# Patient Record
Sex: MALE | Race: WHITE | NOT HISPANIC OR LATINO | Employment: OTHER | URBAN - METROPOLITAN AREA
[De-identification: names, ages, dates, MRNs, and addresses within clinical notes are randomized per-mention and may not be internally consistent; named-entity substitution may affect disease eponyms.]

---

## 2017-01-10 DIAGNOSIS — L30.9 ECZEMA: Primary | ICD-10-CM

## 2017-01-10 NOTE — TELEPHONE ENCOUNTER
fluocinonide      Last Written Prescription Date: 11/10/2016  Last Fill Quantity: 60,  # refills: 0   Last Office Visit with [facility], [facility] or [facility] prescribing provider: 12/27/2016

## 2017-01-12 RX ORDER — FLUOCINONIDE 0.5 MG/G
CREAM TOPICAL
Qty: 60 G | Refills: 0 | Status: SHIPPED | OUTPATIENT
Start: 2017-01-12 | End: 2017-03-05

## 2017-01-12 NOTE — TELEPHONE ENCOUNTER
Prescription approved per [facility] Refill Protocol.  Zahida Carbajal RN

## 2017-03-05 DIAGNOSIS — L30.9 ECZEMA: ICD-10-CM

## 2017-03-06 NOTE — TELEPHONE ENCOUNTER
Fluocinonide   Last Written Prescription Date: 01/12/2017  Last Fill Quantity: 60,  # refills: 0   Last Office Visit with [facility], [facility] or [facility] prescribing provider: 12/27/2016

## 2017-03-07 RX ORDER — FLUOCINONIDE 0.5 MG/G
CREAM TOPICAL
Qty: 60 G | Refills: 5 | Status: SHIPPED | OUTPATIENT
Start: 2017-03-07 | End: 2017-11-29

## 2017-03-07 NOTE — TELEPHONE ENCOUNTER
Prescription approved per [facility] Refill Protocol.  Zoila Matthews RN

## 2017-03-17 DIAGNOSIS — J30.2 SEASONAL ALLERGIC RHINITIS: ICD-10-CM

## 2017-03-17 NOTE — TELEPHONE ENCOUNTER
Ventolin 108 mcg       Last Written Prescription Date: 03/25/2016  Last Fill Quantity: 3, # refills: 3    Last Office Visit with G, [facility] or [facility] prescribing provider:  03/25/2016   Future Office Visit:       Date of Last Asthma Action Plan Letter:   Asthma Action Plan Q1 Year    Topic Date Due     Asthma Action Plan - yearly  12/27/2017      Asthma Control Test:   ACT Total Scores 12/27/2016   ACT TOTAL SCORE -   ASTHMA ER VISITS -   ASTHMA HOSPITALIZATIONS -   ACT TOTAL SCORE (Goal Greater than or Equal to 20) 22   In the past 12 months, how many times did you visit the emergency room for your asthma without being admitted to the hospital? 0   In the past 12 months, how many times were you hospitalized overnight because of your asthma? 0       Date of Last Spirometry Test:   No results found for this or any previous visit.

## 2017-03-20 RX ORDER — ALBUTEROL SULFATE 90 UG/1
AEROSOL, METERED RESPIRATORY (INHALATION)
Qty: 54 G | Refills: 3 | Status: SHIPPED | OUTPATIENT
Start: 2017-03-20 | End: 2018-02-27

## 2017-03-20 NOTE — TELEPHONE ENCOUNTER
Prescription approved per [facility] Refill Protocol.  Boogie Carlin RN

## 2017-06-27 DIAGNOSIS — J45.31 MILD PERSISTENT ASTHMA WITH ACUTE EXACERBATION: ICD-10-CM

## 2017-06-27 NOTE — TELEPHONE ENCOUNTER
Symbicort 160-4.5 mcg       Last Written Prescription Date: 9/19/2016  Last Fill Quantity: 3, # refills: 2    Last Office Visit with G, P or [facility] prescribing provider:  12/27/2016   Future Office Visit:       Date of Last Asthma Action Plan Letter:   Asthma Action Plan Q1 Year    Topic Date Due     Asthma Action Plan - yearly  12/27/2017      Asthma Control Test:   ACT Total Scores 12/27/2016   ACT TOTAL SCORE -   ASTHMA ER VISITS -   ASTHMA HOSPITALIZATIONS -   ACT TOTAL SCORE (Goal Greater than or Equal to 20) 22   In the past 12 months, how many times did you visit the emergency room for your asthma without being admitted to the hospital? 0   In the past 12 months, how many times were you hospitalized overnight because of your asthma? 0       Date of Last Spirometry Test:   No results found for this or any previous visit.

## 2017-06-28 RX ORDER — BUDESONIDE AND FORMOTEROL FUMARATE DIHYDRATE 160; 4.5 UG/1; UG/1
AEROSOL RESPIRATORY (INHALATION)
Qty: 3 INHALER | Refills: 1 | Status: SHIPPED | OUTPATIENT
Start: 2017-06-28 | End: 2017-11-29

## 2017-06-28 NOTE — TELEPHONE ENCOUNTER
Prescription approved per [facility] [place] Refill Protocol.  Per LOV okay to follow up in a year.   Zahida Carbajal RN, BSN

## 2017-11-29 DIAGNOSIS — L30.9 ECZEMA: ICD-10-CM

## 2017-11-29 DIAGNOSIS — J45.31 MILD PERSISTENT ASTHMA WITH ACUTE EXACERBATION: ICD-10-CM

## 2017-11-30 RX ORDER — FLUOCINONIDE 0.5 MG/G
CREAM TOPICAL
Qty: 60 G | Refills: 0 | Status: SHIPPED | OUTPATIENT
Start: 2017-11-30 | End: 2017-12-29

## 2017-11-30 RX ORDER — BUDESONIDE AND FORMOTEROL FUMARATE DIHYDRATE 160; 4.5 UG/1; UG/1
AEROSOL RESPIRATORY (INHALATION)
Qty: 30.6 G | Refills: 0 | Status: SHIPPED | OUTPATIENT
Start: 2017-11-30 | End: 2017-12-29

## 2017-11-30 NOTE — TELEPHONE ENCOUNTER
Symbicort & Lidex:  Prescription approved per [facility] Refill Protocol.    Rolanda Merrill, RN, BSN

## 2017-12-22 NOTE — PROGRESS NOTES
SUBJECTIVE:   CC: Mainor Grande is an 62 year old male who presents for preventative health visit.     Physical   Annual:     Getting at least 3 servings of Calcium per day::  NO    Bi-annual eye exam::  Yes    Dental care twice a year::  Yes    Sleep apnea or symptoms of sleep apnea::  Daytime drowsiness    Diet::  Regular (no restrictions)    Frequency of exercise::  2-3 days/week    Duration of exercise::  15-30 minutes    Taking medications regularly::  Yes    Medication side effects::  Not applicable    Additional concerns today::  YES          Answers for HPI/ROS submitted by the patient on 12/28/2017   PHQ-2 Score: 2      Today's PHQ-2 Score:   PHQ-2 ( 1999 [employer]) 12/28/2017   Q1: Little interest or pleasure in doing things 1   Q2: Feeling down, depressed or hopeless 1   PHQ-2 Score 2   Q1: Little interest or pleasure in doing things Several days   Q2: Feeling down, depressed or hopeless Several days   PHQ-2 Score 2       Abuse: Current or Past(Physical, Sexual or Emotional)- No  Do you feel safe in your environment - Yes    Social History   Substance Use Topics     Smoking status: Never Smoker     Smokeless tobacco: Never Used     Alcohol use 6.0 oz/week      Comment: caffeine 6 per day     Alcohol Use 12/28/2017   If you drink alcohol, do you typically have greater than 3 drinks per day OR greater than 7 drinks per week?   No   No flowsheet data found.      Last PSA:   PSA   Date Value Ref Range Status   09/05/2014 2.34 0 - 4 ug/L Final       Reviewed orders with patient. Reviewed health maintenance and updated orders accordingly - Yes  Labs reviewed in EPIC  BP Readings from Last 3 Encounters:   12/29/17 118/78   12/27/16 124/74   07/28/16 136/77    Wt Readings from Last 3 Encounters:   12/29/17 214 lb (97.1 kg)   12/27/16 210 lb (95.3 kg)   07/28/16 207 lb 4.8 oz (94 kg)                  Patient Active Problem List   Diagnosis     Reflux esophagitis     Abnormal levels of other serum enzymes      Erectile dysfunction     Mild persistent asthma without complication     Fatty liver     HYPERLIPIDEMIA LDL GOAL <160     Seasonal allergic rhinitis     Deviated nasal septum     Chronic nasal congestion     Past Surgical History:   Procedure Laterality Date     COLONOSCOPY  9/4/2013    Procedure: COLONOSCOPY;  colonoscopy;  Surgeon: Beto Keen MD;  Location: [facility]     HC COLONOSCOPY THRU STOMA, DIAGNOSTIC  2005    normal     HC VASECTOMY UNILAT/BILAT W POSTOP SEMEN  1992    Vasectomy       Social History   Substance Use Topics     Smoking status: Never Smoker     Smokeless tobacco: Never Used     Alcohol use 6.0 oz/week      Comment: caffeine 6 per day     Family History   Problem Relation Age of Onset     CANCER Mother      bladder cancer     Neurologic Disorder Father      alzheimers dementia     GASTROINTESTINAL DISEASE Brother      divertculitis         Current Outpatient Prescriptions   Medication Sig Dispense Refill     budesonide-formoterol (SYMBICORT) 160-4.5 MCG/ACT Inhaler Inhale 2 puffs into the lungs 2 times daily 30.6 g 11     fluocinonide (LIDEX) 0.05 % cream Apply topically 2 times daily APPLY SPARINGLY TO AFFECTED AREA TWICE DAILY AS NEEDED. DO NOT APPLY TO FACE. 60 g 0     VENTOLIN  (90 BASE) MCG/ACT Inhaler INHALE 1-2 PUFFS EVERY 4-6 HOURS AS NEEDED 54 g 3     Ibuprofen (ADVIL PO)        ASPIRIN PO Take 162 mg by mouth at onset of headache for moderate pain       esomeprazole (NEXIUM) 20 MG capsule Take 20 mg by mouth every other day Take 30-60 minutes before eating.       Multiple Vitamins-Minerals (MULTIVITAMIN ADULT PO)        melatonin 1 MG TABS Take 1 mg by mouth nightly as needed for sleep       fluticasone (FLONASE) 50 MCG/ACT nasal spray Spray 1-2 sprays into both nostrils daily 16 g 11     azelastine (ASTELIN) 0.1 % spray SPRAY 2 SPRAYS IN EACH NOSTRIL TWO TIMES A DAY 90 mL 3     Allergies   Allergen Reactions     No Known Drug Allergies      Seasonal Allergies      Recent  Labs   Lab Test  01/02/18   0720  06/02/16   0837  12/22/15   0846   09/05/14   1508   LDL  91   --   112*   --   119   HDL  65   --   82   --   73   TRIG  67   --   67   --   113   ALT  64  61  125*   --    --    CR  1.04  1.03  1.01   < >   --    GFRESTIMATED  72  73  75   < >   --    GFRESTBLACK  87  88  >90   GFR Calc     < >   --    POTASSIUM  4.1  4.1  4.3   < >   --    TSH   --   1.49   --    --    --     < > = values in this interval not displayed.              Reviewed and updated as needed this visit by clinical staffTobacco  Allergies  Meds  Problems  Med Hx  Surg Hx  Fam Hx  Soc Hx          Reviewed and updated as needed this visit by Provider  Allergies  Meds  Problems          Past Medical History:   Diagnosis Date     Allergic rhinitis, cause unspecified      Fatty liver 2004    elevated LFT, saw GI for a consultation     Mild persistent asthma 9/12/2008     Reflux esophagitis      Unspecified disease of respiratory system     RAD     Viremia, unspecified     acute      Past Surgical History:   Procedure Laterality Date     COLONOSCOPY  9/4/2013    Procedure: COLONOSCOPY;  colonoscopy;  Surgeon: Beto Keen MD;  Location:  GI     HC COLONOSCOPY THRU STOMA, DIAGNOSTIC  2005    normal     HC VASECTOMY UNILAT/BILAT W POSTOP SEMEN  1992    Vasectomy         Review of Systems   Constitutional: Negative for chills and fever.   HENT: Negative for congestion, ear pain, hearing loss and sore throat.    Eyes: Positive for visual disturbance. Negative for pain.   Respiratory: Negative for cough.    Cardiovascular: Negative for chest pain, palpitations and peripheral edema.   Gastrointestinal: Negative for abdominal pain, constipation, diarrhea, heartburn, hematochezia and nausea.   Genitourinary: Negative for discharge, dysuria, frequency, genital sores, hematuria, impotence and urgency.   Musculoskeletal: Negative for arthralgias, joint swelling and myalgias.   Skin: Positive  "for rash.   Neurological: Positive for paresthesias. Negative for dizziness, weakness and headaches.   Psychiatric/Behavioral: Positive for mood changes. The patient is not nervous/anxious.        OBJECTIVE:   /78 (BP Location: Right arm, Patient Position: Chair, Cuff Size: Adult Regular)  Pulse 66  Temp 97.8  F (36.6  C) (Oral)  Resp 16  Ht 5' 10.28\" (1.785 m)  Wt 214 lb (97.1 kg)  BMI 30.46 kg/m2    Physical Exam   Constitutional: He is oriented to person, place, and time. He appears well-developed and well-nourished. No distress.   HENT:   Right Ear: Tympanic membrane and external ear normal.   Left Ear: Tympanic membrane and external ear normal.   Nose: Nose normal.   Mouth/Throat: Oropharynx is clear and moist. No oral lesions. No oropharyngeal exudate.   Eyes: Conjunctivae are normal. Pupils are equal, round, and reactive to light. Right eye exhibits no discharge. Left eye exhibits no discharge.   Neck: Neck supple. No tracheal deviation present. No thyromegaly present.   Cardiovascular: Normal rate, regular rhythm, S1 normal, S2 normal, normal heart sounds and normal pulses.  Exam reveals no S3 and no S4.    No murmur heard.  Pulmonary/Chest: Effort normal and breath sounds normal. No respiratory distress. He has no wheezes. He has no rales.   Abdominal: Soft. Bowel sounds are normal. He exhibits no mass. There is no hepatosplenomegaly. There is no tenderness.   Musculoskeletal: Normal range of motion. He exhibits no edema or deformity.   Lymphadenopathy:     He has no cervical adenopathy.   Neurological: He is alert and oriented to person, place, and time. He has normal strength and normal reflexes. He exhibits normal muscle tone.   Skin: Skin is warm and dry. No lesion and no rash noted.   Psychiatric: He has a normal mood and affect. His speech is normal. Judgment and thought content normal. Cognition and memory are normal.         ASSESSMENT/PLAN:     Problem List Items Addressed This Visit  " "   None      Visit Diagnoses     Screening for lipoid disorders    -  Primary    Relevant Orders    Lipid Profile (Chol, Trig, HDL, LDL calc) (Completed)    Mild persistent asthma with acute exacerbation        Relevant Medications    budesonide-formoterol (SYMBICORT) 160-4.5 MCG/ACT Inhaler    Eczema, unspecified type        Relevant Medications    fluocinonide (LIDEX) 0.05 % cream    Transaminitis        Relevant Orders    Comprehensive metabolic panel (BMP + Alb, Alk Phos, ALT, AST, Total. Bili, TP) (Completed)    Encounter for routine adult health examination without abnormal findings                COUNSELING:   Reviewed preventive health counseling, as reflected in patient instructions       Regular exercise       Healthy diet/nutrition       Vision screening       Hearing screening           reports that he has never smoked. He has never used smokeless tobacco.      Estimated body mass index is 30.46 kg/(m^2) as calculated from the following:    Height as of this encounter: 5' 10.28\" (1.785 m).    Weight as of this encounter: 214 lb (97.1 kg).         Counseling Resources:  ATP IV Guidelines  Pooled Cohorts Equation Calculator  FRAX Risk Assessment  ICSI Preventive Guidelines  Dietary Guidelines for Americans, 2010  USDA's MyPlate  ASA Prophylaxis  Lung CA Screening    Karen Rodriguez MD  [facility]  "

## 2017-12-28 ASSESSMENT — ENCOUNTER SYMPTOMS
HEMATOCHEZIA: 0
PARESTHESIAS: 1
CHILLS: 0
ABDOMINAL PAIN: 0
COUGH: 0
FREQUENCY: 0
HEARTBURN: 0
CONSTIPATION: 0
DIZZINESS: 0
JOINT SWELLING: 0
DIARRHEA: 0
HEMATURIA: 0
ARTHRALGIAS: 0
MYALGIAS: 0
NAUSEA: 0
DYSURIA: 0
HEADACHES: 0
WEAKNESS: 0
NERVOUS/ANXIOUS: 0
EYE PAIN: 0
PALPITATIONS: 0
FEVER: 0
SORE THROAT: 0

## 2017-12-29 ENCOUNTER — OFFICE VISIT (OUTPATIENT)
Dept: FAMILY MEDICINE | Facility: OTHER | Age: 62
End: 2017-12-29
Payer: COMMERCIAL

## 2017-12-29 VITALS
SYSTOLIC BLOOD PRESSURE: 118 MMHG | HEART RATE: 66 BPM | BODY MASS INDEX: 30.64 KG/M2 | DIASTOLIC BLOOD PRESSURE: 78 MMHG | RESPIRATION RATE: 16 BRPM | WEIGHT: 214 LBS | HEIGHT: 70 IN | TEMPERATURE: 97.8 F

## 2017-12-29 DIAGNOSIS — R74.01 TRANSAMINITIS: ICD-10-CM

## 2017-12-29 DIAGNOSIS — Z00.00 ENCOUNTER FOR ROUTINE ADULT HEALTH EXAMINATION WITHOUT ABNORMAL FINDINGS: Primary | ICD-10-CM

## 2017-12-29 DIAGNOSIS — Z13.220 SCREENING FOR LIPOID DISORDERS: ICD-10-CM

## 2017-12-29 DIAGNOSIS — L30.9 ECZEMA, UNSPECIFIED TYPE: ICD-10-CM

## 2017-12-29 DIAGNOSIS — J45.31 MILD PERSISTENT ASTHMA WITH ACUTE EXACERBATION: ICD-10-CM

## 2017-12-29 PROCEDURE — 99396 PREV VISIT EST AGE 40-64: CPT | Performed by: FAMILY MEDICINE

## 2017-12-29 RX ORDER — FLUOCINONIDE 0.5 MG/G
CREAM TOPICAL 2 TIMES DAILY
Qty: 60 G | Refills: 0 | Status: SHIPPED | OUTPATIENT
Start: 2017-12-29 | End: 2018-01-24

## 2017-12-29 RX ORDER — BUDESONIDE AND FORMOTEROL FUMARATE DIHYDRATE 160; 4.5 UG/1; UG/1
2 AEROSOL RESPIRATORY (INHALATION) 2 TIMES DAILY
Qty: 30.6 G | Refills: 11 | Status: SHIPPED | OUTPATIENT
Start: 2017-12-29 | End: 2019-01-17

## 2017-12-29 ASSESSMENT — ENCOUNTER SYMPTOMS
ARTHRALGIAS: 0
COUGH: 0
FEVER: 0
SORE THROAT: 0
HEADACHES: 0
FREQUENCY: 0
HEMATURIA: 0
HEMATOCHEZIA: 0
DYSURIA: 0
MYALGIAS: 0
DIARRHEA: 0
JOINT SWELLING: 0
CONSTIPATION: 0
DIZZINESS: 0
NAUSEA: 0
PALPITATIONS: 0
EYE PAIN: 0
NERVOUS/ANXIOUS: 0
WEAKNESS: 0
CHILLS: 0
HEARTBURN: 0
ABDOMINAL PAIN: 0
PARESTHESIAS: 1

## 2017-12-29 ASSESSMENT — PAIN SCALES - GENERAL: PAINLEVEL: NO PAIN (0)

## 2017-12-29 NOTE — NURSING NOTE
"Chief Complaint   Patient presents with     Physical     Panel Management     Flu, ACT, AAP, AD       Initial /78 (BP Location: Right arm, Patient Position: Chair, Cuff Size: Adult Regular)  Pulse 66  Temp 97.8  F (36.6  C) (Oral)  Resp 16  Ht 5' 10.28\" (1.785 m)  Wt 214 lb (97.1 kg)  BMI 30.46 kg/m2 Estimated body mass index is 30.46 kg/(m^2) as calculated from the following:    Height as of this encounter: 5' 10.28\" (1.785 m).    Weight as of this encounter: 214 lb (97.1 kg).  Medication Reconciliation: complete   Erin Cortes CMA (AAMA)      "

## 2017-12-29 NOTE — MR AVS SNAPSHOT
After Visit Summary   12/29/2017    Mainor Grande    MRN: [id number]           Patient Information     Date Of Birth          1955        Visit Information        Provider Department      12/29/2017 2:20 PM Karen Rodriguez MD [facility]        Today's Diagnoses     Screening for lipoid disorders    -  1    Mild persistent asthma with acute exacerbation        Eczema, unspecified type        Transaminitis          Care Instructions      Preventive Health Recommendations  Male Ages 50 - 64    Yearly exam:             See your health care provider every year in order to  o   Review health changes.   o   Discuss preventive care.    o   Review your medicines if your doctor has prescribed any.     Have a cholesterol test every 5 years, or more frequently if you are at risk for high cholesterol/heart disease.     Have a diabetes test (fasting glucose) every three years. If you are at risk for diabetes, you should have this test more often.     Have a colonoscopy at age 50, or have a yearly FIT test (stool test). These exams will check for colon cancer.      Talk with your health care provider about whether or not a prostate cancer screening test (PSA) is right for you.    You should be tested each year for STDs (sexually transmitted diseases), if you re at risk.     Shots: Get a flu shot each year. Get a tetanus shot every 10 years.     Nutrition:    Eat at least 5 servings of fruits and vegetables daily.     Eat whole-grain bread, whole-wheat pasta and brown rice instead of white grains and rice.     Talk to your provider about Calcium and Vitamin D.     Lifestyle    Exercise for at least 150 minutes a week (30 minutes a day, 5 days a week). This will help you control your weight and prevent disease.     Limit alcohol to one drink per day.     No smoking.     Wear sunscreen to prevent skin cancer.     See your dentist every six months for an exam and cleaning.     See your eye doctor  "every 1 to 2 years.            Follow-ups after your visit        Future tests that were ordered for you today     Open Future Orders        Priority Expected Expires Ordered    Comprehensive metabolic panel (BMP + Alb, Alk Phos, ALT, AST, Total. Bili, TP) Routine  12/29/2018 12/29/2017    Lipid Profile (Chol, Trig, HDL, LDL calc) Routine  12/29/2018 12/29/2017            Who to contact     If you have questions or need follow up information about today's clinic visit or your schedule please contact [facility] [place] directly at [phone].  Normal or non-critical lab and imaging results will be communicated to you by [employer], letter or phone within 4 business days after the clinic has received the results. If you do not hear from us within 7 days, please contact the clinic through [employer] or phone. If you have a critical or abnormal lab result, we will notify you by phone as soon as possible.  Submit refill requests through [employer] or call your pharmacy and they will forward the refill request to us. Please allow 3 business days for your refill to be completed.          Additional Information About Your Visit        MyChart Information     [employer] gives you secure access to your electronic health record. If you see a primary care provider, you can also send messages to your care team and make appointments. If you have questions, please call your primary care clinic.  If you do not have a primary care provider, please call [phone] and they will assist you.        Care EveryWhere ID     This is your Care EveryWhere ID. This could be used by other organizations to access your [place] medical records  [id number]        Your Vitals Were     Pulse Temperature Respirations Height BMI (Body Mass Index)       66 97.8  F (36.6  C) (Oral) 16 5' 10.28\" (1.785 m) 30.46 kg/m2        Blood Pressure from Last 3 Encounters:   12/29/17 118/78   12/27/16 124/74   07/28/16 136/77    Weight from Last 3 Encounters: "   12/29/17 214 lb (97.1 kg)   12/27/16 210 lb (95.3 kg)   07/28/16 207 lb 4.8 oz (94 kg)                 Today's Medication Changes          These changes are accurate as of: 12/29/17  3:13 PM.  If you have any questions, ask your nurse or doctor.               These medicines have changed or have updated prescriptions.        Dose/Directions    budesonide-formoterol 160-4.5 MCG/ACT Inhaler   Commonly known as:  SYMBICORT   This may have changed:  See the new instructions.   Used for:  Mild persistent asthma with acute exacerbation   Changed by:  Karen Rodriguez MD        Dose:  2 puff   Inhale 2 puffs into the lungs 2 times daily   Quantity:  30.6 g   Refills:  11       fluocinonide 0.05 % cream   Commonly known as:  LIDEX   This may have changed:  See the new instructions.   Used for:  Eczema, unspecified type   Changed by:  Karen Rodriguez MD        Apply topically 2 times daily APPLY SPARINGLY TO AFFECTED AREA TWICE DAILY AS NEEDED. DO NOT APPLY TO FACE.   Quantity:  60 g   Refills:  0            Where to get your medicines      These medications were sent to [id number] - [place], [place] - [address] [facility]  [id number] [place] [address]     Phone:  [phone]     budesonide-formoterol 160-4.5 MCG/ACT Inhaler    fluocinonide 0.05 % cream                Primary Care Provider Office Phone # Fax #    Karen Rodriguez [phone]       [address] [place] [address]        Equal Access to Services     MINA ALEJANDRA : Claudia moy Sojanett, waaxda luqadaha, qaybta kaalmada adeegyadustin, tarik rodriguez. So [facility] [phone].    ATENCIÓN: Si habla español, tiene a agustin disposición servicios gratuitos de asistencia lingüística. Llame al [phone].    We comply with applicable federal civil rights laws and [place] laws. We do not discriminate on the basis of race, color, national origin, age, disability, sex, sexual orientation, or  gender identity.            Thank you!     Thank you for choosing [facility]  for your care. Our goal is always to provide you with excellent care. Hearing back from our patients is one way we can continue to improve our services. Please take a few minutes to complete the written survey that you may receive in the mail after your visit with us. Thank you!             Your Updated Medication List - Protect others around you: Learn how to safely use, store and throw away your medicines at www.disposemymeds.org.          This list is accurate as of: 12/29/17  3:13 PM.  Always use your most recent med list.                   Brand Name Dispense Instructions for use Diagnosis    ADVIL PO           ASPIRIN PO      Take 162 mg by mouth at onset of headache for moderate pain        azelastine 0.1 % spray    ASTELIN    3 Bottle    USE 2 SPRAYS INTO BOTH NOSTRILS TWICE A DAY    Chronic rhinitis       budesonide-formoterol 160-4.5 MCG/ACT Inhaler    SYMBICORT    30.6 g    Inhale 2 puffs into the lungs 2 times daily    Mild persistent asthma with acute exacerbation       fluocinonide 0.05 % cream    LIDEX    60 g    Apply topically 2 times daily APPLY SPARINGLY TO AFFECTED AREA TWICE DAILY AS NEEDED. DO NOT APPLY TO FACE.    Eczema, unspecified type       fluticasone 50 MCG/ACT spray    FLONASE    16 g    Spray 1-2 sprays into both nostrils daily    Chronic nasal congestion, Deviated nasal septum, Seasonal allergic rhinitis       melatonin 1 MG Tabs tablet      Take 1 mg by mouth nightly as needed for sleep        MULTIVITAMIN ADULT PO           nexIUM 20 MG CR capsule   Generic drug:  esomeprazole      Take 20 mg by mouth every other day Take 30-60 minutes before eating.        VENTOLIN  (90 BASE) MCG/ACT Inhaler   Generic drug:  albuterol     54 g    INHALE 1-2 PUFFS EVERY 4-6 HOURS AS NEEDED    Seasonal allergic rhinitis

## 2017-12-30 ASSESSMENT — ASTHMA QUESTIONNAIRES: ACT_TOTALSCORE: 24

## 2018-01-02 DIAGNOSIS — Z13.220 SCREENING FOR LIPOID DISORDERS: ICD-10-CM

## 2018-01-02 DIAGNOSIS — R74.01 TRANSAMINITIS: ICD-10-CM

## 2018-01-02 LAB
ALBUMIN SERPL-MCNC: 3.6 G/DL (ref 3.4–5)
ALP SERPL-CCNC: 202 U/L (ref 40–150)
ALT SERPL W P-5'-P-CCNC: 64 U/L (ref 0–70)
ANION GAP SERPL CALCULATED.3IONS-SCNC: 7 MMOL/L (ref 3–14)
AST SERPL W P-5'-P-CCNC: 42 U/L (ref 0–45)
BILIRUB SERPL-MCNC: 0.6 MG/DL (ref 0.2–1.3)
BUN SERPL-MCNC: 10 MG/DL (ref 7–30)
CALCIUM SERPL-MCNC: 8.7 MG/DL (ref 8.5–10.1)
CHLORIDE SERPL-SCNC: 101 MMOL/L (ref 94–109)
CHOLEST SERPL-MCNC: 169 MG/DL
CO2 SERPL-SCNC: 29 MMOL/L (ref 20–32)
CREAT SERPL-MCNC: 1.04 MG/DL (ref 0.66–1.25)
GFR SERPL CREATININE-BSD FRML MDRD: 72 ML/MIN/1.7M2
GLUCOSE SERPL-MCNC: 94 MG/DL (ref 70–99)
HDLC SERPL-MCNC: 65 MG/DL
LDLC SERPL CALC-MCNC: 91 MG/DL
NONHDLC SERPL-MCNC: 104 MG/DL
POTASSIUM SERPL-SCNC: 4.1 MMOL/L (ref 3.4–5.3)
PROT SERPL-MCNC: 7.4 G/DL (ref 6.8–8.8)
SODIUM SERPL-SCNC: 137 MMOL/L (ref 133–144)
TRIGL SERPL-MCNC: 67 MG/DL

## 2018-01-02 PROCEDURE — 36415 COLL VENOUS BLD VENIPUNCTURE: CPT | Performed by: FAMILY MEDICINE

## 2018-01-02 PROCEDURE — 80061 LIPID PANEL: CPT | Performed by: FAMILY MEDICINE

## 2018-01-02 PROCEDURE — 80053 COMPREHEN METABOLIC PANEL: CPT | Performed by: FAMILY MEDICINE

## 2018-01-04 ENCOUNTER — MYC MEDICAL ADVICE (OUTPATIENT)
Dept: FAMILY MEDICINE | Facility: OTHER | Age: 63
End: 2018-01-04

## 2018-01-04 NOTE — TELEPHONE ENCOUNTER
"[employer] message sent with Dr. Rodriguez's result note. \"Improved cholesterol levels. Blood chemistries including kidney and liver functions are essentially normal\"  Maya Dale, CMA    "

## 2018-01-24 DIAGNOSIS — L30.9 ECZEMA, UNSPECIFIED TYPE: ICD-10-CM

## 2018-01-29 RX ORDER — FLUOCINONIDE 0.5 MG/G
CREAM TOPICAL
Qty: 60 G | Refills: 3 | Status: SHIPPED | OUTPATIENT
Start: 2018-01-29 | End: 2018-05-01

## 2018-01-29 NOTE — TELEPHONE ENCOUNTER
Lidex:  Prescription approved per [facility] Refill Protocol.    Rolanda Merrill, RN, BSN

## 2018-01-30 ENCOUNTER — NURSE TRIAGE (OUTPATIENT)
Dept: NURSING | Facility: CLINIC | Age: 63
End: 2018-01-30

## 2018-01-30 NOTE — TELEPHONE ENCOUNTER
Patient calling for refill of med pasted below  fluocinonide (LIDEX) 0.05 % cream 60 g 3 1/29/2018  No      Sig: APPLY SPARINGLY TO AFFECTED AREA TWICE DAILY AS NEEDED DO NOT APPLY TO FACE     Advised Patient that the refill was sent in yesterday and should be available to him at his pharmacy.  Caller has no further questions or concerns.     Additional Information    Pharmacy calling with prescription question and triager answers question    Protocols used: MEDICATION QUESTION CALL-ADULT-

## 2018-02-27 DIAGNOSIS — J45.30 MILD PERSISTENT ASTHMA WITHOUT COMPLICATION: Primary | ICD-10-CM

## 2018-02-27 DIAGNOSIS — J30.2 SEASONAL ALLERGIC RHINITIS: ICD-10-CM

## 2018-02-28 RX ORDER — ALBUTEROL SULFATE 90 UG/1
AEROSOL, METERED RESPIRATORY (INHALATION)
Qty: 54 G | Refills: 3 | Status: SHIPPED | OUTPATIENT
Start: 2018-02-28 | End: 2019-01-17

## 2018-02-28 NOTE — TELEPHONE ENCOUNTER
VENTOLIN  (90 BASE) MCG/ACT Inhaler  ACT Total Scores 12/29/2017   ACT TOTAL SCORE -   ASTHMA ER VISITS -   ASTHMA HOSPITALIZATIONS -   ACT TOTAL SCORE (Goal Greater than or Equal to 20) 24   In the past 12 months, how many times did you visit the emergency room for your asthma without being admitted to the hospital? 0   In the past 12 months, how many times were you hospitalized overnight because of your asthma? 0   Prescription approved per [facility] Refill Protocol.    Zahida Carbajal, RN, BSN

## 2018-04-26 NOTE — PROGRESS NOTES
SUBJECTIVE:   Mainor Grande is a 62 year old male who presents to clinic today for the following health issues:      HPI  Acute Illness   Acute illness concerns: Cough  Onset: x 3 weeks    Fever: no    Chills/Sweats: no    Headache (location?): no    Sinus Pressure:YES- occasionally    Conjunctivitis:  no    Ear Pain: no    Rhinorrhea: no    Congestion: YES- chest    Sore Throat: YES- from coughing     Cough: YES-non-productive, worsening over time    Wheeze: no    Decreased Appetite: YES    Nausea: no    Vomiting: no    Diarrhea:  YES    Dysuria/Freq.: no    Fatigue/Achiness: YES- Fatigue    Sick/Strep Exposure: no     Therapies Tried and outcome: Robitussin, Inhaler    Problem list and histories reviewed & adjusted, as indicated.  Additional history: as documented        Patient Active Problem List   Diagnosis     Reflux esophagitis     Abnormal levels of other serum enzymes     Erectile dysfunction     Mild persistent asthma without complication     Fatty liver     HYPERLIPIDEMIA LDL GOAL <160     Seasonal allergic rhinitis     Deviated nasal septum     Chronic nasal congestion     Past Surgical History:   Procedure Laterality Date     COLONOSCOPY  9/4/2013    Procedure: COLONOSCOPY;  colonoscopy;  Surgeon: Beto Keen MD;  Location: [facility]     HC COLONOSCOPY THRU STOMA, DIAGNOSTIC  2005    normal     HC VASECTOMY UNILAT/BILAT W POSTOP SEMEN  1992    Vasectomy       Social History   Substance Use Topics     Smoking status: Never Smoker     Smokeless tobacco: Never Used     Alcohol use 6.0 oz/week      Comment: caffeine 6 per day     Family History   Problem Relation Age of Onset     CANCER Mother      bladder cancer     Neurologic Disorder Father      alzheimers dementia     GASTROINTESTINAL DISEASE Brother      divertculitis         Current Outpatient Prescriptions   Medication Sig Dispense Refill     ASPIRIN PO Take 162 mg by mouth at onset of headache for moderate pain       azelastine (ASTELIN) 0.1  "% spray SPRAY 2 SPRAYS IN EACH NOSTRIL TWO TIMES A DAY 90 mL 3     azithromycin (ZITHROMAX) 250 MG tablet Two tablets first day, then one tablet daily for four days. 6 tablet 0     budesonide-formoterol (SYMBICORT) 160-4.5 MCG/ACT Inhaler Inhale 2 puffs into the lungs 2 times daily 30.6 g 11     esomeprazole (NEXIUM) 20 MG capsule Take 20 mg by mouth every other day Take 30-60 minutes before eating.       fluocinonide (LIDEX) 0.05 % cream APPLY SPARINGLY TO AFFECTED AREA TWICE DAILY AS NEEDED DO NOT APPLY TO FACE 60 g 3     fluticasone (FLONASE) 50 MCG/ACT nasal spray Spray 1-2 sprays into both nostrils daily 16 g 11     Ibuprofen (ADVIL PO)        melatonin 1 MG TABS Take 1 mg by mouth nightly as needed for sleep       Multiple Vitamins-Minerals (MULTIVITAMIN ADULT PO)        predniSONE (DELTASONE) 20 MG tablet Take 2 tablets (40 mg) by mouth daily for 5 days 10 tablet 0     VENTOLIN  (90 BASE) MCG/ACT Inhaler INHALE 1-2 PUFFS EVERY 4-6 HOURS AS NEEDED 54 g 3     Allergies   Allergen Reactions     No Known Drug Allergies      Seasonal Allergies      BP Readings from Last 3 Encounters:   04/27/18 124/70   12/29/17 118/78   12/27/16 124/74    Wt Readings from Last 3 Encounters:   04/27/18 214 lb (97.1 kg)   12/29/17 214 lb (97.1 kg)   12/27/16 210 lb (95.3 kg)                  Labs reviewed in EPIC    ROS:  Constitutional, HEENT, cardiovascular, pulmonary, GI, , musculoskeletal, neuro, skin, endocrine and psych systems are negative, except as otherwise noted.    OBJECTIVE:     /70 (BP Location: Right arm, Patient Position: Chair, Cuff Size: Adult Regular)  Pulse 94  Temp 98.4  F (36.9  C) (Temporal)  Resp 16  Ht 5' 10.2\" (1.783 m)  Wt 214 lb (97.1 kg)  SpO2 98%  BMI 30.53 kg/m2  Body mass index is 30.53 kg/(m^2).   Physical Exam   Constitutional: He is oriented to person, place, and time. He appears well-developed and well-nourished.   HENT:   Head: Normocephalic and atraumatic. "   Cardiovascular: Normal rate, regular rhythm and normal heart sounds.    Pulmonary/Chest: Effort normal. No respiratory distress. He has wheezes. He has no rales. He exhibits no tenderness.   Neurological: He is alert and oriented to person, place, and time.   Psychiatric: He has a normal mood and affect.         Diagnostic Test Results:  none     ASSESSMENT/PLAN:     Problem List Items Addressed This Visit     None      Visit Diagnoses     Moderate persistent asthma with exacerbation    -  Primary    Relevant Medications    azithromycin (ZITHROMAX) 250 MG tablet    predniSONE (DELTASONE) 20 MG tablet    Other Relevant Orders    HONORING CHOICES REFERRAL         Likely asthma exacerbation.  Given prolonged cough I also recommended doing antibiotics to prevent secondary bacterial infections.  Prednisone as prescribed.  Advised scheduled albuterol every 6 hours for the next 2 weeks.  Continue Symbicort at the current dose.  Discussed home care  Reportable signs and symptoms discussed  RTC if symptoms persist or fail to improve    Karen Rodriguez MD  [facility]

## 2018-04-27 ENCOUNTER — OFFICE VISIT (OUTPATIENT)
Dept: FAMILY MEDICINE | Facility: OTHER | Age: 63
End: 2018-04-27
Payer: COMMERCIAL

## 2018-04-27 VITALS
OXYGEN SATURATION: 98 % | SYSTOLIC BLOOD PRESSURE: 124 MMHG | DIASTOLIC BLOOD PRESSURE: 70 MMHG | RESPIRATION RATE: 16 BRPM | BODY MASS INDEX: 30.64 KG/M2 | HEIGHT: 70 IN | TEMPERATURE: 98.4 F | HEART RATE: 94 BPM | WEIGHT: 214 LBS

## 2018-04-27 DIAGNOSIS — J45.41 MODERATE PERSISTENT ASTHMA WITH EXACERBATION: Primary | ICD-10-CM

## 2018-04-27 PROCEDURE — 99213 OFFICE O/P EST LOW 20 MIN: CPT | Performed by: FAMILY MEDICINE

## 2018-04-27 RX ORDER — PREDNISONE 20 MG/1
40 TABLET ORAL DAILY
Qty: 10 TABLET | Refills: 0 | Status: SHIPPED | OUTPATIENT
Start: 2018-04-27 | End: 2018-05-02

## 2018-04-27 RX ORDER — AZITHROMYCIN 250 MG/1
TABLET, FILM COATED ORAL
Qty: 6 TABLET | Refills: 0 | Status: SHIPPED | OUTPATIENT
Start: 2018-04-27 | End: 2018-11-30

## 2018-04-27 ASSESSMENT — PAIN SCALES - GENERAL: PAINLEVEL: NO PAIN (0)

## 2018-04-27 NOTE — MR AVS SNAPSHOT
After Visit Summary   4/27/2018    Mainor Grande    MRN: [id number]           Patient Information     Date Of Birth          1955        Visit Information        Provider Department      4/27/2018 12:40 PM Karen Rodriguez MD [facility]        Today's Diagnoses     Moderate persistent asthma with exacerbation    -  1       Follow-ups after your visit        Additional Services     HONORING MORENITA REFERRAL       Your provider has referred you to Outpatient Honoring Choices Advance Care Planning Facilitator or Serious illness clinic support staff. The facilitator or support staff will contact you to schedule the appointment or for the follow up call    Reason for Referral: Basic Advance Care Planning - 1:1 need                  Who to contact     If you have questions or need follow up information about today's clinic visit or your schedule please contact [facility] directly at [phone].  Normal or non-critical lab and imaging results will be communicated to you by MyChart, letter or phone within 4 business days after the clinic has received the results. If you do not hear from us within 7 days, please contact the clinic through [employer] or phone. If you have a critical or abnormal lab result, we will notify you by phone as soon as possible.  Submit refill requests through [employer] or call your pharmacy and they will forward the refill request to us. Please allow 3 business days for your refill to be completed.          Additional Information About Your Visit        MyChart Information     [employer] gives you secure access to your electronic health record. If you see a primary care provider, you can also send messages to your care team and make appointments. If you have questions, please call your primary care clinic.  If you do not have a primary care provider, please call [phone] and they will assist you.        Care EveryWhere ID     This is your Care  "EveryWhere ID. This could be used by other organizations to access your [facility] medical records  [id number]        Your Vitals Were     Pulse Temperature Respirations Height Pulse Oximetry BMI (Body Mass Index)    94 98.4  F (36.9  C) (Temporal) 16 5' 10.2\" (1.783 m) 98% 30.53 kg/m2       Blood Pressure from Last 3 Encounters:   04/27/18 124/70   12/29/17 118/78   12/27/16 124/74    Weight from Last 3 Encounters:   04/27/18 214 lb (97.1 kg)   12/29/17 214 lb (97.1 kg)   12/27/16 210 lb (95.3 kg)              We Performed the Following     HONORING CHOICES REFERRAL          Today's Medication Changes          These changes are accurate as of 4/27/18  1:16 PM.  If you have any questions, ask your nurse or doctor.               Start taking these medicines.        Dose/Directions    azithromycin 250 MG tablet   Commonly known as:  ZITHROMAX   Used for:  Moderate persistent asthma with exacerbation   Started by:  Karen Rodriguez MD        Two tablets first day, then one tablet daily for four days.   Quantity:  6 tablet   Refills:  0       predniSONE 20 MG tablet   Commonly known as:  DELTASONE   Used for:  Moderate persistent asthma with exacerbation   Started by:  Karen Rodriguez MD        Dose:  40 mg   Take 2 tablets (40 mg) by mouth daily for 5 days   Quantity:  10 tablet   Refills:  0            Where to get your medicines      These medications were sent to [id number] - [place], [place] - [address] [address]  [address] [facility] [address]     Phone:  [phone]     azithromycin 250 MG tablet    predniSONE 20 MG tablet                Primary Care Provider Office Phone # Fax #    Karen Rodriguez [phone]       [address]  [facility] [address]        Equal Access to Services     PATTIE ALEJANDRA AH: Claudia Irizarry, wilfredo johnston, qapolota kaalchan miles, tarik rodriguez. So [facility] [phone].    ATENCIÓN: Si wendy frank, " tiene a agustin disposición servicios gratuitos de asistencia lingüística. Feroz jennings [phone].    We comply with applicable federal civil rights laws and [place] laws. We do not discriminate on the basis of race, color, national origin, age, disability, sex, sexual orientation, or gender identity.            Thank you!     Thank you for choosing [facility]  for your care. Our goal is always to provide you with excellent care. Hearing back from our patients is one way we can continue to improve our services. Please take a few minutes to complete the written survey that you may receive in the mail after your visit with us. Thank you!             Your Updated Medication List - Protect others around you: Learn how to safely use, store and throw away your medicines at www.disposemymeds.org.          This list is accurate as of 4/27/18  1:16 PM.  Always use your most recent med list.                   Brand Name Dispense Instructions for use Diagnosis    ADVIL PO           ASPIRIN PO      Take 162 mg by mouth at onset of headache for moderate pain        azelastine 0.1 % spray    ASTELIN    90 mL    SPRAY 2 SPRAYS IN EACH NOSTRIL TWO TIMES A DAY    Chronic rhinitis, unspecified type       azithromycin 250 MG tablet    ZITHROMAX    6 tablet    Two tablets first day, then one tablet daily for four days.    Moderate persistent asthma with exacerbation       budesonide-formoterol 160-4.5 MCG/ACT Inhaler    SYMBICORT    30.6 g    Inhale 2 puffs into the lungs 2 times daily    Mild persistent asthma with acute exacerbation       fluocinonide 0.05 % cream    LIDEX    60 g    APPLY SPARINGLY TO AFFECTED AREA TWICE DAILY AS NEEDED DO NOT APPLY TO FACE    Eczema, unspecified type       fluticasone 50 MCG/ACT spray    FLONASE    16 g    Spray 1-2 sprays into both nostrils daily    Chronic nasal congestion, Deviated nasal septum, Seasonal allergic rhinitis       melatonin 1 MG Tabs tablet      Take 1 mg by mouth  nightly as needed for sleep        MULTIVITAMIN ADULT PO           nexIUM 20 MG CR capsule   Generic drug:  esomeprazole      Take 20 mg by mouth every other day Take 30-60 minutes before eating.        predniSONE 20 MG tablet    DELTASONE    10 tablet    Take 2 tablets (40 mg) by mouth daily for 5 days    Moderate persistent asthma with exacerbation       VENTOLIN  (90 Base) MCG/ACT Inhaler   Generic drug:  albuterol     54 g    INHALE 1-2 PUFFS EVERY 4-6 HOURS AS NEEDED    Seasonal allergic rhinitis, Mild persistent asthma without complication

## 2018-04-27 NOTE — LETTER
My Asthma Action Plan  Name: Mainor Grande   YOB: 1955  Date: 4/27/2018   My doctor: Karen Rodriguez MD   My clinic: [facility]        My Control Medicine: Budesonide + formoterol (Symbicort) -  160/4.5 mcg .  My Rescue Medicine: Albuterol (Proair/Ventolin/Proventil) inhaler .   My Asthma Severity: moderate persistent  Avoid your asthma triggers: dust mites               GREEN ZONE   Good Control    I feel good    No cough or wheeze    Can work, sleep and play without asthma symptoms       Take your asthma control medicine every day.     1. If exercise triggers your asthma, take your rescue medication    15 minutes before exercise or sports, and    During exercise if you have asthma symptoms  2. Spacer to use with inhaler: If you have a spacer, make sure to use it with your inhaler             YELLOW ZONE Getting Worse  I have ANY of these:    I do not feel good    Cough or wheeze    Chest feels tight    Wake up at night   1. Keep taking your Green Zone medications  2. Start taking your rescue medicine:    every 20 minutes for up to 1 hour. Then every 4 hours for 24-48 hours.  3. If you stay in the Yellow Zone for more than 12-24 hours, contact your doctor.  4. If you do not return to the Green Zone in 12-24 hours or you get worse, start taking your oral steroid medicine if prescribed by your provider.           RED ZONE Medical Alert - Get Help  I have ANY of these:    I feel awful    Medicine is not helping    Breathing getting harder    Trouble walking or talking    Nose opens wide to breathe       1. Take your rescue medicine NOW  2. If your provider has prescribed an oral steroid medicine, start taking it NOW  3. Call your doctor NOW  4. If you are still in the Red Zone after 20 minutes and you have not reached your doctor:    Take your rescue medicine again and    Call 911 or go to the emergency room right away    See your regular doctor within 2 weeks of an Emergency Room or  Urgent Care visit for follow-up treatment.          Annual Reminders:  Meet with Asthma Educator,  Flu Shot in the Fall, consider Pneumonia Vaccination for patients with asthma (aged 19 and older).    [id number] [place] [place] - [address] [address] [facility]                      Asthma Triggers  How To Control Things That Make Your Asthma Worse    Triggers are things that make your asthma worse.  Look at the list below to help you find your triggers and what you can do about them.  You can help prevent asthma flare-ups by staying away from your triggers.      Trigger                                                          What you can do   Cigarette Smoke  Tobacco smoke can make asthma worse. Do not allow smoking in your home, car or around you.  Be sure no one smokes at a child s day care or school.  If you smoke, ask your health care provider for ways to help you quit.  Ask family members to quit too.  Ask your health care provider for a referral to Quit Plan to help you quit smoking, or call 0-944-308-PLAN.     Colds, Flu, Bronchitis  These are common triggers of asthma. Wash your hands often.  Don t touch your eyes, nose or mouth.  Get a flu shot every year.     Dust Mites  These are tiny bugs that live in cloth or carpet. They are too small to see. Wash sheets and blankets in hot water every week.   Encase pillows and mattress in dust mite proof covers.  Avoid having carpet if you can. If you have carpet, vacuum weekly.   Use a dust mask and HEPA vacuum.   Pollen and Outdoor Mold  Some people are allergic to trees, grass, or weed pollen, or molds. Try to keep your windows closed.  Limit time out doors when pollen count is high.   Ask you health care provider about taking medicine during allergy season.     Animal Dander  Some people are allergic to skin flakes, urine or saliva from pets with fur or feathers. Keep pets with fur or feathers out of your home.    If you can t keep the pet outdoors, then keep  the pet out of your bedroom.  Keep the bedroom door closed.  Keep pets off cloth furniture and away from stuffed toys.     Mice, Rats, and Cockroaches  Some people are allergic to the waste from these pests.   Cover food and garbage.  Clean up spills and food crumbs.  Store grease in the refrigerator.   Keep food out of the bedroom.   Indoor Mold  This can be a trigger if your home has high moisture. Fix leaking faucets, pipes, or other sources of water.   Clean moldy surfaces.  Dehumidify basement if it is damp and smelly.   Smoke, Strong Odors, and Sprays  These can reduce air quality. Stay away from strong odors and sprays, such as perfume, powder, hair spray, paints, smoke incense, paint, cleaning products, candles and new carpet.   Exercise or Sports  Some people with asthma have this trigger. Be active!  Ask your doctor about taking medicine before sports or exercise to prevent symptoms.    Warm up for 5-10 minutes before and after sports or exercise.     Other Triggers of Asthma  Cold air:  Cover your nose and mouth with a scarf.  Sometimes laughing or crying can be a trigger.  Some medicines and food can trigger asthma.

## 2018-04-27 NOTE — NURSING NOTE
"Chief Complaint   Patient presents with     Cough     x 3 weeks     Panel Management     Height, HIV, honoring choices, aap, act       Initial /70 (BP Location: Right arm, Patient Position: Chair, Cuff Size: Adult Regular)  Pulse 94  Temp 98.4  F (36.9  C) (Temporal)  Resp 16  Ht 5' 10.2\" (1.783 m)  Wt 214 lb (97.1 kg)  SpO2 98%  BMI 30.53 kg/m2 Estimated body mass index is 30.53 kg/(m^2) as calculated from the following:    Height as of this encounter: 5' 10.2\" (1.783 m).    Weight as of this encounter: 214 lb (97.1 kg).  Medication Reconciliation: complete   Erin Cortes CMA (AAMA)      "

## 2018-05-01 DIAGNOSIS — L30.9 ECZEMA, UNSPECIFIED TYPE: ICD-10-CM

## 2018-05-02 NOTE — TELEPHONE ENCOUNTER
"Requested Prescriptions   Pending Prescriptions Disp Refills     fluocinonide (LIDEX) 0.05 % cream [Pharmacy Med Name: FLUOCINONIDE 0.05% CREA] 60 g 3     Sig: APPLY SPARINGLY TO AFFECTED AREA TWICE DAILY AS NEEDED DO NOT APPLY TO FACE    Topical Steroid Protocol Failed    5/1/2018  9:33 AM       Failed - High potency steroid not ordered       Passed - Patient is age 6 or older       Passed - Authorizing prescriber's most recent note related to this medication read.       Passed - Recent (12 mo) or future (30 days) visit within the authorizing provider's specialty    Patient had office visit in the last 12 months or has a visit in the next 30 days with authorizing provider or within the authorizing provider's specialty.  See \"Patient Info\" tab in inbasket, or \"Choose Columns\" in Meds & Orders section of the refill encounter.            Routing refill request to provider for review/approval because:  Failed - High potency steroid not ordered    Breonna Zamora RN          "

## 2018-05-03 RX ORDER — FLUOCINONIDE 0.5 MG/G
CREAM TOPICAL
Qty: 60 G | Refills: 3 | Status: SHIPPED | OUTPATIENT
Start: 2018-05-03 | End: 2018-09-21

## 2018-09-21 DIAGNOSIS — L30.9 ECZEMA, UNSPECIFIED TYPE: ICD-10-CM

## 2018-09-21 RX ORDER — FLUOCINONIDE 0.5 MG/G
CREAM TOPICAL
Qty: 60 G | Refills: 3 | Status: SHIPPED | OUTPATIENT
Start: 2018-09-21 | End: 2019-02-12

## 2018-09-21 NOTE — TELEPHONE ENCOUNTER
"Requested Prescriptions   Pending Prescriptions Disp Refills     fluocinonide (LIDEX) 0.05 % cream [Pharmacy Med Name: FLUOCINONIDE 0.05% CREA] 60 g 3     Sig: APPLY SPARINGLY TO AFFECTED AREA TWICE DAILY AS NEEDED DO NOT APPLY TO FACE    Topical Steroids and Nonsteroidals Protocol Failed    9/21/2018  9:20 AM       Failed - High potency steroid not ordered       Passed - Patient is age 6 or older       Passed - Authorizing prescriber's most recent note related to this medication read.    If refill request is for ophthalmic use, please forward request to provider for approval.         Passed - Recent (12 mo) or future (30 days) visit within the authorizing provider's specialty    Patient had office visit in the last 12 months or has a visit in the next 30 days with authorizing provider or within the authorizing provider's specialty.  See \"Patient Info\" tab in inbasket, or \"Choose Columns\" in Meds & Orders section of the refill encounter.            Last OV: 04/27/2018    Routing refill request to provider for review/approval because:  High potency steroid not ordered and given refill on 05/01/2018    Parvez Roque, RN, BSN        "

## 2018-11-30 ENCOUNTER — OFFICE VISIT (OUTPATIENT)
Dept: FAMILY MEDICINE | Facility: OTHER | Age: 63
End: 2018-11-30
Payer: COMMERCIAL

## 2018-11-30 VITALS
SYSTOLIC BLOOD PRESSURE: 118 MMHG | TEMPERATURE: 98.6 F | BODY MASS INDEX: 29.28 KG/M2 | WEIGHT: 205.2 LBS | HEART RATE: 80 BPM | OXYGEN SATURATION: 98 % | RESPIRATION RATE: 16 BRPM | DIASTOLIC BLOOD PRESSURE: 72 MMHG

## 2018-11-30 DIAGNOSIS — R19.7 DIARRHEA, UNSPECIFIED TYPE: Primary | ICD-10-CM

## 2018-11-30 DIAGNOSIS — J45.30 MILD PERSISTENT ASTHMA WITHOUT COMPLICATION: ICD-10-CM

## 2018-11-30 DIAGNOSIS — E78.5 HYPERLIPIDEMIA LDL GOAL <100: ICD-10-CM

## 2018-11-30 DIAGNOSIS — J45.41 MODERATE PERSISTENT ASTHMA WITH EXACERBATION: ICD-10-CM

## 2018-11-30 PROCEDURE — 99214 OFFICE O/P EST MOD 30 MIN: CPT | Performed by: FAMILY MEDICINE

## 2018-11-30 ASSESSMENT — PAIN SCALES - GENERAL: PAINLEVEL: NO PAIN (0)

## 2018-11-30 NOTE — ASSESSMENT & PLAN NOTE
Infectious versusinflammatory colitis  Labs as ordered to rule out above  differential.  Will treat based on the results

## 2018-11-30 NOTE — MR AVS SNAPSHOT
After Visit Summary   11/30/2018    Mainor Grande    MRN: [id number]           Patient Information     Date Of Birth          1955        Visit Information        Provider Department      11/30/2018 1:00 PM Karen Rodriguez MD [facility]        Today's Diagnoses     Diarrhea, unspecified type    -  1    Hyperlipidemia LDL goal <100        Moderate persistent asthma with exacerbation        Mild persistent asthma without complication           Follow-ups after your visit        Future tests that were ordered for you today     Open Future Orders        Priority Expected Expires Ordered    CBC with platelets and differential Routine  12/30/2018 11/30/2018    Comprehensive metabolic panel (BMP + Alb, Alk Phos, ALT, AST, Total. Bili, TP) Routine  12/30/2018 11/30/2018    TSH with free T4 reflex Routine  12/30/2018 11/30/2018    Lipid panel reflex to direct LDL Fasting Routine  11/30/2019 11/30/2018    Ova and Parasite Exam Routine Routine  11/30/2019 11/30/2018    Clostridium difficile Toxin B PCR Routine  12/30/2018 11/30/2018    Cryptosporidium in stool, stain Routine  11/30/2019 11/30/2018    Enteric Bacteria and Virus Panel by TOBY Stool Routine  11/30/2019 11/30/2018            Who to contact     If you have questions or need follow up information about today's clinic visit or your schedule please contact [facility] directly at [phone].  Normal or non-critical lab and imaging results will be communicated to you by MyChart, letter or phone within 4 business days after the clinic has received the results. If you do not hear from us within 7 days, please contact the clinic through MyChart or phone. If you have a critical or abnormal lab result, we will notify you by phone as soon as possible.  Submit refill requests through "[employer]" or call your pharmacy and they will forward the refill request to us. Please allow 3 business days for your refill to be completed.           Additional Information About Your Visit        MyChart Information     [employer] gives you secure access to your electronic health record. If you see a primary care provider, you can also send messages to your care team and make appointments. If you have questions, please call your primary care clinic.  If you do not have a primary care provider, please call [phone] and they will assist you.        Care EveryWhere ID     This is your Care EveryWhere ID. This could be used by other organizations to access your [place] medical records  [id number]        Your Vitals Were     Pulse Temperature Respirations Pulse Oximetry BMI (Body Mass Index)       80 98.6  F (37  C) (Temporal) 16 98% 29.28 kg/m2        Blood Pressure from Last 3 Encounters:   11/30/18 118/72   04/27/18 124/70   12/29/17 118/78    Weight from Last 3 Encounters:   11/30/18 205 lb 3.2 oz (93.1 kg)   04/27/18 214 lb (97.1 kg)   12/29/17 214 lb (97.1 kg)               Primary Care Provider Office Phone # Fax #    Karen Rodriguez [phone]       [address]  [facility] [address]        Equal Access to Services     PATTIE ALEJANDRA AH: Hadii deidra lundbergo Sojanett, waaxda preston, qaybta kaalmada adeumerda, tarik rodriguez. So [facility] [phone].    ATENCIÓN: Si habla español, tiene a agustin disposición servicios gratuitos de asistencia lingüística. Feroz al [phone].    We comply with applicable federal civil rights laws and [place] laws. We do not discriminate on the basis of race, color, national origin, age, disability, sex, sexual orientation, or gender identity.            Thank you!     Thank you for choosing [facility]  for your care. Our goal is always to provide you with excellent care. Hearing back from our patients is one way we can continue to improve our services. Please take a few minutes to complete the written survey that you may receive in the mail after your  visit with us. Thank you!             Your Updated Medication List - Protect others around you: Learn how to safely use, store and throw away your medicines at www.disposemymeds.org.          This list is accurate as of 11/30/18  3:45 PM.  Always use your most recent med list.                   Brand Name Dispense Instructions for use Diagnosis    ADVIL PO           ASPIRIN PO      Take 162 mg by mouth at onset of headache for moderate pain        azelastine 0.1 % nasal spray    ASTELIN    90 mL    SPRAY 2 SPRAYS IN EACH NOSTRIL TWO TIMES A DAY    Chronic rhinitis, unspecified type       budesonide-formoterol 160-4.5 MCG/ACT Inhaler    SYMBICORT    30.6 g    Inhale 2 puffs into the lungs 2 times daily    Mild persistent asthma with acute exacerbation       fluocinonide 0.05 % external cream    LIDEX    60 g    APPLY SPARINGLY TO AFFECTED AREA TWICE DAILY AS NEEDED DO NOT APPLY TO FACE    Eczema, unspecified type       fluticasone 50 MCG/ACT nasal spray    FLONASE    16 g    Spray 1-2 sprays into both nostrils daily    Chronic nasal congestion, Deviated nasal septum, Seasonal allergic rhinitis       melatonin 1 MG Tabs tablet      Take 1 mg by mouth nightly as needed for sleep        MULTIVITAMIN ADULT PO           nexIUM 20 MG DR capsule   Generic drug:  esomeprazole      Take 20 mg by mouth every other day Take 30-60 minutes before eating.        VENTOLIN  (90 Base) MCG/ACT inhaler   Generic drug:  albuterol     54 g    INHALE 1-2 PUFFS EVERY 4-6 HOURS AS NEEDED    Seasonal allergic rhinitis, Mild persistent asthma without complication

## 2018-11-30 NOTE — ASSESSMENT & PLAN NOTE
Symptoms of cough likely secondary to asthma exacerbation from recent viral URI.  Discussed consistent use of Symbicort and scheduled albuterol every 4-6 hours for the next week to 10 days.  Will consider steroid burst once infectious colitis is ruled out.  Patient is agreeable to the above plan.

## 2018-11-30 NOTE — PROGRESS NOTES
SUBJECTIVE:   Mainor Grande is a 63 year old male who presents to clinic today for the following health issues:    HPI  Acute Illness   Acute illness concerns: cough, cold, flu like symptoms, diarrhea  Onset: a few weeks ago    Fever: no     Chills/Sweats: YES- occasionally.     Headache (location?): YES- occasionally.    Sinus Pressure:YES-all the time, but has allergies.    Conjunctivitis:  no    Ear Pain: YES: bilateral, sensitive.     Rhinorrhea: no     Congestion: no     Sore Throat: YES- but from coughing.      Cough: YES-worsening over time    Wheeze: YES- occasionally.     Decreased Appetite: YES    Nausea: no     Vomiting: no     Diarrhea:  YES-mostly liquid, going a lot more.     Dysuria/Freq.: No    Fatigue/Achiness: YES- achy    Sick/Strep Exposure: YES- daughter-in-law a week ago was getting over the flu.      Therapies Tried and outcome: cough medicine, ibuprofen    Problem list and histories reviewed & adjusted, as indicated.  Additional history: as documented        Patient Active Problem List   Diagnosis     Reflux esophagitis     Abnormal levels of other serum enzymes     Erectile dysfunction     Mild persistent asthma without complication     Fatty liver     HYPERLIPIDEMIA LDL GOAL <160     Seasonal allergic rhinitis     Deviated nasal septum     Chronic nasal congestion     Diarrhea, unspecified type     Past Surgical History:   Procedure Laterality Date     COLONOSCOPY  9/4/2013    Procedure: COLONOSCOPY;  colonoscopy;  Surgeon: Beto Keen MD;  Location: [facility]     HC COLONOSCOPY THRU STOMA, DIAGNOSTIC  2005    normal     HC VASECTOMY UNILAT/BILAT W POSTOP SEMEN  1992    Vasectomy       Social History   Substance Use Topics     Smoking status: Never Smoker     Smokeless tobacco: Never Used     Alcohol use 6.0 oz/week      Comment: caffeine 6 per day     Family History   Problem Relation Age of Onset     Cancer Mother      bladder cancer     Neurologic Disorder Father      alzheimers  dementia     GASTROINTESTINAL DISEASE Brother      divertculitis         Current Outpatient Prescriptions   Medication Sig Dispense Refill     ASPIRIN PO Take 162 mg by mouth at onset of headache for moderate pain       azelastine (ASTELIN) 0.1 % spray SPRAY 2 SPRAYS IN EACH NOSTRIL TWO TIMES A DAY 90 mL 3     budesonide-formoterol (SYMBICORT) 160-4.5 MCG/ACT Inhaler Inhale 2 puffs into the lungs 2 times daily 30.6 g 11     esomeprazole (NEXIUM) 20 MG capsule Take 20 mg by mouth every other day Take 30-60 minutes before eating.       fluocinonide (LIDEX) 0.05 % cream APPLY SPARINGLY TO AFFECTED AREA TWICE DAILY AS NEEDED DO NOT APPLY TO FACE 60 g 3     fluticasone (FLONASE) 50 MCG/ACT nasal spray Spray 1-2 sprays into both nostrils daily 16 g 11     Ibuprofen (ADVIL PO)        melatonin 1 MG TABS Take 1 mg by mouth nightly as needed for sleep       Multiple Vitamins-Minerals (MULTIVITAMIN ADULT PO)        VENTOLIN  (90 BASE) MCG/ACT Inhaler INHALE 1-2 PUFFS EVERY 4-6 HOURS AS NEEDED 54 g 3     Allergies   Allergen Reactions     No Known Drug Allergies      Seasonal Allergies      BP Readings from Last 3 Encounters:   11/30/18 118/72   04/27/18 124/70   12/29/17 118/78    Wt Readings from Last 3 Encounters:   11/30/18 205 lb 3.2 oz (93.1 kg)   04/27/18 214 lb (97.1 kg)   12/29/17 214 lb (97.1 kg)                  Labs reviewed in EPIC    ROS:  Constitutional, HEENT, cardiovascular, pulmonary, gi and gu systems are negative, except as otherwise noted.    OBJECTIVE:     /72  Pulse 80  Temp 98.6  F (37  C) (Temporal)  Resp 16  Wt 205 lb 3.2 oz (93.1 kg)  SpO2 98%  BMI 29.28 kg/m2  Body mass index is 29.28 kg/(m^2).   Physical Exam   Constitutional: He is oriented to person, place, and time. He appears well-developed and well-nourished.   HENT:   Head: Normocephalic and atraumatic.   Right Ear: External ear normal.   Left Ear: External ear normal.   Nose: Nose normal.   Mouth/Throat: No oropharyngeal  exudate.   Cardiovascular: Normal rate, regular rhythm and normal heart sounds.    Pulmonary/Chest: Effort normal and breath sounds normal. No respiratory distress. He has no wheezes. He has no rales. He exhibits no tenderness.   Abdominal: Soft. Bowel sounds are normal. He exhibits no distension and no mass. There is no tenderness. There is no rebound and no guarding.   Neurological: He is alert and oriented to person, place, and time.   Psychiatric: He has a normal mood and affect.         Diagnostic Test Results:  none     ASSESSMENT/PLAN:     Problem List Items Addressed This Visit     Mild persistent asthma without complication     Symptoms of cough likely secondary to asthma exacerbation from recent viral URI.  Discussed consistent use of Symbicort and scheduled albuterol every 4-6 hours for the next week to 10 days.  Will consider steroid burst once infectious colitis is ruled out.  Patient is agreeable to the above plan.         Diarrhea, unspecified type - Primary     Infectious versusinflammatory colitis  Labs as ordered to rule out above  differential.  Will treat based on the results         Relevant Orders    Ova and Parasite Exam Routine    Clostridium difficile Toxin B PCR    Cryptosporidium in stool, stain    Enteric Bacteria and Virus Panel by TOBY Stool      Other Visit Diagnoses     Hyperlipidemia LDL goal <100        Relevant Orders    Lipid panel reflex to direct LDL Fasting    Moderate persistent asthma with exacerbation               Karen Rodriguez MD  [facility]

## 2018-12-01 ASSESSMENT — ASTHMA QUESTIONNAIRES: ACT_TOTALSCORE: 17

## 2018-12-17 ENCOUNTER — MYC MEDICAL ADVICE (OUTPATIENT)
Dept: FAMILY MEDICINE | Facility: OTHER | Age: 63
End: 2018-12-17

## 2018-12-17 DIAGNOSIS — R05.9 COUGH: Primary | ICD-10-CM

## 2018-12-17 DIAGNOSIS — T78.40XD ALLERGIC STATE, SUBSEQUENT ENCOUNTER: ICD-10-CM

## 2018-12-17 NOTE — TELEPHONE ENCOUNTER
Sent patient my chart message informing him of referral placed to an allergist and the number to schedule here in [place].

## 2019-01-10 ENCOUNTER — TELEPHONE (OUTPATIENT)
Dept: FAMILY MEDICINE | Facility: OTHER | Age: 64
End: 2019-01-10

## 2019-01-10 NOTE — LETTER
[facility]  [address]   [place] [address]  Phone: [phone]  January 11, 2019      Mainor Grande  [address]  [place] [address]      Dear Mainor,    We care about your health and have reviewed your health plan including your medical conditions, medications, and lab results.  Based on this review, it is recommended that you follow up regarding the following health topic(s):  -Cholesterol  -Labs    We recommend you take the following action(s):  -schedule a LAB ONLY APPOINTMENT to recheck your: Lipids (fasting cholesterol - nothing to eat except water and/or meds for 8-10 hours), CMP(complete metabolic panel), TSH (thyroid test), CBC (complete blood cell counts) and Stool Studies (c. diff, ova & parasite, enteric bacteria) within the next 1-4 weeks.  If' you have had labs completed eslewhere, please let us know so we can update your records.       Please call us at the [facility] - [phone] (or use Virtual Web) to address the above recommendations.     Thank you for trusting [facility] and we appreciate the opportunity to serve you.  We look forward to supporting your healthcare needs in the future.    Healthy Regards,    Your Health Care Team  [facility]

## 2019-01-10 NOTE — TELEPHONE ENCOUNTER
Panel Management Review    Summary:    Patient is due/failing the following:   TSH, CMP, CBC, LDL, C.diff, Ova & Parasite, Enteric Bacteria    Action needed:   Patient needs fasting lab only appointment and stool studies.    Type of outreach:    Phone, left message for patient to call back.     Questions for provider review:    None                                                                                                                                    Erin Cortes CMA ([facility]).       Chart routed to Care Team .      Patient has the following on his problem list:     Asthma review     ACT Total Scores 11/30/2018   ACT TOTAL SCORE -   ASTHMA ER VISITS -   ASTHMA HOSPITALIZATIONS -   ACT TOTAL SCORE (Goal Greater than or Equal to 20) 17   In the past 12 months, how many times did you visit the emergency room for your asthma without being admitted to the hospital? 0   In the past 12 months, how many times were you hospitalized overnight because of your asthma? 0      1. Is Asthma diagnosis on the Problem List? Yes    2. Is Asthma listed on Health Maintenance? No   3. Patient is due for:  none      Composite cancer screening  Chart review shows that this patient is due/due soon for the following None

## 2019-01-11 NOTE — TELEPHONE ENCOUNTER
Left message for patient to call back. Please see message below. Letter sent as well.  Maya Dale, TIESHA

## 2019-01-17 DIAGNOSIS — J45.30 MILD PERSISTENT ASTHMA WITHOUT COMPLICATION: ICD-10-CM

## 2019-01-17 DIAGNOSIS — J30.2 SEASONAL ALLERGIC RHINITIS, UNSPECIFIED TRIGGER: ICD-10-CM

## 2019-01-17 DIAGNOSIS — J45.31 MILD PERSISTENT ASTHMA WITH ACUTE EXACERBATION: ICD-10-CM

## 2019-01-17 RX ORDER — ALBUTEROL SULFATE 90 UG/1
AEROSOL, METERED RESPIRATORY (INHALATION)
Qty: 54 G | Refills: 3 | Status: SHIPPED | OUTPATIENT
Start: 2019-01-17 | End: 2019-12-05

## 2019-01-17 RX ORDER — BUDESONIDE AND FORMOTEROL FUMARATE DIHYDRATE 160; 4.5 UG/1; UG/1
AEROSOL RESPIRATORY (INHALATION)
Qty: 30.6 G | Refills: 11 | Status: SHIPPED | OUTPATIENT
Start: 2019-01-17 | End: 2020-03-20

## 2019-01-17 NOTE — TELEPHONE ENCOUNTER
Ventolin and Symbicort    ACT Total Scores 11/30/2018   ACT TOTAL SCORE -   ASTHMA ER VISITS -   ASTHMA HOSPITALIZATIONS -   ACT TOTAL SCORE (Goal Greater than or Equal to 20) 17   In the past 12 months, how many times did you visit the emergency room for your asthma without being admitted to the hospital? 0   In the past 12 months, how many times were you hospitalized overnight because of your asthma? 0     Routing refill request to provider for review/approval because:  Labs out of range:  ACT  LOV 11/30/2018    Christina Orosco RN, BSN

## 2019-01-24 NOTE — TELEPHONE ENCOUNTER
Reason for Call:  Other returning call    Detailed comments: Patient called clinic and scheduled fasting labs for 1/30.    Phone Number Patient can be reached at: Home number on file [phone] (home)    Best Time: ---    Can we leave a detailed message on this number? Not Applicable    Call taken on 1/24/2019 at 10:00 AM by Danis Quiros

## 2019-01-30 DIAGNOSIS — E78.5 HYPERLIPIDEMIA LDL GOAL <100: ICD-10-CM

## 2019-01-30 DIAGNOSIS — R19.7 DIARRHEA, UNSPECIFIED TYPE: ICD-10-CM

## 2019-01-30 LAB
ALBUMIN SERPL-MCNC: 3.2 G/DL (ref 3.4–5)
ALP SERPL-CCNC: 310 U/L (ref 40–150)
ALT SERPL W P-5'-P-CCNC: 42 U/L (ref 0–70)
ANION GAP SERPL CALCULATED.3IONS-SCNC: 6 MMOL/L (ref 3–14)
AST SERPL W P-5'-P-CCNC: 34 U/L (ref 0–45)
BASOPHILS # BLD AUTO: 0.1 10E9/L (ref 0–0.2)
BASOPHILS NFR BLD AUTO: 0.9 %
BILIRUB SERPL-MCNC: 0.9 MG/DL (ref 0.2–1.3)
BUN SERPL-MCNC: 12 MG/DL (ref 7–30)
CALCIUM SERPL-MCNC: 8.7 MG/DL (ref 8.5–10.1)
CHLORIDE SERPL-SCNC: 103 MMOL/L (ref 94–109)
CHOLEST SERPL-MCNC: 171 MG/DL
CO2 SERPL-SCNC: 28 MMOL/L (ref 20–32)
CREAT SERPL-MCNC: 1.02 MG/DL (ref 0.66–1.25)
DIFFERENTIAL METHOD BLD: NORMAL
EOSINOPHIL # BLD AUTO: 0.4 10E9/L (ref 0–0.7)
EOSINOPHIL NFR BLD AUTO: 5.4 %
ERYTHROCYTE [DISTWIDTH] IN BLOOD BY AUTOMATED COUNT: 13.1 % (ref 10–15)
GFR SERPL CREATININE-BSD FRML MDRD: 77 ML/MIN/{1.73_M2}
GLUCOSE SERPL-MCNC: 99 MG/DL (ref 70–99)
HCT VFR BLD AUTO: 41.9 % (ref 40–53)
HDLC SERPL-MCNC: 62 MG/DL
HGB BLD-MCNC: 13.4 G/DL (ref 13.3–17.7)
LDLC SERPL CALC-MCNC: 61 MG/DL
LYMPHOCYTES # BLD AUTO: 2.1 10E9/L (ref 0.8–5.3)
LYMPHOCYTES NFR BLD AUTO: 28.4 %
MCH RBC QN AUTO: 30.2 PG (ref 26.5–33)
MCHC RBC AUTO-ENTMCNC: 32 G/DL (ref 31.5–36.5)
MCV RBC AUTO: 95 FL (ref 78–100)
MONOCYTES # BLD AUTO: 0.8 10E9/L (ref 0–1.3)
MONOCYTES NFR BLD AUTO: 10.2 %
NEUTROPHILS # BLD AUTO: 4.2 10E9/L (ref 1.6–8.3)
NEUTROPHILS NFR BLD AUTO: 55.1 %
NONHDLC SERPL-MCNC: 109 MG/DL
PLATELET # BLD AUTO: 369 10E9/L (ref 150–450)
POTASSIUM SERPL-SCNC: 4.3 MMOL/L (ref 3.4–5.3)
PROT SERPL-MCNC: 8.2 G/DL (ref 6.8–8.8)
RBC # BLD AUTO: 4.43 10E12/L (ref 4.4–5.9)
SODIUM SERPL-SCNC: 137 MMOL/L (ref 133–144)
TRIGL SERPL-MCNC: 239 MG/DL
TSH SERPL DL<=0.005 MIU/L-ACNC: 1.38 MU/L (ref 0.4–4)
WBC # BLD AUTO: 7.5 10E9/L (ref 4–11)

## 2019-01-30 PROCEDURE — 80053 COMPREHEN METABOLIC PANEL: CPT | Performed by: FAMILY MEDICINE

## 2019-01-30 PROCEDURE — 85025 COMPLETE CBC W/AUTO DIFF WBC: CPT | Performed by: FAMILY MEDICINE

## 2019-01-30 PROCEDURE — 80061 LIPID PANEL: CPT | Performed by: FAMILY MEDICINE

## 2019-01-30 PROCEDURE — 36415 COLL VENOUS BLD VENIPUNCTURE: CPT | Performed by: FAMILY MEDICINE

## 2019-01-30 PROCEDURE — 84443 ASSAY THYROID STIM HORMONE: CPT | Performed by: FAMILY MEDICINE

## 2019-01-31 ENCOUNTER — TELEPHONE (OUTPATIENT)
Dept: FAMILY MEDICINE | Facility: OTHER | Age: 64
End: 2019-01-31

## 2019-01-31 NOTE — TELEPHONE ENCOUNTER
----- Message from Karen Rodriguez MD sent at 1/31/2019  2:59 PM CST -----  Normal thyroid test.  Blood counts including white blood cell and hemoglobin levels are normal.  Cholesterol levels are stable compared to last year.  Blood chemistries show essentially normal kidney function.  The liver functions are essentially stable compared to last year.  No intervention necessary at this point

## 2019-02-12 ENCOUNTER — OFFICE VISIT (OUTPATIENT)
Dept: ALLERGY | Facility: OTHER | Age: 64
End: 2019-02-12
Payer: COMMERCIAL

## 2019-02-12 VITALS
SYSTOLIC BLOOD PRESSURE: 112 MMHG | BODY MASS INDEX: 29.06 KG/M2 | RESPIRATION RATE: 18 BRPM | TEMPERATURE: 98.4 F | WEIGHT: 203 LBS | HEIGHT: 70 IN | HEART RATE: 76 BPM | OXYGEN SATURATION: 95 % | DIASTOLIC BLOOD PRESSURE: 68 MMHG

## 2019-02-12 DIAGNOSIS — J01.90 ACUTE SINUSITIS WITH SYMPTOMS > 10 DAYS: ICD-10-CM

## 2019-02-12 DIAGNOSIS — L30.0 NUMMULAR ECZEMA: ICD-10-CM

## 2019-02-12 DIAGNOSIS — J45.41 MODERATE PERSISTENT ASTHMA WITH ACUTE EXACERBATION: Primary | ICD-10-CM

## 2019-02-12 DIAGNOSIS — R09.81 NASAL CONGESTION: ICD-10-CM

## 2019-02-12 LAB
FEF 25/75: NORMAL
FEV-1: NORMAL
FEV1/FVC: NORMAL
FVC: NORMAL

## 2019-02-12 PROCEDURE — 99244 OFF/OP CNSLTJ NEW/EST MOD 40: CPT | Mod: 25 | Performed by: ALLERGY & IMMUNOLOGY

## 2019-02-12 PROCEDURE — 94060 EVALUATION OF WHEEZING: CPT | Performed by: ALLERGY & IMMUNOLOGY

## 2019-02-12 PROCEDURE — 95004 PERQ TESTS W/ALRGNC XTRCS: CPT | Performed by: ALLERGY & IMMUNOLOGY

## 2019-02-12 RX ORDER — TRIAMCINOLONE ACETONIDE 1 MG/G
CREAM TOPICAL 2 TIMES DAILY
Qty: 80 G | Refills: 3 | Status: SHIPPED | OUTPATIENT
Start: 2019-02-12 | End: 2019-10-16

## 2019-02-12 RX ORDER — ALBUTEROL SULFATE 0.83 MG/ML
2.5 SOLUTION RESPIRATORY (INHALATION) ONCE
Status: COMPLETED | OUTPATIENT
Start: 2019-02-12 | End: 2019-02-12

## 2019-02-12 RX ORDER — PREDNISONE 10 MG/1
30 TABLET ORAL 2 TIMES DAILY
Qty: 30 TABLET | Refills: 0 | Status: SHIPPED | OUTPATIENT
Start: 2019-02-12 | End: 2019-02-17

## 2019-02-12 RX ADMIN — ALBUTEROL SULFATE 2.5 MG: 0.83 SOLUTION RESPIRATORY (INHALATION) at 11:56

## 2019-02-12 ASSESSMENT — MIFFLIN-ST. JEOR: SCORE: 1712.05

## 2019-02-12 NOTE — PATIENT INSTRUCTIONS
Allergy Staff Appt Hours Shot Hours Locations    Physician     Miguel Escobar DO       Support Staff     DONNY [place] CMA  Monday:                      [place] 8-7     Tuesday:         [place] 8-5     Wednesday:        [place]: 7-5     Friday:        [place] 7-5   [place]        Monday: 9-5:50        Wednesday: 2-5:50        Friday: 7-12:50     [place]        Tuesday: 7-10:50        Thursday: 1:30-6:30     [place] Monday: 7:10-4:50        Tuesday: 12:30-6:30        Thursday: 7-11:50 [facility]  [address] [place] [address]  Appt Line: [phone]  Allergy RN (Monday):  [phone]    [facility]  [address] [place] [address]  Appt Line: [phone]  Allergy RN (Tues & Wed):  [phone]    [facility]  [address] [place] [address]  Appt Line: [phone]  Allergy RN (Friday):  [phone]       Important Scheduling Information  Aspirin Desensitization: Appt will last 2 clinic days. Please call the Allergy RN line for your clinic to schedule. Discontinue antihistamines 7 days prior to the appointment.     Food Challenges: Appt will last 3-4 hours. Please call the Allergy RN line for your clinic to schedule. Discontinue antihistamines 7 days prior to the appointment.     Penicillin Testing: Appt will last 2-3 hours. Please call the Allergy RN line for your clinic to schedule. Discontinue antihistamines 7 days prior to the appointment.     Skin Testing: Appt will about 40 minutes. Call the appointment line for your clinic to schedule. Discontinue antihistamines 7 days prior to the appointment.     Venom Testing: Appt will last 2-3 hours. Please call the Allergy RN line for your clinic to schedule. Discontinue antihistamines 7 days prior to the appointment.     Thank you for trusting us with your Allergy, Asthma, and Immunology care. Please feel free to contact us with any questions or concerns you may have.      - Prednisone 30mg by  mouth twice daily for 5 days.   - Symbicort 160/4.5mcg 2 puffs inhaled twice daily with a spacer.   - Augmentin twice daily for 10 days.   - Albuterol 2-4 puffs inhaled (use a spacer unless using a Proair Respiclick device) every 4 hours as needed for chest tightness, wheezing, shortness of breath and/or coughing.   - Continue Flonase and azelastine.     Eczema Treatment Instructions     Moisturize the skin: This is the first and most important step.  Thick, greasy ointments work best: Vaseline jelly, Eucerin, Aquaphor, etc.    Apply to entire body at least twice a day, up to several times per day when the air is dry (as in late fall, winter, early spring)    If using steroid ointments, apply these first; allow to dry before applying moisturizer over the steroid ointment.    Bathing:  Bathe DAILY.    Use as little soap as possible, and very mild soaps.  Wash dirty areas with soap first, rinse off the soap, then fill the tub with clean water.    Soak in lukewarm water for 20-30 minutes    Pat dry gently, and immediately apply moisturizer while the skin is still damp    Steroid ointments:    Stronger steroid ointment for the rest of the body:Triamcinolone 0.1% cream. Apply twice daily, only to areas of rash (do not use the steroid ointment as a moisturizer).         Avoidance measures: Avoid exposure to things that make eczema worse   Rough or scratchy clothing    Harsh soaps or detergents

## 2019-02-12 NOTE — ASSESSMENT & PLAN NOTE
Asthma diagnosed 23 years of age.  On Symbicort 160/4.5 mcg 2 puffs inhaled twice daily.  Albuterol use 2-3 days/week.  Recent flaring of chest symptoms as he has been having nasal symptoms since November.  Allergy testing done today negative.  Spirometry showed significant improvement in FEF 25-75 without improvement in FEV1.  He subjectively had improvement in his chest tightness after receiving albuterol.  ACT 20.    - An asthma action plan was provided and discussed with patient and family.   - Albuterol 2-4 puffs inhaled (use a spacer unless using a Proair Respiclick device) every 4 hours as needed for chest tightness, wheezing, shortness of breath and/or coughing.   - Albuterol 2-4 puffs inhaled (use spacer if not using Proair Respiclick device) 15-20 minutes prior to physical activity.   - Please ensure warm up period prior to exercise.   - Avoid asthma triggers.  - Symbicort 160/4.5mcg 2 puffs inhaled twice daily with a spacer.   - Prednisone 30mg PO bid for 5 days.   - Treating sinusitis as noted.

## 2019-02-12 NOTE — ASSESSMENT & PLAN NOTE
History of allergic rhinitis.  Status post allergy shots multiple years ago.  He was on shots for 15 years.  He did great for 15-20 additional years.  Over the last 2-3 years in the winter he has had nasal symptoms mucus, congestion, postnasal drainage.  Repeated allergy testing today which was negative.  Yellow mucus noted on examination.    -Augmentin twice daily for 10 days.  -Continue Flonase.  -Continue Astelin 2 sprays per nostril twice daily.

## 2019-02-12 NOTE — ASSESSMENT & PLAN NOTE
[place] shaped erythematous, dry, rough patches noted on bilateral lower extremities.  Improved somewhat with Lidex.  Using Eucerin once per day.    - Eczema treatment instructions were discussed with the patient and literature provided.    - Daily bathing.   - Use of thick emollient such as Eucerin, Aquaphor, Vanicream or Vaseline 2-3 times daily.   - Soak and seal technique was discussed.    - Avoid harsh soaps and detergents. Use fragrance free.    - Triamcinolone 0.1% cream bid to active eczema on body.

## 2019-02-12 NOTE — LETTER
My Asthma Action Plan  Name: Mainor Grande   YOB: 1955  Date: 2/12/2019   My doctor: Miguel Escobar, DO   My clinic: [facility]        My Control Medicine: Budesonide + formoterol (Symbicort) -  160/4.5 mcg 2 puffs twice daily.   My Rescue Medicine:  Albuterol 2-4 puffs inhaled (use a spacer unless using a Proair Respiclick device) every 4 hours as needed for chest tightness, wheezing, shortness of breath and/or coughing.      My Asthma Severity: moderate persistent  Avoid your asthma triggers: upper respiratory infections               GREEN ZONE   Good Control    I feel good    No cough or wheeze    Can work, sleep and play without asthma symptoms       Take your asthma control medicine every day.     1. If exercise triggers your asthma, take your rescue medication    15 minutes before exercise or sports, and    During exercise if you have asthma symptoms  2. Spacer to use with inhaler: If you have a spacer, make sure to use it with your inhaler             YELLOW ZONE Getting Worse  I have ANY of these:    I do not feel good    Cough or wheeze    Chest feels tight    Wake up at night   1. Keep taking your Green Zone medications  2. Start taking your rescue medicine:    every 20 minutes for up to 1 hour. Then every 4 hours for 24-48 hours.  3. If you stay in the Yellow Zone for more than 12-24 hours, contact your doctor.  4. If you do not return to the Green Zone in 12-24 hours or you get worse, start taking your oral steroid medicine if prescribed by your provider.           RED ZONE Medical Alert - Get Help  I have ANY of these:    I feel awful    Medicine is not helping    Breathing getting harder    Trouble walking or talking    Nose opens wide to breathe       1. Take your rescue medicine NOW  2. If your provider has prescribed an oral steroid medicine, start taking it NOW  3. Call your doctor NOW  4. If you are still in the Red Zone after 20 minutes and you have not  reached your doctor:    Take your rescue medicine again and    Call 911 or go to the emergency room right away    See your regular doctor within 2 weeks of an Emergency Room or Urgent Care visit for follow-up treatment.          Annual Reminders:  Meet with Asthma Educator,  Flu Shot in the Fall, consider Pneumonia Vaccination for patients with asthma (aged 19 and older).    Pharmacy: [id number] - [place], [place] - [address] [facility]                      Asthma Triggers  How To Control Things That Make Your Asthma Worse    Triggers are things that make your asthma worse.  Look at the list below to help you find your triggers and what you can do about them.  You can help prevent asthma flare-ups by staying away from your triggers.      Trigger                                                          What you can do   Cigarette Smoke  Tobacco smoke can make asthma worse. Do not allow smoking in your home, car or around you.  Be sure no one smokes at a child s day care or school.  If you smoke, ask your health care provider for ways to help you quit.  Ask family members to quit too.  Ask your health care provider for a referral to Quit Plan to help you quit smoking, or call 1-306-441-PLAN.     Colds, Flu, Bronchitis  These are common triggers of asthma. Wash your hands often.  Don t touch your eyes, nose or mouth.  Get a flu shot every year.     Dust Mites  These are tiny bugs that live in cloth or carpet. They are too small to see. Wash sheets and blankets in hot water every week.   Encase pillows and mattress in dust mite proof covers.  Avoid having carpet if you can. If you have carpet, vacuum weekly.   Use a dust mask and HEPA vacuum.   Pollen and Outdoor Mold  Some people are allergic to trees, grass, or weed pollen, or molds. Try to keep your windows closed.  Limit time out doors when pollen count is high.   Ask you health care provider about taking medicine during allergy season.     Animal Dander  Some  people are allergic to skin flakes, urine or saliva from pets with fur or feathers. Keep pets with fur or feathers out of your home.    If you can t keep the pet outdoors, then keep the pet out of your bedroom.  Keep the bedroom door closed.  Keep pets off cloth furniture and away from stuffed toys.     Mice, Rats, and Cockroaches  Some people are allergic to the waste from these pests.   Cover food and garbage.  Clean up spills and food crumbs.  Store grease in the refrigerator.   Keep food out of the bedroom.   Indoor Mold  This can be a trigger if your home has high moisture. Fix leaking faucets, pipes, or other sources of water.   Clean moldy surfaces.  Dehumidify basement if it is damp and smelly.   Smoke, Strong Odors, and Sprays  These can reduce air quality. Stay away from strong odors and sprays, such as perfume, powder, hair spray, paints, smoke incense, paint, cleaning products, candles and new carpet.   Exercise or Sports  Some people with asthma have this trigger. Be active!  Ask your doctor about taking medicine before sports or exercise to prevent symptoms.    Warm up for 5-10 minutes before and after sports or exercise.     Other Triggers of Asthma  Cold air:  Cover your nose and mouth with a scarf.  Sometimes laughing or crying can be a trigger.  Some medicines and food can trigger asthma.

## 2019-02-12 NOTE — NURSING NOTE
The following nebulizer treatment was given:     MEDICATION: Albuterol Sulfate 2.5 mg  : Ritedose  LOT #: [id number]  EXPIRATION DATE:  04/30/2020  NDC # 92788-114-89

## 2019-02-12 NOTE — LETTER
2/12/2019         RE: Mainor Grande  [id number] [facility] [address]        Dear Colleague,    Thank you for referring your patient, Mainor Grande, to the [facility]. Please see a copy of my visit note below.    Mainor Grande is a 63 year old White male with previous medical history significant for asthma and allergic rhinitis. Mainor Grande is being seen today for evaluation of asthma and seasonal allergies. The patient is being seen in consultation at the request of Dr. Michael MD.     Patient reports that since 23 years of age she has had perennial with winter worsening congestion, sneezing.  He underwent allergy shots for 15 years for trees, weeds, dust mite and molds.  Allergy shots were helpful.  He did rate for the last 15-20 years but then began having nasal symptoms over the last 2-3 years during the winter only.  Symptoms began in late November and persist through March.  Symptoms include congestion, sneezing, colored mucus, mucus in chest, coughing and occasional wheezing.  Treatment has included Astelin 2 sprays per nostril twice daily and Flonase 2 sprays per nostril daily.  He has been on oral antihistamine including Zyrtec, Allegra and Claritin and has found did not be beneficial.  No use of antibiotics.  He snacks has been helpful.  Denies sinus pressure.  Sense of smell and taste is intact.  He additionally has a history of asthma.  He is on Symbicort 160/4.5 mcg.  He has been on Symbicort for the last 6-7 years.  He reports chest symptoms flare with cold air, upper respiratory tract infections and mowing grass.  No use of montelukast.  He is currently using albuterol 2-3 days/week.  Symptoms have flared with his chest symptoms.  No use of prednisone.  No ER visits or hospitalizations.  He additionally reports that in the winter he gets dry, erythematous, pruritic patches on bilateral lower extremities that are corn shaped.  Treated with Lidex and  this is beneficial.  Using Eucerin once daily.      ENVIRONMENTAL HISTORY: [place] The home is heated with a forced air. They does have central air conditioning. The patient's bedroom is furnished with carpeting in bedroom.  Pets inside the house include 0 animals. There is no history of cockroach or mice infestation. There is/are 0 smokers in the house.  The house does not have a damp basement.           ACT Total Scores 2/12/2019   ACT TOTAL SCORE -   ASTHMA ER VISITS -   ASTHMA HOSPITALIZATIONS -   ACT TOTAL SCORE (Goal Greater than or Equal to 20) 20   In the past 12 months, how many times did you visit the emergency room for your asthma without being admitted to the hospital? 0   In the past 12 months, how many times were you hospitalized overnight because of your asthma? 0             Past Medical History:   Diagnosis Date     Allergic rhinitis, cause unspecified      Fatty liver 2004    elevated LFT, saw GI for a consultation     Mild persistent asthma 9/12/2008     Reflux esophagitis      Unspecified disease of respiratory system     RAD     Viremia, unspecified     acute     Family History   Problem Relation Age of Onset     Cancer Mother         bladder cancer     Neurologic Disorder Father         alzheimers dementia     Gastrointestinal Disease Brother         divertculitis     Past Surgical History:   Procedure Laterality Date     COLONOSCOPY  9/4/2013    Procedure: COLONOSCOPY;  colonoscopy;  Surgeon: Beto Keen MD;  Location: [facility]     HC COLONOSCOPY THRU STOMA, DIAGNOSTIC  2005    normal     HC VASECTOMY UNILAT/BILAT W POSTOP SEMEN  1992    Vasectomy       REVIEW OF SYSTEMS:  General: negative for weight gain. negative for weight loss. negative for changes in sleep.   Ears: negative for fullness. negative for hearing loss. negative for dizziness.   Nose: negative for snoring.negative for changes in smell. negative for drainage.   Eyes: negative  for eye watering. negative for eye itching. negative for vision changes. negative for eye redness.  Throat: negative for hoarseness. negative for sore throat. negative for trouble swallowing.   Lungs: positive  for shortness of breath.positive  for wheezing. positive  for sputum production.   Cardiovascular: negative for chest pain. negative for swelling of ankles. negative for fast or irregular heartbeat.   Gastrointestinal: negative for nausea. positive  for heartburn. negative for acid reflux.   Musculoskeletal: negative for joint pain. negative for joint stiffness. negative for joint swelling.   Neurologic: negative for seizures. negative for fainting. negative for weakness.   Psychiatric: negative for changes in mood. negative for anxiety.   Endocrine: negative for cold intolerance. negative for heat intolerance. negative for tremors.   Lymphatic: negative for lower extremity swelling. negative for lymph node swelling.   Hematologic: negative for easy bruising. negative for easy bleeding.  Integumentary: positive  for rash. negative for scaling. negative for nail changes.       Current Outpatient Medications:      amoxicillin-clavulanate (AUGMENTIN) 875-125 MG tablet, Take 1 tablet by mouth 2 times daily for 10 days, Disp: 20 tablet, Rfl: 0     ASPIRIN PO, Take 162 mg by mouth at onset of headache for moderate pain, Disp: , Rfl:      azelastine (ASTELIN) 0.1 % spray, SPRAY 2 SPRAYS IN EACH NOSTRIL TWO TIMES A DAY, Disp: 90 mL, Rfl: 3     esomeprazole (NEXIUM) 20 MG capsule, Take 20 mg by mouth every other day Take 30-60 minutes before eating., Disp: , Rfl:      fluticasone (FLONASE) 50 MCG/ACT nasal spray, Spray 1-2 sprays into both nostrils daily, Disp: 16 g, Rfl: 11     Ibuprofen (ADVIL PO), , Disp: , Rfl:      melatonin 1 MG TABS, Take 1 mg by mouth nightly as needed for sleep, Disp: , Rfl:      Multiple Vitamins-Minerals (MULTIVITAMIN ADULT PO), , Disp: , Rfl:      predniSONE (DELTASONE) 10 MG tablet, Take  30 mg by mouth 2 times daily for 5 days., Disp: 30 tablet, Rfl: 0     SYMBICORT 160-4.5 MCG/ACT Inhaler, INHALE TWO PUFFS BY MOUTH TWICE A DAY, Disp: 30.6 g, Rfl: 11     triamcinolone (KENALOG) 0.1 % external cream, Apply topically 2 times daily, Disp: 80 g, Rfl: 3     VENTOLIN  (90 Base) MCG/ACT inhaler, INHALE 1-2 PUFFS EVERY 4-6 HOURS AS NEEDED, Disp: 54 g, Rfl: 3  No current facility-administered medications for this visit.   Immunization History   Administered Date(s) Administered     HepB 06/30/2013, 08/01/2013, 01/07/2014     Influenza (IIV3) PF 11/14/2005     Influenza Vaccine IM 3yrs+ 4 Valent IIV4 10/01/2015     Mantoux Tuberculin Skin Test 06/30/2013     TD (ADULT, 7+) 06/30/2013     Tetanus 03/20/2003     Allergies   Allergen Reactions     No Known Drug Allergies      Seasonal Allergies          EXAM:   Constitutional:  Appears well-developed and well-nourished. No distress.   HEENT:   Head: Normocephalic.   Mouth/Throat: No oropharyngeal exudate present.   No cobblestoning of posterior oropharynx.   Nasal tissue pink and normal appearing.  Yellow rhinorrhea noted.    Eyes: Conjunctivae are non-erythematous   No maxillary or frontal sinus tenderness to palpation.   Cardiovascular: Normal rate, regular rhythm and normal heart sounds. Exam reveals no gallop and no friction rub.   No murmur heard.  Respiratory: Effort normal and breath sounds normal. No respiratory distress. No wheezes. No rales.   Musculoskeletal: Normal range of motion.   Neuro: Oriented to person, place, and time.  Skin: Erythematous and dry coin shaped patches on lower extremities.   Psychiatric: Normal mood and affect.     Nursing note and vitals reviewed.    WORKUP:  ENVIRONMENTAL PERCUTANEOUS SKIN TESTING: ADULT  [place] Environmental 2/12/2019   Cat Hair*ALK (10,000 BAU/ml) 0   AP Dog Hair/Dander (1:100 w/v) 0   Dust Mite p. 30,000 AU/ml 0   Dust Mite f. (30,000 AU/ml) 0   [place] (W/F in millimeters) 0   La[place] Grass  (100,000 BAU/mL) 0   Red [place] (W/F in millimeters) 0   Maple/Niles (W/F in millimeters) 0   Hackberry (W/F in millimeters) 0   Bay (W/F in millimeters) 0   [place] *ALK (W/F in millimeters) 0   American Elm (W/F in millimeters) 0   [place] (W/F in millimeters) 0   [place] (W/F in millimeters) 0   Birch Mix (W/F in millimeters) 0   [place] (W/F in millimeters) 0   Oak (W/F in millimeters) 0   Cocklebur (W/F in millimeters) 0   [place] (W/F in millimeters) 0   [place] (W/F in millimeters) 0   [place] (W/F in millimeters) 0   Nettle (W/F in millimeters) 0   English Plantain (W/F in millimeters) 0   Kochia (W/F in millimeters) 0   Lamb's Quarter (W/F in millimeters) 0   Mars[place]er (W/F in millimeters) 0   Ragweed Mix* ALK (W/F in millimeters) 0   Russian Thistle (W/F in millimeters) 0   Sagebrush/Mugwort (W/F in millimeters) 0   Sheep Sorrel (W/F in millimeters) 0   Feather Mix* ALK (W/F in millimeters) 0   Penicillium Mix (1:10 w/v) 0   Curvularia spicifera (1:10 w/v) 0   Epicoccum (1:10 w/v) 0   Aspergillus fumigatus (1:10 w/v): 0   Alternaria tenius (1:10 w/v) 0   H. Cladosporium (1:10 w/v) 0   Phoma herbarum (1:10 w/v) 0      Spirometry-pre  FVC % pred:81  FEV1 % pred:77  FEV1/FVC % act:72  FEF 25-75% pred:65    Spirometry-post  FVC % pred:76  FEV1 % pred:79  FEV1/FVC % act:78  FEF 25-75% pred:91    Nonsignificant improvement in FEV1.  40% improvement in FEF 25-75.        ASSESSMENT/PLAN:  Problem List Items Addressed This Visit        Respiratory    Moderate persistent asthma with acute exacerbation - Primary     Asthma diagnosed 23 years of age.  On Symbicort 160/4.5 mcg 2 puffs inhaled twice daily.  Albuterol use 2-3 days/week.  Recent flaring of chest symptoms as he has been having nasal symptoms since November.  Allergy testing done today negative.  Spirometry showed significant improvement in FEF 25-75 without improvement in FEV1.  He subjectively had improvement in his chest tightness  after receiving albuterol.  ACT 20.    - An asthma action plan was provided and discussed with patient and family.   - Albuterol 2-4 puffs inhaled (use a spacer unless using a Proair Respiclick device) every 4 hours as needed for chest tightness, wheezing, shortness of breath and/or coughing.   - Albuterol 2-4 puffs inhaled (use spacer if not using Proair Respiclick device) 15-20 minutes prior to physical activity.   - Please ensure warm up period prior to exercise.   - Avoid asthma triggers.  - Symbicort 160/4.5mcg 2 puffs inhaled twice daily with a spacer.   - Prednisone 30mg PO bid for 5 days.   - Treating sinusitis as noted.          Relevant Medications    albuterol (PROVENTIL) neb solution 2.5 mg (Completed)    predniSONE (DELTASONE) 10 MG tablet    amoxicillin-clavulanate (AUGMENTIN) 875-125 MG tablet    Other Relevant Orders    BREATHING CAPACITY TEST [87008] (Completed)    BRONCHODILATION RESPONSE, PRE/POST ADMIN    ALLERGY SKIN TESTS,ALLERGENS [37420] (Completed)    Acute sinusitis with symptoms > 10 days     History of allergic rhinitis.  Status post allergy shots multiple years ago.  He was on shots for 15 years.  He did great for 15-20 additional years.  Over the last 2-3 years in the winter he has had nasal symptoms mucus, congestion, postnasal drainage.  Repeated allergy testing today which was negative.  Yellow mucus noted on examination.    -Augmentin twice daily for 10 days.  -Continue Flonase.  -Continue Astelin 2 sprays per nostril twice daily.         Relevant Medications    albuterol (PROVENTIL) neb solution 2.5 mg (Completed)    predniSONE (DELTASONE) 10 MG tablet    amoxicillin-clavulanate (AUGMENTIN) 875-125 MG tablet       Musculoskeletal and Integumentary    Nummular eczema     [place] shaped erythematous, dry, rough patches noted on bilateral lower extremities.  Improved somewhat with Lidex.  Using Eucerin once per day.    - Eczema treatment instructions were discussed with the patient and  literature provided.    - Daily bathing.   - Use of thick emollient such as Eucerin, Aquaphor, Vanicream or Vaseline 2-3 times daily.   - Soak and seal technique was discussed.    - Avoid harsh soaps and detergents. Use fragrance free.    - Triamcinolone 0.1% cream bid to active eczema on body.              Relevant Medications    predniSONE (DELTASONE) 10 MG tablet    triamcinolone (KENALOG) 0.1 % external cream    Other Relevant Orders    ALLERGY SKIN TESTS,ALLERGENS [27228] (Completed)      Other Visit Diagnoses     Nasal congestion        Relevant Orders    ALLERGY SKIN TESTS,ALLERGENS [92574] (Completed)        6 month follow up.     Chart documentation with Dragon Voice recognition Software. Although reviewed after completion, some words and grammatical errors may remain.    Miguel Escobar,    Allergy/Immunology  [facility], [place] and [place], [place]      Again, thank you for allowing me to participate in the care of your patient.        Sincerely,        Miguel Escobar, DO

## 2019-02-12 NOTE — PROGRESS NOTES
Mainor Grande is a 63 year old White male with previous medical history significant for asthma and allergic rhinitis. Mainor Grande is being seen today for evaluation of asthma and seasonal allergies. The patient is being seen in consultation at the request of Dr. Michael MD.     Patient reports that since 23 years of age she has had perennial with winter worsening congestion, sneezing.  He underwent allergy shots for 15 years for trees, weeds, dust mite and molds.  Allergy shots were helpful.  He did rate for the last 15-20 years but then began having nasal symptoms over the last 2-3 years during the winter only.  Symptoms began in late November and persist through March.  Symptoms include congestion, sneezing, colored mucus, mucus in chest, coughing and occasional wheezing.  Treatment has included Astelin 2 sprays per nostril twice daily and Flonase 2 sprays per nostril daily.  He has been on oral antihistamine including Zyrtec, Allegra and Claritin and has found did not be beneficial.  No use of antibiotics.  He snacks has been helpful.  Denies sinus pressure.  Sense of smell and taste is intact.  He additionally has a history of asthma.  He is on Symbicort 160/4.5 mcg.  He has been on Symbicort for the last 6-7 years.  He reports chest symptoms flare with cold air, upper respiratory tract infections and mowing grass.  No use of montelukast.  He is currently using albuterol 2-3 days/week.  Symptoms have flared with his chest symptoms.  No use of prednisone.  No ER visits or hospitalizations.  He additionally reports that in the winter he gets dry, erythematous, pruritic patches on bilateral lower extremities that are corn shaped.  Treated with Lidex and this is beneficial.  Using Eucerin once daily.      ENVIRONMENTAL HISTORY: [place] The home is heated with a forced air. They does have central air conditioning. The patient's bedroom is furnished with carpeting  in bedroom.  Pets inside the house include 0 animals. There is no history of cockroach or mice infestation. There is/are 0 smokers in the house.  The house does not have a damp basement.           ACT Total Scores 2/12/2019   ACT TOTAL SCORE -   ASTHMA ER VISITS -   ASTHMA HOSPITALIZATIONS -   ACT TOTAL SCORE (Goal Greater than or Equal to 20) 20   In the past 12 months, how many times did you visit the emergency room for your asthma without being admitted to the hospital? 0   In the past 12 months, how many times were you hospitalized overnight because of your asthma? 0             Past Medical History:   Diagnosis Date     Allergic rhinitis, cause unspecified      Fatty liver 2004    elevated LFT, saw GI for a consultation     Mild persistent asthma 9/12/2008     Reflux esophagitis      Unspecified disease of respiratory system     RAD     Viremia, unspecified     acute     Family History   Problem Relation Age of Onset     Cancer Mother         bladder cancer     Neurologic Disorder Father         alzheimers dementia     Gastrointestinal Disease Brother         divertculitis     Past Surgical History:   Procedure Laterality Date     COLONOSCOPY  9/4/2013    Procedure: COLONOSCOPY;  colonoscopy;  Surgeon: Beto Keen MD;  Location:  GI     HC COLONOSCOPY THRU STOMA, DIAGNOSTIC  2005    normal     HC VASECTOMY UNILAT/BILAT W POSTOP SEMEN  1992    Vasectomy       REVIEW OF SYSTEMS:  General: negative for weight gain. negative for weight loss. negative for changes in sleep.   Ears: negative for fullness. negative for hearing loss. negative for dizziness.   Nose: negative for snoring.negative for changes in smell. negative for drainage.   Eyes: negative for eye watering. negative for eye itching. negative for vision changes. negative for eye redness.  Throat: negative for hoarseness. negative for sore throat. negative for trouble swallowing.   Lungs: positive  for shortness of breath.positive  for wheezing.  positive  for sputum production.   Cardiovascular: negative for chest pain. negative for swelling of ankles. negative for fast or irregular heartbeat.   Gastrointestinal: negative for nausea. positive  for heartburn. negative for acid reflux.   Musculoskeletal: negative for joint pain. negative for joint stiffness. negative for joint swelling.   Neurologic: negative for seizures. negative for fainting. negative for weakness.   Psychiatric: negative for changes in mood. negative for anxiety.   Endocrine: negative for cold intolerance. negative for heat intolerance. negative for tremors.   Lymphatic: negative for lower extremity swelling. negative for lymph node swelling.   Hematologic: negative for easy bruising. negative for easy bleeding.  Integumentary: positive  for rash. negative for scaling. negative for nail changes.       Current Outpatient Medications:      amoxicillin-clavulanate (AUGMENTIN) 875-125 MG tablet, Take 1 tablet by mouth 2 times daily for 10 days, Disp: 20 tablet, Rfl: 0     ASPIRIN PO, Take 162 mg by mouth at onset of headache for moderate pain, Disp: , Rfl:      azelastine (ASTELIN) 0.1 % spray, SPRAY 2 SPRAYS IN EACH NOSTRIL TWO TIMES A DAY, Disp: 90 mL, Rfl: 3     esomeprazole (NEXIUM) 20 MG capsule, Take 20 mg by mouth every other day Take 30-60 minutes before eating., Disp: , Rfl:      fluticasone (FLONASE) 50 MCG/ACT nasal spray, Spray 1-2 sprays into both nostrils daily, Disp: 16 g, Rfl: 11     Ibuprofen (ADVIL PO), , Disp: , Rfl:      melatonin 1 MG TABS, Take 1 mg by mouth nightly as needed for sleep, Disp: , Rfl:      Multiple Vitamins-Minerals (MULTIVITAMIN ADULT PO), , Disp: , Rfl:      predniSONE (DELTASONE) 10 MG tablet, Take 30 mg by mouth 2 times daily for 5 days., Disp: 30 tablet, Rfl: 0     SYMBICORT 160-4.5 MCG/ACT Inhaler, INHALE TWO PUFFS BY MOUTH TWICE A DAY, Disp: 30.6 g, Rfl: 11     triamcinolone (KENALOG) 0.1 % external cream, Apply topically 2 times daily, Disp: 80 g,  Rfl: 3     VENTOLIN  (90 Base) MCG/ACT inhaler, INHALE 1-2 PUFFS EVERY 4-6 HOURS AS NEEDED, Disp: 54 g, Rfl: 3  No current facility-administered medications for this visit.   Immunization History   Administered Date(s) Administered     HepB 06/30/2013, 08/01/2013, 01/07/2014     Influenza (IIV3) PF 11/14/2005     Influenza Vaccine IM 3yrs+ 4 Valent IIV4 10/01/2015     Mantoux Tuberculin Skin Test 06/30/2013     TD (ADULT, 7+) 06/30/2013     Tetanus 03/20/2003     Allergies   Allergen Reactions     No Known Drug Allergies      Seasonal Allergies          EXAM:   Constitutional:  Appears well-developed and well-nourished. No distress.   HEENT:   Head: Normocephalic.   Mouth/Throat: No oropharyngeal exudate present.   No cobblestoning of posterior oropharynx.   Nasal tissue pink and normal appearing.  Yellow rhinorrhea noted.    Eyes: Conjunctivae are non-erythematous   No maxillary or frontal sinus tenderness to palpation.   Cardiovascular: Normal rate, regular rhythm and normal heart sounds. Exam reveals no gallop and no friction rub.   No murmur heard.  Respiratory: Effort normal and breath sounds normal. No respiratory distress. No wheezes. No rales.   Musculoskeletal: Normal range of motion.   Neuro: Oriented to person, place, and time.  Skin: Erythematous and dry coin shaped patches on lower extremities.   Psychiatric: Normal mood and affect.     Nursing note and vitals reviewed.    WORKUP:  ENVIRONMENTAL PERCUTANEOUS SKIN TESTING: ADULT  [place] Environmental 2/12/2019   Cat Hair*ALK (10,000 BAU/ml) 0   AP Dog Hair/Dander (1:100 w/v) 0   Dust Mite p. 30,000 AU/ml 0   Dust Mite f. (30,000 AU/ml) 0   [place] (W/F in millimeters) 0   [place] Grass (100,000 BAU/mL) 0   Red Cedar (W/F in millimeters) 0   Maple/[place] (W/F in millimeters) 0   [place]berry (W/F in millimeters) 0   Power (W/F in millimeters) 0   [place] *ALK (W/F in millimeters) 0   American Elm (W/F in millimeters) 0   [place] (W/F in  millimeters) 0   [place] (W/F in millimeters) 0   Birch Mix (W/F in millimeters) 0   [place] (W/F in millimeters) 0   Oak (W/F in millimeters) 0   Cockle[place] (W/F in millimeters) 0   [place] (W/F in millimeters) 0   [place] (W/F in millimeters) 0   [place] (W/F in millimeters) 0   Nettle (W/F in millimeters) 0   English Plantain (W/F in millimeters) 0   Kochia (W/F in millimeters) 0   Lamb's Quarter (W/F in millimeters) 0   [place]ars[place]er (W/F in millimeters) 0   Ragweed Mix* ALK (W/F in millimeters) 0   Russian Thistle (W/F in millimeters) 0   Sagebrush/Mugwort (W/F in millimeters) 0   Sheep Sorrel (W/F in millimeters) 0   Feather Mix* ALK (W/F in millimeters) 0   Penicillium Mix (1:10 w/v) 0   Curvularia spicifera (1:10 w/v) 0   Epicoccum (1:10 w/v) 0   Aspergillus fumigatus (1:10 w/v): 0   Alternaria tenius (1:10 w/v) 0   H. Cladosporium (1:10 w/v) 0   Phoma herbarum (1:10 w/v) 0      Spirometry-pre  FVC % pred:81  FEV1 % pred:77  FEV1/FVC % act:72  FEF 25-75% pred:65    Spirometry-post  FVC % pred:76  FEV1 % pred:79  FEV1/FVC % act:78  FEF 25-75% pred:91    Nonsignificant improvement in FEV1.  40% improvement in FEF 25-75.        ASSESSMENT/PLAN:  Problem List Items Addressed This Visit        Respiratory    Moderate persistent asthma with acute exacerbation - Primary     Asthma diagnosed 23 years of age.  On Symbicort 160/4.5 mcg 2 puffs inhaled twice daily.  Albuterol use 2-3 days/week.  Recent flaring of chest symptoms as he has been having nasal symptoms since November.  Allergy testing done today negative.  Spirometry showed significant improvement in FEF 25-75 without improvement in FEV1.  He subjectively had improvement in his chest tightness after receiving albuterol.  ACT 20.    - An asthma action plan was provided and discussed with patient and family.   - Albuterol 2-4 puffs inhaled (use a spacer unless using a Proair Respiclick device) every 4 hours as needed for chest tightness, wheezing,  shortness of breath and/or coughing.   - Albuterol 2-4 puffs inhaled (use spacer if not using Proair Respiclick device) 15-20 minutes prior to physical activity.   - Please ensure warm up period prior to exercise.   - Avoid asthma triggers.  - Symbicort 160/4.5mcg 2 puffs inhaled twice daily with a spacer.   - Prednisone 30mg PO bid for 5 days.   - Treating sinusitis as noted.          Relevant Medications    albuterol (PROVENTIL) neb solution 2.5 mg (Completed)    predniSONE (DELTASONE) 10 MG tablet    amoxicillin-clavulanate (AUGMENTIN) 875-125 MG tablet    Other Relevant Orders    BREATHING CAPACITY TEST [50534] (Completed)    BRONCHODILATION RESPONSE, PRE/POST ADMIN    ALLERGY SKIN TESTS,ALLERGENS [18117] (Completed)    Acute sinusitis with symptoms > 10 days     History of allergic rhinitis.  Status post allergy shots multiple years ago.  He was on shots for 15 years.  He did great for 15-20 additional years.  Over the last 2-3 years in the winter he has had nasal symptoms mucus, congestion, postnasal drainage.  Repeated allergy testing today which was negative.  Yellow mucus noted on examination.    -Augmentin twice daily for 10 days.  -Continue Flonase.  -Continue Astelin 2 sprays per nostril twice daily.         Relevant Medications    albuterol (PROVENTIL) neb solution 2.5 mg (Completed)    predniSONE (DELTASONE) 10 MG tablet    amoxicillin-clavulanate (AUGMENTIN) 875-125 MG tablet       Musculoskeletal and Integumentary    Nummular eczema     North shaped erythematous, dry, rough patches noted on bilateral lower extremities.  Improved somewhat with Lidex.  Using Eucerin once per day.    - Eczema treatment instructions were discussed with the patient and literature provided.    - Daily bathing.   - Use of thick emollient such as Eucerin, Aquaphor, Vanicream or Vaseline 2-3 times daily.   - Soak and seal technique was discussed.    - Avoid harsh soaps and detergents. Use fragrance free.    - Triamcinolone  0.1% cream bid to active eczema on body.              Relevant Medications    predniSONE (DELTASONE) 10 MG tablet    triamcinolone (KENALOG) 0.1 % external cream    Other Relevant Orders    ALLERGY SKIN TESTS,ALLERGENS [84403] (Completed)      Other Visit Diagnoses     Nasal congestion        Relevant Orders    ALLERGY SKIN TESTS,ALLERGENS [02691] (Completed)        6 month follow up.     Chart documentation with Dragon Voice recognition Software. Although reviewed after completion, some words and grammatical errors may remain.    Miguel Escobar DO   Allergy/Immunology  [facility] and [place]

## 2019-02-13 ASSESSMENT — ASTHMA QUESTIONNAIRES: ACT_TOTALSCORE: 20

## 2019-03-10 DIAGNOSIS — J31.0 CHRONIC RHINITIS: ICD-10-CM

## 2019-03-11 NOTE — TELEPHONE ENCOUNTER
Azelastine    Routing refill request to provider for review/approval because:  pts age >64    Christina Orosco, RN, BSN

## 2019-03-12 RX ORDER — AZELASTINE 1 MG/ML
SPRAY, METERED NASAL
Qty: 90 ML | Refills: 3 | Status: SHIPPED | OUTPATIENT
Start: 2019-03-12 | End: 2020-03-10

## 2019-05-14 DIAGNOSIS — L30.9 ECZEMA, UNSPECIFIED TYPE: ICD-10-CM

## 2019-05-14 DIAGNOSIS — J45.31 MILD PERSISTENT ASTHMA WITH ACUTE EXACERBATION: ICD-10-CM

## 2019-05-15 RX ORDER — FLUOCINONIDE 0.5 MG/G
CREAM TOPICAL
Qty: 60 G | Refills: 3 | Status: SHIPPED | OUTPATIENT
Start: 2019-05-15 | End: 2019-11-21

## 2019-05-15 RX ORDER — BUDESONIDE AND FORMOTEROL FUMARATE DIHYDRATE 160; 4.5 UG/1; UG/1
AEROSOL RESPIRATORY (INHALATION)
Qty: 30.6 G | Refills: 11 | OUTPATIENT
Start: 2019-05-15

## 2019-05-15 NOTE — TELEPHONE ENCOUNTER
Symbicort    Sent 1/17/2019 with 1 year supply. Refill not appropriate at this time.       Fluocinonide cream    Routing refill request to provider for review/approval because:  Drug not active on patient's medication list    Christina Orosco RN, BSN

## 2019-09-28 ENCOUNTER — HEALTH MAINTENANCE LETTER (OUTPATIENT)
Age: 64
End: 2019-09-28

## 2019-10-15 DIAGNOSIS — L30.0 NUMMULAR ECZEMA: ICD-10-CM

## 2019-10-16 RX ORDER — TRIAMCINOLONE ACETONIDE 1 MG/G
CREAM TOPICAL 2 TIMES DAILY
Qty: 80 G | Refills: 3 | Status: SHIPPED | OUTPATIENT
Start: 2019-10-16 | End: 2021-07-02

## 2019-11-21 DIAGNOSIS — L30.9 ECZEMA, UNSPECIFIED TYPE: ICD-10-CM

## 2019-11-22 RX ORDER — FLUOCINONIDE 0.5 MG/G
CREAM TOPICAL
Qty: 60 G | Refills: 3 | Status: SHIPPED | OUTPATIENT
Start: 2019-11-22 | End: 2021-07-02

## 2019-11-22 NOTE — TELEPHONE ENCOUNTER
fluocinonide (LIDEX) 0.05 % external crea*60 g   3            Sig: APPLY SPARINGLY TO AFFECTED AREA TWICE DAILY AS           NEEDED DO NOT APPLY TO FACE    Routing refill request to provider for review/approval because:  Fails protocol: High potency steroid not ordered     Yumi Dee, RN, BSN

## 2019-12-04 ENCOUNTER — MYC MEDICAL ADVICE (OUTPATIENT)
Dept: FAMILY MEDICINE | Facility: OTHER | Age: 64
End: 2019-12-04

## 2019-12-04 NOTE — TELEPHONE ENCOUNTER
Scheduled. [employer] message sent. I did keep him on the schedule for 12/6 in case this doesn't work.

## 2019-12-04 NOTE — PROGRESS NOTES
"SUBJECTIVE:   Mainor Grande is a 64 year old male who presents for Preventive Visit.    Are you in the first 12 months of your Medicare coverage?  No    Healthy Habits:     In general, how would you rate your overall health?  Good    Frequency of exercise:  2-3 days/week    Duration of exercise:  15-30 minutes    Do you usually eat at least 4 servings of fruit and vegetables a day, include whole grains    & fiber and avoid regularly eating high fat or \"junk\" foods?  No    Taking medications regularly:  Yes    Medication side effects:  None    Ability to successfully perform activities of daily living:  No assistance needed    Home Safety:  No safety concerns identified    Hearing Impairment:  Need to ask people to speak up or repeat themselves and no hearing concerns    In the past 6 months, have you been bothered by leaking of urine? Yes    In general, how would you rate your overall mental or emotional health?  Good      PHQ-2 Total Score: 1    Additional concerns today:  No    Do you feel safe in your environment? Yes    Have you ever done Advance Care Planning? (For example, a Health Directive, POLST, or a discussion with a medical provider or your loved ones about your wishes): No, advance care planning information given to patient to review.  Patient plans to discuss their wishes with loved ones or provider.        Fall risk  Fallen 2 or more times in the past year?: No  Any fall with injury in the past year?: No    Cognitive Screening   1) Repeat 3 items (Leader, Season, Table)    2) Clock draw: NORMAL  3) 3 item recall: Recalls 2 objects   Results: NORMAL clock, 1-2 items recalled: COGNITIVE IMPAIRMENT LESS LIKELY    Mini-CogTM Copyright RYLIE Yates. Licensed by the author for use in [facility]; reprinted with permission (ramonita@.[facility]). All rights reserved.        Do you have sleep apnea, excessive snoring or daytime drowsiness?: no    Reviewed and updated as needed this visit by clinical " staff  Tobacco  Allergies  Meds  Problems  Med Hx  Surg Hx  Fam Hx  Soc Hx          Reviewed and updated as needed this visit by Provider  Tobacco  Allergies  Meds  Problems  Med Hx  Surg Hx  Fam Hx        Social History     Tobacco Use     Smoking status: Never Smoker     Smokeless tobacco: Never Used   Substance Use Topics     Alcohol use: Yes     Alcohol/week: 10.0 standard drinks     Comment: caffeine 6 per day     If you drink alcohol do you typically have >3 drinks per day or >7 drinks per week? No    Alcohol Use 12/5/2019   Prescreen: >3 drinks/day or >7 drinks/week? No   Prescreen: >3 drinks/day or >7 drinks/week? -     Current providers sharing in care for this patient include:   Patient Care Team:  Karen Rodriguez MD as PCP - General (Family Practice)  Karen Rodriguez MD as Assigned PCP    The following health maintenance items are reviewed in Epic and correct as of today:  Health Maintenance   Topic Date Due     AORTIC ANEURYSM SCREENING (SYSTEM ASSIGNED)  09/20/2018     MEDICARE ANNUAL WELLNESS VISIT  12/29/2018     ASTHMA CONTROL TEST  08/12/2019     FALL RISK ASSESSMENT  11/30/2019     ZOSTER IMMUNIZATION (3 of 3) 12/26/2019     ASTHMA ACTION PLAN  02/12/2020     PNEUMOCOCCAL IMMUNIZATION 65+ LOW/MEDIUM RISK (2 of 2 - PPSV23) 09/28/2022     DTAP/TDAP/TD IMMUNIZATION (2 - Td) 06/30/2023     COLONOSCOPY  09/04/2023     LIPID  01/30/2024     ADVANCE CARE PLANNING  12/05/2024     HEPATITIS C SCREENING  Completed     PHQ-2  Completed     INFLUENZA VACCINE  Completed     IPV IMMUNIZATION  Aged Out     MENINGITIS IMMUNIZATION  Aged Out     Lab work is in process  Labs reviewed in EPIC  BP Readings from Last 3 Encounters:   12/06/19 120/82   12/05/19 122/72   02/12/19 112/68    Wt Readings from Last 3 Encounters:   12/06/19 91.4 kg (201 lb 8 oz)   12/05/19 90.3 kg (199 lb)   02/12/19 92.1 kg (203 lb)                  Patient Active Problem List   Diagnosis     Reflux esophagitis     Abnormal  levels of other serum enzymes     Erectile dysfunction     Moderate persistent asthma with acute exacerbation     Fatty liver     HYPERLIPIDEMIA LDL GOAL <160     Seasonal allergic rhinitis     Deviated nasal septum     Chronic nasal congestion     Diarrhea, unspecified type     Acute sinusitis with symptoms > 10 days     Nummular eczema     Memory changes     Past Surgical History:   Procedure Laterality Date     COLONOSCOPY  9/4/2013    Procedure: COLONOSCOPY;  colonoscopy;  Surgeon: Beto Keen MD;  Location: [facility]     HC COLONOSCOPY THRU STOMA, DIAGNOSTIC  2005    normal     HC VASECTOMY UNILAT/BILAT W POSTOP SEMEN  1992    Vasectomy       Social History     Tobacco Use     Smoking status: Never Smoker     Smokeless tobacco: Never Used   Substance Use Topics     Alcohol use: Yes     Alcohol/week: 10.0 standard drinks     Comment: caffeine 6 per day     Family History   Problem Relation Age of Onset     Cancer Mother         bladder cancer     Neurologic Disorder Father         alzheimers dementia     Gastrointestinal Disease Brother         divertculitis         Current Outpatient Medications   Medication Sig Dispense Refill     albuterol (VENTOLIN HFA) 108 (90 Base) MCG/ACT inhaler Inhale 2 puffs into the lungs every 4 hours as needed for shortness of breath / dyspnea or wheezing 54 g 3     ASPIRIN PO Take 162 mg by mouth at onset of headache for moderate pain       azelastine (ASTELIN) 0.1 % nasal spray SPRAY 2 SPRAYS IN EACH NOSTRIL TWO TIMES A DAY 90 mL 3     esomeprazole (NEXIUM) 20 MG capsule Take 20 mg by mouth every other day Take 30-60 minutes before eating.       fluocinonide (LIDEX) 0.05 % external cream APPLY SPARINGLY TO AFFECTED AREA TWICE DAILY AS NEEDED DO NOT APPLY TO FACE 60 g 3     fluticasone (FLONASE) 50 MCG/ACT nasal spray Spray 1-2 sprays into both nostrils daily 16 g 11     Ibuprofen (ADVIL PO)        melatonin 1 MG TABS Take 1 mg by mouth nightly as needed for sleep       Multiple  "Vitamins-Minerals (MULTIVITAMIN ADULT PO)        SYMBICORT 160-4.5 MCG/ACT Inhaler INHALE TWO PUFFS BY MOUTH TWICE A DAY 30.6 g 11     triamcinolone (KENALOG) 0.1 % external cream Apply topically 2 times daily 80 g 3     Allergies   Allergen Reactions     No Known Drug Allergies      Seasonal Allergies      Recent Labs   Lab Test 01/30/19  0740 01/02/18  0720 06/02/16  0837 12/22/15  0846   LDL 61 91  --  112*   HDL 62 65  --  82   TRIG 239* 67  --  67   ALT 42 64 61 125*   CR 1.02 1.04 1.03 1.01   GFRESTIMATED 77 72 73 75   GFRESTBLACK 89 87 88 >90   GFR Calc     POTASSIUM 4.3 4.1 4.1 4.3   TSH 1.38  --  1.49  --           Review of Systems   Constitutional: Negative for chills and fever.   HENT: Positive for congestion. Negative for ear pain, hearing loss and sore throat.    Eyes: Negative for pain and visual disturbance.   Respiratory: Positive for cough. Negative for shortness of breath.    Cardiovascular: Negative for chest pain, palpitations and peripheral edema.   Gastrointestinal: Negative for abdominal pain, constipation, diarrhea, heartburn, hematochezia and nausea.   Genitourinary: Positive for impotence and urgency. Negative for discharge, dysuria, frequency, genital sores and hematuria.   Musculoskeletal: Negative for arthralgias, joint swelling and myalgias.   Skin: Negative for rash.   Neurological: Negative for dizziness, weakness, headaches and paresthesias.   Psychiatric/Behavioral: Positive for mood changes. The patient is not nervous/anxious.      Constitutional, HEENT, cardiovascular, pulmonary, GI, , musculoskeletal, neuro, skin, endocrine and psych systems are negative, except as otherwise noted.    OBJECTIVE:   /72   Pulse 56   Temp 97.6  F (36.4  C) (Temporal)   Resp 14   Ht 1.784 m (5' 10.24\")   Wt 90.3 kg (199 lb)   BMI 28.36 kg/m   Estimated body mass index is 28.36 kg/m  as calculated from the following:    Height as of this encounter: 1.784 m (5' 10.24\").   "  Weight as of this encounter: 90.3 kg (199 lb).  Physical Exam  Constitutional:       Appearance: Normal appearance. He is well-developed and normal weight.   HENT:      Head: Normocephalic and atraumatic.      Right Ear: Tympanic membrane, ear canal and external ear normal. There is no impacted cerumen.      Left Ear: Tympanic membrane, ear canal and external ear normal. There is no impacted cerumen.      Nose: Nose normal. No rhinorrhea.      Mouth/Throat:      Mouth: Mucous membranes are moist.      Pharynx: No oropharyngeal exudate or posterior oropharyngeal erythema.   Eyes:      Extraocular Movements: Extraocular movements intact.      Conjunctiva/sclera: Conjunctivae normal.      Pupils: Pupils are equal, round, and reactive to light.   Neck:      Musculoskeletal: Normal range of motion and neck supple.   Cardiovascular:      Rate and Rhythm: Normal rate and regular rhythm.      Pulses: Normal pulses.      Heart sounds: Normal heart sounds. No murmur. No friction rub. No gallop.    Pulmonary:      Effort: Pulmonary effort is normal. No respiratory distress.      Breath sounds: Normal breath sounds. No stridor. No wheezing, rhonchi or rales.   Chest:      Chest wall: No tenderness.   Abdominal:      General: Bowel sounds are normal. There is no distension.      Palpations: Abdomen is soft. There is no mass.      Tenderness: There is no abdominal tenderness. There is no right CVA tenderness, left CVA tenderness, guarding or rebound.      Hernia: No hernia is present.   Musculoskeletal: Normal range of motion.   Neurological:      General: No focal deficit present.      Mental Status: He is alert and oriented to person, place, and time.      Cranial Nerves: No cranial nerve deficit.      Sensory: No sensory deficit.      Motor: No weakness.      Coordination: Coordination normal.      Gait: Gait normal.      Deep Tendon Reflexes: Reflexes normal.   Psychiatric:         Mood and Affect: Mood normal.          "Behavior: Behavior normal.         Thought Content: Thought content normal.         Judgment: Judgment normal.           Diagnostic Test Results:  Labs reviewed in Epic    ASSESSMENT / PLAN:     Problem List Items Addressed This Visit     Seasonal allergic rhinitis    Relevant Medications    albuterol (VENTOLIN HFA) 108 (90 Base) MCG/ACT inhaler    Memory changes     Wife repors there are episodes where he has forgotten things or events that they spoke about few hours earlier. Gross memory test are normal.  Will get Neuropsych testing and MRI brain for further eval         Relevant Orders    MR Brain w/o & w Contrast    NEUROPSYCHOLOGY REFERRAL    CBC with platelets and differential      Other Visit Diagnoses     Encounter for routine adult medical exam with abnormal findings    -  Primary    Sebaceous cyst        Relevant Orders    GENERAL SURG ADULT REFERRAL    Elevated alkaline phosphatase level        Relevant Orders    Comprehensive metabolic panel    GGT    Transaminitis        Relevant Orders    Lipid panel reflex to direct LDL Fasting    Screening for prostate cancer        Relevant Orders    PSA, screen    Mild persistent asthma without complication        Relevant Medications    albuterol (VENTOLIN HFA) 108 (90 Base) MCG/ACT inhaler    Encounter for Medicare annual wellness exam              COUNSELING:  Reviewed preventive health counseling, as reflected in patient instructions       Regular exercise       Healthy diet/nutrition    Estimated body mass index is 28.36 kg/m  as calculated from the following:    Height as of this encounter: 1.784 m (5' 10.24\").    Weight as of this encounter: 90.3 kg (199 lb).         reports that he has never smoked. He has never used smokeless tobacco.      Appropriate preventive services were discussed with this patient, including applicable screening as appropriate for cardiovascular disease, diabetes, osteopenia/osteoporosis, and glaucoma.  As appropriate for age/gender, " discussed screening for colorectal cancer, prostate cancer, breast cancer, and cervical cancer. Checklist reviewing preventive services available has been given to the patient.    Reviewed patients plan of care and provided an AVS. The Basic Care Plan (routine screening as documented in Health Maintenance) for Mainor meets the Care Plan requirement. This Care Plan has been established and reviewed with the Patient.    Counseling Resources:  ATP IV Guidelines  Pooled Cohorts Equation Calculator  Breast Cancer Risk Calculator  FRAX Risk Assessment  ICSI Preventive Guidelines  Dietary Guidelines for Americans, 2010  [employer] MyPlate  ASA Prophylaxis  Lung CA Screening    Karen Rodriguez MD  [facility]    Identified Health Risks:    The patient was counseled and encouraged to consider modifying their diet and eating habits. He was provided with information on recommended healthy diet options.  The patient was provided with written information regarding signs of hearing loss.  Information on urinary incontinence and treatment options given to patient.

## 2019-12-05 ENCOUNTER — OFFICE VISIT (OUTPATIENT)
Dept: FAMILY MEDICINE | Facility: OTHER | Age: 64
End: 2019-12-05
Payer: COMMERCIAL

## 2019-12-05 VITALS
HEART RATE: 56 BPM | SYSTOLIC BLOOD PRESSURE: 122 MMHG | DIASTOLIC BLOOD PRESSURE: 72 MMHG | TEMPERATURE: 97.6 F | RESPIRATION RATE: 14 BRPM | HEIGHT: 70 IN | BODY MASS INDEX: 28.49 KG/M2 | WEIGHT: 199 LBS

## 2019-12-05 DIAGNOSIS — Z00.00 ENCOUNTER FOR MEDICARE ANNUAL WELLNESS EXAM: ICD-10-CM

## 2019-12-05 DIAGNOSIS — L72.3 SEBACEOUS CYST: ICD-10-CM

## 2019-12-05 DIAGNOSIS — Z12.5 SCREENING FOR PROSTATE CANCER: ICD-10-CM

## 2019-12-05 DIAGNOSIS — R74.8 ELEVATED ALKALINE PHOSPHATASE LEVEL: ICD-10-CM

## 2019-12-05 DIAGNOSIS — J30.2 SEASONAL ALLERGIC RHINITIS, UNSPECIFIED TRIGGER: ICD-10-CM

## 2019-12-05 DIAGNOSIS — R41.3 MEMORY CHANGES: ICD-10-CM

## 2019-12-05 DIAGNOSIS — R74.01 TRANSAMINITIS: ICD-10-CM

## 2019-12-05 DIAGNOSIS — Z00.01 ENCOUNTER FOR ROUTINE ADULT MEDICAL EXAM WITH ABNORMAL FINDINGS: Primary | ICD-10-CM

## 2019-12-05 DIAGNOSIS — J45.30 MILD PERSISTENT ASTHMA WITHOUT COMPLICATION: ICD-10-CM

## 2019-12-05 PROCEDURE — 99213 OFFICE O/P EST LOW 20 MIN: CPT | Mod: 25 | Performed by: FAMILY MEDICINE

## 2019-12-05 PROCEDURE — 99397 PER PM REEVAL EST PAT 65+ YR: CPT | Performed by: FAMILY MEDICINE

## 2019-12-05 RX ORDER — ALBUTEROL SULFATE 90 UG/1
2 AEROSOL, METERED RESPIRATORY (INHALATION) EVERY 4 HOURS PRN
Qty: 54 G | Refills: 3 | Status: SHIPPED | OUTPATIENT
Start: 2019-12-05 | End: 2020-12-22

## 2019-12-05 ASSESSMENT — ENCOUNTER SYMPTOMS
JOINT SWELLING: 0
PARESTHESIAS: 0
HEMATOCHEZIA: 0
FREQUENCY: 0
ABDOMINAL PAIN: 0
ARTHRALGIAS: 0
PALPITATIONS: 0
CONSTIPATION: 0
SHORTNESS OF BREATH: 0
DIARRHEA: 0
HEADACHES: 0
EYE PAIN: 0
MYALGIAS: 0
COUGH: 1
SORE THROAT: 0
DYSURIA: 0
HEARTBURN: 0
HEMATURIA: 0
FEVER: 0
NAUSEA: 0
CHILLS: 0
DIZZINESS: 0
NERVOUS/ANXIOUS: 0
WEAKNESS: 0

## 2019-12-05 ASSESSMENT — MIFFLIN-ST. JEOR: SCORE: 1692.66

## 2019-12-05 ASSESSMENT — ACTIVITIES OF DAILY LIVING (ADL): CURRENT_FUNCTION: NO ASSISTANCE NEEDED

## 2019-12-06 ENCOUNTER — OFFICE VISIT (OUTPATIENT)
Dept: SURGERY | Facility: OTHER | Age: 64
End: 2019-12-06
Payer: COMMERCIAL

## 2019-12-06 ENCOUNTER — TRANSFERRED RECORDS (OUTPATIENT)
Dept: HEALTH INFORMATION MANAGEMENT | Facility: CLINIC | Age: 64
End: 2019-12-06

## 2019-12-06 VITALS
BODY MASS INDEX: 28.85 KG/M2 | WEIGHT: 201.5 LBS | SYSTOLIC BLOOD PRESSURE: 120 MMHG | DIASTOLIC BLOOD PRESSURE: 82 MMHG | TEMPERATURE: 97.8 F | HEIGHT: 70 IN

## 2019-12-06 DIAGNOSIS — L98.9 SKIN LESION OF FACE: Primary | ICD-10-CM

## 2019-12-06 PROBLEM — R41.3 MEMORY CHANGES: Status: ACTIVE | Noted: 2019-12-06

## 2019-12-06 PROCEDURE — 99243 OFF/OP CNSLTJ NEW/EST LOW 30: CPT | Performed by: SURGERY

## 2019-12-06 ASSESSMENT — MIFFLIN-ST. JEOR: SCORE: 1700.25

## 2019-12-06 NOTE — LETTER
12/6/2019         RE: Mainor Grande  [id number] [facility] [address]        Dear Colleague,    Thank you for referring your patient, Mainor Grande, to the [facility]. Please see a copy of my visit note below.    General Surgery Consultation    Mainor Grande MRN# [id number]   Age: 64 year old YOB: 1955     Reason for consult: Left cheek skin lesion                         Assessment and Plan:   I was asked to see this patient at the request of Dr. Rodriguez  for evaluation of left cheek skin lesion.  Mainor Grande is a 64 year old male who presented with history, exam, laboratory and imaging most consistent with:        ICD-10-CM    1. Skin lesion of face L98.9     left cheek     Leno has an irritated epidermoid versus sebaceous cyst.  He does not need antibiotic at this point.  I recommend that he does warm compresses to help decrease the irritation around the cyst.  This should decrease in size with time.  Is a decrease in size and not relieved of irritation I recommend that we excise this area in the office.  Leno is to see me in 2 to 3 weeks.  If this continues to increase in size, redness or having pain he is to come see me immediately.  All his questions were answered to his satisfaction.    I thank Dr. Rodriguez for the opportunity to participate in the patient's care.           Chief Complaint:   Left cheek skin lesion     History is obtained from the patient         History of Present Illness:   This patient is a 64 year old  male without a significant past medical history who presents with left cheek skin lesion.    Left cheek; started 1 week ago.  Started as a pimple-like lesion and increasing in size.  ~2cm; this is the most red and bigger size this this has been.  Never had anything like this in the past no pain.  No discharge; he's attempted to squeezed it and got some white curd material.  No fevers; no chills; no abx.  He is  never had this lesion elsewhere.  Denies any skin cancer or any sarcoma in self or family.  Is not on any blood thinners.          Past Medical History:    has a past medical history of Allergic rhinitis, cause unspecified, Fatty liver (2004), Mild persistent asthma (9/12/2008), Reflux esophagitis, Unspecified disease of respiratory system, and Viremia, unspecified.          Past Surgical History:     Past Surgical History:   Procedure Laterality Date     COLONOSCOPY  9/4/2013    Procedure: COLONOSCOPY;  colonoscopy;  Surgeon: Beto Keen MD;  Location: [facility]     HC COLONOSCOPY THRU STOMA, DIAGNOSTIC  2005    normal     HC VASECTOMY UNILAT/BILAT W POSTOP SEMEN  1992    Vasectomy           Medications:   albuterol (VENTOLIN HFA) 108 (90 Base) MCG/ACT inhaler, Inhale 2 puffs into the lungs every 4 hours as needed for shortness of breath / dyspnea or wheezing  ASPIRIN PO, Take 162 mg by mouth at onset of headache for moderate pain  azelastine (ASTELIN) 0.1 % nasal spray, SPRAY 2 SPRAYS IN EACH NOSTRIL TWO TIMES A DAY  esomeprazole (NEXIUM) 20 MG capsule, Take 20 mg by mouth every other day Take 30-60 minutes before eating.  fluocinonide (LIDEX) 0.05 % external cream, APPLY SPARINGLY TO AFFECTED AREA TWICE DAILY AS NEEDED DO NOT APPLY TO FACE  fluticasone (FLONASE) 50 MCG/ACT nasal spray, Spray 1-2 sprays into both nostrils daily  Ibuprofen (ADVIL PO),   melatonin 1 MG TABS, Take 1 mg by mouth nightly as needed for sleep  Multiple Vitamins-Minerals (MULTIVITAMIN ADULT PO),   SYMBICORT 160-4.5 MCG/ACT Inhaler, INHALE TWO PUFFS BY MOUTH TWICE A DAY  triamcinolone (KENALOG) 0.1 % external cream, Apply topically 2 times daily    No current facility-administered medications on file prior to visit.         Allergies:      Allergies   Allergen Reactions     No Known Drug Allergies      Seasonal Allergies             Social History:   Mainor Grande  reports that he has never smoked. He has never used smokeless tobacco.  "He reports current alcohol use of about 10.0 standard drinks of alcohol per week. He reports that he does not use drugs.          Family History:   The patient has no family history of any bleeding, clotting or anesthesia problems.          Review of Systems:     Constitutional: Denies fever or chills   Eyes: Denies change in visual acuity   HENT: Denies nasal congestion or sore throat   Respiratory: Denies cough or shortness of breath   Cardiovascular: Denies chest pain or edema   GI: Denies abdominal pain, nausea, vomiting, bloody stools or diarrhea   : Denies dysuria   Musculoskeletal: Denies back pain or joint pain   Integument: Denies rash   Neurologic: Denies headache, focal weakness or sensory changes   Endocrine: Denies polyuria or polydipsia   Lymphatic: Denies swollen glands   Psychiatric: Denies depression or anxiety          Physical Exam:     Vitals: /82   Temp 97.8  F (36.6  C) (Temporal)   Ht 1.778 m (5' 10\")   Wt 91.4 kg (201 lb 8 oz)   BMI 28.91 kg/m     BMI= Body mass index is 28.91 kg/m .  Constitutional: Awake, alert, no acute distress.  Eyes:  No scleral icterus.  Conjunctiva are without injection.  ENMT: Mucous membranes moist, dentition and gums are intact.   Neck: Soft, supple, trachea midline.    Endocrine: n/a  Lymphatic: There is no cervical, submandibular, or supraclavicular adenopathy.  Respiratory: No audible wheezes, no acute distress  Cardiovascular: Regular; S1, S2    Abdomen: Non-distended, non-tender, normoactive bowel sounds present, No masses,   Musculoskeletal: No spinal or CVA tenderness. Full range of motion in the upper and lower extremities.    Skin: 2 cm left cheek likely epidermoid cyst or sebaceous cyst.  No signs of abscess.  Mild erythema around this palpable lesion.  Some area of thickened skin around this.  No signs of drainage.  No signs of active infection.    Neurologic: Cranial nerves II through XII are grossly intact and symmetric.  Psychiatric: The " "patient is alert and oriented times 3.  The patient's affect is not blunted and mood is appropriate.          Data:   Vital Signs:  /82   Temp 97.8  F (36.6  C) (Temporal)   Ht 1.778 m (5' 10\")   Wt 91.4 kg (201 lb 8 oz)   BMI 28.91 kg/m        WBC -   WBC   Date Value Ref Range Status   01/30/2019 7.5 4.0 - 11.0 10e9/L Final   ], HgB -   Hemoglobin   Date Value Ref Range Status   01/30/2019 13.4 13.3 - 17.7 g/dL Final   ]   Liver Function Studies -   Recent Labs   Lab Test 01/30/19  0740   PROTTOTAL 8.2   ALBUMIN 3.2*   BILITOTAL 0.9   ALKPHOS 310*   AST 34   ALT 42     No results found for this or any previous visit (from the past 744 hour(s)).     Christina Vegas DO 12/6/2019 8:41 AM           Again, thank you for allowing me to participate in the care of your patient.        Sincerely,        Christina Vegas MD    "

## 2019-12-06 NOTE — PROGRESS NOTES
General Surgery Consultation    Mainor Grande MRN# [id number]   Age: 64 year old YOB: 1955     Reason for consult: Left cheek skin lesion                         Assessment and Plan:   I was asked to see this patient at the request of Dr. Rodriguez  for evaluation of left cheek skin lesion.  Mainor Grande is a 64 year old male who presented with history, exam, laboratory and imaging most consistent with:        ICD-10-CM    1. Skin lesion of face L98.9     left cheek     Leno has an irritated epidermoid versus sebaceous cyst.  He does not need antibiotic at this point.  I recommend that he does warm compresses to help decrease the irritation around the cyst.  This should decrease in size with time.  Is a decrease in size and not relieved of irritation I recommend that we excise this area in the office.  Leno is to see me in 2 to 3 weeks.  If this continues to increase in size, redness or having pain he is to come see me immediately.  All his questions were answered to his satisfaction.    I thank Dr. Rodriguez for the opportunity to participate in the patient's care.           Chief Complaint:   Left cheek skin lesion     History is obtained from the patient         History of Present Illness:   This patient is a 64 year old  male without a significant past medical history who presents with left cheek skin lesion.    Left cheek; started 1 week ago.  Started as a pimple-like lesion and increasing in size.  ~2cm; this is the most red and bigger size this this has been.  Never had anything like this in the past no pain.  No discharge; he's attempted to squeezed it and got some white curd material.  No fevers; no chills; no abx.  He is never had this lesion elsewhere.  Denies any skin cancer or any sarcoma in self or family.  Is not on any blood thinners.          Past Medical History:    has a past medical history of Allergic rhinitis, cause unspecified, Fatty liver (2004), Mild persistent  asthma (9/12/2008), Reflux esophagitis, Unspecified disease of respiratory system, and Viremia, unspecified.          Past Surgical History:     Past Surgical History:   Procedure Laterality Date     COLONOSCOPY  9/4/2013    Procedure: COLONOSCOPY;  colonoscopy;  Surgeon: Beto Keen MD;  Location: [facility]     HC COLONOSCOPY THRU STOMA, DIAGNOSTIC  2005    normal     HC VASECTOMY UNILAT/BILAT W POSTOP SEMEN  1992    Vasectomy           Medications:   albuterol (VENTOLIN HFA) 108 (90 Base) MCG/ACT inhaler, Inhale 2 puffs into the lungs every 4 hours as needed for shortness of breath / dyspnea or wheezing  ASPIRIN PO, Take 162 mg by mouth at onset of headache for moderate pain  azelastine (ASTELIN) 0.1 % nasal spray, SPRAY 2 SPRAYS IN EACH NOSTRIL TWO TIMES A DAY  esomeprazole (NEXIUM) 20 MG capsule, Take 20 mg by mouth every other day Take 30-60 minutes before eating.  fluocinonide (LIDEX) 0.05 % external cream, APPLY SPARINGLY TO AFFECTED AREA TWICE DAILY AS NEEDED DO NOT APPLY TO FACE  fluticasone (FLONASE) 50 MCG/ACT nasal spray, Spray 1-2 sprays into both nostrils daily  Ibuprofen (ADVIL PO),   melatonin 1 MG TABS, Take 1 mg by mouth nightly as needed for sleep  Multiple Vitamins-Minerals (MULTIVITAMIN ADULT PO),   SYMBICORT 160-4.5 MCG/ACT Inhaler, INHALE TWO PUFFS BY MOUTH TWICE A DAY  triamcinolone (KENALOG) 0.1 % external cream, Apply topically 2 times daily    No current facility-administered medications on file prior to visit.         Allergies:      Allergies   Allergen Reactions     No Known Drug Allergies      Seasonal Allergies             Social History:   Mainor Grande  reports that he has never smoked. He has never used smokeless tobacco. He reports current alcohol use of about 10.0 standard drinks of alcohol per week. He reports that he does not use drugs.          Family History:   The patient has no family history of any bleeding, clotting or anesthesia problems.          Review of Systems:  "    Constitutional: Denies fever or chills   Eyes: Denies change in visual acuity   HENT: Denies nasal congestion or sore throat   Respiratory: Denies cough or shortness of breath   Cardiovascular: Denies chest pain or edema   GI: Denies abdominal pain, nausea, vomiting, bloody stools or diarrhea   : Denies dysuria   Musculoskeletal: Denies back pain or joint pain   Integument: Denies rash   Neurologic: Denies headache, focal weakness or sensory changes   Endocrine: Denies polyuria or polydipsia   Lymphatic: Denies swollen glands   Psychiatric: Denies depression or anxiety          Physical Exam:     Vitals: /82   Temp 97.8  F (36.6  C) (Temporal)   Ht 1.778 m (5' 10\")   Wt 91.4 kg (201 lb 8 oz)   BMI 28.91 kg/m    BMI= Body mass index is 28.91 kg/m .  Constitutional: Awake, alert, no acute distress.  Eyes:  No scleral icterus.  Conjunctiva are without injection.  ENMT: Mucous membranes moist, dentition and gums are intact.   Neck: Soft, supple, trachea midline.    Endocrine: n/a  Lymphatic: There is no cervical, submandibular, or supraclavicular adenopathy.  Respiratory: No audible wheezes, no acute distress  Cardiovascular: Regular; S1, S2    Abdomen: Non-distended, non-tender, normoactive bowel sounds present, No masses,   Musculoskeletal: No spinal or CVA tenderness. Full range of motion in the upper and lower extremities.    Skin: 2 cm left cheek likely epidermoid cyst or sebaceous cyst.  No signs of abscess.  Mild erythema around this palpable lesion.  Some area of thickened skin around this.  No signs of drainage.  No signs of active infection.    Neurologic: Cranial nerves II through XII are grossly intact and symmetric.  Psychiatric: The patient is alert and oriented times 3.  The patient's affect is not blunted and mood is appropriate.          Data:   Vital Signs:  /82   Temp 97.8  F (36.6  C) (Temporal)   Ht 1.778 m (5' 10\")   Wt 91.4 kg (201 lb 8 oz)   BMI 28.91 kg/m       WBC - "   WBC   Date Value Ref Range Status   01/30/2019 7.5 4.0 - 11.0 10e9/L Final   ], HgB -   Hemoglobin   Date Value Ref Range Status   01/30/2019 13.4 13.3 - 17.7 g/dL Final   ]   Liver Function Studies -   Recent Labs   Lab Test 01/30/19  0740   PROTTOTAL 8.2   ALBUMIN 3.2*   BILITOTAL 0.9   ALKPHOS 310*   AST 34   ALT 42     No results found for this or any previous visit (from the past 744 hour(s)).     Hayes-Johana DO [place] 12/6/2019 8:41 AM

## 2019-12-06 NOTE — PATIENT INSTRUCTIONS
Patient Education   Personalized Prevention Plan  You are due for the preventive services outlined below.  Your care team is available to assist you in scheduling these services.  If you have already completed any of these items, please share that information with your care team to update in your medical record.  Health Maintenance Due   Topic Date Due     AORTIC ANEURYSM SCREENING (SYSTEM ASSIGNED)  09/20/2018     Annual Wellness Visit  12/29/2018     Asthma Control Test  08/12/2019     FALL RISK ASSESSMENT  11/30/2019       Understanding USDA MyPlate  The USDA (U.S. Department of Agriculture) has guidelines to help you make healthy food choices. These are called MyPlate. MyPlate shows the food groups that make up healthy meals using the image of a place setting. Before you eat, think about the healthiest choices for what to put onto your plate or into your cup or bowl. To learn more about building a healthy plate, visit www.[employer].gov.    The food groups    Fruits. Any fruit or 100% fruit juice counts as part of the Fruit Group. Fruits may be fresh, canned, frozen, or dried, and may be whole, cut-up, or pureed. Make half your plate fruits and vegetables.    Vegetables. Any vegetable or 100% vegetable juice counts as a member of the Vegetable Group. Vegetables may be fresh, frozen, canned, or dried. They can be served raw or cooked and may be whole, cut-up, or mashed. Make half your plate fruits and vegetables.    Grains. All foods made from grains are part of the Grains Group. These include wheat, rice, oats, cornmeal, and barley such as bread, pasta, oatmeal, cereal, tortillas, and grits. Grains should be no more than a quarter of your plate. At least half of your grains should be whole grains.    Protein. This group includes meat, poultry, seafood, beans and peas, eggs, processed soy products (like tofu), nuts (including nut butters), and seeds. Make protein choices no more than a quarter of your plate.  Meat and poultry choices should be lean or low fat.    Dairy. All fluid milk products and foods made from milk that contain calcium, like yogurt and cheese, are part of the Dairy Group. (Foods that have little calcium, such as cream, butter, and cream cheese, are not part of the group.) Most dairy choices should be low-fat or fat-free.    Oils. These are fats that are liquid at room temperature. They include canola, corn, olive, soybean, and sunflower oil. Foods that are mainly oil include mayonnaise, certain salad dressings, and soft margarines. You should have only 5 to 7 teaspoons of oils a day. You probably already get this much from the food you eat.  Date Last Reviewed: 8/1/2017 2000-2018 [employer]. [address] [place] [address], [place] [address]. All rights reserved. This information is not intended as a substitute for professional medical care. Always follow your healthcare professional's instructions.          Signs of Hearing Loss     Hearing much better with one ear can be a sign of hearing loss.     Hearing loss is a problem shared by many people. In fact, it is one of the most common health conditions, particularly as people age. Most people over age 65 have some hearing loss, and by age 80, almost everyone does. Because hearing loss usually occurs slowly over the years, you may not realize your hearing ability has gotten worse.  Have your hearing checked  Contact your healthcare provider if you:    Have to strain to hear normal conversation    Have to watch other people s faces very carefully to follow what they re saying    Need to ask people to repeat what they ve said    Often misunderstand what people are saying    Turn the volume of the television or radio up so high that others complain    Feel that people are mumbling when they re talking to you    Find that the effort to hear leaves you feeling tired and irritated    Notice, when using the phone, that you hear better with one ear  than the other  Date Last Reviewed: 12/1/2016 2000-2018 [employer]. [address] [place] [address], [place] [address]. All rights reserved. This information is not intended as a substitute for professional medical care. Always follow your healthcare professional's instructions.          Urinary Incontinence (Male)    Urinary incontinence means not being able to control the release of urine from the bladder.  Causes  Common causes of urinary incontinence in men include:    Infection    Certain medicines    Aging    Poor pelvic muscle tone    Bladder spasms    Obesity    Urinary retention  Nervous system diseases, diabetes, sleep apnea, urinary tract infections, prostate surgery, and pelvic trauma can also cause incontinence. Constipation and smoking have also been identified as risk factors.  Symptoms    Urge incontinence (also called  overactive bladder ) is a sudden urge to urinate even though there may not be much urine in the bladder. The need to urinate often during the night is common. It is due to bladder spasms.    Stress incontinence is involuntary urine leakage that can occur with sneezing, coughing, and other actions that put stress on the bladder.  Treatment  Treatment of urinary incontinence depends on the cause. Infections of the bladder are treated with antibiotics. Urinary retention is treated with a bladder catheter.  Home care  Follow these guidelines when caring for yourself at home:    Don't consume foods and drinks that may irritate the bladder. These include drinks containing alcohol, caffeinate, or carbonation; chocolate; and acidic fruits and juices.    Limit fluid intake to 6 to 8 cups a day.    Lose weight if you are overweight. This will reduce your symptoms.    If needed, wear absorbent pads to catch urine. Change pads frequently to maintain hygiene and prevent skin and bladder infections.    Bathe daily to maintain good hygiene.    If an antibiotic was prescribed to treat a  bladder infection, be sure to take it until finished, even if you are feeling better before then. This is to make sure your infection has cleared.    If a catheter was left in place, it is important to keep bacteria from getting into the collection bag. Don't disconnect the catheter from the collection bag.    Use a leg band to secure the catheter drainage tube, so it does not pull on the catheter. Drain the collection bag when it becomes full using the drain spout at the bottom of the bag. Don't disconnect the bag from the catheter.    Don't pull on or try to remove a catheter. The catheter must be removed by a healthcare provider.  Follow-up care  Follow up with your healthcare provider, or as advised.  When to seek medical advice  Call your healthcare provider right away if any of these occur:    Fever over 100.4 F (38 C), or as directed by your healthcare provider    Bladder pain or fullness    Abdominal swelling, nausea, or vomiting    Back pain    Weakness, dizziness, or fainting    If a catheter was left in place, return if:  ? Catheter falls out  ? Catheter stops draining for 6 hours  Date Last Reviewed: 10/1/2017    8857-2399 [employer]. [address] [place] [address]. All rights reserved. This information is not intended as a substitute for professional medical care. Always follow your healthcare professional's instructions.

## 2019-12-06 NOTE — ASSESSMENT & PLAN NOTE
Wife repors there are episodes where he has forgotten things or events that they spoke about few hours earlier. Gross memory test are normal.  Will get Neuropsych testing and MRI brain for further eval

## 2019-12-09 ENCOUNTER — MYC MEDICAL ADVICE (OUTPATIENT)
Dept: FAMILY MEDICINE | Facility: OTHER | Age: 64
End: 2019-12-09

## 2019-12-09 ENCOUNTER — HOSPITAL ENCOUNTER (OUTPATIENT)
Dept: MRI IMAGING | Facility: CLINIC | Age: 64
Discharge: HOME | End: 2019-12-09
Attending: FAMILY MEDICINE | Admitting: FAMILY MEDICINE
Payer: COMMERCIAL

## 2019-12-09 DIAGNOSIS — R41.3 MEMORY CHANGES: ICD-10-CM

## 2019-12-09 PROCEDURE — 70553 MRI BRAIN STEM W/O & W/DYE: CPT

## 2019-12-09 PROCEDURE — 25500064 ZZH RX 255 OP 636: Performed by: FAMILY MEDICINE

## 2019-12-09 PROCEDURE — A9585 GADOBUTROL INJECTION: HCPCS | Performed by: FAMILY MEDICINE

## 2019-12-09 RX ORDER — GADOBUTROL 604.72 MG/ML
10 INJECTION INTRAVENOUS ONCE
Status: COMPLETED | OUTPATIENT
Start: 2019-12-09 | End: 2019-12-09

## 2019-12-09 RX ADMIN — GADOBUTROL 9 ML: 604.72 INJECTION INTRAVENOUS at 10:24

## 2019-12-20 ENCOUNTER — OFFICE VISIT (OUTPATIENT)
Dept: SURGERY | Facility: OTHER | Age: 64
End: 2019-12-20
Payer: COMMERCIAL

## 2019-12-20 VITALS
DIASTOLIC BLOOD PRESSURE: 77 MMHG | TEMPERATURE: 98 F | BODY MASS INDEX: 29.28 KG/M2 | WEIGHT: 204.5 LBS | HEIGHT: 70 IN | SYSTOLIC BLOOD PRESSURE: 127 MMHG | HEART RATE: 86 BPM

## 2019-12-20 DIAGNOSIS — L98.9 SKIN LESION OF CHEEK: Primary | ICD-10-CM

## 2019-12-20 PROCEDURE — 12051 INTMD RPR FACE/MM 2.5 CM/<: CPT | Performed by: SURGERY

## 2019-12-20 PROCEDURE — 88305 TISSUE EXAM BY PATHOLOGIST: CPT | Mod: TC | Performed by: SURGERY

## 2019-12-20 PROCEDURE — 11442 EXC FACE-MM B9+MARG 1.1-2 CM: CPT | Mod: 51 | Performed by: SURGERY

## 2019-12-20 ASSESSMENT — MIFFLIN-ST. JEOR: SCORE: 1713.86

## 2019-12-20 NOTE — LETTER
12/20/2019         RE: Mainor Grande  [id number] [facility] [address]        Dear Colleague,    Thank you for referring your patient, Mainor Grande, to the [facility]. Please see a copy of my visit note below.    The History and Physical has been reviewed, the patient has been examined and no changes have occurred in the patient's condition since the H & P was completed.     Left cheek lesion improved and decreased in size significantly.  Pt stated it drained spontaneously after I saw him in the office.        [facility] [place], DO      Again, thank you for allowing me to participate in the care of your patient.        Sincerely,        HayesNatalie Vegas MD

## 2019-12-20 NOTE — PATIENT INSTRUCTIONS
[facility]    Home Care Following AMBULATORY SURGERY    Dr. Vegas     Care of the Incision:    Dermabond dressing is in place.  No other dressing is needed.    On post-op day 1 you may shower, but don t soak in a tub or swim for at least 1 week.  Gently pat your incision dry with a freshly laundered towel.    Do not pick at your incision.    Leave your incision open to air.  Cover it only if clothing rubs or irritates it.    ______________________________________________________    Gauze dressing is in place.  Remove the dressing in 24 hours (replace it earlier if it is heavily soiled).  At 24 hours you may wash it in the shower with soap and water, but do not soak in a tub or swim for at least 1 week.  Gently pat your incision dry with a freshly laundered towel.  Either replace the dressing or leave it open to air according to your doctor's instructions.    ______________________________________________________  Activity:    Gradually increase your activity.  Walk short distances several times each day and increase the distance as your strength allows.    To promote circulation, do not cross your legs while sitting.    No strenuous lifting (20 pounds) or straining for 6 weeks (2 weeks for laparoscopic surgery).      Return to work will be determined by the type of work you do and should be discussed with your physician.    By 2 weeks after surgery, you may do any non-strenuous activity you feel like doing as long as you STOP IF IT HURTS.    Do not drive or operate equipment while taking prescription pain medicines.  You may drive 1 week after surgery if you have stopped taking prescription pain medicines and are pain-free enough to make an emergency stop if necessary.    Diet:    Return to the diet you were on before surgery.    Drink plenty of  water and fruit juices.    Avoid foods that cause constipation.      REMEMBER--most prescription pain pills cause constipation.  Walking, extra fluids, and  increased fiber (fresh fruits and vegetables, etc.) are natural remedies for constipation.  Ask your doctor about a stool softener such as Colace or Metamucil.    Call Your Physician if You Have:    Redness, increased swelling or cloudy drainage from your incision.    A temperature of more than 101 degrees F.    Worsening pain in your incision not relieved by your prescription pain pills and/or a short rest.  Persistent nausea/vomiting. Jaundice. Worsening abdominal pain. Shortness of breath or difficulty swallowing.    Any questions or concerns about your recovery, please call [phone].    Follow-up Care:    Make an appointment 1 week after your surgery.  Call [phone].

## 2019-12-20 NOTE — PROGRESS NOTES
The History and Physical has been reviewed, the patient has been examined and no changes have occurred in the patient's condition since the H & P was completed.     Left cheek lesion improved and decreased in size significantly.  Pt stated it drained spontaneously after I saw him in the office.        [facility]

## 2019-12-20 NOTE — PROCEDURES
EXCISION PROCEDURE NOTE    Name: Mainor [facility]: [unfilled]   : 1955, 64 year old Room/Bed: Room/bed info not found   CSN: [id number] Admission: No admission date for patient encounter.   MRN: [id number] Today: 2019,     PCP: Karen Rodriguez Attending: No att. providers found       PROCEDURE:   1. Excision of left maxilla skin lesion 2x0.5x1  2. Intermediate skin closure    INDICATION: history of infected skin lesion    PRE-PROCEDURE     : Christina Vegas MD  CONSENT: This procedure has been fully reviewed with the patient and written informed consent has been obtained.    PROCEDURE     The left maxilla area was prepped and appropriately anesthetized with 1% lidocaine with epinephrine. I marked the area where the lesion is is palpable.  Using the usual technique, full thickness elliptical excision in total and with layered closure was performed.  Utilizing a #15 blade scalpel, an incision was made over the area of the palpable mass.  Adsons forceps were used to elevate the skin edge and blunt dissection utilizing a curved hemostat was done to separate the area of concern from the deep dermis. Hemostasis was achieved by holding pressure and injecting more local anesthetic with epinephrine, copious irrigation was then used to clean the wound. Hemostasis was achieved.  The total area excised, including lesion and margins was 2x0.5cm cm.  Intermediate closure was accomplished with interrupted 3-0 monocryl for the deep subcutaneous layer and running subcuticular 4-0 monocryl for the skin.  Incision was covered with dermabond.  The procedure was well tolerated and without complications. Specimen was sent to Pathology      POST-PROCEDURE     The patient tolerated the procedure well    COMPLICATIONS: none    Plan:  1. Instructed to keep the wound dry and covered for 24-48h and clean thereafter.  2. Warning signs of infection were reviewed.    3. Recommended that the patient use OTC  acetaminophen, OTC ibuprofen as needed for pain.         Electronically signed by: Christina Vegas MD; 12/20/2019

## 2019-12-30 ENCOUNTER — OFFICE VISIT (OUTPATIENT)
Dept: SURGERY | Facility: OTHER | Age: 64
End: 2019-12-30
Payer: COMMERCIAL

## 2019-12-30 VITALS
OXYGEN SATURATION: 99 % | TEMPERATURE: 98.4 F | HEIGHT: 70 IN | HEART RATE: 65 BPM | DIASTOLIC BLOOD PRESSURE: 82 MMHG | BODY MASS INDEX: 28.77 KG/M2 | SYSTOLIC BLOOD PRESSURE: 124 MMHG | RESPIRATION RATE: 16 BRPM | WEIGHT: 201 LBS

## 2019-12-30 DIAGNOSIS — Z09 S/P EXCISION OF SKIN LESION, FOLLOW-UP EXAM: Primary | ICD-10-CM

## 2019-12-30 LAB — COPATH REPORT: NORMAL

## 2019-12-30 PROCEDURE — 99024 POSTOP FOLLOW-UP VISIT: CPT | Performed by: SURGERY

## 2019-12-30 ASSESSMENT — MIFFLIN-ST. JEOR: SCORE: 1697.98

## 2019-12-30 NOTE — LETTER
"    12/30/2019         RE: Mainor Grande  [id number] [facility] [address]        Dear Colleague,    Thank you for referring your patient, Mainor Grande, to the [facility]. Please see a copy of my visit note below.    Patient in clinic today for post-op from procedure on 12/20/2019.    [facility] FOLLOW-UP NOTE  GENERAL SURGERY    PCP: Karen Rodriguez         Assessment and Plan:   Assessment:   Mainor Grande is a 64 year old male who presented post operatively from s/p excision of left maxilla skin lesion on 12/20/2019 and is doing very well.       ICD-10-CM    1. S/P excision of skin lesion, follow-up exam Z09        I reviewed the pathology report today with the patient and answered all questions.  SPECIMEN(S):   Skin, left maxilla     FINAL DIAGNOSIS:   Skin, left maxilla:   - Consistent with ruptured folliculitis and scar - (see description)       Plan:  Incision clean dry and intact.  There is a small firmness at the superior aspect of the incision.  This is the likely from the inflammation versus another area of folliculitis.  I told patient to continue to warm compresses the area or put ice on this area.  He is to call me if this area increase in size.  Otherwise the incision is healing well.           Subjective:     Mainor Grande is a 64 year old male who presents post operatively from s/p excision of left maxilla skin lesion on 12/20/2019. Pain has been controled. Currently is not using pain medications. Eating a Regular and having regular bladder/bowel function. Overall doing very well.           Objective:       /82   Pulse 65   Temp 98.4  F (36.9  C) (Temporal)   Resp 16   Ht 1.778 m (5' 10\")   Wt 91.2 kg (201 lb)   SpO2 99%   BMI 28.84 kg/m      Constitutional: Awake, alert, in no acute distress.  Respiratory: Non-labored.   Cardiovascular: Regular rate and rhythm.   Skin : left maxilla - Incision is Healing well.    [facility] [place], " DO  [facility]                Again, thank you for allowing me to participate in the care of your patient.        Sincerely,        Christina Vegas MD

## 2019-12-30 NOTE — PROGRESS NOTES
"[facility] FOLLOW-UP NOTE  GENERAL SURGERY    PCP: Karen Rodriguez         Assessment and Plan:   Assessment:   Mainor Grande is a 64 year old male who presented post operatively from s/p excision of left maxilla skin lesion on 12/20/2019 and is doing very well.       ICD-10-CM    1. S/P excision of skin lesion, follow-up exam Z09        I reviewed the pathology report today with the patient and answered all questions.  SPECIMEN(S):   Skin, left maxilla     FINAL DIAGNOSIS:   Skin, left maxilla:   - Consistent with ruptured folliculitis and scar - (see description)       Plan:  Incision clean dry and intact.  There is a small firmness at the superior aspect of the incision.  This is the likely from the inflammation versus another area of folliculitis.  I told patient to continue to warm compresses the area or put ice on this area.  He is to call me if this area increase in size.  Otherwise the incision is healing well.           Subjective:     Mainor Grande is a 64 year old male who presents post operatively from s/p excision of left maxilla skin lesion on 12/20/2019. Pain has been controled. Currently is not using pain medications. Eating a Regular and having regular bladder/bowel function. Overall doing very well.           Objective:       /82   Pulse 65   Temp 98.4  F (36.9  C) (Temporal)   Resp 16   Ht 1.778 m (5' 10\")   Wt 91.2 kg (201 lb)   SpO2 99%   BMI 28.84 kg/m     Constitutional: Awake, alert, in no acute distress.  Respiratory: Non-labored.   Cardiovascular: Regular rate and rhythm.   Skin : left maxilla - Incision is Healing well.    [facility], DO  [place] [facility]              "

## 2020-03-07 DIAGNOSIS — J31.0 CHRONIC RHINITIS: ICD-10-CM

## 2020-03-09 NOTE — TELEPHONE ENCOUNTER
Pending Prescriptions:                       Disp   Refills    azelastine (ASTELIN) 0.1 % nasal spray [Ph*90 mL  3        Sig: SPRAY 2 SPRAYS IN EACH NOSTRIL TWO TIMES A DAY      Routing refill request to provider for review/approval because:  pts age    Christina Orosco, MSN, RN

## 2020-03-10 RX ORDER — AZELASTINE 1 MG/ML
SPRAY, METERED NASAL
Qty: 90 ML | Refills: 3 | Status: SHIPPED | OUTPATIENT
Start: 2020-03-10 | End: 2021-03-09

## 2020-03-20 DIAGNOSIS — J45.31 MILD PERSISTENT ASTHMA WITH ACUTE EXACERBATION: ICD-10-CM

## 2020-03-20 RX ORDER — BUDESONIDE AND FORMOTEROL FUMARATE DIHYDRATE 160; 4.5 UG/1; UG/1
AEROSOL RESPIRATORY (INHALATION)
Qty: 30.6 G | Refills: 11 | Status: SHIPPED | OUTPATIENT
Start: 2020-03-20 | End: 2021-06-10

## 2020-03-20 NOTE — TELEPHONE ENCOUNTER
Pending Prescriptions:                       Disp   Refills    SYMBICORT 160-4.5 MCG/ACT Inhaler [Pharmac*30.6 g 11       Sig: INHALE TWO PUFFS BY MOUTH TWICE A DAY    ACT Total Scores 2/12/2019   ACT TOTAL SCORE -   ASTHMA ER VISITS -   ASTHMA HOSPITALIZATIONS -   ACT TOTAL SCORE (Goal Greater than or Equal to 20) 20   In the past 12 months, how many times did you visit the emergency room for your asthma without being admitted to the hospital? 0   In the past 12 months, how many times were you hospitalized overnight because of your asthma? 0       Routing refill request to provider for review/approval because:  Labs not current:  ACT    Christina Orosco, MSN, RN

## 2020-05-10 ENCOUNTER — NURSE TRIAGE (OUTPATIENT)
Dept: NURSING | Facility: CLINIC | Age: 65
End: 2020-05-10

## 2020-05-10 ENCOUNTER — TELEPHONE (OUTPATIENT)
Dept: FAMILY MEDICINE | Facility: OTHER | Age: 65
End: 2020-05-10

## 2020-05-10 NOTE — TELEPHONE ENCOUNTER
Reason for Call:  Other appointment    Detailed comments: patient transfer from [facility] for an in clinic appointment for weight loss, fatigue, losing weight. Patient would like a call back Monday if possible.     Phone Number Patient can be reached at: Other phone number:  [phone]    Best Time: anytime     Can we leave a detailed message on this number? YES    Call taken on 5/10/2020 at 10:53 AM by Yenifer Gonzalez

## 2020-05-10 NOTE — TELEPHONE ENCOUNTER
Spouse calling reporting she is concerned with patient's recent weight loss from 190's to 183 lbs. Reporting increased fatigue.  Stating she is calling today as patient came down after shower sweating. Temp during triage 93 Oral (patient has been sipping cold water in past 10 minutes). Reporting chills that have resolved.  Reporting sweating has resolved. Denied chest pain or pressure. Patient is denying difficulty breathing. Spouse feels patient has been more tired and weak over the past couple of months. Becoming more fatigued when going on walks.  Patient is denying any shortness of breath on exertion today. Blood pressure 120/66 and 108/67.    Patient agrees to recheck temp in 30 minutes after avoiding cold water. If temp remains below 95 degrees Emergency Room advised.   Caller will schedule appointment for 5/11/20 while on call and verbalized understanding to go into the ER if temp is below 95.     Connected to Central Scheduling.     Maddy Ferro RN  [facility]       Reason for Disposition    [1] SEVERE sweating (e.g., drenched with sweat) AND [2] cause unknown    Additional Information    Negative: Shock suspected (e.g., cold/pale/clammy skin, too weak to stand, low BP, rapid pulse)    Negative: Sounds like a life-threatening emergency to the triager    Negative: Difficulty breathing    Negative: Patient sounds very sick or weak to the triager    Protocols used: [id number]

## 2020-05-11 ENCOUNTER — MYC MEDICAL ADVICE (OUTPATIENT)
Dept: FAMILY MEDICINE | Facility: CLINIC | Age: 65
End: 2020-05-11

## 2020-05-11 ENCOUNTER — VIRTUAL VISIT (OUTPATIENT)
Dept: FAMILY MEDICINE | Facility: OTHER | Age: 65
End: 2020-05-11
Payer: COMMERCIAL

## 2020-05-11 DIAGNOSIS — K52.9 CHRONIC DIARRHEA: ICD-10-CM

## 2020-05-11 DIAGNOSIS — R63.4 WEIGHT LOSS: Primary | ICD-10-CM

## 2020-05-11 DIAGNOSIS — Z12.11 SPECIAL SCREENING FOR MALIGNANT NEOPLASMS, COLON: ICD-10-CM

## 2020-05-11 PROCEDURE — 99214 OFFICE O/P EST MOD 30 MIN: CPT | Mod: 95 | Performed by: FAMILY MEDICINE

## 2020-05-11 ASSESSMENT — PAIN SCALES - GENERAL: PAINLEVEL: NO PAIN (0)

## 2020-05-11 NOTE — TELEPHONE ENCOUNTER
Please call patient and schedule for virtual visit with any provider today.     Routed to erc registration.  Yumi Dee, RINKUN, RN, PHN

## 2020-05-11 NOTE — PROGRESS NOTES
"Mainor Grande is a 64 year old male who is being evaluated via a billable video visit.      The patient has been notified of following:     \"This video visit will be conducted via a call between you and your physician/provider. We have found that certain health care needs can be provided without the need for an in-person physical exam.  This service lets us provide the care you need with a video conversation.  If a prescription is necessary we can send it directly to your pharmacy.  If lab work is needed we can place an order for that and you can then stop by our lab to have the test done at a later time.    Video visits are billed at different rates depending on your insurance coverage.  Please reach out to your insurance provider with any questions.    If during the course of the call the physician/provider feels a video visit is not appropriate, you will not be charged for this service.\"    Patient has given verbal consent for Video visit? Yes    How would you like to obtain your AVS? MyChart    Patient would like the video invitation sent by: Other e-mail: [email or web address]    Will anyone else be joining your video visit? No      Subjective     Mainor Grande is a 64 year old male who presents today via video visit for the following health issues:    HPI        Video Start Time: 12:29PM    Fatigue and weight loss      Duration: last few months    Description (location/character/radiation): wondering if this is stress related     Intensity:  mild    Accompanying signs and symptoms: not eating well, very stressed, not been able to go to gym, has lost about 10-20lbs    History (similar episodes/previous evaluation): None    Precipitating or alleviating factors: stress/anxiety     Therapies tried and outcome: None       Patient Active Problem List   Diagnosis     Reflux esophagitis     Abnormal levels of other serum enzymes     Erectile dysfunction     Moderate persistent asthma with acute exacerbation     " Fatty liver     HYPERLIPIDEMIA LDL GOAL <160     Seasonal allergic rhinitis     Deviated nasal septum     Chronic nasal congestion     Diarrhea, unspecified type     Acute sinusitis with symptoms > 10 days     Nummular eczema     Memory changes     Past Surgical History:   Procedure Laterality Date     COLONOSCOPY  9/4/2013    Procedure: COLONOSCOPY;  colonoscopy;  Surgeon: Beto Keen MD;  Location: [facility]     HC COLONOSCOPY THRU STOMA, DIAGNOSTIC  2005    normal     HC VASECTOMY UNILAT/BILAT W POSTOP SEMEN  1992    Vasectomy       Social History     Tobacco Use     Smoking status: Never Smoker     Smokeless tobacco: Never Used   Substance Use Topics     Alcohol use: Yes     Alcohol/week: 10.0 standard drinks     Comment: caffeine 6 per day     Family History   Problem Relation Age of Onset     Cancer Mother         bladder cancer     Neurologic Disorder Father         alzheimers dementia     Gastrointestinal Disease Brother         divertculitis         Current Outpatient Medications   Medication Sig Dispense Refill     albuterol (VENTOLIN HFA) 108 (90 Base) MCG/ACT inhaler Inhale 2 puffs into the lungs every 4 hours as needed for shortness of breath / dyspnea or wheezing 54 g 3     ASPIRIN PO Take 162 mg by mouth at onset of headache for moderate pain       azelastine (ASTELIN) 0.1 % nasal spray SPRAY 2 SPRAYS IN EACH NOSTRIL TWO TIMES A DAY 90 mL 3     esomeprazole (NEXIUM) 20 MG capsule Take 20 mg by mouth every other day Take 30-60 minutes before eating.       fluocinonide (LIDEX) 0.05 % external cream APPLY SPARINGLY TO AFFECTED AREA TWICE DAILY AS NEEDED DO NOT APPLY TO FACE 60 g 3     fluticasone (FLONASE) 50 MCG/ACT nasal spray Spray 1-2 sprays into both nostrils daily 16 g 11     Ibuprofen (ADVIL PO)        melatonin 1 MG TABS Take 1 mg by mouth nightly as needed for sleep       Multiple Vitamins-Minerals (MULTIVITAMIN ADULT PO)        SYMBICORT 160-4.5 MCG/ACT Inhaler INHALE TWO PUFFS BY MOUTH  "TWICE A DAY 30.6 g 11     triamcinolone (KENALOG) 0.1 % external cream Apply topically 2 times daily 80 g 3     Allergies   Allergen Reactions     No Known Drug Allergies      Seasonal Allergies      BP Readings from Last 3 Encounters:   12/30/19 124/82   12/20/19 127/77   12/06/19 120/82    Wt Readings from Last 3 Encounters:   12/30/19 91.2 kg (201 lb)   12/20/19 92.8 kg (204 lb 8 oz)   12/06/19 91.4 kg (201 lb 8 oz)                    Reviewed and updated as needed this visit by Provider         Review of Systems   Constitutional, HEENT, cardiovascular, pulmonary, GI, , musculoskeletal, neuro, skin, endocrine and psych systems are negative, except as otherwise noted.      Objective    There were no vitals taken for this visit.  Estimated body mass index is 28.84 kg/m  as calculated from the following:    Height as of 12/30/19: 1.778 m (5' 10\").    Weight as of 12/30/19: 91.2 kg (201 lb).  Physical Exam     GENERAL: Healthy, alert and no distress  EYES: Eyes grossly normal to inspection.  No discharge or erythema, or obvious scleral/conjunctival abnormalities.  RESP: No audible wheeze, cough, or visible cyanosis.  No visible retractions or increased work of breathing.    SKIN: Visible skin clear. No significant rash, abnormal pigmentation or lesions.  NEURO: Cranial nerves grossly intact.  Mentation and speech appropriate for age.  PSYCH: Mentation appears normal, affect normal/bright, judgement and insight intact, normal speech and appearance well-groomed.      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  He does not have any other associated symptoms.  Will get labs as ordered " for further evaluation    Update-the labs were essentially normal except for low albumin levels, elevated alkaline phosphatase, positive fecal lactoferrin and mildly elevated CRP levels.  Given negative infectious panel, suspect the cause of low albumin to be his chronic diarrhea from possible inflammatory bowel disease .  We will get diagnostic colonoscopy and refer to gastroenterology for further evaluation.      Assessment & Plan   Problem List Items Addressed This Visit     None      Visit Diagnoses     Weight loss    -  Primary    Relevant Orders    TSH with free T4 reflex (Completed)    CBC with platelets and differential (Completed)    Comprehensive metabolic panel (BMP + Alb, Alk Phos, ALT, AST, Total. Bili, TP) (Completed)    Lipase (Completed)    UA with Microscopic reflex to Culture (Completed)    CRP, inflammation (Completed)    GASTROENTEROLOGY ADULT REF PROCEDURE ONLY    GASTROENTEROLOGY ADULT REF CONSULT ONLY    Chronic diarrhea        Relevant Orders    Fecal Lactoferrin (Completed)    Enteric Bacteria and Virus Panel by TOBY Stool (Completed)    Clostridium difficile Toxin B PCR (Completed)    Ova and Parasite Exam Routine (Completed)    GASTROENTEROLOGY ADULT REF PROCEDURE ONLY    GASTROENTEROLOGY ADULT REF CONSULT ONLY    Special screening for malignant neoplasms, colon        Relevant Orders    Fecal colorectal cancer screen (FIT) (Completed)               See Patient Instructions  No follow-ups on file.    Karen Rodriguez MD  [facility]      Video-Visit Details    Type of service:  Video Visit    Video End Time:1:04 PM    Originating Location (pt. Location): Home    Distant Location (provider location):  [facility]     Platform used for Video Visit: Doximity    No follow-ups on file.       Karen Rodriguez MD

## 2020-05-14 ENCOUNTER — TELEPHONE (OUTPATIENT)
Dept: FAMILY MEDICINE | Facility: OTHER | Age: 65
End: 2020-05-14

## 2020-05-14 DIAGNOSIS — R63.4 WEIGHT LOSS: ICD-10-CM

## 2020-05-14 LAB
ALBUMIN SERPL-MCNC: 2.8 G/DL (ref 3.4–5)
ALBUMIN UR-MCNC: ABNORMAL MG/DL
ALP SERPL-CCNC: 343 U/L (ref 40–150)
ALT SERPL W P-5'-P-CCNC: 46 U/L (ref 0–70)
ANION GAP SERPL CALCULATED.3IONS-SCNC: 3 MMOL/L (ref 3–14)
APPEARANCE UR: CLEAR
AST SERPL W P-5'-P-CCNC: 38 U/L (ref 0–45)
BACTERIA #/AREA URNS HPF: ABNORMAL /HPF
BASOPHILS # BLD AUTO: 0.1 10E9/L (ref 0–0.2)
BASOPHILS NFR BLD AUTO: 0.8 %
BILIRUB SERPL-MCNC: 0.5 MG/DL (ref 0.2–1.3)
BILIRUB UR QL STRIP: NEGATIVE
BUN SERPL-MCNC: 13 MG/DL (ref 7–30)
CALCIUM SERPL-MCNC: 8.4 MG/DL (ref 8.5–10.1)
CHLORIDE SERPL-SCNC: 103 MMOL/L (ref 94–109)
CO2 SERPL-SCNC: 28 MMOL/L (ref 20–32)
COLOR UR AUTO: YELLOW
CREAT SERPL-MCNC: 1 MG/DL (ref 0.66–1.25)
CRP SERPL-MCNC: 11.4 MG/L (ref 0–8)
DIFFERENTIAL METHOD BLD: ABNORMAL
EOSINOPHIL # BLD AUTO: 0.7 10E9/L (ref 0–0.7)
EOSINOPHIL NFR BLD AUTO: 7.4 %
ERYTHROCYTE [DISTWIDTH] IN BLOOD BY AUTOMATED COUNT: 14.3 % (ref 10–15)
GFR SERPL CREATININE-BSD FRML MDRD: 78 ML/MIN/{1.73_M2}
GLUCOSE SERPL-MCNC: 97 MG/DL (ref 70–99)
GLUCOSE UR STRIP-MCNC: NEGATIVE MG/DL
HCT VFR BLD AUTO: 38 % (ref 40–53)
HGB BLD-MCNC: 12.2 G/DL (ref 13.3–17.7)
HGB UR QL STRIP: NEGATIVE
KETONES UR STRIP-MCNC: NEGATIVE MG/DL
LEUKOCYTE ESTERASE UR QL STRIP: ABNORMAL
LIPASE SERPL-CCNC: 110 U/L (ref 73–393)
LYMPHOCYTES # BLD AUTO: 2.9 10E9/L (ref 0.8–5.3)
LYMPHOCYTES NFR BLD AUTO: 29.9 %
MCH RBC QN AUTO: 27.9 PG (ref 26.5–33)
MCHC RBC AUTO-ENTMCNC: 32.1 G/DL (ref 31.5–36.5)
MCV RBC AUTO: 87 FL (ref 78–100)
MONOCYTES # BLD AUTO: 1 10E9/L (ref 0–1.3)
MONOCYTES NFR BLD AUTO: 10.6 %
NEUTROPHILS # BLD AUTO: 5 10E9/L (ref 1.6–8.3)
NEUTROPHILS NFR BLD AUTO: 51.3 %
NITRATE UR QL: NEGATIVE
NON-SQ EPI CELLS #/AREA URNS LPF: ABNORMAL /LPF
PH UR STRIP: 6.5 PH (ref 5–7)
PLATELET # BLD AUTO: 458 10E9/L (ref 150–450)
POTASSIUM SERPL-SCNC: 4.5 MMOL/L (ref 3.4–5.3)
PROT SERPL-MCNC: 8 G/DL (ref 6.8–8.8)
RBC # BLD AUTO: 4.38 10E12/L (ref 4.4–5.9)
RBC #/AREA URNS AUTO: ABNORMAL /HPF
SODIUM SERPL-SCNC: 134 MMOL/L (ref 133–144)
SOURCE: ABNORMAL
SP GR UR STRIP: 1.02 (ref 1–1.03)
TSH SERPL DL<=0.005 MIU/L-ACNC: 1.36 MU/L (ref 0.4–4)
UROBILINOGEN UR STRIP-ACNC: 0.2 EU/DL (ref 0.2–1)
WBC # BLD AUTO: 9.8 10E9/L (ref 4–11)
WBC #/AREA URNS AUTO: ABNORMAL /HPF

## 2020-05-14 PROCEDURE — 36415 COLL VENOUS BLD VENIPUNCTURE: CPT | Performed by: FAMILY MEDICINE

## 2020-05-14 PROCEDURE — 83690 ASSAY OF LIPASE: CPT | Performed by: FAMILY MEDICINE

## 2020-05-14 PROCEDURE — 81001 URINALYSIS AUTO W/SCOPE: CPT | Performed by: FAMILY MEDICINE

## 2020-05-14 PROCEDURE — 82274 ASSAY TEST FOR BLOOD FECAL: CPT | Performed by: FAMILY MEDICINE

## 2020-05-14 PROCEDURE — 87493 C DIFF AMPLIFIED PROBE: CPT | Mod: 59 | Performed by: FAMILY MEDICINE

## 2020-05-14 PROCEDURE — 80050 GENERAL HEALTH PANEL: CPT | Performed by: FAMILY MEDICINE

## 2020-05-14 PROCEDURE — 87209 SMEAR COMPLEX STAIN: CPT | Performed by: FAMILY MEDICINE

## 2020-05-14 PROCEDURE — 83630 LACTOFERRIN FECAL (QUAL): CPT | Performed by: FAMILY MEDICINE

## 2020-05-14 PROCEDURE — 87506 IADNA-DNA/RNA PROBE TQ 6-11: CPT | Performed by: FAMILY MEDICINE

## 2020-05-14 PROCEDURE — 86140 C-REACTIVE PROTEIN: CPT | Performed by: FAMILY MEDICINE

## 2020-05-14 PROCEDURE — 87177 OVA AND PARASITES SMEARS: CPT | Performed by: FAMILY MEDICINE

## 2020-05-14 NOTE — TELEPHONE ENCOUNTER
----- Message from Karen Rodriguez MD sent at 5/14/2020  2:05 PM CDT -----  Please inform pt that the labs showed a low albumin levels and worsened liver enzymes compared to previous test and mildly elevated inflammatory markers. Also noted was that he is mildly anemic which is a new finding. All this could be related to the chronic diarrhea he has been having. I did want him to complete some stool studies which were ordered along with the blood tests. He did not complete these yet. Please have him complete these before further recommendations

## 2020-05-14 NOTE — TELEPHONE ENCOUNTER
Attempted to contact patient - phone rang a few times and then went silent. Please give him the information below if he calls back. Otherwise, will try him again later.  Maya Dale, CMA

## 2020-05-15 DIAGNOSIS — K52.9 CHRONIC DIARRHEA: ICD-10-CM

## 2020-05-15 DIAGNOSIS — Z12.11 SPECIAL SCREENING FOR MALIGNANT NEOPLASMS, COLON: ICD-10-CM

## 2020-05-15 LAB
C COLI+JEJUNI+LARI FUSA STL QL NAA+PROBE: NOT DETECTED
C DIFF TOX B STL QL: NEGATIVE
EC STX1 GENE STL QL NAA+PROBE: NOT DETECTED
EC STX2 GENE STL QL NAA+PROBE: NOT DETECTED
ENTERIC PATHOGEN COMMENT: NORMAL
HEMOCCULT STL QL IA: NEGATIVE
LACTOFERRIN STL QL IA: POSITIVE
NOROV GI+II ORF1-ORF2 JNC STL QL NAA+PR: NOT DETECTED
RVA NSP5 STL QL NAA+PROBE: NOT DETECTED
SALMONELLA SP RPOD STL QL NAA+PROBE: NOT DETECTED
SHIGELLA SP+EIEC IPAH STL QL NAA+PROBE: NOT DETECTED
SPECIMEN SOURCE: NORMAL
V CHOL+PARA RFBL+TRKH+TNAA STL QL NAA+PR: NOT DETECTED
Y ENTERO RECN STL QL NAA+PROBE: NOT DETECTED

## 2020-05-15 NOTE — TELEPHONE ENCOUNTER
Spoke to patient, he will be dropping the stool studies off shortly, they are completed. He wants to know what it means by worsening albumin levels?    Madyson Wagner MA

## 2020-05-15 NOTE — TELEPHONE ENCOUNTER
Albumin is one of the protein that is produced in the liver . His levels are lower than normal which either means that he is loosing it at higher rate due to ongoing diarrhea or that he has reduced production from the liver. I suspect this is due to ongoing diarrhea and hence I wanted him to complete the stool studies. Will wait for them to be completed prior to further recommendations

## 2020-05-15 NOTE — TELEPHONE ENCOUNTER
Left message for patient to return call regarding RK response about the albumin below. Stool studies are in process.  Madyson Wagner MA

## 2020-05-17 ENCOUNTER — MYC MEDICAL ADVICE (OUTPATIENT)
Dept: FAMILY MEDICINE | Facility: OTHER | Age: 65
End: 2020-05-17

## 2020-05-18 LAB
O+P STL MICRO: NORMAL
O+P STL MICRO: NORMAL
SPECIMEN SOURCE: NORMAL

## 2020-05-18 NOTE — TELEPHONE ENCOUNTER
Pasted from result note:  Notes recorded by Karen Rodriguez MD on 5/18/2020 at 4:44 PM CDT   Please inform pt that the stool studies showed increased white cell count in the stool which can be a sign of inflammation of intestines . The cause is mostly non infectious as the rest of the infection stool test are  negative.  There are certain inflammatory bowl disease like microscopic colitis which can present in a similar way and need a colonoscopy for evaluation. I would recommend doing a colonoscopy for further evaluation. Referral placed. Please help schedule. Also referral placed to see gastroenterology.

## 2020-05-20 ENCOUNTER — TELEPHONE (OUTPATIENT)
Dept: FAMILY MEDICINE | Facility: OTHER | Age: 65
End: 2020-05-20

## 2020-05-20 NOTE — TELEPHONE ENCOUNTER
Writer attempted to reach the patient and schedule a virtual visit appointment with either Dr. Vee or Dr. Eaton [facility].    CALL CENTER OK TO SCHEDULE    Lvm.    [facility]  Adult Med Spec/Surg Spec   [phone]

## 2020-05-25 DIAGNOSIS — J45.31 MILD PERSISTENT ASTHMA WITH ACUTE EXACERBATION: ICD-10-CM

## 2020-05-26 RX ORDER — BUDESONIDE AND FORMOTEROL FUMARATE DIHYDRATE 160; 4.5 UG/1; UG/1
AEROSOL RESPIRATORY (INHALATION)
Qty: 30.6 G | Refills: 11 | OUTPATIENT
Start: 2020-05-26

## 2020-05-26 NOTE — TELEPHONE ENCOUNTER
Pending Prescriptions:                       Disp   Refills    SYMBICORT 160-4.5 MCG/ACT Inhaler [Pharma*30.6 g 11           Sig: INHALE TWO PUFFS BY MOUTH TWICE A DAY    Sent 3/20/20 with 1 year supply. Refill not appropriate at this time.     Christina Orosco, MSN, RN

## 2020-06-19 ENCOUNTER — VIRTUAL VISIT (OUTPATIENT)
Dept: GASTROENTEROLOGY | Facility: CLINIC | Age: 65
End: 2020-06-19
Payer: COMMERCIAL

## 2020-06-19 DIAGNOSIS — R63.4 WEIGHT LOSS: Primary | ICD-10-CM

## 2020-06-19 DIAGNOSIS — R19.7 DIARRHEA, UNSPECIFIED TYPE: ICD-10-CM

## 2020-06-19 PROCEDURE — 99204 OFFICE O/P NEW MOD 45 MIN: CPT | Mod: GT | Performed by: INTERNAL MEDICINE

## 2020-06-19 NOTE — PROGRESS NOTES
"Mainor Grande is a 64 year old male who is being evaluated via a billable video visit.      The patient has been notified of following:     \"This video visit will be conducted via a call between you and your physician/provider. We have found that certain health care needs can be provided without the need for an in-person physical exam.  This service lets us provide the care you need with a video conversation.  If a prescription is necessary we can send it directly to your pharmacy.  If lab work is needed we can place an order for that and you can then stop by our lab to have the test done at a later time.    Video visits are billed at different rates depending on your insurance coverage.  Please reach out to your insurance provider with any questions.    If during the course of the call the physician/provider feels a video visit is not appropriate, you will not be charged for this service.\"    Patient has given verbal consent for Video visit? Yes    Will anyone else be joining your video visit? No  Mago Brown LPN      Video-Visit Details          "

## 2020-06-19 NOTE — PROGRESS NOTES
"Mainor Grande is a 64 year old male who is being evaluated via a billable video visit.      The patient has been notified of following:     \"This video visit will be conducted via a call between you and your physician/provider. We have found that certain health care needs can be provided without the need for an in-person physical exam.  This service lets us provide the care you need with a video conversation.  If a prescription is necessary we can send it directly to your pharmacy.  If lab work is needed we can place an order for that and you can then stop by our lab to have the test done at a later time.    Video visits are billed at different rates depending on your insurance coverage.  Please reach out to your insurance provider with any questions.    If during the course of the call the physician/provider feels a video visit is not appropriate, you will not be charged for this service.\"    Patient has given verbal consent for Video visit? Yes    Will anyone else be joining your video visit? No        Appointment to establish care for diarrhea and 20lbs unintentional weight loss over the last few months.  Does endorse decreased appetite but no nausea or vomiting.  Has up to 4-5 nonbloody BMs a day which is new for him - previously was much more regular. Symptoms occur intermittently, no known inciting factors that he can think of.    Previously seen by hepatology re:elevated LFTs - had MRCP and biopsy a few years ago - biopsy concerning for chronic low grade biliary obstruction.  MRCP unremarkable aside from enlarged portahepatis lymph node.    Most recent labs with elevated ALP (343, from 310 in 2019) and low albumin. Transaminases wnl.    Nonsmoker.  No family history of GI malignancies that he is aware of.    Was seen by PCP re:diarrhea - stool studies unremarkable aside from fecal lactoferrin. FIT negative.    Past Medical History:   Diagnosis Date     Allergic rhinitis, cause unspecified      Fatty liver " 2004    elevated LFT, saw GI for a consultation     Mild persistent asthma 9/12/2008     Reflux esophagitis      Unspecified disease of respiratory system     RAD     Viremia, unspecified     acute       Past Surgical History:   Procedure Laterality Date     COLONOSCOPY  9/4/2013    Procedure: COLONOSCOPY;  colonoscopy;  Surgeon: Beto Keen MD;  Location: [facility]     HC COLONOSCOPY THRU STOMA, DIAGNOSTIC  2005    normal     HC VASECTOMY UNILAT/BILAT W POSTOP SEMEN  1992    Vasectomy       Family History   Problem Relation Age of Onset     Cancer Mother         bladder cancer     Neurologic Disorder Father         alzheimers dementia     Gastrointestinal Disease Brother         divertculitis       Social History     Tobacco Use     Smoking status: Never Smoker     Smokeless tobacco: Never Used   Substance Use Topics     Alcohol use: Yes     Alcohol/week: 10.0 standard drinks     Comment: caffeine 6 per day     Assessment and Plan:    # weight loss, hypoalbuminemia - will plan for CT chest/abdomen/pelvis for further evaluation.  Could be related to diarrhea, although other etiologies should be ruled out as well.      # diarrhea - will check celiac serologies and fecal calprotectin - will likely need colonoscopy for further evaluation.    # elevated ALP - previously evaluated in 2016 with MRCP and liver biopsy which were concerning for low grade biliary obstruction - will repeat CT as above, may need MRCP for further evaluation as well.    Video-Visit Details    Type of service:  Video Visit      Originating Location (pt. Location): Home    Distant Location (provider location):  [facility]     Platform used for Video Visit: Briana Eaton DO

## 2020-06-22 ENCOUNTER — TELEPHONE (OUTPATIENT)
Dept: GASTROENTEROLOGY | Facility: CLINIC | Age: 65
End: 2020-06-22

## 2020-06-22 NOTE — TELEPHONE ENCOUNTER
LPN left voicemail for patient requesting a return call to assist with scheduling a lab only appointment and imaging.     Lily Eaton DO P [facility] G[facility]enterology-Adult[facility] -   He needs labs and a CT scan.   Thanks,   Lily Brown LPN

## 2020-06-23 NOTE — TELEPHONE ENCOUNTER
Patient returned call and would like to have labs done at [facility] and the CT scan done in [place]. LPN instructed patient that he would need to call the [facility] to schedule the lab only appointment and call [place] to schedule the CT. Patient is on vacation at this time and stated he would call and schedule these when he returns home.     Mago Brown LPN

## 2020-07-02 ENCOUNTER — HOSPITAL ENCOUNTER (OUTPATIENT)
Dept: CT IMAGING | Facility: CLINIC | Age: 65
Discharge: HOME | End: 2020-07-02
Attending: INTERNAL MEDICINE | Admitting: INTERNAL MEDICINE
Payer: COMMERCIAL

## 2020-07-02 DIAGNOSIS — R63.4 WEIGHT LOSS: ICD-10-CM

## 2020-07-02 DIAGNOSIS — K21.00 REFLUX ESOPHAGITIS: ICD-10-CM

## 2020-07-02 DIAGNOSIS — R19.7 DIARRHEA, UNSPECIFIED TYPE: ICD-10-CM

## 2020-07-02 DIAGNOSIS — R74.8 ELEVATED ALKALINE PHOSPHATASE LEVEL: Primary | ICD-10-CM

## 2020-07-02 LAB
ALBUMIN SERPL-MCNC: 3.1 G/DL (ref 3.4–5)
ALP SERPL-CCNC: 393 U/L (ref 40–150)
ALT SERPL W P-5'-P-CCNC: 54 U/L (ref 0–70)
ANION GAP SERPL CALCULATED.3IONS-SCNC: 6 MMOL/L (ref 3–14)
AST SERPL W P-5'-P-CCNC: 39 U/L (ref 0–45)
BILIRUB SERPL-MCNC: 0.6 MG/DL (ref 0.2–1.3)
BUN SERPL-MCNC: 10 MG/DL (ref 7–30)
CALCIUM SERPL-MCNC: 8.5 MG/DL (ref 8.5–10.1)
CHLORIDE SERPL-SCNC: 103 MMOL/L (ref 94–109)
CO2 SERPL-SCNC: 25 MMOL/L (ref 20–32)
CREAT SERPL-MCNC: 0.96 MG/DL (ref 0.66–1.25)
GFR SERPL CREATININE-BSD FRML MDRD: 82 ML/MIN/{1.73_M2}
GGT SERPL-CCNC: 256 U/L (ref 0–75)
GLUCOSE SERPL-MCNC: 102 MG/DL (ref 70–99)
POTASSIUM SERPL-SCNC: 4.2 MMOL/L (ref 3.4–5.3)
PROT SERPL-MCNC: 8.4 G/DL (ref 6.8–8.8)
SODIUM SERPL-SCNC: 134 MMOL/L (ref 133–144)

## 2020-07-02 PROCEDURE — 25000125 ZZHC RX 250: Performed by: INTERNAL MEDICINE

## 2020-07-02 PROCEDURE — 74177 CT ABD & PELVIS W/CONTRAST: CPT

## 2020-07-02 PROCEDURE — 83993 ASSAY FOR CALPROTECTIN FECAL: CPT | Performed by: INTERNAL MEDICINE

## 2020-07-02 PROCEDURE — 82977 ASSAY OF GGT: CPT | Performed by: INTERNAL MEDICINE

## 2020-07-02 PROCEDURE — 36415 COLL VENOUS BLD VENIPUNCTURE: CPT | Performed by: INTERNAL MEDICINE

## 2020-07-02 PROCEDURE — 25000128 H RX IP 250 OP 636: Performed by: INTERNAL MEDICINE

## 2020-07-02 PROCEDURE — 83516 IMMUNOASSAY NONANTIBODY: CPT | Performed by: INTERNAL MEDICINE

## 2020-07-02 PROCEDURE — 82784 ASSAY IGA/IGD/IGG/IGM EACH: CPT | Performed by: INTERNAL MEDICINE

## 2020-07-02 PROCEDURE — 80053 COMPREHEN METABOLIC PANEL: CPT | Performed by: INTERNAL MEDICINE

## 2020-07-02 RX ORDER — IOPAMIDOL 755 MG/ML
500 INJECTION, SOLUTION INTRAVASCULAR ONCE
Status: COMPLETED | OUTPATIENT
Start: 2020-07-02 | End: 2020-07-02

## 2020-07-02 RX ADMIN — SODIUM CHLORIDE 60 ML: 9 INJECTION, SOLUTION INTRAVENOUS at 09:55

## 2020-07-02 RX ADMIN — IOPAMIDOL 98 ML: 755 INJECTION, SOLUTION INTRAVENOUS at 09:55

## 2020-07-03 ENCOUNTER — TELEPHONE (OUTPATIENT)
Dept: GASTROENTEROLOGY | Facility: CLINIC | Age: 65
End: 2020-07-03

## 2020-07-03 LAB
CALPROTECTIN STL-MCNT: 979 MG/KG (ref 0–49.9)
IGA SERPL-MCNC: 359 MG/DL (ref 84–499)
MITOCHONDRIA M2 IGG SER-ACNC: 2 U/ML
TTG IGA SER-ACNC: 1 U/ML
TTG IGG SER-ACNC: 2 U/ML

## 2020-07-03 NOTE — TELEPHONE ENCOUNTER
Spoke to pt. He requested a MyC with the phone numbers to call for Hepatology and ASC for endoscopy.  Pt has no further questions.    Maye Ramirez RN  Gastroenterology Care Coordinator  [place], [place]

## 2020-07-09 ENCOUNTER — TELEPHONE (OUTPATIENT)
Dept: GASTROENTEROLOGY | Facility: CLINIC | Age: 65
End: 2020-07-09

## 2020-07-09 DIAGNOSIS — Z11.59 ENCOUNTER FOR SCREENING FOR OTHER VIRAL DISEASES: Primary | ICD-10-CM

## 2020-07-09 NOTE — TELEPHONE ENCOUNTER
RECORDS RECEIVED FROM: Reflux esophagitis [K21.0],Elevated alkaline phosphatase level [R74.8], Diarrhea, unspecified type    Appt Date: 07.14.2020   NOTES STATUS DETAILS   OFFICE NOTE from referring provider Internal 07.03.2020 07.02.2020   OFFICE NOTES from other specialists N/A    DISCHARGE SUMMARY from hospital N/A    MEDICATION LIST Internal    LIVER BIOSPY (IF APPLICABLE)      PATHOLOGY REPORTS  Internal 06.08.2016 06.28.2016   IMAGING     ENDOSCOPY (IF AVAILABLE) N/A    COLONOSCOPY (IF AVAILABLE) N/A    ULTRASOUND LIVER Internal 03.21.2016   CT OF ABDOMEN Internal 07.02.2020 03.24.2016   MRI OF LIVER Internal 06.02.2016   FIBROSCAN, US ELASTOGRAPHY, FIBROSIS SCAN, MR ELASTOGRAPHY N/A    LABS     HEPATIC PANEL (LIVER PANEL) Internal 06.02.2016   BASIC METABOLIC PANEL Internal 06.02.2016   COMPLETE METABOLIC PANEL Internal 05.14.2020   COMPLETE BLOOD COUNT (CBC) Internal 05.14.2020   INTERNATIONAL NORMALIZED RATIO (INR) Internal 06.02.2016   HEPATITIS C ANTIBODY Internal 03.21.2016   HEPATITIS C VIRAL LOAD/PCR N/A    HEPATITIS C GENOTYPE N/A    HEPATITIS B SURFACE ANTIGEN Internal 03.21.2016   HEPATITIS B SURFACE ANTIBODY Internal 03.21.2016   HEPATITIS B DNA QUANT LEVEL N/A    HEPATITIS B CORE ANTIBODY Internal 03.21.2016

## 2020-07-14 ENCOUNTER — PRE VISIT (OUTPATIENT)
Dept: GASTROENTEROLOGY | Facility: CLINIC | Age: 65
End: 2020-07-14

## 2020-07-14 ENCOUNTER — VIRTUAL VISIT (OUTPATIENT)
Dept: GASTROENTEROLOGY | Facility: CLINIC | Age: 65
End: 2020-07-14
Attending: INTERNAL MEDICINE
Payer: COMMERCIAL

## 2020-07-14 DIAGNOSIS — R19.7 DIARRHEA, UNSPECIFIED TYPE: ICD-10-CM

## 2020-07-14 DIAGNOSIS — R74.8 ELEVATED ALKALINE PHOSPHATASE LEVEL: ICD-10-CM

## 2020-07-14 DIAGNOSIS — K21.00 REFLUX ESOPHAGITIS: ICD-10-CM

## 2020-07-14 RX ORDER — NAPHAZOLINE/ZINC SULF/GLYCERIN 0.012-0.25
DROPS OPHTHALMIC (EYE) PRN
COMMUNITY

## 2020-07-14 ASSESSMENT — PAIN SCALES - GENERAL: PAINLEVEL: NO PAIN (0)

## 2020-07-14 NOTE — LETTER
7/14/2020     RE: Mainor Grande  [id number] [facility] [id number]    Dear Colleague,    Thank you for referring your patient, Mainor Grande, to the [facility]PAT[facility]. Please see a copy of my visit note below.    Mainor Grande is a 64 year old male who is being evaluated via a billable telephone visit.      Phone call duration: 39  minutes  Ute Fu MD    [facility]    Hepatology New Patient Visit    CHIEF COMPLAINT AND REASON FOR VISIT:  Abnormal liver function tests.        HISTORY OF PRESENT ILLNESS:  Mr. Grande had abnormal liver function tests in the past and was seen by Dr. Bro Morataya, who has since relocated.  He did a full workup for his cholestatic picture including a liver biopsy, and this was a possibility of chronic low-grade biliary obstruction.  At that time, MRCP was not showing any abnormalities.  When he was seen by Bro in 2016, he thought that the patient might have had a remote history of cholangitis with residual scarring.  Now, patient shows up so many years after that with diarrhea which started sometime in February.  He claims that the diarrhea is 3-4 episodes with no blood in it.  He also had a 21-pound weight loss with no blood in it.  He otherwise denied any jaundice, does not have any abdominal pain, nausea or vomiting.  Denied any fever.  He had in 07/2020 an abdominal CT scan which did show worsening of the intrahepatic biliary dilation and pancreatic atrophy.  Otherwise, he does not have any fever, cough, shortness of breath or chest pain.     Medical hx Surgical hx   Past Medical History:   Diagnosis Date     Allergic rhinitis, cause unspecified      Fatty liver 2004    elevated LFT, saw GI for a consultation     Mild persistent asthma 9/12/2008     Reflux esophagitis      Unspecified disease of respiratory system     RAD     Viremia, unspecified     acute    Past Surgical History:   Procedure Laterality Date      COLONOSCOPY  9/4/2013    Procedure: COLONOSCOPY;  colonoscopy;  Surgeon: Beto Keen MD;  Location: [facility]     HC COLONOSCOPY THRU STOMA, DIAGNOSTIC  2005    normal     HC VASECTOMY UNILAT/BILAT W POSTOP SEMEN  1992    Vasectomy          Medications  Prior to Admission medications    Medication Sig Start Date End Date Taking? Authorizing Provider   albuterol (VENTOLIN HFA) 108 (90 Base) MCG/ACT inhaler Inhale 2 puffs into the lungs every 4 hours as needed for shortness of breath / dyspnea or wheezing 12/5/19  Yes Karen Rodriguez MD   ASPIRIN PO Take 162 mg by mouth at onset of headache for moderate pain   Yes Reported, Patient   azelastine (ASTELIN) 0.1 % nasal spray SPRAY 2 SPRAYS IN EACH NOSTRIL TWO TIMES A DAY 3/10/20  Yes Karen Rodriguez MD   esomeprazole (NEXIUM) 20 MG capsule Take 20 mg by mouth daily Take 30-60 minutes before eating. Takes clear baudilio   Yes Reported, Patient   fluocinonide (LIDEX) 0.05 % external cream APPLY SPARINGLY TO AFFECTED AREA TWICE DAILY AS NEEDED DO NOT APPLY TO FACE 11/22/19  Yes Karen Rodriguez MD   fluticasone (FLONASE) 50 MCG/ACT nasal spray Spray 1-2 sprays into both nostrils daily 9/16/15  Yes Karen Rodriguez MD   Ibuprofen (ADVIL PO) Take 200 mg by mouth every 4 hours as needed    Yes Reported, Patient   melatonin 1 MG TABS Take 1 mg by mouth nightly as needed for sleep   Yes Reported, Patient   Multiple Vitamins-Minerals (MULTIVITAMIN ADULT PO) Take 1 tablet by mouth daily    Yes Reported, Patient   Naphazoline-Glycerin-Zinc Sulf (CLEAR EYES MAXIMUM ITCHY EYE) 0.012-0.25-0.25 % SOLN Apply to eye as needed (for allergies)   Yes Reported, Patient   SYMBICORT 160-4.5 MCG/ACT Inhaler INHALE TWO PUFFS BY MOUTH TWICE A DAY 3/20/20  Yes Karen Rodriguez MD   triamcinolone (KENALOG) 0.1 % external cream Apply topically 2 times daily 10/16/19  Yes Miguel Escobar, DO       Allergies  Allergies   Allergen Reactions     No Known Drug Allergies      Seasonal  Allergies        Family hx Social hx   Family History   Problem Relation Age of Onset     Cancer Mother         bladder cancer     Neurologic Disorder Father         alzheimers dementia     Gastrointestinal Disease Brother         divertculitis      Social History     Tobacco Use     Smoking status: Never Smoker     Smokeless tobacco: Never Used   Substance Use Topics     Alcohol use: Yes     Alcohol/week: 1.0 - 2.0 standard drinks     Types: 1 - 2 Standard drinks or equivalent per week     Comment: caffeine 6 per day     Drug use: No          REVIEW OF SYSTEMS:  Patient denies any infections.  Recently has fatigue and no headaches or seizures.  Denies any cough, shortness of breath or chest pain.  No anemia or easy bruising.  He has no known degenerative joint disease.  He has no known psychiatric problems.  He has a right eye cataract, but he has no problems with hearing and no problems with the skin.  Otherwise, a comprehensive review of symptoms was noncontributory.     Examination  There were no vitals taken for this visit.  There is no height or weight on file to calculate BMI.    Gen- well, NAD, A+Ox3, normal color  Skin- no rash or jaundice  Psych- normal mood    Laboratory  Lab Results   Component Value Date     07/02/2020    POTASSIUM 4.2 07/02/2020    CHLORIDE 103 07/02/2020    CO2 25 07/02/2020    BUN 10 07/02/2020    CR 0.96 07/02/2020       Lab Results   Component Value Date    BILITOTAL 0.6 07/02/2020    ALT 54 07/02/2020    AST 39 07/02/2020    ALKPHOS 393 07/02/2020       Lab Results   Component Value Date    ALBUMIN 3.1 07/02/2020    PROTTOTAL 8.4 07/02/2020        Lab Results   Component Value Date    WBC 9.8 05/14/2020    HGB 12.2 05/14/2020    MCV 87 05/14/2020     05/14/2020       Lab Results   Component Value Date    INR 0.94 06/02/2016     Radiology    ASSESSMENT AND PLAN:  Abnormal liver function tests.  Mr. Grande has abnormal liver function tests.  His ALT and AST are normal.   His alkaline phosphatase is elevated at 393.  He had Dr. Eaton do certain labs including mitochondrial antibody that was negative.  He had also in the past with Bro a workup for iron overload viral hepatitis, which were all noncontributory.  The patient now developed a new onset diarrhea.  The relationship between the two, the first being a question of primary sclerosing cholangitis and associated with some form of inflammatory bowel disease should be investigated.  In that case, I agree with the MRCP that was ordered.  We await the results of that.  If this MRCP is compatible with primary sclerosing cholangitis, the patient will need also a colonoscopy, seeing that he has lost weight and has diarrhea.  That CT scan which I will discuss with the radiologists next Friday in the multidisciplinary conference shows also that his pancreas is atrophic.  Does he have chronic pancreatitis that can explain also the weight loss, not quite sure about that, but he might need further workup if that is the case, or he might need to be referred to our pancreas group so they can do like 24-hour fecal fat  and/or EUS..      This was a 38-minute visit, including going through his chart.       Ute Fu MD  Hepatology  [facility]

## 2020-07-14 NOTE — PROGRESS NOTES
"Mainor Grande is a 64 year old male who is being evaluated via a billable telephone visit.      The patient has been notified of following:     \"This telephone visit will be conducted via a call between you and your physician/provider. We have found that certain health care needs can be provided without the need for a physical exam.  This service lets us provide the care you need with a short phone conversation.  If a prescription is necessary we can send it directly to your pharmacy.  If lab work is needed we can place an order for that and you can then stop by our lab to have the test done at a later time.    Telephone visits are billed at different rates depending on your insurance coverage. During this emergency period, for some insurers they may be billed the same as an in-person visit.  Please reach out to your insurance provider with any questions.    If during the course of the call the physician/provider feels a telephone visit is not appropriate, you will not be charged for this service.\"    Patient has given verbal consent for Telephone visit?  Yes    What phone number would you like to be contacted at? [phone]    How would you like to obtain your AVS? Dustyhart    Phone call duration: 39  minutes    Ute Fu MD    [facility]    Hepatology New Patient Visit    CHIEF COMPLAINT AND REASON FOR VISIT:  Abnormal liver function tests.        HISTORY OF PRESENT ILLNESS:  Mr. Grande had abnormal liver function tests in the past and was seen by Dr. Bro Morataya, who has since relocated.  He did a full workup for his cholestatic picture including a liver biopsy, and this was a possibility of chronic low-grade biliary obstruction.  At that time, MRCP was not showing any abnormalities.  When he was seen by Bro in 2016, he thought that the patient might have had a remote history of cholangitis with residual scarring.  Now, patient shows up so many years after that " with diarrhea which started sometime in February.  He claims that the diarrhea is 3-4 episodes with no blood in it.  He also had a 21-pound weight loss with no blood in it.  He otherwise denied any jaundice, does not have any abdominal pain, nausea or vomiting.  Denied any fever.  He had in 07/2020 an abdominal CT scan which did show worsening of the intrahepatic biliary dilation and pancreatic atrophy.  Otherwise, he does not have any fever, cough, shortness of breath or chest pain.     Medical hx Surgical hx   Past Medical History:   Diagnosis Date     Allergic rhinitis, cause unspecified      Fatty liver 2004    elevated LFT, saw GI for a consultation     Mild persistent asthma 9/12/2008     Reflux esophagitis      Unspecified disease of respiratory system     RAD     Viremia, unspecified     acute      Past Surgical History:   Procedure Laterality Date     COLONOSCOPY  9/4/2013    Procedure: COLONOSCOPY;  colonoscopy;  Surgeon: Beto Keen MD;  Location: [facility]     HC COLONOSCOPY THRU STOMA, DIAGNOSTIC  2005    normal     HC VASECTOMY UNILAT/BILAT W POSTOP SEMEN  1992    Vasectomy          Medications  Prior to Admission medications    Medication Sig Start Date End Date Taking? Authorizing Provider   albuterol (VENTOLIN HFA) 108 (90 Base) MCG/ACT inhaler Inhale 2 puffs into the lungs every 4 hours as needed for shortness of breath / dyspnea or wheezing 12/5/19  Yes Karen Rodriguez MD   ASPIRIN PO Take 162 mg by mouth at onset of headache for moderate pain   Yes Reported, Patient   azelastine (ASTELIN) 0.1 % nasal spray SPRAY 2 SPRAYS IN EACH NOSTRIL TWO TIMES A DAY 3/10/20  Yes Karen Rodriguez MD   esomeprazole (NEXIUM) 20 MG capsule Take 20 mg by mouth daily Take 30-60 minutes before eating. Takes clear baudilio   Yes Reported, Patient   fluocinonide (LIDEX) 0.05 % external cream APPLY SPARINGLY TO AFFECTED AREA TWICE DAILY AS NEEDED DO NOT APPLY TO FACE 11/22/19  Yes Karen Rodriguez MD   fluticasone  (FLONASE) 50 MCG/ACT nasal spray Spray 1-2 sprays into both nostrils daily 9/16/15  Yes Karen Rodriguez MD   Ibuprofen (ADVIL PO) Take 200 mg by mouth every 4 hours as needed    Yes Reported, Patient   melatonin 1 MG TABS Take 1 mg by mouth nightly as needed for sleep   Yes Reported, Patient   Multiple Vitamins-Minerals (MULTIVITAMIN ADULT PO) Take 1 tablet by mouth daily    Yes Reported, Patient   Naphazoline-Glycerin-Zinc Sulf (CLEAR EYES MAXIMUM ITCHY EYE) 0.012-0.25-0.25 % SOLN Apply to eye as needed (for allergies)   Yes Reported, Patient   SYMBICORT 160-4.5 MCG/ACT Inhaler INHALE TWO PUFFS BY MOUTH TWICE A DAY 3/20/20  Yes Karen Rodriguez MD   triamcinolone (KENALOG) 0.1 % external cream Apply topically 2 times daily 10/16/19  Yes Miguel Escobar,        Allergies  Allergies   Allergen Reactions     No Known Drug Allergies      Seasonal Allergies        Family hx Social hx   Family History   Problem Relation Age of Onset     Cancer Mother         bladder cancer     Neurologic Disorder Father         alzheimers dementia     Gastrointestinal Disease Brother         divertculitis      Social History     Tobacco Use     Smoking status: Never Smoker     Smokeless tobacco: Never Used   Substance Use Topics     Alcohol use: Yes     Alcohol/week: 1.0 - 2.0 standard drinks     Types: 1 - 2 Standard drinks or equivalent per week     Comment: caffeine 6 per day     Drug use: No          REVIEW OF SYSTEMS:  Patient denies any infections.  Recently has fatigue and no headaches or seizures.  Denies any cough, shortness of breath or chest pain.  No anemia or easy bruising.  He has no known degenerative joint disease.  He has no known psychiatric problems.  He has a right eye cataract, but he has no problems with hearing and no problems with the skin.  Otherwise, a comprehensive review of symptoms was noncontributory.     Examination  There were no vitals taken for this visit.  There is no height or weight on file to  calculate BMI.    Gen- well, NAD, A+Ox3, normal color  Skin- no rash or jaundice  Psych- normal mood    Laboratory  Lab Results   Component Value Date     07/02/2020    POTASSIUM 4.2 07/02/2020    CHLORIDE 103 07/02/2020    CO2 25 07/02/2020    BUN 10 07/02/2020    CR 0.96 07/02/2020       Lab Results   Component Value Date    BILITOTAL 0.6 07/02/2020    ALT 54 07/02/2020    AST 39 07/02/2020    ALKPHOS 393 07/02/2020       Lab Results   Component Value Date    ALBUMIN 3.1 07/02/2020    PROTTOTAL 8.4 07/02/2020        Lab Results   Component Value Date    WBC 9.8 05/14/2020    HGB 12.2 05/14/2020    MCV 87 05/14/2020     05/14/2020       Lab Results   Component Value Date    INR 0.94 06/02/2016         Radiology    ASSESSMENT AND PLAN:  Abnormal liver function tests.  Mr. Grande has abnormal liver function tests.  His ALT and AST are normal.  His alkaline phosphatase is elevated at 393.  He had Dr. Eaton do certain labs including mitochondrial antibody that was negative.  He had also in the past with Bro a workup for iron overload viral hepatitis, which were all noncontributory.  The patient now developed a new onset diarrhea.  The relationship between the two, the first being a question of primary sclerosing cholangitis and associated with some form of inflammatory bowel disease should be investigated.  In that case, I agree with the MRCP that was ordered.  We await the results of that.  If this MRCP is compatible with primary sclerosing cholangitis, the patient will need also a colonoscopy, seeing that he has lost weight and has diarrhea.  That CT scan which I will discuss with the radiologists next Friday in the multidisciplinary conference shows also that his pancreas is atrophic.  Does he have chronic pancreatitis that can explain also the weight loss, not quite sure about that, but he might need further workup if that is the case, or he might need to be referred to our pancreas group so they can  do like 24-hour fecal fat  and/or EUS..      This was a 38-minute visit, including going through his chart.           Ute Fu MD  Hepatology  [facility]

## 2020-07-15 ENCOUNTER — PATIENT OUTREACH (OUTPATIENT)
Dept: GASTROENTEROLOGY | Facility: CLINIC | Age: 65
End: 2020-07-15

## 2020-07-15 NOTE — TELEPHONE ENCOUNTER
Per Dr. Carias    Procedure/Imaging/Clinic: EUS   Physician: Aretha   Timing: As above   Procedure length:60   Anesthesia:Deep   Dx: Abnormal GI imaging, double duct   Tier: 2   Location: [place]     Called to discuss above with patient, to occur after MRCP scheduled on 7/23. Message left.    Stephanie Milan RN Care Coordinator

## 2020-07-15 NOTE — TELEPHONE ENCOUNTER
Returned patients call. Discussed plan for recommended procedure. Patient has many questions. Scheduled video visit with Dr. Carias on 7/30 to discuss MRCP on 7/23.     [email or web address] user name and ID updated per patient.     Stephanie Milan RN Care Coordinator

## 2020-07-22 RX ORDER — BISACODYL 5 MG/1
15 TABLET, DELAYED RELEASE ORAL SEE ADMIN INSTRUCTIONS
Qty: 3 TABLET | Refills: 0 | Status: SHIPPED | OUTPATIENT
Start: 2020-07-22 | End: 2021-02-24

## 2020-07-22 RX ORDER — SODIUM, POTASSIUM,MAG SULFATES 17.5-3.13G
1 SOLUTION, RECONSTITUTED, ORAL ORAL SEE ADMIN INSTRUCTIONS
Qty: 2 BOTTLE | Refills: 0 | Status: SHIPPED | OUTPATIENT
Start: 2020-07-22 | End: 2021-07-02

## 2020-07-23 ENCOUNTER — ANCILLARY PROCEDURE (OUTPATIENT)
Dept: MRI IMAGING | Facility: CLINIC | Age: 65
End: 2020-07-23
Attending: INTERNAL MEDICINE
Payer: COMMERCIAL

## 2020-07-23 DIAGNOSIS — R19.7 DIARRHEA, UNSPECIFIED TYPE: ICD-10-CM

## 2020-07-23 DIAGNOSIS — R74.8 ELEVATED ALKALINE PHOSPHATASE LEVEL: ICD-10-CM

## 2020-07-23 PROCEDURE — 74183 MRI ABD W/O CNTR FLWD CNTR: CPT | Performed by: RADIOLOGY

## 2020-07-23 PROCEDURE — A9585 GADOBUTROL INJECTION: HCPCS | Performed by: RADIOLOGY

## 2020-07-23 RX ORDER — GADOBUTROL 604.72 MG/ML
10 INJECTION INTRAVENOUS ONCE
Status: COMPLETED | OUTPATIENT
Start: 2020-07-23 | End: 2020-07-23

## 2020-07-23 RX ADMIN — GADOBUTROL 9.5 ML: 604.72 INJECTION INTRAVENOUS at 08:44

## 2020-07-28 ENCOUNTER — TELEPHONE (OUTPATIENT)
Dept: GASTROENTEROLOGY | Facility: CLINIC | Age: 65
End: 2020-07-28

## 2020-07-28 DIAGNOSIS — Z11.59 ENCOUNTER FOR SCREENING FOR OTHER VIRAL DISEASES: ICD-10-CM

## 2020-07-28 PROCEDURE — U0003 INFECTIOUS AGENT DETECTION BY NUCLEIC ACID (DNA OR RNA); SEVERE ACUTE RESPIRATORY SYNDROME CORONAVIRUS 2 (SARS-COV-2) (CORONAVIRUS DISEASE [COVID-19]), AMPLIFIED PROBE TECHNIQUE, MAKING USE OF HIGH THROUGHPUT TECHNOLOGIES AS DESCRIBED BY CMS-2020-01-R: HCPCS | Performed by: INTERNAL MEDICINE

## 2020-07-29 ENCOUNTER — PATIENT OUTREACH (OUTPATIENT)
Dept: GASTROENTEROLOGY | Facility: CLINIC | Age: 65
End: 2020-07-29

## 2020-07-29 ENCOUNTER — VIRTUAL VISIT (OUTPATIENT)
Dept: GASTROENTEROLOGY | Facility: CLINIC | Age: 65
End: 2020-07-29
Payer: COMMERCIAL

## 2020-07-29 DIAGNOSIS — R19.7 DIARRHEA, UNSPECIFIED TYPE: Primary | ICD-10-CM

## 2020-07-29 DIAGNOSIS — R74.8 ELEVATED ALKALINE PHOSPHATASE LEVEL: ICD-10-CM

## 2020-07-29 LAB
SARS-COV-2 RNA SPEC QL NAA+PROBE: NOT DETECTED
SPECIMEN SOURCE: NORMAL

## 2020-07-29 PROCEDURE — 99214 OFFICE O/P EST MOD 30 MIN: CPT | Mod: 95 | Performed by: INTERNAL MEDICINE

## 2020-07-29 NOTE — PROGRESS NOTES
"Mainor Grande is a 64 year old male who is being evaluated via a billable video visit.      The patient has been notified of following:     \"This video visit will be conducted via a call between you and your physician/provider. We have found that certain health care needs can be provided without the need for an in-person physical exam.  This service lets us provide the care you need with a video conversation.  If a prescription is necessary we can send it directly to your pharmacy.  If lab work is needed we can place an order for that and you can then stop by our lab to have the test done at a later time.    If during the course of the call the physician/provider feels a video visit is not appropriate, you will not be charged for this service.\"     Patient has given verbal consent for Video visit? Yes    Patient would like the video invitation sent by: Send to e-mail at: [email or web address]        Video Start Time: 8:35 AM  Mainor Grande complains of    Chief Complaint   Patient presents with     RECHECK     4 week follow up     Appointment today with his wife for follow-up.  Doing okay - did gain about 8 lbs but lost it again.  Continues to have diarrhea - at least 5 episodes a day.  Scheduled for colonoscopy with me on Friday.  Did discuss concerns that this is likely IBD.    Was seen by liver clinic for likely PSC.  Plan was to possibly evaluate pancreatic atrophy seen on CT with an EUS - patient is scheduled for an appointment with panc-bili tomorrow but was unaware and would like to reschedule.    Wife reports that his color appears off - says people keep saying he looks \"gray.\" No yellowing of the skin or eyes.  No dark urine.  No itching.    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      Past Medical History:   Diagnosis Date     Allergic rhinitis, cause " unspecified      Fatty liver 2004    elevated LFT, saw GI for a consultation     Mild persistent asthma 9/12/2008     Reflux esophagitis      Unspecified disease of respiratory system     RAD     Viremia, unspecified     acute       Past Surgical History:   Procedure Laterality Date     COLONOSCOPY  9/4/2013    Procedure: COLONOSCOPY;  colonoscopy;  Surgeon: Beto Keen MD;  Location: [facility]     HC COLONOSCOPY THRU STOMA, DIAGNOSTIC  2005    normal     HC VASECTOMY UNILAT/BILAT W POSTOP SEMEN  1992    Vasectomy       Family History   Problem Relation Age of Onset     Cancer Mother         bladder cancer     Neurologic Disorder Father         alzheimers dementia     Gastrointestinal Disease Brother         divertculitis       Social History     Tobacco Use     Smoking status: Never Smoker     Smokeless tobacco: Never Used   Substance Use Topics     Alcohol use: Yes     Alcohol/week: 1.0 - 2.0 standard drinks     Types: 1 - 2 Standard drinks or equivalent per week     Comment: caffeine 6 per day     O:  Gen: well nourished male in NAD  HEENT: NCAT  Neck: normal ROM  Resp: nonlabored breathing  Neuro: no gross deficits  Psych: appropriate mood and affect    I have reviewed and updated the patient's Past Medical History, Social History, Family History and Medication List.    ALLERGIES  No known drug allergies and Seasonal allergies    Additional provider notes:     MRCP:  IMPRESSION: Nonvisualization of recent CT-identified gallstone. Mild  narrowing of common hepatic duct near branch point of right and left  intrahepatic ducts with mild central intrahepatic biliary dilation,  again likely due to PSC with no mass identified. 8 mm right renal  cyst.    Assessment/Plan:    # diarrhea, weight loss - overall concerning for inflammatory bowel disease given imaging findings, elevated calpro, negative infectious studies and likely PSC.  Scheduled for colonoscopy for further evaluation.  This was discussed with patient  and his wife today - treatment and further work-up will depend on colonoscopy findings.    # likely PSC - MRCP as above. Appreciate input by Dr. Fu.      # concern pancreatic insufficiency/pancreatic atrophy on CT - MRCP unrevealing.  Will discuss possible EUS for further evaluation with panc/bili.  Check fecal elastase if colonoscopy unremarkable.    RTC after testing.     Video-Visit Details    Type of service:  Video Visit    Video End Time (time video stopped): 8:54 AM    Originating Location (pt. Location):Distant Location (provider location):  [facility]     Mode of Communication:  Video Conference via [employer]

## 2020-07-29 NOTE — TELEPHONE ENCOUNTER
Per Lily Eaton DO, referring MD, pt wants to cancel/delay visit with Dr. Carias on 7/30. Called pt to discuss.     Left message- appointment cancelled     Stephanie Milan, RN Care Coordinator

## 2020-07-29 NOTE — PATIENT INSTRUCTIONS
I will see you for your colonoscopy on Friday. We will talk about next steps for the work-up and treatment of your diarrhea at that time.    I'll follow-up with Dr. Fu and Dr. Carias about if an endoscopic ultrasound is still needed.    The crohns and colitis foundation (ccfa.org) is a great place to go for information on inflammatory bowel disease.    Please contact me with any questions or concerns.

## 2020-07-30 ENCOUNTER — TELEPHONE (OUTPATIENT)
Dept: GASTROENTEROLOGY | Facility: CLINIC | Age: 65
End: 2020-07-30

## 2020-07-30 NOTE — TELEPHONE ENCOUNTER
7/30 Provided phone number [phone] to schedule in about 6 weeks (around 9/9/2020).    Denisse Ortega   Procedure    Ortho/Sports Med/Pod/Ent/Eye/Surgical Specialties  [facility] [place]   [phone]

## 2020-07-31 ENCOUNTER — HOSPITAL ENCOUNTER (OUTPATIENT)
Facility: AMBULATORY SURGERY CENTER | Age: 65
Discharge: HOME | End: 2020-07-31
Attending: INTERNAL MEDICINE | Admitting: INTERNAL MEDICINE
Payer: COMMERCIAL

## 2020-07-31 ENCOUNTER — SURGERY (OUTPATIENT)
Age: 65
End: 2020-07-31
Payer: COMMERCIAL

## 2020-07-31 VITALS
OXYGEN SATURATION: 98 % | RESPIRATION RATE: 16 BRPM | TEMPERATURE: 98.4 F | HEART RATE: 74 BPM | DIASTOLIC BLOOD PRESSURE: 77 MMHG | SYSTOLIC BLOOD PRESSURE: 119 MMHG

## 2020-07-31 DIAGNOSIS — R19.7 DIARRHEA, UNSPECIFIED TYPE: Primary | ICD-10-CM

## 2020-07-31 DIAGNOSIS — Z12.11 SCREEN FOR COLON CANCER: ICD-10-CM

## 2020-07-31 LAB
C DIFF TOX B STL QL: NEGATIVE
COLONOSCOPY: NORMAL
CRP SERPL-MCNC: 42.6 MG/L (ref 0–8)
ERYTHROCYTE [DISTWIDTH] IN BLOOD BY AUTOMATED COUNT: 13.3 % (ref 10–15)
HCT VFR BLD AUTO: 36.2 % (ref 40–53)
HGB BLD-MCNC: 11.6 G/DL (ref 13.3–17.7)
MCH RBC QN AUTO: 27.2 PG (ref 26.5–33)
MCHC RBC AUTO-ENTMCNC: 32 G/DL (ref 31.5–36.5)
MCV RBC AUTO: 85 FL (ref 78–100)
PLATELET # BLD AUTO: 449 10E9/L (ref 150–450)
RBC # BLD AUTO: 4.27 10E12/L (ref 4.4–5.9)
SPECIMEN SOURCE: NORMAL
UPPER GI ENDOSCOPY: NORMAL
WBC # BLD AUTO: 7.1 10E9/L (ref 4–11)

## 2020-07-31 PROCEDURE — 86706 HEP B SURFACE ANTIBODY: CPT | Performed by: INTERNAL MEDICINE

## 2020-07-31 PROCEDURE — 88305 TISSUE EXAM BY PATHOLOGIST: CPT | Performed by: INTERNAL MEDICINE

## 2020-07-31 PROCEDURE — 43239 EGD BIOPSY SINGLE/MULTIPLE: CPT

## 2020-07-31 PROCEDURE — G8907 PT DOC NO EVENTS ON DISCHARG: HCPCS

## 2020-07-31 PROCEDURE — 99000 SPECIMEN HANDLING OFFICE-LAB: CPT | Performed by: INTERNAL MEDICINE

## 2020-07-31 PROCEDURE — 87493 C DIFF AMPLIFIED PROBE: CPT | Performed by: INTERNAL MEDICINE

## 2020-07-31 PROCEDURE — 86140 C-REACTIVE PROTEIN: CPT | Performed by: INTERNAL MEDICINE

## 2020-07-31 PROCEDURE — 85027 COMPLETE CBC AUTOMATED: CPT | Performed by: INTERNAL MEDICINE

## 2020-07-31 PROCEDURE — 87340 HEPATITIS B SURFACE AG IA: CPT | Performed by: INTERNAL MEDICINE

## 2020-07-31 PROCEDURE — 45380 COLONOSCOPY AND BIOPSY: CPT | Performed by: INTERNAL MEDICINE

## 2020-07-31 PROCEDURE — 86704 HEP B CORE ANTIBODY TOTAL: CPT | Performed by: INTERNAL MEDICINE

## 2020-07-31 PROCEDURE — 82657 ENZYME CELL ACTIVITY: CPT | Mod: 90 | Performed by: INTERNAL MEDICINE

## 2020-07-31 PROCEDURE — 36415 COLL VENOUS BLD VENIPUNCTURE: CPT | Performed by: INTERNAL MEDICINE

## 2020-07-31 PROCEDURE — G8918 PT W/O PREOP ORDER IV AB PRO: HCPCS

## 2020-07-31 PROCEDURE — 86481 TB AG RESPONSE T-CELL SUSP: CPT | Performed by: INTERNAL MEDICINE

## 2020-07-31 PROCEDURE — 43239 EGD BIOPSY SINGLE/MULTIPLE: CPT | Mod: 51 | Performed by: INTERNAL MEDICINE

## 2020-07-31 PROCEDURE — 45380 COLONOSCOPY AND BIOPSY: CPT

## 2020-07-31 PROCEDURE — 88342 IMHCHEM/IMCYTCHM 1ST ANTB: CPT | Performed by: INTERNAL MEDICINE

## 2020-07-31 RX ORDER — FENTANYL CITRATE 50 UG/ML
INJECTION, SOLUTION INTRAMUSCULAR; INTRAVENOUS PRN
Status: DISCONTINUED | OUTPATIENT
Start: 2020-07-31 | End: 2020-07-31 | Stop reason: HOSPADM

## 2020-07-31 RX ORDER — ONDANSETRON 2 MG/ML
4 INJECTION INTRAMUSCULAR; INTRAVENOUS
Status: DISCONTINUED | OUTPATIENT
Start: 2020-07-31 | End: 2020-08-01 | Stop reason: HOSPADM

## 2020-07-31 RX ORDER — LIDOCAINE 40 MG/G
CREAM TOPICAL
Status: DISCONTINUED | OUTPATIENT
Start: 2020-07-31 | End: 2020-08-01 | Stop reason: HOSPADM

## 2020-07-31 RX ORDER — ONDANSETRON 4 MG/1
4 TABLET, ORALLY DISINTEGRATING ORAL EVERY 6 HOURS PRN
Status: DISCONTINUED | OUTPATIENT
Start: 2020-07-31 | End: 2020-08-01 | Stop reason: HOSPADM

## 2020-07-31 RX ORDER — NALOXONE HYDROCHLORIDE 0.4 MG/ML
.1-.4 INJECTION, SOLUTION INTRAMUSCULAR; INTRAVENOUS; SUBCUTANEOUS
Status: DISCONTINUED | OUTPATIENT
Start: 2020-07-31 | End: 2020-08-01 | Stop reason: HOSPADM

## 2020-07-31 RX ORDER — SODIUM CHLORIDE, SODIUM LACTATE, POTASSIUM CHLORIDE, CALCIUM CHLORIDE 600; 310; 30; 20 MG/100ML; MG/100ML; MG/100ML; MG/100ML
INJECTION, SOLUTION INTRAVENOUS CONTINUOUS PRN
Status: COMPLETED | OUTPATIENT
Start: 2020-07-31 | End: 2020-07-31

## 2020-07-31 RX ORDER — FLUMAZENIL 0.1 MG/ML
0.2 INJECTION, SOLUTION INTRAVENOUS
Status: DISCONTINUED | OUTPATIENT
Start: 2020-07-31 | End: 2020-08-01 | Stop reason: HOSPADM

## 2020-07-31 RX ORDER — ONDANSETRON 2 MG/ML
4 INJECTION INTRAMUSCULAR; INTRAVENOUS EVERY 6 HOURS PRN
Status: DISCONTINUED | OUTPATIENT
Start: 2020-07-31 | End: 2020-08-01 | Stop reason: HOSPADM

## 2020-07-31 RX ADMIN — FENTANYL CITRATE 50 MCG: 50 INJECTION, SOLUTION INTRAMUSCULAR; INTRAVENOUS at 12:14

## 2020-07-31 RX ADMIN — FENTANYL CITRATE 25 MCG: 50 INJECTION, SOLUTION INTRAMUSCULAR; INTRAVENOUS at 12:31

## 2020-07-31 RX ADMIN — SODIUM CHLORIDE, SODIUM LACTATE, POTASSIUM CHLORIDE, CALCIUM CHLORIDE 50 ML/HR: 600; 310; 30; 20 INJECTION, SOLUTION INTRAVENOUS at 12:49

## 2020-07-31 RX ADMIN — FENTANYL CITRATE 50 MCG: 50 INJECTION, SOLUTION INTRAMUSCULAR; INTRAVENOUS at 12:11

## 2020-08-02 LAB
GAMMA INTERFERON BACKGROUND BLD IA-ACNC: 0.03 IU/ML
M TB IFN-G CD4+ BCKGRND COR BLD-ACNC: 9.97 IU/ML
M TB TUBERC IFN-G BLD QL: NEGATIVE
MITOGEN IGNF BCKGRD COR BLD-ACNC: 0 IU/ML
MITOGEN IGNF BCKGRD COR BLD-ACNC: 0.01 IU/ML

## 2020-08-03 LAB
HBV CORE AB SERPL QL IA: NONREACTIVE
HBV SURFACE AB SERPL IA-ACNC: 0.74 M[IU]/ML
HBV SURFACE AG SERPL QL IA: NONREACTIVE

## 2020-08-04 LAB — TPMT BLD-CCNC: 28.6 U/ML (ref 24–44)

## 2020-08-05 DIAGNOSIS — K51.00 ULCERATIVE PANCOLITIS WITHOUT COMPLICATION (H): Primary | ICD-10-CM

## 2020-08-05 LAB — COPATH REPORT: NORMAL

## 2020-08-05 RX ORDER — HEPARIN SODIUM,PORCINE 10 UNIT/ML
5 VIAL (ML) INTRAVENOUS
Status: CANCELLED | OUTPATIENT
Start: 2020-08-05

## 2020-08-05 RX ORDER — HEPARIN SODIUM (PORCINE) LOCK FLUSH IV SOLN 100 UNIT/ML 100 UNIT/ML
5 SOLUTION INTRAVENOUS
Status: CANCELLED | OUTPATIENT
Start: 2020-08-05

## 2020-08-05 RX ORDER — PREDNISONE 10 MG/1
40 TABLET ORAL DAILY
Qty: 120 TABLET | Refills: 1 | Status: SHIPPED | OUTPATIENT
Start: 2020-08-05 | End: 2020-10-12

## 2020-08-06 NOTE — PROGRESS NOTES
Discussed lab and biopsy results with patient as well as treatment options.  Did discuss using an anti-TNF agent although patient is concerned re:side effects.  Also discussed vedolizumab with patient which he is agreeable to.  Will start a course of prednisone now and plan to start vedolizumab pending insurance approval.  Will set up follow-up appointment in clinic in 2 weeks.

## 2020-08-07 ENCOUNTER — TELEPHONE (OUTPATIENT)
Dept: GASTROENTEROLOGY | Facility: CLINIC | Age: 65
End: 2020-08-07

## 2020-08-10 NOTE — TELEPHONE ENCOUNTER
Prior Authorization Infusion/Clinic Administered Request    Location:   Diagnosis and ICD:  Drug/Therapy: Entyvio    Previously Tried and Failed Therapies:     Date of provider note with supporting information:     Urgency (When is the patient scheduled?):     Would you like to include any research articles?         If yes please call [phone] for further instructions about sending that information

## 2020-08-11 NOTE — TELEPHONE ENCOUNTER
Prior Authorization required. This is off-label or against insurance policy. This may take 10-14 business days.    I will notify you as soon as I've received correspondence from the insurance company.

## 2020-08-12 NOTE — TELEPHONE ENCOUNTER
Updated patient on infusion therapy.    Maye Ramirez RN  Gastroenterology Care Coordinator  [facility] [place]

## 2020-08-19 ENCOUNTER — VIRTUAL VISIT (OUTPATIENT)
Dept: GASTROENTEROLOGY | Facility: CLINIC | Age: 65
End: 2020-08-19
Payer: COMMERCIAL

## 2020-08-19 DIAGNOSIS — K51.00 ULCERATIVE PANCOLITIS WITHOUT COMPLICATION (H): Primary | ICD-10-CM

## 2020-08-19 PROCEDURE — 99213 OFFICE O/P EST LOW 20 MIN: CPT | Mod: 95 | Performed by: INTERNAL MEDICINE

## 2020-08-19 NOTE — PATIENT INSTRUCTIONS
Please decrease your prednisone to 30mg once a day - if your diarrhea gets worse, please let us know and we will increase it back to 40mg.    We are trying to get you approved for entyvio - I will have someone contact you to get your new insurance information - I'll keep you updated with the results of this.

## 2020-08-19 NOTE — PROGRESS NOTES
"Mainor Grande is a 64 year old male who is being evaluated via a billable video visit.      The patient has been notified of following:     \"This video visit will be conducted via a call between you and your physician/provider. We have found that certain health care needs can be provided without the need for an in-person physical exam.  This service lets us provide the care you need with a video conversation.  If a prescription is necessary we can send it directly to your pharmacy.  If lab work is needed we can place an order for that and you can then stop by our lab to have the test done at a later time.    If during the course of the call the physician/provider feels a video visit is not appropriate, you will not be charged for this service.\"     Patient has given verbal consent for Video visit? Yes    Patient would like the video invitation sent by: Send to e-mail at: [email or web address]        Mainor Grande complains of    Chief Complaint   Patient presents with     RECHECK     2 week follow up/     Patient here today for follow-up.  Doing much better since starting prednisone - still has occasional diarrhea but seems dependent on what he eats.  Has gained a few pounds.  Generally has 3 BMs a day but more formed than they were.  Also notices his appetite has improved and he has a lot more enegry than before.    Has officially retired - with this - his insurance has changed.  Had previously submitted appeal to his old insurance for entyvio but haven't heard back yet.     Past Medical History:   Diagnosis Date     Allergic rhinitis, cause unspecified      Fatty liver 2004    elevated LFT, saw GI for a consultation     Mild persistent asthma 9/12/2008     Reflux esophagitis      Unspecified disease of respiratory system     RAD     Viremia, unspecified     acute       Past Surgical History:   Procedure Laterality Date     COLONOSCOPY  9/4/2013    Procedure: COLONOSCOPY;  colonoscopy;  Surgeon: Osmani " Beto VILLEGAS MD;  Location: [facility]     COLONOSCOPY N/A 7/31/2020    Procedure: Colonoscopy, With Polypectomy And Biopsy;  Surgeon: Lily Eaton DO;  Location: [facility]     COLONOSCOPY WITH CO2 INSUFFLATION N/A 7/31/2020    Procedure: COLONOSCOPY, WITH CO2 INSUFFLATION;  Surgeon: Lily Eaton DO;  [facility]     COMBINED ESOPHAGOSCOPY, GASTROSCOPY, DUODENOSCOPY (EGD) WITH CO2 INSUFFLATION N/A 7/31/2020    Procedure: ESOPHAGOGASTRODUODENOSCOPY, WITH CO2 INSUFFLATION;  Surgeon: Lily Eaton DO;  [facility]     ESOPHAGOSCOPY, GASTROSCOPY, DUODENOSCOPY (EGD), COMBINED N/A 7/31/2020    Procedure: Esophagogastroduodenoscopy, With Biopsy;  Surgeon: Lily Eaton DO;  Location: [facility]     HC COLONOSCOPY THRU STOMA, DIAGNOSTIC  2005    normal     HC VASECTOMY UNILAT/BILAT W POSTOP SEMEN  1992    Vasectomy       Family History   Problem Relation Age of Onset     Cancer Mother         bladder cancer     Neurologic Disorder Father         alzheimers dementia     Gastrointestinal Disease Brother         divertculitis       Social History     Tobacco Use     Smoking status: Never Smoker     Smokeless tobacco: Never Used   Substance Use Topics     Alcohol use: Yes     Alcohol/week: 1.0 - 2.0 standard drinks     Types: 1 - 2 Standard drinks or equivalent per week     Comment: caffeine 6 per day       I have reviewed and updated the patient's Past Medical History, Social History, Family History and Medication List.    ALLERGIES  No known drug allergies and Seasonal allergies    Additional provider notes:      Assessment/Plan:    # newly diagnosed moderate to severe UC pancolitis - will plan to start entyvio as discussed at last appointment given his age and co-morbidities.  Will discuss other options if not approved.  Did discuss disease course at length with patient and his wife including likely need for lifelong medications to keep the disease under control. Will start to decrease steroid dose to 30 mg daily -  will not completely wean off until maintenance regimen is started. Patient advised to contact me if his symptoms worsen as we decrease the dose.     RTC 2 weeks    Lily Eaton, DO      Video-Visit Details    Type of service:  Video Visit    Video End Time (time video stopped):     Originating Location (pt. Location):     Distant Location (provider location):  [facility]     Mode of Communication:  Video Conference via [employer]

## 2020-08-28 NOTE — PROGRESS NOTES
"Mainor Grande is a 64 year old male who is being evaluated via a billable telephone visit.      The patient has been notified of following:     \"This telephone visit will be conducted via a call between you and your physician/provider. We have found that certain health care needs can be provided without the need for a physical exam.  This service lets us provide the care you need with a short phone conversation.  If a prescription is necessary we can send it directly to your pharmacy.  If lab work is needed we can place an order for that and you can then stop by our lab to have the test done at a later time.    Telephone visits are billed at different rates depending on your insurance coverage. During this emergency period, for some insurers they may be billed the same as an in-person visit.  Please reach out to your insurance provider with any questions.    If during the course of the call the physician/provider feels a telephone visit is not appropriate, you will not be charged for this service.\"    Patient has given verbal consent for Telephone visit?  Yes    What phone number would you like to be contacted at? [phone]    How would you like to obtain your AVS? Dustyhart    Subjective     Mainor Grande is a 64 year old male who presents via phone visit today for the following health issues:    HPI     Results  Would like to gastro  Results and talk about stress test before vacationing in [place]         Review of Systems   Constitutional, HEENT, cardiovascular, pulmonary, gi and gu systems are negative, except as otherwise noted.       Objective          Vitals:  No vitals were obtained today due to virtual visit.    healthy, alert and no distress  PSYCH: Alert and oriented times 3; coherent speech, normal   rate and volume, able to articulate logical thoughts, able   to abstract reason, no tangential thoughts, no hallucinations   or delusions  His affect is normal  RESP: No cough, no audible wheezing, " "able to talk in full sentences  Remainder of exam unable to be completed due to telephone visits            Assessment/Plan:    Assessment & Plan   1. Coronary artery calcification  Patient was incidentally noted to have severe atherosclerosis and calcium deposition in the coronary arteries on his CT done to evaluate his abdominal symptoms. Wife reports that he has been more short of breath with minimal activity though the patient refutes it. Given these finding and he is planning on hiking in [place] soon , I suggested getting a stress test to r/o CAD. Pt agreeable to this . Follow results before further recommendations  - NM Exercise stress test (nuc card); Future    BMI:   Estimated body mass index is 28.84 kg/m  as calculated from the following:    Height as of 12/30/19: 1.778 m (5' 10\").    Weight as of 12/30/19: 91.2 kg (201 lb).     Karen Rodriguez MD  [facility]    Phone call duration:  7 minutes              "

## 2020-08-31 ENCOUNTER — VIRTUAL VISIT (OUTPATIENT)
Dept: FAMILY MEDICINE | Facility: OTHER | Age: 65
End: 2020-08-31
Payer: COMMERCIAL

## 2020-08-31 DIAGNOSIS — R94.39 ABNORMAL STRESS TEST: ICD-10-CM

## 2020-08-31 DIAGNOSIS — I25.10 CORONARY ARTERY CALCIFICATION: Primary | ICD-10-CM

## 2020-08-31 PROCEDURE — 99213 OFFICE O/P EST LOW 20 MIN: CPT | Mod: 95 | Performed by: FAMILY MEDICINE

## 2020-09-01 ASSESSMENT — ASTHMA QUESTIONNAIRES: ACT_TOTALSCORE: 22

## 2020-09-03 ENCOUNTER — HOSPITAL ENCOUNTER (OUTPATIENT)
Dept: CARDIOLOGY | Facility: CLINIC | Age: 65
Discharge: HOME | End: 2020-09-03
Attending: FAMILY MEDICINE | Admitting: FAMILY MEDICINE
Payer: COMMERCIAL

## 2020-09-03 ENCOUNTER — HOSPITAL ENCOUNTER (OUTPATIENT)
Dept: NUCLEAR MEDICINE | Facility: CLINIC | Age: 65
Setting detail: NUCLEAR MEDICINE
Discharge: HOME | End: 2020-09-03
Attending: FAMILY MEDICINE | Admitting: FAMILY MEDICINE
Payer: COMMERCIAL

## 2020-09-03 ENCOUNTER — HOSPITAL ENCOUNTER (OUTPATIENT)
Dept: NUCLEAR MEDICINE | Facility: CLINIC | Age: 65
Setting detail: NUCLEAR MEDICINE
End: 2020-09-03
Attending: FAMILY MEDICINE
Payer: COMMERCIAL

## 2020-09-03 DIAGNOSIS — I25.10 CORONARY ARTERY CALCIFICATION: ICD-10-CM

## 2020-09-03 LAB
CV STRESS MAX HR HE: 150
NUC STRESS EJECTION FRACTION: 55 %
RATE PRESSURE PRODUCT: NORMAL
STRESS ECHO BASELINE DIASTOLIC HE: 64
STRESS ECHO BASELINE HR: 58
STRESS ECHO BASELINE SYSTOLIC BP: 122
STRESS ECHO CALCULATED PERCENT HR: 97 %
STRESS ECHO LAST STRESS DIASTOLIC BP: 64
STRESS ECHO LAST STRESS SYSTOLIC BP: 168
STRESS ECHO POST ESTIMATED WORKLOAD: 8 METS
STRESS ECHO POST EXERCISE DUR MIN: 7 MIN
STRESS ECHO POST EXERCISE DUR SEC: 0 SEC
STRESS ECHO TARGET HR: 154

## 2020-09-03 PROCEDURE — 78452 HT MUSCLE IMAGE SPECT MULT: CPT | Mod: 26 | Performed by: INTERNAL MEDICINE

## 2020-09-03 PROCEDURE — 78452 HT MUSCLE IMAGE SPECT MULT: CPT

## 2020-09-03 PROCEDURE — A9502 TC99M TETROFOSMIN: HCPCS | Performed by: FAMILY MEDICINE

## 2020-09-03 PROCEDURE — 93016 CV STRESS TEST SUPVJ ONLY: CPT | Performed by: INTERNAL MEDICINE

## 2020-09-03 PROCEDURE — 34300033 ZZH RX 343: Performed by: FAMILY MEDICINE

## 2020-09-03 PROCEDURE — 93017 CV STRESS TEST TRACING ONLY: CPT | Performed by: REHABILITATION PRACTITIONER

## 2020-09-03 PROCEDURE — 93018 CV STRESS TEST I&R ONLY: CPT | Performed by: INTERNAL MEDICINE

## 2020-09-03 RX ADMIN — TETROFOSMIN 10.5 MCI.: 1.38 INJECTION, POWDER, LYOPHILIZED, FOR SOLUTION INTRAVENOUS at 12:40

## 2020-09-03 RX ADMIN — TETROFOSMIN 31.1 MCI.: 1.38 INJECTION, POWDER, LYOPHILIZED, FOR SOLUTION INTRAVENOUS at 14:23

## 2020-09-04 ENCOUNTER — TELEPHONE (OUTPATIENT)
Dept: FAMILY MEDICINE | Facility: OTHER | Age: 65
End: 2020-09-04

## 2020-09-04 NOTE — TELEPHONE ENCOUNTER
Called and spoke with patient regarding message below- patient verbalized understanding.  Erin Cortes CMA ([facility])

## 2020-09-04 NOTE — TELEPHONE ENCOUNTER
Left message asking patient to return call.  Please inform patient of RESULTS from Provider below.     Karen Rodriguez MD sent to [facility]               Please call and inform pt that the stress test came back abnormal with evidence of mild ischemia(decreased blood supply ) to the apex of this heart. This would warrant a further evaluation by cardiology. In the interim continue taking a baby aspirin every day and avoid any exertion . Reports any new chest pain or worsening shortness of breath . Referral placed to see cardiology . Please help schedule appt      [facility] [place] [phone]

## 2020-09-09 DIAGNOSIS — R74.01 TRANSAMINITIS: ICD-10-CM

## 2020-09-09 DIAGNOSIS — R41.3 MEMORY CHANGES: ICD-10-CM

## 2020-09-09 DIAGNOSIS — Z12.5 SCREENING FOR PROSTATE CANCER: ICD-10-CM

## 2020-09-09 DIAGNOSIS — R74.8 ELEVATED ALKALINE PHOSPHATASE LEVEL: ICD-10-CM

## 2020-09-09 DIAGNOSIS — R19.7 DIARRHEA, UNSPECIFIED TYPE: ICD-10-CM

## 2020-09-09 LAB
ALBUMIN SERPL-MCNC: 3.2 G/DL (ref 3.4–5)
ALP SERPL-CCNC: 145 U/L (ref 40–150)
ALT SERPL W P-5'-P-CCNC: 85 U/L (ref 0–70)
ANION GAP SERPL CALCULATED.3IONS-SCNC: 3 MMOL/L (ref 3–14)
AST SERPL W P-5'-P-CCNC: 47 U/L (ref 0–45)
BASOPHILS # BLD AUTO: 0 10E9/L (ref 0–0.2)
BASOPHILS NFR BLD AUTO: 0.2 %
BILIRUB SERPL-MCNC: 0.6 MG/DL (ref 0.2–1.3)
BUN SERPL-MCNC: 16 MG/DL (ref 7–30)
CALCIUM SERPL-MCNC: 9.2 MG/DL (ref 8.5–10.1)
CHLORIDE SERPL-SCNC: 105 MMOL/L (ref 94–109)
CHOLEST SERPL-MCNC: 270 MG/DL
CO2 SERPL-SCNC: 29 MMOL/L (ref 20–32)
CREAT SERPL-MCNC: 0.99 MG/DL (ref 0.66–1.25)
DIFFERENTIAL METHOD BLD: ABNORMAL
EOSINOPHIL # BLD AUTO: 0 10E9/L (ref 0–0.7)
EOSINOPHIL NFR BLD AUTO: 0.4 %
ERYTHROCYTE [DISTWIDTH] IN BLOOD BY AUTOMATED COUNT: 14.9 % (ref 10–15)
GFR SERPL CREATININE-BSD FRML MDRD: 78 ML/MIN/{1.73_M2}
GGT SERPL-CCNC: 328 U/L (ref 0–75)
GLUCOSE SERPL-MCNC: 94 MG/DL (ref 70–99)
HCT VFR BLD AUTO: 35.4 % (ref 40–53)
HDLC SERPL-MCNC: 122 MG/DL
HGB BLD-MCNC: 11.4 G/DL (ref 13.3–17.7)
LDLC SERPL CALC-MCNC: 135 MG/DL
LYMPHOCYTES # BLD AUTO: 4.2 10E9/L (ref 0.8–5.3)
LYMPHOCYTES NFR BLD AUTO: 42.2 %
MCH RBC QN AUTO: 26.5 PG (ref 26.5–33)
MCHC RBC AUTO-ENTMCNC: 32.2 G/DL (ref 31.5–36.5)
MCV RBC AUTO: 82 FL (ref 78–100)
MONOCYTES # BLD AUTO: 1 10E9/L (ref 0–1.3)
MONOCYTES NFR BLD AUTO: 9.9 %
NEUTROPHILS # BLD AUTO: 4.7 10E9/L (ref 1.6–8.3)
NEUTROPHILS NFR BLD AUTO: 47.3 %
NONHDLC SERPL-MCNC: 148 MG/DL
PLATELET # BLD AUTO: 327 10E9/L (ref 150–450)
POTASSIUM SERPL-SCNC: 4.3 MMOL/L (ref 3.4–5.3)
PROT SERPL-MCNC: 7.3 G/DL (ref 6.8–8.8)
PSA SERPL-ACNC: 4.18 UG/L (ref 0–4)
RBC # BLD AUTO: 4.3 10E12/L (ref 4.4–5.9)
SODIUM SERPL-SCNC: 137 MMOL/L (ref 133–144)
TRIGL SERPL-MCNC: 64 MG/DL
WBC # BLD AUTO: 9.8 10E9/L (ref 4–11)

## 2020-09-09 PROCEDURE — 80053 COMPREHEN METABOLIC PANEL: CPT | Performed by: INTERNAL MEDICINE

## 2020-09-09 PROCEDURE — 80061 LIPID PANEL: CPT | Performed by: INTERNAL MEDICINE

## 2020-09-09 PROCEDURE — 85025 COMPLETE CBC W/AUTO DIFF WBC: CPT | Performed by: INTERNAL MEDICINE

## 2020-09-09 PROCEDURE — 36415 COLL VENOUS BLD VENIPUNCTURE: CPT | Performed by: INTERNAL MEDICINE

## 2020-09-09 PROCEDURE — 82977 ASSAY OF GGT: CPT | Performed by: INTERNAL MEDICINE

## 2020-09-09 PROCEDURE — G0103 PSA SCREENING: HCPCS | Performed by: INTERNAL MEDICINE

## 2020-09-11 DIAGNOSIS — K51.00 ULCERATIVE PANCOLITIS WITHOUT COMPLICATION (H): Primary | ICD-10-CM

## 2020-09-15 NOTE — TELEPHONE ENCOUNTER
LVM for pt to call back to discuss recommendations from Dr. Elizondo.    Maye Ramirez, RN  Gastroenterology Care Coordinator  [place], [place]

## 2020-09-18 ENCOUNTER — TELEPHONE (OUTPATIENT)
Dept: FAMILY MEDICINE | Facility: OTHER | Age: 65
End: 2020-09-18

## 2020-09-18 DIAGNOSIS — R97.20 ELEVATED PROSTATE SPECIFIC ANTIGEN (PSA): Primary | ICD-10-CM

## 2020-09-18 NOTE — TELEPHONE ENCOUNTER
Attempted to reach pt, someone picked up and could hear talking but then call was dropped. Attempted to call pt back but number just rang. Will try again later.    Candy Draper CMA ([facility])

## 2020-09-18 NOTE — TELEPHONE ENCOUNTER
----- Message from Karen Rodriguez MD sent at 9/18/2020  5:38 PM CDT -----  Please inform pt that the PSA test is elevated compared to previous test. This is a non specific test and could be related to enlarged prostrate or inflammation of prostate or prostate cancer. I would recommend a recheck on it in 1-2 months to follow up.   Worsened cholesterol levels compared to last test. Recommend low fat diet to help improve. Recheck in 6 months  Elevated liver enzymes to be followed by GI.

## 2020-09-24 ENCOUNTER — VIRTUAL VISIT (OUTPATIENT)
Dept: CARDIOLOGY | Facility: CLINIC | Age: 65
End: 2020-09-24
Attending: FAMILY MEDICINE
Payer: COMMERCIAL

## 2020-09-24 DIAGNOSIS — R94.39 ABNORMAL STRESS TEST: ICD-10-CM

## 2020-09-24 DIAGNOSIS — I25.10 CORONARY ARTERY CALCIFICATION: ICD-10-CM

## 2020-09-24 PROCEDURE — 99203 OFFICE O/P NEW LOW 30 MIN: CPT | Mod: 95 | Performed by: INTERNAL MEDICINE

## 2020-09-24 RX ORDER — ATORVASTATIN CALCIUM 40 MG/1
40 TABLET, FILM COATED ORAL DAILY
Qty: 90 TABLET | Refills: 3 | Status: SHIPPED | OUTPATIENT
Start: 2020-09-24 | End: 2021-07-02

## 2020-09-24 NOTE — PROGRESS NOTES
2020         Karen Rodriguez MD   [address] [address]      Patient:  Mainor Grande   MRN:  [id number]   :  1955         Dear Dr. Rodriguez:      It was a pleasure participating in the care of your patient, Mr. Mainor Grande.  As you know, he is a 65-year-old gentleman who I saw today over a virtual video visit for an abnormal stress test.      His past medical history is significant for the followin.  Ulcerative colitis.   2.  Eczema.   3.  Asthma/allergic rhinitis.   4.  Fatty liver.   5.  Erectile dysfunction.   6.  Gastroesophageal reflux disease.      He denies having a significant history of cardiac disease.      In terms of his present symptom complex, he had a chest CT performed 2020 that showed severe coronary calcifications.  You ordered a lipid panel and an exercise nuclear stress test that was performed on  that showed a small amount of apical ischemia.  He was referred here for further evaluation.      From a functional standpoint, the patient exercises 4 times a week.  He goes on the elliptical for 15-20 minutes at a time and exercises hard.  He walks for anywhere from 20 minutes to an hour, and he is able to hike at 9000 feet on the trails for a couple hours without stopping.  He will get mildly short of breath, but no chest pressure or pain or limiting symptoms.  He denies other PND, orthopnea, edema, palpitations, syncope or near-syncope.        Five years ago, he weighed 245 pounds and then lost 45 pounds.  He has had some GI issues due to stress and is thinking of starting Entyvio monoclonal antibody to decrease inflammation in the got soon, and he is currently weighing in around 187 pounds.      His blood pressures at home have been running in the 116/68 range with a pulse in the 60s-70s.      He otherwise denies any PND, orthopnea, edema, palpitations, syncope or near-syncope.  He denies caise  chest discomfort.      In terms of his cardiac risk factors, no history of diabetes or hypertension.  No history of smoking.  Drinks 1-2 drinks a week.  Denies family history of heart disease.  He does have hyperlipidemia.      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  He has done this for 5 years, but is currently in the midst of retiring.      CURRENT MEDICATIONS:     1.  Aspirin 81 mg a day.   2.  Inhalers.   3.  Melatonin.      PHYSICAL EXAMINATION:     VITAL SIGNS:  His blood pressures run 116/68 with a pulse in the 60s-70s at home.   GENERAL:  He is comfortable, well groomed.   PSYCHIATRIC:  He is alert and oriented.   EYES:  Do not appear grossly erythematous or have exudate.   RESPIRATORY:  He is breathing comfortably without gross cough.      The remainder of the comprehensive physical exam was deferred secondary to COVID-19 pandemic and secondary to video visit restrictions.      Exercise nuclear stress test 09/03/2020 reveals a small amount of apical ischemia, borderline finding after reviewing the images.      Chest CT 07/02/2020 reveals severe coronary calcifications, volume of which would make coronary CTA contraindicated in the future due to the overall burden of calcium noted.      09/09/2020, potassium 4.3, GFR normal, , total cholesterol 270, hemoglobin 11.4.        IMPRESSION:      Leno is a 65-year-old gentleman without a prior documented history of cardiac disease, who presents with several active issues:      1.  Severe coronary artery calcifications:      Chest CT 07/02/2020 revealed severe coronary calcifications.  Obviously, this represents underlying coronary artery disease, burden of which was significant enough to preclude coronary CTA in the future from being a useful diagnostic test.      Exercise nuclear stress test 09/03/2020 revealed only a small amount of apical ischemia that was a relatively borderline finding.      More importantly, from a  functional standpoint, the patient does not complain of any significant chest pain or pressure or exertional limitation, but rather only gets short of breath if he really pushes himself hard.  At this point in time, medical therapy and secondary prevention measures would be warranted.      2.  Hyperlipidemia:      The patient's cholesterol was quite high on last check, 2020, with an LDL of 135 and total cholesterol of 270.  Further aggressive secondary prevention would be warranted.        PLAN:     1.  Baby aspirin enteric-coated 81 mg everyday lifelong, if no bleeding contraindications.     2.  Lipitor 40 mg a day to hopefully achieve a goal LDL of less than 70.     3.  Low-carbohydrate Mediterranean diet to achieve more ideal body mass index at approximately 175 pounds as a goal.     4.  Followup virtual video visit via KochAbo 6 months with lab work at that time, earlier if needed.      Once again, it was a pleasure participating in the care of your patient, Mr. Claudia Grande.  Please feel free to contact me anytime if any questions regarding his care in the future.         Sincerely,      KINGS ARROYO MD             D: 2020   T: 2020   MT: [id number]      Name:     CLAUDIA GRANDE   MRN:      [id number]        Account:      [id number]   :      1955      Document: [id number]       cc: Karen Rodriguez MD

## 2020-09-24 NOTE — PROGRESS NOTES
"Mainor Grande is a 65 year old male who is being evaluated via a billable video visit.      The patient has been notified of following:     \"This video visit will be conducted via a call between you and your physician/provider. We have found that certain health care needs can be provided without the need for an in-person physical exam.  This service lets us provide the care you need with a video conversation.  If a prescription is necessary we can send it directly to your pharmacy.  If lab work is needed we can place an order for that and you can then stop by our lab to have the test done at a later time.    Video visits are billed at different rates depending on your insurance coverage.  Please reach out to your insurance provider with any questions.    If during the course of the call the physician/provider feels a video visit is not appropriate, you will not be charged for this service.\"    Patient has given verbal consent for Video visit? Yes  How would you like to obtain your AVS? MyChart  If you are dropped from the video visit, the video invite should be resent to: Text to cell phone: [phone]  Will anyone else be joining your video visit? No      Video-Visit Details    Type of service:  Video Visit    Video Start Time: 9:45am    Video End Time: 1023am    Originating Location (pt. Location): Patient workplace    Distant Location (provider location): Home office    Platform used for Video Visit: Campos    See dictation#[id number]        "

## 2020-09-28 NOTE — TELEPHONE ENCOUNTER
Please see note from today for update.    Maye Ramirez RN  Gastroenterology Care Coordinator  [facility] [place]

## 2020-09-30 ENCOUNTER — VIRTUAL VISIT (OUTPATIENT)
Dept: GASTROENTEROLOGY | Facility: CLINIC | Age: 65
End: 2020-09-30
Payer: COMMERCIAL

## 2020-09-30 DIAGNOSIS — K51.00 ULCERATIVE PANCOLITIS WITHOUT COMPLICATION (H): Primary | ICD-10-CM

## 2020-09-30 PROCEDURE — 99214 OFFICE O/P EST MOD 30 MIN: CPT | Mod: 95 | Performed by: INTERNAL MEDICINE

## 2020-09-30 NOTE — PROGRESS NOTES
"Mainor Grande is a 65 year old male who is being evaluated via a billable telephone visit.      The patient has been notified of following:     \"This telephone visit will be conducted via a call between you and your physician/provider. We have found that certain health care needs can be provided without the need for a physical exam.  This service lets us provide the care you need with a short phone conversation.  If a prescription is necessary we can send it directly to your pharmacy.  If lab work is needed we can place an order for that and you can then stop by our lab to have the test done at a later time.    Telephone visits are billed at different rates depending on your insurance coverage. During this emergency period, for some insurers they may be billed the same as an in-person visit.  Please reach out to your insurance provider with any questions.    If during the course of the call the physician/provider feels a telephone visit is not appropriate, you will not be charged for this service.\"    Patient has given verbal consent for Telephone visit?  Yes    What phone number would you like to be contacted at? [phone]    How would you like to obtain your AVS? [facility]    Phone call duration:  minutes    Andrey Pettit MA      "

## 2020-09-30 NOTE — PROGRESS NOTES
HPI:    Leno presents today for a telephone visit to follow-up of UC pancolitis.  Recently had a trip to [place] - did well while he was there and did a lot of hiking without any issues. Recently saw cardiology re:CAD and had stress test and was also started on lipitor.  Has also switched to a mediterranean diet.    BMs have been more formed - generally 2-3 a day.  Occasionally has a loose stool - once or twice a week.  No blood in the stool.  Decreased prednisone to 20mg daily a few days ago with plan to continue to taper off.     Past Medical History:   Diagnosis Date     Allergic rhinitis, cause unspecified      Fatty liver 2004    elevated LFT, saw GI for a consultation     Mild persistent asthma 9/12/2008     Reflux esophagitis      Unspecified disease of respiratory system     RAD     Viremia, unspecified     acute       Past Surgical History:   Procedure Laterality Date     COLONOSCOPY  9/4/2013    Procedure: COLONOSCOPY;  colonoscopy;  Surgeon: Beto Keen MD;  Location: [facility]     COLONOSCOPY N/A 7/31/2020    Procedure: Colonoscopy, With Polypectomy And Biopsy;  Surgeon: Lily Eaton DO;  Location: [facility]     COLONOSCOPY WITH CO2 INSUFFLATION N/A 7/31/2020    Procedure: COLONOSCOPY, WITH CO2 INSUFFLATION;  Surgeon: Lily Eaton DO;  Location: [facility]     COMBINED ESOPHAGOSCOPY, GASTROSCOPY, DUODENOSCOPY (EGD) WITH CO2 INSUFFLATION N/A 7/31/2020    Procedure: ESOPHAGOGASTRODUODENOSCOPY, WITH CO2 INSUFFLATION;  Surgeon: Lily Eaton DO;  Location: [facility]     ESOPHAGOSCOPY, GASTROSCOPY, DUODENOSCOPY (EGD), COMBINED N/A 7/31/2020    Procedure: Esophagogastroduodenoscopy, With Biopsy;  Surgeon: Lily Eaton DO;  Location: [facility]     HC COLONOSCOPY THRU STOMA, DIAGNOSTIC  2005    normal     HC VASECTOMY UNILAT/BILAT W POSTOP SEMEN  1992    Vasectomy       Family History   Problem Relation Age of Onset     Cancer Mother         bladder cancer     Neurologic Disorder Father          alzheimers dementia     Gastrointestinal Disease Brother         divertculitis       Social History     Tobacco Use     Smoking status: Never Smoker     Smokeless tobacco: Never Used   Substance Use Topics     Alcohol use: Yes     Alcohol/week: 1.0 - 2.0 standard drinks     Types: 1 - 2 Standard drinks or equivalent per week     Comment: caffeine 6 per day        Assessment and Plan:    #UC pancolitis - doing well on steroids - plan to start entyvio but still awaiting insurance approval.  Will continue to taper prednisone and in the interim, will start asacol as well. Will get repeat labs and fecal calpro in 4 weeks.  Patient encouraged to call immediately if issues in the interim.     # likely PSC - follows with hepatology - repeat LFTs in 4 weeks        RTC 6 weeks    Lily Eaton DO     Phone call duration: 14 minutes

## 2020-09-30 NOTE — PATIENT INSTRUCTIONS
Continue to taper down your prednisone as discussed.  Please start asacol (mesalamine).    We will let you know when we hear about your entyvio.    Please have labs done and submit a stool sample in 4 weeks.    If your diarrhea is coming back as you taper your prednisone, please let us know right away

## 2020-10-08 ENCOUNTER — MYC MEDICAL ADVICE (OUTPATIENT)
Dept: GASTROENTEROLOGY | Facility: CLINIC | Age: 65
End: 2020-10-08

## 2020-10-08 DIAGNOSIS — K51.00 ULCERATIVE PANCOLITIS WITHOUT COMPLICATION (H): ICD-10-CM

## 2020-10-08 RX ORDER — MESALAMINE 800 MG/1
1600 TABLET, DELAYED RELEASE ORAL DAILY
Qty: 90 TABLET | Refills: 0 | Status: SHIPPED | OUTPATIENT
Start: 2020-10-08 | End: 2021-07-02

## 2020-10-10 DIAGNOSIS — K51.00 ULCERATIVE PANCOLITIS WITHOUT COMPLICATION (H): ICD-10-CM

## 2020-10-12 NOTE — TELEPHONE ENCOUNTER
predniSONE (DELTASONE) 10 MG tablet   Last Written Prescription Date:  8/5/2020  Last Fill Quantity: 120,   # refills: 1  Last Office Visit : 9/30/2020  Future Office visit:  11/11/2020  Routing refill request to provider for review/approval because:  Drug not on the [facility], [facility] or [facility] refill protocol or controlled substance      Laurence Aguilar RN  Central Triage Red Flags/Med Refills

## 2020-10-13 RX ORDER — PREDNISONE 10 MG/1
40 TABLET ORAL DAILY
Qty: 120 TABLET | Refills: 0 | Status: SHIPPED | OUTPATIENT
Start: 2020-10-13 | End: 2021-07-02

## 2020-10-28 ENCOUNTER — TELEPHONE (OUTPATIENT)
Dept: GASTROENTEROLOGY | Facility: CLINIC | Age: 65
End: 2020-10-28

## 2020-10-28 DIAGNOSIS — K51.00 ULCERATIVE PANCOLITIS WITHOUT COMPLICATION (H): Primary | ICD-10-CM

## 2020-10-28 DIAGNOSIS — K51.00 ULCERATIVE PANCOLITIS WITHOUT COMPLICATION (H): ICD-10-CM

## 2020-10-28 PROCEDURE — U0003 INFECTIOUS AGENT DETECTION BY NUCLEIC ACID (DNA OR RNA); SEVERE ACUTE RESPIRATORY SYNDROME CORONAVIRUS 2 (SARS-COV-2) (CORONAVIRUS DISEASE [COVID-19]), AMPLIFIED PROBE TECHNIQUE, MAKING USE OF HIGH THROUGHPUT TECHNOLOGIES AS DESCRIBED BY CMS-2020-01-R: HCPCS | Performed by: INTERNAL MEDICINE

## 2020-10-28 NOTE — TELEPHONE ENCOUNTER
[facility] Call Center    Phone Message    May a detailed message be left on voicemail: yes     Reason for Call: Symptoms or Concerns     If patient has red-flag symptoms, warm transfer to triage line    Current symptom or concern: Patients wife called. She states that her  is not acting like himself. She states that he isnt sleeping, his stomach is upset, he has a fever every now and again. She isnt sure if it is the medication recently prescribed or if he has COVID.    Symptoms have been present for:  2 week(s)  Please advise      Action Taken: Message routed to:  Adult Clinics: Gastroenterology (GI) p [id number]

## 2020-10-28 NOTE — TELEPHONE ENCOUNTER
"LPN returned call to patient and his wife, Rani. Patient reports he has been feeling very run down, lack of energy, and worn out. Patient stated that he slept good last night, but for the last couple weeks he has not been sleeping well. Patient denies nausea or vomiting, reports having diarrhea but stated that has been an ongoing issue since he started having GI issues, denies blood in the stool. Patient reports that he is taking the Mesalamine 1600 mg daily in the morning and has not taken any prednisone in a couple weeks. Writer then spoke with patients wife, Rani. Rani stated \"I checked his temp right before you called and it was 101.7, otherwise he has had low grade fevers off and on for the past 2 weeks. The above 100 temps started yesterday. He is having chills and headaches, he has poor appetite. The other night when we went to bed, he put his hand on my back and it was hot, not warm, hot and there has been a couple nights that he has woken up during the night and had to change clothes because he is drenched in sweat. We will be having a conversation and he will start falling asleep during it. I am just worried because this isn't like him. I don't know if its the medication or if he has COVID. Should he take the Mesalamine this morning?\" Writer stated that a high priority message would be sent to provider to advise.     Mago Brown LPN    "

## 2020-10-28 NOTE — TELEPHONE ENCOUNTER
LPN spoke with representatives at [facility] and [facility] inquire about symptomatic COVID testing. Writer was told that neither location is doing symptomatic COVID testing and patient should call the COVID test scheduling line for assistance. Writer spoke with patients wife, Rani and notified her of this and to call [phone]. Rani verbalized understanding. LPN sent message to infusion  team for an update on the entyvio PA.       Lily Eaton, DO  You 30 minutes ago (9:05 AM)     Called and spoke with patient and his wife.  Poor energy and fevers over the last few weeks although worse the last few days. Has been tapering prednisone as discussed at last appointment and thinks he is on prednisone 10mg daily, although his wife is concerned he maybe confused and not taking it as prescribed.  Having loose stools (3 a day).  No blood in the stool.  Recently started mesalamine.  No chest pain, no shortness of breath.  Works at [employer] - have a few students quarantined right now but no confirmed covid cases.     Will get labs today including CBC, CMP, CRP and a covid test given his fevers.  Will also check stool studies.  Can you help him arrange this? They would like to go to [facility] or [facility].  I told his wife that if he is getting worse they will just need to go to the ER for further evaluation.  I had him stop his mesalamine and continue his prednisone at 10mg.     Can we also check the status of his entyvio prior auth? It's been a few months now.  He's not doing well and has failed his prednisone taper so we can resubmit it as urgent with this information.   Thanks,   Lily     Message text      Mago Brown LPN

## 2020-10-29 DIAGNOSIS — K51.00 ULCERATIVE PANCOLITIS WITHOUT COMPLICATION (H): ICD-10-CM

## 2020-10-29 LAB
C DIFF TOX B STL QL: NEGATIVE
SARS-COV-2 RNA SPEC QL NAA+PROBE: NOT DETECTED
SPECIMEN SOURCE: NORMAL
SPECIMEN SOURCE: NORMAL

## 2020-10-29 PROCEDURE — 87493 C DIFF AMPLIFIED PROBE: CPT | Performed by: INTERNAL MEDICINE

## 2020-10-29 PROCEDURE — 83993 ASSAY FOR CALPROTECTIN FECAL: CPT | Performed by: INTERNAL MEDICINE

## 2020-10-30 DIAGNOSIS — K51.00 ULCERATIVE PANCOLITIS WITHOUT COMPLICATION (H): ICD-10-CM

## 2020-10-30 LAB
ALBUMIN SERPL-MCNC: 2.6 G/DL (ref 3.4–5)
ALP SERPL-CCNC: 224 U/L (ref 40–150)
ALT SERPL W P-5'-P-CCNC: 72 U/L (ref 0–70)
ANION GAP SERPL CALCULATED.3IONS-SCNC: 10 MMOL/L (ref 3–14)
AST SERPL W P-5'-P-CCNC: 53 U/L (ref 0–45)
BILIRUB SERPL-MCNC: 0.9 MG/DL (ref 0.2–1.3)
BUN SERPL-MCNC: 13 MG/DL (ref 7–30)
CALCIUM SERPL-MCNC: 9.2 MG/DL (ref 8.5–10.1)
CALPROTECTIN STL-MCNT: 754 MG/KG (ref 0–49.9)
CHLORIDE SERPL-SCNC: 94 MMOL/L (ref 94–109)
CO2 SERPL-SCNC: 26 MMOL/L (ref 20–32)
CREAT SERPL-MCNC: 0.99 MG/DL (ref 0.66–1.25)
CRP SERPL-MCNC: 142 MG/L (ref 0–8)
ERYTHROCYTE [DISTWIDTH] IN BLOOD BY AUTOMATED COUNT: 15.4 % (ref 10–15)
GFR SERPL CREATININE-BSD FRML MDRD: 78 ML/MIN/{1.73_M2}
GLUCOSE SERPL-MCNC: 112 MG/DL (ref 70–99)
HCT VFR BLD AUTO: 38.2 % (ref 40–53)
HGB BLD-MCNC: 12.4 G/DL (ref 13.3–17.7)
MCH RBC QN AUTO: 25.7 PG (ref 26.5–33)
MCHC RBC AUTO-ENTMCNC: 32.5 G/DL (ref 31.5–36.5)
MCV RBC AUTO: 79 FL (ref 78–100)
PLATELET # BLD AUTO: 494 10E9/L (ref 150–450)
POTASSIUM SERPL-SCNC: 3.2 MMOL/L (ref 3.4–5.3)
PROT SERPL-MCNC: 7.8 G/DL (ref 6.8–8.8)
RBC # BLD AUTO: 4.83 10E12/L (ref 4.4–5.9)
SODIUM SERPL-SCNC: 130 MMOL/L (ref 133–144)
WBC # BLD AUTO: 12.3 10E9/L (ref 4–11)

## 2020-10-30 PROCEDURE — 86140 C-REACTIVE PROTEIN: CPT | Performed by: INTERNAL MEDICINE

## 2020-10-30 PROCEDURE — 85027 COMPLETE CBC AUTOMATED: CPT | Performed by: INTERNAL MEDICINE

## 2020-10-30 PROCEDURE — 80053 COMPREHEN METABOLIC PANEL: CPT | Performed by: INTERNAL MEDICINE

## 2020-10-30 PROCEDURE — 36415 COLL VENOUS BLD VENIPUNCTURE: CPT | Performed by: INTERNAL MEDICINE

## 2020-10-30 NOTE — TELEPHONE ENCOUNTER
"LPN spoke with patients wife, Rani. Rani stated \"we haven't heard anything about his COVID test, but I see in his mychart that the COVID test and his stool test was negative for infection. Should he go have the blood work done then?\" Writer reviewed patients chart and verified that the COVID and C.Diff tests were negative, but informed patients wife that the Calprotectin Feces test is still in process. Writer stated that since patients COVID test was negative, he should have the blood work done and that he could have this done at any [place] lab. Patients wife stated \"ok, I will call and see if he can get in to [place]. He's doing better than he was on Wednesday; he's not falling asleep while we are talking, but he is having very liquidy stools and abdominal cramping. Yesterday we had chicken noodle soup and within 30 minutes he was in the bathroom and sometimes he doesn't make it to the bathroom on time and has an accident.\" Patient denied having any blood in the stool and stated that they are very clear. Patient denies any vomiting. Patients wife took his temp while on the phone with writer and reported it was 97.5 and patient has not had any fevers yesterday. Patients wife reports patient is currently only taking his lipitor and Prednisone 10 mg 1 tablet daily, but has stopped all other medications. Patients wife is questioning if he should restart the Mesalamine and should he stay on the prednisone 10 mg 1 tablet daily. Writer stated a message would be sent to provider to advise.     Mago Brown LPN    "

## 2020-11-02 ENCOUNTER — TELEPHONE (OUTPATIENT)
Dept: GASTROENTEROLOGY | Facility: CLINIC | Age: 65
End: 2020-11-02

## 2020-11-02 ENCOUNTER — NURSE TRIAGE (OUTPATIENT)
Dept: NURSING | Facility: CLINIC | Age: 65
End: 2020-11-02

## 2020-11-02 NOTE — TELEPHONE ENCOUNTER
LVM for pt to call clinic back. Was calling to see how he is doing and also to update on message string below.     [facility]  Phone Message  May a detailed message be left on voicemail: yes   Reason for Call: Other: Pt wife states they were supposed to call in today to discuss how pt is doing. Please call pt wife back to [facility].    Action Taken: Message routed to:  Adult Clinics: Gastroenterology (GI) p [id number]  Travel Screening: Not Applicable    Ce Landeros LPN

## 2020-11-02 NOTE — TELEPHONE ENCOUNTER
"Pt called back at this time. States that overall he is feeling OK. Said that the med combination right now is working OK, and is \"settling in to be a reasonable solution for the time being.\" Pt did state that he still has sporadic symptoms.     Was also inquiring on note from the [employer]. Wondering if if was because they made too much money or too little. Wanted them to know that he is almost through with his longterm, and after that his income will be changing.     Informed pt I will update Dr. Eaton on how pt is doing. In the meantime we will patiently wait for more word on the Entyvio. Pt good with plan.    Ce Landeros LPN    "

## 2020-11-02 NOTE — TELEPHONE ENCOUNTER
Closing encounter as multiple encounters are open. Will copy message from this encounter.     Ce Landeros LPN

## 2020-11-02 NOTE — TELEPHONE ENCOUNTER
[facility] Call [facility]    Phone Message    May a detailed message be left on voicemail: yes     Reason for Call: Other: Pt wife states they were supposed to call in today to discuss how pt is doing. Please call pt wife back to [facility].      Action Taken: Message routed to:  Adult Clinics: Gastroenterology (GI) p [id number]    Travel Screening: Not Applicable

## 2020-11-03 ENCOUNTER — NURSE TRIAGE (OUTPATIENT)
Dept: NURSING | Facility: CLINIC | Age: 65
End: 2020-11-03

## 2020-11-03 ENCOUNTER — APPOINTMENT (OUTPATIENT)
Dept: CT IMAGING | Facility: CLINIC | Age: 65
End: 2020-11-03
Attending: FAMILY MEDICINE
Payer: COMMERCIAL

## 2020-11-03 ENCOUNTER — HOSPITAL ENCOUNTER (EMERGENCY)
Facility: CLINIC | Age: 65
Discharge: HOME | End: 2020-11-03
Attending: FAMILY MEDICINE | Admitting: FAMILY MEDICINE
Payer: COMMERCIAL

## 2020-11-03 ENCOUNTER — TELEPHONE (OUTPATIENT)
Dept: EMERGENCY MEDICINE | Facility: CLINIC | Age: 65
End: 2020-11-03

## 2020-11-03 VITALS
OXYGEN SATURATION: 100 % | WEIGHT: 183 LBS | SYSTOLIC BLOOD PRESSURE: 107 MMHG | BODY MASS INDEX: 26.26 KG/M2 | HEART RATE: 98 BPM | RESPIRATION RATE: 16 BRPM | DIASTOLIC BLOOD PRESSURE: 71 MMHG | TEMPERATURE: 98.1 F

## 2020-11-03 DIAGNOSIS — E87.6 HYPOKALEMIA: ICD-10-CM

## 2020-11-03 DIAGNOSIS — E86.0 DEHYDRATION: ICD-10-CM

## 2020-11-03 LAB
ALBUMIN SERPL-MCNC: 2.3 G/DL (ref 3.4–5)
ALP SERPL-CCNC: 202 U/L (ref 40–150)
ALT SERPL W P-5'-P-CCNC: 113 U/L (ref 0–70)
ANION GAP SERPL CALCULATED.3IONS-SCNC: 8 MMOL/L (ref 3–14)
AST SERPL W P-5'-P-CCNC: 64 U/L (ref 0–45)
BASOPHILS # BLD AUTO: 0.1 10E9/L (ref 0–0.2)
BASOPHILS NFR BLD AUTO: 0.7 %
BILIRUB SERPL-MCNC: 0.5 MG/DL (ref 0.2–1.3)
BUN SERPL-MCNC: 13 MG/DL (ref 7–30)
CALCIUM SERPL-MCNC: 8.4 MG/DL (ref 8.5–10.1)
CHLORIDE SERPL-SCNC: 94 MMOL/L (ref 94–109)
CO2 SERPL-SCNC: 28 MMOL/L (ref 20–32)
CREAT SERPL-MCNC: 0.98 MG/DL (ref 0.66–1.25)
DIFFERENTIAL METHOD BLD: ABNORMAL
EOSINOPHIL NFR BLD AUTO: 0.8 %
ERYTHROCYTE [DISTWIDTH] IN BLOOD BY AUTOMATED COUNT: 14.6 % (ref 10–15)
GFR SERPL CREATININE-BSD FRML MDRD: 79 ML/MIN/{1.73_M2}
GLUCOSE SERPL-MCNC: 92 MG/DL (ref 70–99)
HCT VFR BLD AUTO: 35.6 % (ref 40–53)
HGB BLD-MCNC: 11.5 G/DL (ref 13.3–17.7)
IMM GRANULOCYTES # BLD: 0.1 10E9/L (ref 0–0.4)
IMM GRANULOCYTES NFR BLD: 1.1 %
LYMPHOCYTES # BLD AUTO: 2.2 10E9/L (ref 0.8–5.3)
LYMPHOCYTES NFR BLD AUTO: 20.1 %
MCH RBC QN AUTO: 25.5 PG (ref 26.5–33)
MCHC RBC AUTO-ENTMCNC: 32.3 G/DL (ref 31.5–36.5)
MCV RBC AUTO: 79 FL (ref 78–100)
MONOCYTES # BLD AUTO: 1 10E9/L (ref 0–1.3)
MONOCYTES NFR BLD AUTO: 9 %
NEUTROPHILS # BLD AUTO: 7.4 10E9/L (ref 1.6–8.3)
NEUTROPHILS NFR BLD AUTO: 68.3 %
NRBC # BLD AUTO: 0 10*3/UL
NRBC BLD AUTO-RTO: 0 /100
PLATELET # BLD AUTO: 432 10E9/L (ref 150–450)
POTASSIUM SERPL-SCNC: 2.8 MMOL/L (ref 3.4–5.3)
PROT SERPL-MCNC: 6.8 G/DL (ref 6.8–8.8)
RBC # BLD AUTO: 4.51 10E12/L (ref 4.4–5.9)
SODIUM SERPL-SCNC: 130 MMOL/L (ref 133–144)
WBC # BLD AUTO: 10.8 10E9/L (ref 4–11)

## 2020-11-03 PROCEDURE — 99284 EMERGENCY DEPT VISIT MOD MDM: CPT | Mod: 25 | Performed by: FAMILY MEDICINE

## 2020-11-03 PROCEDURE — 85025 COMPLETE CBC W/AUTO DIFF WBC: CPT | Performed by: FAMILY MEDICINE

## 2020-11-03 PROCEDURE — 80053 COMPREHEN METABOLIC PANEL: CPT | Performed by: FAMILY MEDICINE

## 2020-11-03 PROCEDURE — 70450 CT HEAD/BRAIN W/O DYE: CPT

## 2020-11-03 PROCEDURE — 99284 EMERGENCY DEPT VISIT MOD MDM: CPT | Performed by: FAMILY MEDICINE

## 2020-11-03 PROCEDURE — 250N000013 HC RX MED GY IP 250 OP 250 PS 637: Performed by: FAMILY MEDICINE

## 2020-11-03 RX ORDER — POTASSIUM CHLORIDE 750 MG/1
20 TABLET, EXTENDED RELEASE ORAL 2 TIMES DAILY
Qty: 12 TABLET | Refills: 0 | Status: SHIPPED | OUTPATIENT
Start: 2020-11-03 | End: 2020-11-06

## 2020-11-03 RX ORDER — SODIUM CHLORIDE 9 MG/ML
INJECTION, SOLUTION INTRAVENOUS CONTINUOUS
Status: DISCONTINUED | OUTPATIENT
Start: 2020-11-03 | End: 2020-11-03 | Stop reason: HOSPADM

## 2020-11-03 RX ORDER — POTASSIUM CHLORIDE 1500 MG/1
40 TABLET, EXTENDED RELEASE ORAL ONCE
Status: COMPLETED | OUTPATIENT
Start: 2020-11-03 | End: 2020-11-03

## 2020-11-03 RX ADMIN — POTASSIUM CHLORIDE 40 MEQ: 1500 TABLET, EXTENDED RELEASE ORAL at 09:00

## 2020-11-03 NOTE — TELEPHONE ENCOUNTER
Wife Rani is calling regarding  Mainor that is falling and is very dizzy.  Wife is not sure if Mainor is having a seizure.  Rani phoned last night regarding dizziness and did not go to hospital.  FNA advised to go to hospital.    COVID 19 Nurse Triage Plan/Patient Instructions    Please be aware that novel coronavirus (COVID-19) may be circulating in the community. If you develop symptoms such as fever, cough, or SOB or if you have concerns about the presence of another infection including coronavirus (COVID-19), please contact your health care provider or visit [email or web address].     Disposition/Instructions    ED Visit recommended. Follow protocol based instructions.     Bring Your Own Device:  Please also bring your smart device(s) (smart phones, tablets, laptops) and their charging cables for your personal use and to communicate with your care team during your visit.    Thank you for taking steps to prevent the spread of this virus.  o Limit your contact with others.  o Wear a simple mask to cover your cough.  o Wash your hands well and often.    Resources    [facility]: About COVID-19: [email or web address]    CDC: What to Do If You're Sick: www.cdc.gov/coronavirus/2019-ncov/about/steps-when-sick.html    CDC: Ending Home Isolation: www.cdc.gov/coronavirus/2019-ncov/hcp/disposition-in-home-patients.html     CDC: Caring for Someone: www.cdc.gov/coronavirus/2019-ncov/if-you-are-sick/care-for-someone.html     [facility]: Interim Guidance for Hospital Discharge to Home: www.health.[facility].[place]/diseases/coronavirus/hcp/hospdischarge.pdf    [facility] clinical trials (COVID-19 research studies): [email or web address].[email or web address]/umn-[email or web address]-trials     Below are the COVID-19 hotlines at the [place] Department of Health ([facility]). Interpreters are available.   o For health questions: Call 349-673-7464 or 1-177.573.3018 (7 a.m. to 7 p.m.)  o For questions about schools and childcare: Call [phone]  or 1-134.904.5118 (7 a.m. to 7 p.m.)                       Reason for Disposition    SEVERE dizziness (e.g., unable to stand, requires support to walk, feels like passing out now)    Additional Information    Negative: Severe difficulty breathing (e.g., struggling for each breath, speaks in single words)    Negative: [1] Difficulty breathing or swallowing AND [2] started suddenly after medicine, an allergic food or bee sting    Negative: Shock suspected (e.g., cold/pale/clammy skin, too weak to stand, low BP, rapid pulse)    Negative: Difficult to awaken or acting confused (e.g., disoriented, slurred speech)    Negative: [1] Weakness (i.e., paralysis, loss of muscle strength) of the face, arm or leg on one side of the body AND [2] sudden onset AND [3] present now    Negative: [1] Numbness (i.e., loss of sensation) of the face, arm or leg on one side of the body AND [2] sudden onset AND [3] present now    Negative: [1] Loss of speech or garbled speech AND [2] sudden onset AND [3] present now    Negative: Overdose (accidental or intentional) of medications    Negative: [1] Fainted > 15 minutes ago AND [2] still feels too weak or dizzy to stand    Negative: Heart beating < 50 beats per minute OR > 140 beats per minute    Negative: Sounds like a life-threatening emergency to the triager    Negative: Difficulty breathing    Protocols used: DIZZINESS - [id number]

## 2020-11-03 NOTE — TELEPHONE ENCOUNTER
Reason for Call:  Same Day Appointment, Requested Provider:  Leila Rodriguez MD     PCP: Karen Rodriguez    Reason for visit: emergency f/u    Duration of symptoms: unknown    Have you been treated for this in the past? Yes    Additional comments: Mainor was seen at [facility] ED today and Dr. Morales Grajeda suggested he been seen by his PCP, Dr Rodriguez this week.     Can this be arranged?     Can we leave a detailed message on this number? YES    Phone number patient can be reached at: Home number on file [phone] (home)    Best Time: any    Call taken on 11/3/2020 at 9:24 AM by Joseph Swartz

## 2020-11-03 NOTE — ED TRIAGE NOTES
Pt here with new diagnosis of ulcerative cholitis this year.  Having issues with weakness, dizziness, loss of appetite.

## 2020-11-03 NOTE — TELEPHONE ENCOUNTER
Pt is calling in with his wife about a fall he had tonight. Pt did not hit his head, but he feels very lightheaded, and dizzy, jittery and weak. Pt has temp of 100.4 BP was 138/70. Pt is having tremors of the left arm that he has never had before. Pt denies any numbness or weakness of the face arm or leg on one side of the body. Pt denies any loss of speech or garbled speech. Pt denies any chest pain or difficulty breathing. Pt denies any headache, vomiting, or diarrhea, or blood in his stools. Pt does feel very weak, and wife reports he is unsteady on his feet.   Care advice given and per protocol pt should be evaluated in the ER. Wife agrees with plan, and will bring him to the ER.     Perez Mckeon RN on 11/2/2020 at 8:15 PM    Reason for Disposition    SEVERE dizziness (e.g., unable to stand, requires support to walk, feels like passing out now)    Additional Information    Negative: Severe difficulty breathing (e.g., struggling for each breath, speaks in single words)    Negative: [1] Difficulty breathing or swallowing AND [2] started suddenly after medicine, an allergic food or bee sting    Negative: Shock suspected (e.g., cold/pale/clammy skin, too weak to stand, low BP, rapid pulse)    Negative: Difficult to awaken or acting confused (e.g., disoriented, slurred speech)    Negative: [1] Weakness (i.e., paralysis, loss of muscle strength) of the face, arm or leg on one side of the body AND [2] sudden onset AND [3] present now    Negative: [1] Numbness (i.e., loss of sensation) of the face, arm or leg on one side of the body AND [2] sudden onset AND [3] present now    Negative: [1] Loss of speech or garbled speech AND [2] sudden onset AND [3] present now    Negative: Overdose (accidental or intentional) of medications    Negative: [1] Fainted > 15 minutes ago AND [2] still feels too weak or dizzy to stand    Negative: Heart beating < 50 beats per minute OR > 140 beats per minute    Negative: Sounds like a  "life-threatening emergency to the triager    Negative: Chest pain    Negative: Rectal bleeding, bloody stool, or tarry-black stool    Negative: [1] Vomiting AND [2] contains red blood or black (\"coffee ground\") material    Negative: Vomiting is main symptom    Negative: Diarrhea is main symptom    Negative: Headache is main symptom    Negative: Patient states that he/she is having an anxiety/panic attack    Negative: Dizziness from low blood sugar (i.e., < 60 mg/dl or 3.5 mmol/l)    Negative: Dizziness is described as a spinning sensation (i.e., vertigo)    Negative: Heat exhaustion suspected (i.e., dehydration from heat exposure)    Negative: Difficulty breathing    Protocols used: DIZZINESS - [id number]      "

## 2020-11-03 NOTE — DISCHARGE INSTRUCTIONS
1.  I want you to start on the potassium replacement tablets today, get 2 doses in today before bed.  2.  I have put in a working message to your primary care doctor, they should be contacting you to get a follow-up appointment set up for this week to have your potassium rechecked.

## 2020-11-03 NOTE — ED PROVIDER NOTES
History     Chief Complaint   Patient presents with     Dizziness     Abdominal Pain     HPI  Mainor Grande is a 65 year old male who presents with concerns of some increasing weakness over the last few days and some tremor of his extremities.  Patient fell last night and they got concerned about this.  Patient is currently being treated for ulcerative colitis and continues to have pain and diarrhea with this.  Is not necessarily any worse today but family is frustrated because patient is continuing to have symptoms.  They are currently trying to get him on a different medication but awaiting insurance approval.  Patient denies any recent bloody stools.  Patient has been eating and drinking okay, has been drinking a lot of free water though.  Patient denies any dysuria or hematuria.  There is been no vomiting.    Allergies:  Allergies   Allergen Reactions     No Known Drug Allergies      Seasonal Allergies        Problem List:    Patient Active Problem List    Diagnosis Date Noted     Ulcerative pancolitis without complication (H) 08/05/2020     Priority: Medium     Memory changes 12/06/2019     Priority: Medium     Acute sinusitis with symptoms > 10 days 02/12/2019     Priority: Medium     Nummular eczema 02/12/2019     Priority: Medium     Diarrhea, unspecified type 11/30/2018     Priority: Medium     Deviated nasal septum 08/01/2013     Priority: Medium     Chronic nasal congestion 08/01/2013     Priority: Medium     Seasonal allergic rhinitis 07/18/2011     Priority: Medium     Primarily fall and spring allergies       HYPERLIPIDEMIA LDL GOAL <160 10/31/2010     Priority: Medium     Fatty liver      Priority: Medium     elevated LFT, saw GI for a consultation       Moderate persistent asthma with acute exacerbation 09/12/2008     Priority: Medium     Erectile dysfunction 06/03/2008     Priority: Medium     Abnormal levels of other serum enzymes 04/15/2004     Priority: Medium     S/p liver biopsy -6/2016 .  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       Reflux esophagitis 03/20/2003     Priority: Medium        Past Medical History:    Past Medical History:   Diagnosis Date     Allergic rhinitis, cause unspecified      Fatty liver 2004     Mild persistent asthma 9/12/2008     Reflux esophagitis      Unspecified disease of respiratory system      Viremia, unspecified        Past Surgical History:    Past Surgical History:   Procedure Laterality Date     COLONOSCOPY  9/4/2013    Procedure: COLONOSCOPY;  colonoscopy;  Surgeon: Beto Keen MD;  Location: [facility]     COLONOSCOPY N/A 7/31/2020    Procedure: Colonoscopy, With Polypectomy And Biopsy;  Surgeon: Lily Eaton DO;  Location: [facility]     COLONOSCOPY WITH CO2 INSUFFLATION N/A 7/31/2020    Procedure: COLONOSCOPY, WITH CO2 INSUFFLATION;  Surgeon: Lily Eaton DO;  Location: [facility]     COMBINED ESOPHAGOSCOPY, GASTROSCOPY, DUODENOSCOPY (EGD) WITH CO2 INSUFFLATION N/A 7/31/2020    Procedure: ESOPHAGOGASTRODUODENOSCOPY, WITH CO2 INSUFFLATION;  Surgeon: Lily Eaton DO;  Location: [facility] OR     ESOPHAGOSCOPY, GASTROSCOPY, DUODENOSCOPY (EGD), COMBINED N/A 7/31/2020    Procedure: Esophagogastroduodenoscopy, With Biopsy;  Surgeon: Lily Eaton DO;  Location: [facility] OR     HC COLONOSCOPY THRU STOMA, DIAGNOSTIC  2005    normal     HC VASECTOMY UNILAT/BILAT W POSTOP SEMEN  1992    Vasectomy       Family History:    Family History   Problem Relation Age of Onset     Cancer Mother         bladder cancer     Neurologic Disorder Father         alzheimers dementia     Gastrointestinal Disease Brother         divertculitis       Social History:  Marital Status:   [2]  Social History     Tobacco Use     Smoking status: Never Smoker     Smokeless tobacco: Never Used   Substance Use Topics     Alcohol use: Yes     Alcohol/week: 1.0 - 2.0 standard drinks     Types: 1 - 2 Standard drinks or equivalent per week     Comment: caffeine 6 per day      Drug use: No        Medications:         albuterol (VENTOLIN HFA) 108 (90 Base) MCG/ACT inhaler       ASPIRIN PO       atorvastatin (LIPITOR) 40 MG tablet       azelastine (ASTELIN) 0.1 % nasal spray       bisacodyl (DULCOLAX) 5 MG EC tablet       esomeprazole (NEXIUM) 20 MG capsule       fluocinonide (LIDEX) 0.05 % external cream       fluticasone (FLONASE) 50 MCG/ACT nasal spray       Ibuprofen (ADVIL PO)       melatonin 1 MG TABS       mesalamine (ASACOL HD) 800 MG EC tablet       Multiple Vitamins-Minerals (MULTIVITAMIN ADULT PO)       Na Sulfate-K Sulfate-Mg Sulf (SUPREP BOWEL PREP KIT) solution       Naphazoline-Glycerin-Zinc Sulf (CLEAR EYES MAXIMUM ITCHY EYE) 0.012-0.25-0.25 % SOLN       polyethylene glycol (GOLYTELY) 236 g suspension       predniSONE (DELTASONE) 10 MG tablet       Simethicone 125 MG TABS       Simethicone 125 MG TABS       SYMBICORT 160-4.5 MCG/ACT Inhaler       triamcinolone (KENALOG) 0.1 % external cream          Review of Systems   All other systems reviewed and are negative.      Physical Exam   BP: 118/82  Pulse: 81  Temp: 98.1  F (36.7  C)  Resp: 16  Weight: 83 kg (183 lb)  SpO2: 100 %  Lying Orthostatic BP: 115/69  Lying Orthostatic Pulse: 83 bpm  Sitting Orthostatic BP: 103/69  Sitting Orthostatic Pulse: 96 bpm  Standing Orthostatic BP: 94/67  Standing Orthostatic Pulse: 99 bpm      Physical Exam  Vitals signs and nursing note reviewed.   Constitutional:       General: He is not in acute distress.     Appearance: He is well-developed. He is not diaphoretic.   HENT:      Head: Normocephalic and atraumatic.      Nose: Nose normal.      Mouth/Throat:      Pharynx: No oropharyngeal exudate.   Eyes:      General: No scleral icterus.     Conjunctiva/sclera: Conjunctivae normal.      Pupils: Pupils are equal, round, and reactive to light.   Neck:      Musculoskeletal: Normal range of motion.   Cardiovascular:      Rate and Rhythm: Normal rate and regular rhythm.      Heart sounds: Normal  heart sounds. No murmur. No friction rub.   Pulmonary:      Effort: Pulmonary effort is normal. No respiratory distress.      Breath sounds: Normal breath sounds. No wheezing or rales.   Abdominal:      General: Bowel sounds are normal. There is no distension.      Palpations: Abdomen is soft. There is no mass.      Tenderness: There is no abdominal tenderness. There is no guarding or rebound.   Musculoskeletal: Normal range of motion.         General: No tenderness.   Skin:     General: Skin is warm.      Findings: No rash.   Neurological:      Mental Status: He is alert and oriented to person, place, and time.   Psychiatric:         Judgment: Judgment normal.         ED Course        Procedures      Results for orders placed or performed during the hospital encounter of 11/03/20 (from the past 24 hour(s))   CBC with platelets differential   Result Value Ref Range    WBC 10.8 4.0 - 11.0 10e9/L    RBC Count 4.51 4.4 - 5.9 10e12/L    Hemoglobin 11.5 (L) 13.3 - 17.7 g/dL    Hematocrit 35.6 (L) 40.0 - 53.0 %    MCV 79 78 - 100 fl    MCH 25.5 (L) 26.5 - 33.0 pg    MCHC 32.3 31.5 - 36.5 g/dL    RDW 14.6 10.0 - 15.0 %    Platelet Count 432 150 - 450 10e9/L    Diff Method Automated Method     % Neutrophils 68.3 %    % Lymphocytes 20.1 %    % Monocytes 9.0 %    % Eosinophils 0.8 %    % Basophils 0.7 %    % Immature Granulocytes 1.1 %    Nucleated RBCs 0 0 /100    Absolute Neutrophil 7.4 1.6 - 8.3 10e9/L    Absolute Lymphocytes 2.2 0.8 - 5.3 10e9/L    Absolute Monocytes 1.0 0.0 - 1.3 10e9/L    Absolute Basophils 0.1 0.0 - 0.2 10e9/L    Abs Immature Granulocytes 0.1 0 - 0.4 10e9/L    Absolute Nucleated RBC 0.0    Comprehensive metabolic panel   Result Value Ref Range    Sodium 130 (L) 133 - 144 mmol/L    Potassium 2.8 (L) 3.4 - 5.3 mmol/L    Chloride 94 94 - 109 mmol/L    Carbon Dioxide 28 20 - 32 mmol/L    Anion Gap 8 3 - 14 mmol/L    Glucose 92 70 - 99 mg/dL    Urea Nitrogen 13 7 - 30 mg/dL    Creatinine 0.98 0.66 - 1.25  mg/dL    GFR Estimate 79 >60 mL/min/[1.73_m2]    GFR Estimate If Black >90 >60 mL/min/[1.73_m2]    Calcium 8.4 (L) 8.5 - 10.1 mg/dL    Bilirubin Total 0.5 0.2 - 1.3 mg/dL    Albumin 2.3 (L) 3.4 - 5.0 g/dL    Protein Total 6.8 6.8 - 8.8 g/dL    Alkaline Phosphatase 202 (H) 40 - 150 U/L     (H) 0 - 70 U/L    AST 64 (H) 0 - 45 U/L   CT Head w/o Contrast    Narrative    CT SCAN OF THE HEAD WITHOUT CONTRAST November 3, 2020 8:27 AM     HISTORY: Headache, dizziness.    TECHNIQUE: Axial images of the head and coronal reformations without  IV contrast material. Radiation dose for this scan was reduced using  automated exposure control, adjustment of the mA and/or kV according  to patient size, or iterative reconstruction technique.    COMPARISON: 12/9/2019     FINDINGS: There is some mild cerebral atrophy. Minimal nonspecific  white matter changes without mass effect are present. There is no  evidence for intracranial hemorrhage, mass effect, acute infarct, or  skull fracture. Visualized paranasal sinuses and mastoid air cells are  clear.      Impression    IMPRESSION:  1. Mild cerebral atrophy with minimal nonspecific white matter  changes.  2. No evidence for intracranial hemorrhage or any acute process.       Medications   0.9% sodium chloride BOLUS (has no administration in time range)     Followed by   sodium chloride 0.9% infusion (has no administration in time range)   potassium chloride ER (KLOR-CON M) CR tablet 40 mEq (40 mEq Oral Given 11/3/20 0900)     Labs are reviewed and patient's potassium was significantly low and sodium was also a little low.  We did check orthostatic vitals and patient was orthostatic.  Think a lot of this is from his ulcerative colitis and GI losses.  Patient was given some oral potassium here and some IV fluids and is feeling a little bit better.  We will discharge the patient home on a few more doses of oral potassium to correct this.  We will have patient follow-up with his  doctor later on this week to have these labs rechecked and see if he is improving.  I told the patient to continue to work with GI about getting his ulcerative colitis under better control.    Assessments & Plan (with Medical Decision Making)  Hypokalemia, dehydration     I have reviewed the nursing notes.    I have reviewed the findings, diagnosis, plan and need for follow up with the patient.              11/3/2020   [facility] EMERGENCY DEPT     Morales Grajeda MD  11/03/20 1012

## 2020-11-03 NOTE — ED AVS SNAPSHOT
[facility] Emergency Dept  [address] [place] [address]  Phone: [phone]  Fax: [phone]                                    Mainor Grande   MRN: [id number]    Department: [facility] Emergency Dept   Date of Visit: 11/3/2020           After Visit Summary Signature Page    I have received my discharge instructions, and my questions have been answered. I have discussed any challenges I see with this plan with the nurse or doctor.    ..........................................................................................................................................  Patient/Patient Representative Signature      ..........................................................................................................................................  Patient Representative Print Name and Relationship to Patient    ..................................................               ................................................  Date                                   Time    ..........................................................................................................................................  Reviewed by Signature/Title    ...................................................              ..............................................  Date                                               Time          22EPIC Rev 08/18

## 2020-11-06 ENCOUNTER — OFFICE VISIT (OUTPATIENT)
Dept: FAMILY MEDICINE | Facility: OTHER | Age: 65
End: 2020-11-06
Payer: COMMERCIAL

## 2020-11-06 VITALS
TEMPERATURE: 97.6 F | OXYGEN SATURATION: 99 % | RESPIRATION RATE: 14 BRPM | HEART RATE: 75 BPM | SYSTOLIC BLOOD PRESSURE: 114 MMHG | BODY MASS INDEX: 26.26 KG/M2 | DIASTOLIC BLOOD PRESSURE: 68 MMHG | WEIGHT: 183 LBS

## 2020-11-06 DIAGNOSIS — K51.00 ULCERATIVE PANCOLITIS WITHOUT COMPLICATION (H): ICD-10-CM

## 2020-11-06 DIAGNOSIS — E78.5 HYPERLIPIDEMIA LDL GOAL <160: ICD-10-CM

## 2020-11-06 DIAGNOSIS — R19.7 DIARRHEA, UNSPECIFIED TYPE: ICD-10-CM

## 2020-11-06 DIAGNOSIS — E87.6 HYPOKALEMIA: Primary | ICD-10-CM

## 2020-11-06 LAB
ANION GAP SERPL CALCULATED.3IONS-SCNC: 5 MMOL/L (ref 3–14)
BUN SERPL-MCNC: 11 MG/DL (ref 7–30)
CALCIUM SERPL-MCNC: 8.8 MG/DL (ref 8.5–10.1)
CHLORIDE SERPL-SCNC: 102 MMOL/L (ref 94–109)
CO2 SERPL-SCNC: 27 MMOL/L (ref 20–32)
CREAT SERPL-MCNC: 0.94 MG/DL (ref 0.66–1.25)
GFR SERPL CREATININE-BSD FRML MDRD: 83 ML/MIN/{1.73_M2}
GLUCOSE SERPL-MCNC: 112 MG/DL (ref 70–99)
POTASSIUM SERPL-SCNC: 4 MMOL/L (ref 3.4–5.3)
SODIUM SERPL-SCNC: 134 MMOL/L (ref 133–144)

## 2020-11-06 PROCEDURE — 99214 OFFICE O/P EST MOD 30 MIN: CPT | Performed by: FAMILY MEDICINE

## 2020-11-06 PROCEDURE — 36415 COLL VENOUS BLD VENIPUNCTURE: CPT | Performed by: FAMILY MEDICINE

## 2020-11-06 PROCEDURE — 80048 BASIC METABOLIC PNL TOTAL CA: CPT | Performed by: FAMILY MEDICINE

## 2020-11-06 NOTE — PROGRESS NOTES
Subjective     Mainor Grande is a 65 year old male who presents to clinic today for the following health issues:    HPI         ED/UC Followup:    Facility:  [facility]  Date of visit: 11/03/2020  Reason for visit: hypokalemia  Current Status: feeling much better, still having cramping, lower abdominal pain, and diarrhea       Review of Systems   Constitutional, HEENT, cardiovascular, pulmonary, GI, , musculoskeletal, neuro, skin, endocrine and psych systems are negative, except as otherwise noted.      Objective    /68   Pulse 75   Temp 97.6  F (36.4  C) (Temporal)   Resp 14   Wt 83 kg (183 lb)   SpO2 99%   BMI 26.26 kg/m    Body mass index is 26.26 kg/m .  Physical Exam   GENERAL: healthy, alert and no distress  NECK: no adenopathy, no asymmetry, masses, or scars and thyroid normal to palpation  RESP: lungs clear to auscultation - no rales, rhonchi or wheezes  CV: regular rate and rhythm, normal S1 S2, no S3 or S4, no murmur, click or rub, no peripheral edema and peripheral pulses strong  ABDOMEN: soft, nontender, no hepatosplenomegaly, no masses . Mildly increased bowel sounds  MS: no gross musculoskeletal defects noted, no edema        1. Hypokalemia/Ulcerative pancolitis without complication (H)/Diarrhea, unspecified type  Ulcerative colitis which has not been controlled. Awaiting insurance approval for Entyvio and follow by Gastroenterology. Was seen in the ER for dehydration and hypokalemia. He is just done with the potassium supplementation he was given after his ED visit. Will recheck chemistries today. Advised to continue supplementation  With oral rehydration solution like pedialyte for ongoing diarrhea to prevent dehydration and replace electrolyte losses. Just heard from insurance that his Entyvio has been approved. Will get in touch with [facility] to have the infusion scheduled. Will suggest further potassium supplementation based on results  - Basic metabolic panel  (Ca, Cl, CO2, Creat,  Gluc, K, Na, BUN)    2. Hyperlipidemia LDL goal <160  Encouraged to continue the 40mg dose of atorvastatin

## 2020-11-06 NOTE — TELEPHONE ENCOUNTER
Received the following message. DENICE spoke with Dr. Eaton in clinic and was given verbal orders that patient should schedule Entyvio infusion first available, Continue Prednisone 20 mg daily, stay off the Mesalamine, and schedule video or telephone follow up with Dr. Eaton 2 weeks after first infusion. LPN spoke with patients wife, Rani in regards to the orders and scheduling. Rani stated that she would like to set patient up for infusions in [place]. She also request I send a [employer] message to patient with the orders. [employer] message sent.      Jens Mcdonald, Lily, DO; P [facility]             Good morning all,     Just wanted to let you know we got the approval for Entyvio with Leno's insurance! He's good to start whenever he can get scheduled. I already spoke with his wife so she is aware. Please let me know if you have any questions.     Thanks much!   ~Jens Mcdonald  Infusion    [facility]  [place]   [address] [place] [address]   [email or web address]   p: [phone]  f: [phone]      Mago Brown LPN

## 2020-11-11 ENCOUNTER — VIRTUAL VISIT (OUTPATIENT)
Dept: GASTROENTEROLOGY | Facility: CLINIC | Age: 65
End: 2020-11-11
Payer: COMMERCIAL

## 2020-11-11 DIAGNOSIS — K51.00 ULCERATIVE PANCOLITIS WITHOUT COMPLICATION (H): Primary | ICD-10-CM

## 2020-11-11 PROCEDURE — 99214 OFFICE O/P EST MOD 30 MIN: CPT | Mod: 95 | Performed by: INTERNAL MEDICINE

## 2020-11-11 RX ORDER — PREDNISONE 10 MG/1
40 TABLET ORAL DAILY
Qty: 120 TABLET | Refills: 1 | Status: SHIPPED | OUTPATIENT
Start: 2020-11-11 | End: 2021-07-02

## 2020-11-11 NOTE — PROGRESS NOTES
HPI:    Leno presents today for a telephone visit to james-[facility]ang - was in the ER last week for dehydration. Was hypokalemic at the time.  Says he wasn't eating and drinking enough - thinks he became too focused on medications. Doing better now - trying to drink things like vitamin water and eating more regularly.  Generally has 1-2 loose BMs in the morning and 1 more at night.  No blood in the stool. Doesn't think he is avoiding eating due to diarrhea.  No pain with eating or nausea/vomiting.  Remains on prednisone 20mg daily.    Has been back at work but hoping to be done by the end of this week.    Was approved for entyvio - first infusion scheduled for next week.    Past Medical History:   Diagnosis Date     Allergic rhinitis, cause unspecified      Fatty liver 2004    elevated LFT, saw GI for a consultation     Mild persistent asthma 9/12/2008     Reflux esophagitis      Unspecified disease of respiratory system     RAD     Viremia, unspecified     acute       Past Surgical History:   Procedure Laterality Date     COLONOSCOPY  9/4/2013    Procedure: COLONOSCOPY;  colonoscopy;  Surgeon: Beto Keen MD;  Location: [facility]     COLONOSCOPY N/A 7/31/2020    Procedure: Colonoscopy, With Polypectomy And Biopsy;  Surgeon: Lily Eaton DO;  Location: [facility] OR     COLONOSCOPY WITH CO2 INSUFFLATION N/A 7/31/2020    Procedure: COLONOSCOPY, WITH CO2 INSUFFLATION;  Surgeon: Lily Eaton DO;  Location: [facility] OR     COMBINED ESOPHAGOSCOPY, GASTROSCOPY, DUODENOSCOPY (EGD) WITH CO2 INSUFFLATION N/A 7/31/2020    Procedure: ESOPHAGOGASTRODUODENOSCOPY, WITH CO2 INSUFFLATION;  Surgeon: Lily Eaton DO;  Location: [facility] OR     ESOPHAGOSCOPY, GASTROSCOPY, DUODENOSCOPY (EGD), COMBINED N/A 7/31/2020    Procedure: Esophagogastroduodenoscopy, With Biopsy;  Surgeon: Lily Eaton DO;  Location: [facility] OR     HC COLONOSCOPY THRU STOMA, DIAGNOSTIC  2005    normal     HC VASECTOMY UNILAT/BILAT W POSTOP SEMEN  1992    Vasectomy        Family History   Problem Relation Age of Onset     Cancer Mother         bladder cancer     Neurologic Disorder Father         alzheimers dementia     Gastrointestinal Disease Brother         divertculitis       Social History     Tobacco Use     Smoking status: Never Smoker     Smokeless tobacco: Never Used   Substance Use Topics     Alcohol use: Yes     Alcohol/week: 1.0 - 2.0 standard drinks     Types: 1 - 2 Standard drinks or equivalent per week     Comment: caffeine 6 per day        Assessment and Plan:    # UC pancolitis - not doing well on prednisone 20mg daily - will increase back to 40mg daily for now - plan to start entyvio next week, will consider tapering about 2 weeks after he begins it depending on how he is doing.  Will repeat CMP in 1 week.  Patient advised to call if not doing well in the interim    # PSC    RTC 4 weeks with entyvio next week    Lily Eaton DO     Phone call duration: 20 minutes

## 2020-11-11 NOTE — PROGRESS NOTES
"Mainor Grande is a 65 year old male who is being evaluated via a billable telephone visit.      The patient has been notified of following:     \"This telephone visit will be conducted via a call between you and your physician/provider. We have found that certain health care needs can be provided without the need for a physical exam.  This service lets us provide the care you need with a short phone conversation.  If a prescription is necessary we can send it directly to your pharmacy.  If lab work is needed we can place an order for that and you can then stop by our lab to have the test done at a later time.    Telephone visits are billed at different rates depending on your insurance coverage. During this emergency period, for some insurers they may be billed the same as an in-person visit.  Please reach out to your insurance provider with any questions.    If during the course of the call the physician/provider feels a telephone visit is not appropriate, you will not be charged for this service.\"    Patient has given verbal consent for Telephone visit?  Yes    What phone number would you like to be contacted at? [phone]    How would you like to obtain your AVS? [facility]    Phone call duration:  minutes    Andrey Pettit MA      "

## 2020-11-11 NOTE — PATIENT INSTRUCTIONS
Please have repeat labs in a week (you can have them done when you go in for your infusion) but let me know if you are feeling worse in the meantime if you are not doing well.    Continue to drink a lot of fluids and eat snacks throughout the day - things that are high in protein like nuts, granola/protein bars, eggs, avocados, etc are good choices.    Increase your prednisone back to 40mg daily (4 tablets) - you can take these all at once in the morning.    Go in for your entyvio infusion as scheduled - contact me with any issues.

## 2020-11-17 ENCOUNTER — INFUSION THERAPY VISIT (OUTPATIENT)
Dept: INFUSION THERAPY | Facility: CLINIC | Age: 65
End: 2020-11-17
Attending: INTERNAL MEDICINE
Payer: COMMERCIAL

## 2020-11-17 VITALS
DIASTOLIC BLOOD PRESSURE: 64 MMHG | BODY MASS INDEX: 25.22 KG/M2 | OXYGEN SATURATION: 100 % | SYSTOLIC BLOOD PRESSURE: 114 MMHG | TEMPERATURE: 97.5 F | WEIGHT: 175.8 LBS | RESPIRATION RATE: 16 BRPM | HEART RATE: 68 BPM

## 2020-11-17 DIAGNOSIS — K90.89 OTHER SPECIFIED INTESTINAL MALABSORPTION: ICD-10-CM

## 2020-11-17 DIAGNOSIS — K51.00 ULCERATIVE PANCOLITIS WITHOUT COMPLICATION (H): Primary | ICD-10-CM

## 2020-11-17 DIAGNOSIS — K51.00 ULCERATIVE PANCOLITIS WITHOUT COMPLICATION (H): ICD-10-CM

## 2020-11-17 DIAGNOSIS — K90.89 OTHER SPECIFIED INTESTINAL MALABSORPTION: Primary | ICD-10-CM

## 2020-11-17 DIAGNOSIS — K90.89 OTHER INTESTINAL MALABSORPTION: ICD-10-CM

## 2020-11-17 LAB
ALBUMIN SERPL-MCNC: 2.9 G/DL (ref 3.4–5)
ALP SERPL-CCNC: 191 U/L (ref 40–150)
ALT SERPL W P-5'-P-CCNC: 117 U/L (ref 0–70)
ANION GAP SERPL CALCULATED.3IONS-SCNC: 6 MMOL/L (ref 3–14)
AST SERPL W P-5'-P-CCNC: 51 U/L (ref 0–45)
BASOPHILS # BLD AUTO: 0.1 10E9/L (ref 0–0.2)
BASOPHILS NFR BLD AUTO: 0.6 %
BILIRUB DIRECT SERPL-MCNC: 0.2 MG/DL (ref 0–0.2)
BILIRUB SERPL-MCNC: 0.5 MG/DL (ref 0.2–1.3)
BUN SERPL-MCNC: 12 MG/DL (ref 7–30)
CALCIUM SERPL-MCNC: 8.8 MG/DL (ref 8.5–10.1)
CHLORIDE SERPL-SCNC: 102 MMOL/L (ref 94–109)
CK SERPL-CCNC: 26 U/L (ref 30–300)
CO2 SERPL-SCNC: 29 MMOL/L (ref 20–32)
CREAT SERPL-MCNC: 1.08 MG/DL (ref 0.66–1.25)
CRP SERPL-MCNC: <2.9 MG/L (ref 0–8)
DIFFERENTIAL METHOD BLD: ABNORMAL
EOSINOPHIL NFR BLD AUTO: 1.6 %
ERYTHROCYTE [DISTWIDTH] IN BLOOD BY AUTOMATED COUNT: 16.2 % (ref 10–15)
ERYTHROCYTE [SEDIMENTATION RATE] IN BLOOD BY WESTERGREN METHOD: 12 MM/H (ref 0–20)
GFR SERPL CREATININE-BSD FRML MDRD: 71 ML/MIN/{1.73_M2}
GLUCOSE SERPL-MCNC: 91 MG/DL (ref 70–99)
HCT VFR BLD AUTO: 41.3 % (ref 40–53)
HGB BLD-MCNC: 12.6 G/DL (ref 13.3–17.7)
IMM GRANULOCYTES # BLD: 0.1 10E9/L (ref 0–0.4)
IMM GRANULOCYTES NFR BLD: 0.4 %
LYMPHOCYTES # BLD AUTO: 6.2 10E9/L (ref 0.8–5.3)
LYMPHOCYTES NFR BLD AUTO: 46.5 %
MCH RBC QN AUTO: 25.7 PG (ref 26.5–33)
MCHC RBC AUTO-ENTMCNC: 30.5 G/DL (ref 31.5–36.5)
MCV RBC AUTO: 84 FL (ref 78–100)
MONOCYTES # BLD AUTO: 0.7 10E9/L (ref 0–1.3)
MONOCYTES NFR BLD AUTO: 5.3 %
NEUTROPHILS # BLD AUTO: 6.1 10E9/L (ref 1.6–8.3)
NEUTROPHILS NFR BLD AUTO: 45.6 %
NRBC # BLD AUTO: 0 10*3/UL
NRBC BLD AUTO-RTO: 0 /100
PLATELET # BLD AUTO: 475 10E9/L (ref 150–450)
PLATELET # BLD EST: ABNORMAL 10*3/UL
POTASSIUM SERPL-SCNC: 4 MMOL/L (ref 3.4–5.3)
PROT SERPL-MCNC: 7 G/DL (ref 6.8–8.8)
RBC # BLD AUTO: 4.91 10E12/L (ref 4.4–5.9)
RBC MORPH BLD: ABNORMAL
SODIUM SERPL-SCNC: 137 MMOL/L (ref 133–144)
VIT B12 SERPL-MCNC: 1989 PG/ML (ref 193–986)
WBC # BLD AUTO: 13.4 10E9/L (ref 4–11)

## 2020-11-17 PROCEDURE — 86140 C-REACTIVE PROTEIN: CPT | Performed by: INTERNAL MEDICINE

## 2020-11-17 PROCEDURE — 36415 COLL VENOUS BLD VENIPUNCTURE: CPT | Performed by: INTERNAL MEDICINE

## 2020-11-17 PROCEDURE — 85025 COMPLETE CBC W/AUTO DIFF WBC: CPT | Performed by: INTERNAL MEDICINE

## 2020-11-17 PROCEDURE — 250N000011 HC RX IP 250 OP 636: Performed by: INTERNAL MEDICINE

## 2020-11-17 PROCEDURE — 82248 BILIRUBIN DIRECT: CPT | Performed by: INTERNAL MEDICINE

## 2020-11-17 PROCEDURE — 80053 COMPREHEN METABOLIC PANEL: CPT | Performed by: INTERNAL MEDICINE

## 2020-11-17 PROCEDURE — 82550 ASSAY OF CK (CPK): CPT | Performed by: INTERNAL MEDICINE

## 2020-11-17 PROCEDURE — 85652 RBC SED RATE AUTOMATED: CPT | Performed by: INTERNAL MEDICINE

## 2020-11-17 PROCEDURE — 82607 VITAMIN B-12: CPT | Performed by: INTERNAL MEDICINE

## 2020-11-17 PROCEDURE — 96365 THER/PROPH/DIAG IV INF INIT: CPT

## 2020-11-17 PROCEDURE — 258N000003 HC RX IP 258 OP 636: Performed by: INTERNAL MEDICINE

## 2020-11-17 PROCEDURE — 82306 VITAMIN D 25 HYDROXY: CPT | Performed by: INTERNAL MEDICINE

## 2020-11-17 RX ORDER — HEPARIN SODIUM (PORCINE) LOCK FLUSH IV SOLN 100 UNIT/ML 100 UNIT/ML
5 SOLUTION INTRAVENOUS
Status: CANCELLED | OUTPATIENT
Start: 2020-12-01

## 2020-11-17 RX ORDER — HEPARIN SODIUM,PORCINE 10 UNIT/ML
5 VIAL (ML) INTRAVENOUS
Status: CANCELLED | OUTPATIENT
Start: 2020-12-01

## 2020-11-17 RX ADMIN — VEDOLIZUMAB 300 MG: 300 INJECTION, POWDER, LYOPHILIZED, FOR SOLUTION INTRAVENOUS at 09:48

## 2020-11-17 RX ADMIN — SODIUM CHLORIDE 250 ML: 9 INJECTION, SOLUTION INTRAVENOUS at 09:30

## 2020-11-17 ASSESSMENT — PAIN SCALES - GENERAL: PAINLEVEL: NO PAIN (0)

## 2020-11-17 NOTE — PROGRESS NOTES
Infusion Nursing Note:  Mainor Grande presents today for 1st dose Entyvio.    Patient seen by provider today: No   present during visit today: Not Applicable.    Note: Patient given medication handout on Entyvio.    Intravenous Access:  Peripheral IV placed.    Treatment Conditions:  Lab Results   Component Value Date    HGB 12.6 11/17/2020     Lab Results   Component Value Date    WBC 13.4 11/17/2020      Lab Results   Component Value Date    ANEU 6.1 11/17/2020     Lab Results   Component Value Date     11/17/2020      Lab Results   Component Value Date     11/17/2020                   Lab Results   Component Value Date    POTASSIUM 4.0 11/17/2020           No results found for: MAG         Lab Results   Component Value Date    CR 1.08 11/17/2020                   Lab Results   Component Value Date    BREANNA 8.8 11/17/2020                Lab Results   Component Value Date    BILITOTAL 0.5 11/17/2020           Lab Results   Component Value Date    ALBUMIN 2.9 11/17/2020                    Lab Results   Component Value Date     11/17/2020           Lab Results   Component Value Date    AST 51 11/17/2020       Results reviewed, labs MET treatment parameters, ok to proceed with treatment.  Biological Infusion Checklist:  ~~~ NOTE: If the patient answers yes to any of the questions below, hold the infusion and contact ordering provider or on-call provider.    1. Have you recently had an elevated temperature, fever, chills, productive cough, coughing for 3 weeks or longer or hemoptysis, abnormal vital signs, night sweats,  chest pain or have you noticed a decrease in your appetite, unexplained weight loss or fatigue? No  2. Do you have any open wounds or new incisions? No  3. Do you have any recent or upcoming hospitalizations, surgeries or dental procedures? No  4. Do you currently have or recently have had any signs of illness or infection or are you on any antibiotics? No  5. Have you  had any new, sudden or worsening abdominal pain? No  6. Have you or anyone in your household received a live vaccination in the past 4 weeks? Please note:  No live vaccines while on biologic/chemotherapy until 6 months after the last treatment.  Patient can receive the flu vaccine (shot only) and the pneumovax.  It is optimal for the patient to get these vaccines mid cycle, but they can be given at any time as long as it is not on the day of the infusion. No  7. Have you recently been diagnosed with any new nervous system diseases (ie. Multiple sclerosis, Guillain Robinson, seizures, neurological changes) or cancer diagnosis? No  8. Are you on any form of radiation or chemotherapy? No  9. Are you pregnant or breast feeding or do you have plans of pregnancy in the future? No  10. Have you been having any signs of worsening depression or suicidal ideations?  (benlysta only) No  11. Have there been any other new onset medical symptoms? No        Post Infusion Assessment:  Patient tolerated infusion without incident.  Patient observed for 15 minutes post Entyvio per protocol.  Site patent and intact, free from redness, edema or discomfort.  Access discontinued per protocol.   EDUCATION POST BIOLOGICAL/CHEMOTHERAPY INFUSION  Call the triage nurse at your clinic or seek medical attention if you have chills and/or temperature greater than or equal to 100.5, uncontrolled nausea/vomiting, diarrhea, constipation, dizziness, shortness of breath, chest pain, heart palpitations, weakness or any other new or concerning symptoms, questions or concerns.  You can not have any live virus vaccines prior to or during treatment or up to 6 months post infusion.  If you have an upcoming surgery, medical procedure or dental procedure during treatment, this should be discussed with your ordering physician and your surgeon/dentist.  If you are having any concerning symptom, if you are unsure if you should get your next infusion or wish to speak  to a provider before your next infusion, please call your care coordinator or triage nurse at your clinic to notify them so we can adequately serve you.    Discharge Plan:   Discharge instructions reviewed with: Patient.  Patient and/or family verbalized understanding of discharge instructions and all questions answered.  AVS to patient via [employer].  Patient will return 2 weeks for next appointment.   Patient discharged in stable condition accompanied by: self.  Departure Mode: Ambulatory. Wife picking himup at front entrance.    Gilda Mays RN

## 2020-11-18 LAB — DEPRECATED CALCIDIOL+CALCIFEROL SERPL-MC: 24 UG/L (ref 20–75)

## 2020-11-19 DIAGNOSIS — E55.9 VITAMIN D DEFICIENCY: Primary | ICD-10-CM

## 2020-11-19 RX ORDER — CHOLECALCIFEROL (VITAMIN D3) 50 MCG
1 TABLET ORAL DAILY
Qty: 30 TABLET | Refills: 11 | Status: SHIPPED | OUTPATIENT
Start: 2020-11-19 | End: 2023-07-12

## 2020-12-01 ENCOUNTER — INFUSION THERAPY VISIT (OUTPATIENT)
Dept: INFUSION THERAPY | Facility: CLINIC | Age: 65
End: 2020-12-01
Attending: FAMILY MEDICINE
Payer: COMMERCIAL

## 2020-12-01 ENCOUNTER — APPOINTMENT (OUTPATIENT)
Dept: LAB | Facility: CLINIC | Age: 65
End: 2020-12-01
Payer: COMMERCIAL

## 2020-12-01 VITALS
WEIGHT: 190.6 LBS | SYSTOLIC BLOOD PRESSURE: 134 MMHG | TEMPERATURE: 97.7 F | BODY MASS INDEX: 27.35 KG/M2 | DIASTOLIC BLOOD PRESSURE: 73 MMHG | HEART RATE: 75 BPM | OXYGEN SATURATION: 98 % | RESPIRATION RATE: 18 BRPM

## 2020-12-01 DIAGNOSIS — K51.00 ULCERATIVE PANCOLITIS WITHOUT COMPLICATION (H): Primary | ICD-10-CM

## 2020-12-01 DIAGNOSIS — K51.00 ULCERATIVE PANCOLITIS WITHOUT COMPLICATION (H): ICD-10-CM

## 2020-12-01 LAB
ALBUMIN SERPL-MCNC: 3.1 G/DL (ref 3.4–5)
ALP SERPL-CCNC: 143 U/L (ref 40–150)
ALT SERPL W P-5'-P-CCNC: 94 U/L (ref 0–70)
AST SERPL W P-5'-P-CCNC: 46 U/L (ref 0–45)
BASOPHILS # BLD AUTO: 0 10E9/L (ref 0–0.2)
BASOPHILS NFR BLD AUTO: 0.3 %
BILIRUB DIRECT SERPL-MCNC: 0.2 MG/DL (ref 0–0.2)
BILIRUB SERPL-MCNC: 0.5 MG/DL (ref 0.2–1.3)
CRP SERPL-MCNC: <2.9 MG/L (ref 0–8)
DIFFERENTIAL METHOD BLD: ABNORMAL
EOSINOPHIL NFR BLD AUTO: 0 %
ERYTHROCYTE [DISTWIDTH] IN BLOOD BY AUTOMATED COUNT: 17.5 % (ref 10–15)
ERYTHROCYTE [SEDIMENTATION RATE] IN BLOOD BY WESTERGREN METHOD: 9 MM/H (ref 0–20)
HCT VFR BLD AUTO: 38 % (ref 40–53)
HGB BLD-MCNC: 11.6 G/DL (ref 13.3–17.7)
IMM GRANULOCYTES # BLD: 0 10E9/L (ref 0–0.4)
IMM GRANULOCYTES NFR BLD: 0.4 %
LYMPHOCYTES # BLD AUTO: 1.1 10E9/L (ref 0.8–5.3)
LYMPHOCYTES NFR BLD AUTO: 16.5 %
MCH RBC QN AUTO: 26.2 PG (ref 26.5–33)
MCHC RBC AUTO-ENTMCNC: 30.5 G/DL (ref 31.5–36.5)
MCV RBC AUTO: 86 FL (ref 78–100)
MONOCYTES # BLD AUTO: 0.3 10E9/L (ref 0–1.3)
MONOCYTES NFR BLD AUTO: 4.6 %
NEUTROPHILS # BLD AUTO: 5.3 10E9/L (ref 1.6–8.3)
NEUTROPHILS NFR BLD AUTO: 78.2 %
NRBC # BLD AUTO: 0 10*3/UL
NRBC BLD AUTO-RTO: 0 /100
PLATELET # BLD AUTO: 283 10E9/L (ref 150–450)
PROT SERPL-MCNC: 6.9 G/DL (ref 6.8–8.8)
RBC # BLD AUTO: 4.43 10E12/L (ref 4.4–5.9)
WBC # BLD AUTO: 6.8 10E9/L (ref 4–11)

## 2020-12-01 PROCEDURE — 96365 THER/PROPH/DIAG IV INF INIT: CPT

## 2020-12-01 PROCEDURE — 36415 COLL VENOUS BLD VENIPUNCTURE: CPT | Performed by: INTERNAL MEDICINE

## 2020-12-01 PROCEDURE — 85025 COMPLETE CBC W/AUTO DIFF WBC: CPT | Performed by: INTERNAL MEDICINE

## 2020-12-01 PROCEDURE — 258N000003 HC RX IP 258 OP 636: Performed by: INTERNAL MEDICINE

## 2020-12-01 PROCEDURE — 85652 RBC SED RATE AUTOMATED: CPT | Performed by: INTERNAL MEDICINE

## 2020-12-01 PROCEDURE — 250N000011 HC RX IP 250 OP 636: Performed by: INTERNAL MEDICINE

## 2020-12-01 PROCEDURE — 86140 C-REACTIVE PROTEIN: CPT | Performed by: INTERNAL MEDICINE

## 2020-12-01 PROCEDURE — 80076 HEPATIC FUNCTION PANEL: CPT | Performed by: INTERNAL MEDICINE

## 2020-12-01 RX ORDER — HEPARIN SODIUM (PORCINE) LOCK FLUSH IV SOLN 100 UNIT/ML 100 UNIT/ML
5 SOLUTION INTRAVENOUS
Status: CANCELLED | OUTPATIENT
Start: 2020-12-29

## 2020-12-01 RX ORDER — HEPARIN SODIUM,PORCINE 10 UNIT/ML
5 VIAL (ML) INTRAVENOUS
Status: CANCELLED | OUTPATIENT
Start: 2020-12-29

## 2020-12-01 RX ADMIN — VEDOLIZUMAB 300 MG: 300 INJECTION, POWDER, LYOPHILIZED, FOR SOLUTION INTRAVENOUS at 15:05

## 2020-12-01 RX ADMIN — SODIUM CHLORIDE 250 ML: 9 INJECTION, SOLUTION INTRAVENOUS at 14:53

## 2020-12-01 ASSESSMENT — PAIN SCALES - GENERAL: PAINLEVEL: NO PAIN (0)

## 2020-12-01 NOTE — TELEPHONE ENCOUNTER
mesalamine (ASACOL HD) 800 MG EC       Last Written Prescription Date:  10/8/20  Last Fill Quantity: 90,   # refills: 0  Last Office Visit : 11/11/20  Future Office visit:  12/9/20    Routing refill request to provider for review/approval because:  LIMITED RF  DOES GI WANT TO CONTINUE THIS MED ?

## 2020-12-01 NOTE — PROGRESS NOTES
Infusion Nursing Note:  Mainor Grande presents today for Day 2 Entyvio.    Patient seen by provider today: No   present during visit today: Not Applicable.    Note: Patient tolerated last Entyvio infusion well. Has already been having improvement with GI symptoms. Minimal diarrhea, no abdominal cramping. Sleep is fair as he has been on some Prednisone. Denies pain. VSS.    Intravenous Access:  Peripheral IV placed.    Treatment Conditions:  Biological Infusion Checklist:  ~~~ NOTE: If the patient answers yes to any of the questions below, hold the infusion and contact ordering provider or on-call provider.    1. Have you recently had an elevated temperature, fever, chills, productive cough, coughing for 3 weeks or longer or hemoptysis, abnormal vital signs, night sweats,  chest pain or have you noticed a decrease in your appetite, unexplained weight loss or fatigue? No  2. Do you have any open wounds or new incisions? No  3. Do you have any recent or upcoming hospitalizations, surgeries or dental procedures? No  4. Do you currently have or recently have had any signs of illness or infection or are you on any antibiotics? No  5. Have you had any new, sudden or worsening abdominal pain? No  6. Have you or anyone in your household received a live vaccination in the past 4 weeks? Please note:  No live vaccines while on biologic/chemotherapy until 6 months after the last treatment.  Patient can receive the flu vaccine (shot only) and the pneumovax.  It is optimal for the patient to get these vaccines mid cycle, but they can be given at any time as long as it is not on the day of the infusion. No  7. Have you recently been diagnosed with any new nervous system diseases (ie. Multiple sclerosis, Guillain Casa, seizures, neurological changes) or cancer diagnosis? No  8. Are you on any form of radiation or chemotherapy? No  9. Are you pregnant or breast feeding or do you have plans of pregnancy in the future?  N/A  10. Have you been having any signs of worsening depression or suicidal ideations?  (benlysta only) N/A  11. Have there been any other new onset medical symptoms? No        Post Infusion Assessment:  Patient tolerated infusion without incident. VSS. Asymptomatic.  Blood return noted pre and post infusion.  Site patent and intact, free from redness, edema or discomfort.  No evidence of extravasations.  PIV access discontinued per protocol.    Biologic Infusion Post Education: Call the triage nurse at your clinic or seek medical attention if you have chills and/or temperature greater than or equal to 100.5, uncontrolled nausea/vomiting, diarrhea, constipation, dizziness, shortness of breath, chest pain, heart palpitations, weakness or any other new or concerning symptoms, questions or concerns.  You cannot have any live virus vaccines prior to or during treatment or up to 6 months post infusion.  If you have an upcoming surgery, medical procedure or dental procedure during treatment, this should be discussed with your ordering physician and your surgeon/dentist.  If you are having any concerning symptom, if you are unsure if you should get your next infusion or wish to speak to a provider before your next infusion, please call your care coordinator or triage nurse at your clinic to notify them so we can adequately serve you.       Discharge Plan:   Copy of AVS reviewed with patient and/or family.  Patient will return 12/29 for next appointment.  Patient discharged in stable condition accompanied by: self.  Departure Mode: Ambulatory.    Keren Flores RN

## 2020-12-02 ENCOUNTER — TELEPHONE (OUTPATIENT)
Dept: GASTROENTEROLOGY | Facility: CLINIC | Age: 65
End: 2020-12-02

## 2020-12-02 NOTE — TELEPHONE ENCOUNTER
Writer called patient to discuss result note per Dr. Eaton.  LVM for patient to call us back.  Andrey Pettit MA

## 2020-12-03 NOTE — TELEPHONE ENCOUNTER
Patient called writer back to get results of a blood test. Writer read result message with instructions tapering off of his prednisone from 30 mg/week to 20 mg/week to 10 mg/week to 0. If any symptoms return patient was instructed to call our office so the dose could be adjusted accordingly.  Patient had no further questions.  Andrey Pettit MA

## 2020-12-03 NOTE — TELEPHONE ENCOUNTER
LPN left message for patients spouse, Rani requesting that she have patient return call or that he can view the [employer] message sent by Dr. Eaton.    Mago Brown LPN

## 2020-12-09 ENCOUNTER — VIRTUAL VISIT (OUTPATIENT)
Dept: GASTROENTEROLOGY | Facility: CLINIC | Age: 65
End: 2020-12-09
Payer: COMMERCIAL

## 2020-12-09 DIAGNOSIS — K51.00 ULCERATIVE PANCOLITIS WITHOUT COMPLICATION (H): Primary | ICD-10-CM

## 2020-12-09 PROCEDURE — 99214 OFFICE O/P EST MOD 30 MIN: CPT | Mod: 95 | Performed by: INTERNAL MEDICINE

## 2020-12-09 NOTE — PROGRESS NOTES
"Mainor Grande is a 65 year old male who is being evaluated via a billable telephone visit.      The patient has been notified of following:     \"This telephone visit will be conducted via a call between you and your physician/provider. We have found that certain health care needs can be provided without the need for a physical exam.  This service lets us provide the care you need with a short phone conversation.  If a prescription is necessary we can send it directly to your pharmacy.  If lab work is needed we can place an order for that and you can then stop by our lab to have the test done at a later time.    Telephone visits are billed at different rates depending on your insurance coverage. During this emergency period, for some insurers they may be billed the same as an in-person visit.  Please reach out to your insurance provider with any questions.    If during the course of the call the physician/provider feels a telephone visit is not appropriate, you will not be charged for this service.\"    Patient has given verbal consent for Telephone visit?  Yes    What phone number would you like to be contacted at? [phone]    How would you like to obtain your AVS? Juan J Brown LPN          "

## 2020-12-09 NOTE — PROGRESS NOTES
HPI:    Leno presents today for a telephone visit to follow-up on UC.  Diarrhea has resolved.  Having 2 BMs a day and much more formed.  After having the increased dose in his prednisone started eating a lot more and did eventually start gaining weight (7-8lbs) - now since starting to wean prednisone is eating less - has lost a few pounds.      Said that immediately after his first entyvio infusion he started feeling better.  Abdominal pain/cramping resolved.     Past Medical History:   Diagnosis Date     Allergic rhinitis, cause unspecified      Fatty liver 2004    elevated LFT, saw GI for a consultation     Mild persistent asthma 9/12/2008     Reflux esophagitis      Unspecified disease of respiratory system     RAD     Viremia, unspecified     acute       Past Surgical History:   Procedure Laterality Date     COLONOSCOPY  9/4/2013    Procedure: COLONOSCOPY;  colonoscopy;  Surgeon: Beto Keen MD;  Location: [facility]     COLONOSCOPY N/A 7/31/2020    Procedure: Colonoscopy, With Polypectomy And Biopsy;  Surgeon: Lily Eaton DO;  Location: [facility]     COLONOSCOPY WITH CO2 INSUFFLATION N/A 7/31/2020    Procedure: COLONOSCOPY, WITH CO2 INSUFFLATION;  Surgeon: Lily Eaton DO;  Location: [facility]     COMBINED ESOPHAGOSCOPY, GASTROSCOPY, DUODENOSCOPY (EGD) WITH CO2 INSUFFLATION N/A 7/31/2020    Procedure: ESOPHAGOGASTRODUODENOSCOPY, WITH CO2 INSUFFLATION;  Surgeon: Lily Eaton DO;  Location: [facility]     ESOPHAGOSCOPY, GASTROSCOPY, DUODENOSCOPY (EGD), COMBINED N/A 7/31/2020    Procedure: Esophagogastroduodenoscopy, With Biopsy;  Surgeon: Lily Eaton DO;  Location: [facility] OR     HC COLONOSCOPY THRU STOMA, DIAGNOSTIC  2005    normal     HC VASECTOMY UNILAT/BILAT W POSTOP SEMEN  1992    Vasectomy       Family History   Problem Relation Age of Onset     Cancer Mother         bladder cancer     Neurologic Disorder Father         alzheimers dementia     Gastrointestinal Disease Brother          divertculitis       Social History     Tobacco Use     Smoking status: Never Smoker     Smokeless tobacco: Never Used   Substance Use Topics     Alcohol use: Yes     Alcohol/week: 1.0 - 2.0 standard drinks     Types: 1 - 2 Standard drinks or equivalent per week     Comment: caffeine 6 per day        Assessment and Plan:    # UC pancolitis - Previously tried mesalamine (which seemed to make symptoms worse) - currently doing well on entyvio (just received 2nd dose) - currently on prednisone 30mg - will continue to wean by 10mg a week - patient advised to call immediately if he develops worsening symptoms at any point.  Will potentially plan for repeat colonoscopy after next appointment to assess response to treatment.    # PSC    Vaccinations:  -- Influenza (every year): Last given this year per patient  -- TdaP (every 10 years): Last given 2013  -- Pneumococcal Pneumonia (once then every 5 years): Last given UTD    -- Due to the immunosuppression in this patient, I would not advise administration of live vaccines such as varicella/VZV, intranasal influenza, MMR, or yellow fever vaccine (if travelling).      Bone mineral density screening   -- Recommend all patients supplement with calcium and vitamin D  -- Given prior steroid use recommnd DEXA - will discuss at next visit      Skin cancer screening: Annual visual exam of skin by dermatologist since patient is immunocompromised      Misc:  -- Avoid tobacco use  -- Avoid NSAIDs as there is potentially a 25% chance of causing an IBD flare        RTC 2 months    Lily Eaton DO     Phone call duration: 20 minutes

## 2020-12-09 NOTE — PATIENT INSTRUCTIONS
I'm glad you are doing so much better.  Continue to taper your prednisone - go down to 20mg daily starting Friday followed by 10 mg daily the following Friday and then the next week 10mg every other day for a week and then off.  If at any point you develop worsening diarrhea, abdominal cramping, etc please let me know.    We will repeat your blood work with your next infusion.

## 2020-12-16 RX ORDER — MESALAMINE 800 MG/1
1600 TABLET, DELAYED RELEASE ORAL DAILY
Qty: 90 TABLET | Refills: 0 | OUTPATIENT
Start: 2020-12-16

## 2020-12-21 DIAGNOSIS — J30.2 SEASONAL ALLERGIC RHINITIS, UNSPECIFIED TRIGGER: ICD-10-CM

## 2020-12-21 DIAGNOSIS — J45.30 MILD PERSISTENT ASTHMA WITHOUT COMPLICATION: ICD-10-CM

## 2020-12-22 RX ORDER — ALBUTEROL SULFATE 90 UG/1
AEROSOL, METERED RESPIRATORY (INHALATION)
Qty: 1 INHALER | Refills: 3 | Status: SHIPPED | OUTPATIENT
Start: 2020-12-22 | End: 2021-05-11

## 2020-12-23 NOTE — TELEPHONE ENCOUNTER
Prescription approved per [facility] Refill Protocol.  Inés Martínez RN  December 22, 2020

## 2020-12-29 ENCOUNTER — INFUSION THERAPY VISIT (OUTPATIENT)
Dept: INFUSION THERAPY | Facility: CLINIC | Age: 65
End: 2020-12-29
Attending: INTERNAL MEDICINE
Payer: COMMERCIAL

## 2020-12-29 VITALS
TEMPERATURE: 98.7 F | WEIGHT: 196.5 LBS | BODY MASS INDEX: 28.19 KG/M2 | DIASTOLIC BLOOD PRESSURE: 73 MMHG | OXYGEN SATURATION: 100 % | SYSTOLIC BLOOD PRESSURE: 125 MMHG | RESPIRATION RATE: 16 BRPM | HEART RATE: 60 BPM

## 2020-12-29 DIAGNOSIS — K51.00 ULCERATIVE PANCOLITIS WITHOUT COMPLICATION (H): Primary | ICD-10-CM

## 2020-12-29 DIAGNOSIS — K51.00 ULCERATIVE PANCOLITIS WITHOUT COMPLICATION (H): ICD-10-CM

## 2020-12-29 LAB
ALBUMIN SERPL-MCNC: 3.4 G/DL (ref 3.4–5)
ALP SERPL-CCNC: 140 U/L (ref 40–150)
ALT SERPL W P-5'-P-CCNC: 50 U/L (ref 0–70)
ANION GAP SERPL CALCULATED.3IONS-SCNC: 3 MMOL/L (ref 3–14)
AST SERPL W P-5'-P-CCNC: 47 U/L (ref 0–45)
BILIRUB DIRECT SERPL-MCNC: 0.3 MG/DL (ref 0–0.2)
BILIRUB SERPL-MCNC: 0.8 MG/DL (ref 0.2–1.3)
BUN SERPL-MCNC: 9 MG/DL (ref 7–30)
CALCIUM SERPL-MCNC: 9.2 MG/DL (ref 8.5–10.1)
CHLORIDE SERPL-SCNC: 106 MMOL/L (ref 94–109)
CO2 SERPL-SCNC: 31 MMOL/L (ref 20–32)
CREAT SERPL-MCNC: 1.04 MG/DL (ref 0.66–1.25)
CRP SERPL-MCNC: <2.9 MG/L (ref 0–8)
ERYTHROCYTE [DISTWIDTH] IN BLOOD BY AUTOMATED COUNT: 15.7 % (ref 10–15)
GFR SERPL CREATININE-BSD FRML MDRD: 74 ML/MIN/{1.73_M2}
GLUCOSE SERPL-MCNC: 71 MG/DL (ref 70–99)
HCT VFR BLD AUTO: 37 % (ref 40–53)
HGB BLD-MCNC: 11.4 G/DL (ref 13.3–17.7)
LABORATORY COMMENT REPORT: NORMAL
MCH RBC QN AUTO: 26.3 PG (ref 26.5–33)
MCHC RBC AUTO-ENTMCNC: 30.8 G/DL (ref 31.5–36.5)
MCV RBC AUTO: 86 FL (ref 78–100)
PLATELET # BLD AUTO: 320 10E9/L (ref 150–450)
POTASSIUM SERPL-SCNC: 4.2 MMOL/L (ref 3.4–5.3)
PROT SERPL-MCNC: 7.1 G/DL (ref 6.8–8.8)
RBC # BLD AUTO: 4.33 10E12/L (ref 4.4–5.9)
SARS-COV-2 RNA SPEC QL NAA+PROBE: NEGATIVE
SODIUM SERPL-SCNC: 140 MMOL/L (ref 133–144)
SPECIMEN SOURCE: NORMAL
WBC # BLD AUTO: 7.9 10E9/L (ref 4–11)

## 2020-12-29 PROCEDURE — U0003 INFECTIOUS AGENT DETECTION BY NUCLEIC ACID (DNA OR RNA); SEVERE ACUTE RESPIRATORY SYNDROME CORONAVIRUS 2 (SARS-COV-2) (CORONAVIRUS DISEASE [COVID-19]), AMPLIFIED PROBE TECHNIQUE, MAKING USE OF HIGH THROUGHPUT TECHNOLOGIES AS DESCRIBED BY CMS-2020-01-R: HCPCS | Performed by: INTERNAL MEDICINE

## 2020-12-29 PROCEDURE — 85027 COMPLETE CBC AUTOMATED: CPT | Performed by: INTERNAL MEDICINE

## 2020-12-29 PROCEDURE — 36415 COLL VENOUS BLD VENIPUNCTURE: CPT | Performed by: INTERNAL MEDICINE

## 2020-12-29 PROCEDURE — 86140 C-REACTIVE PROTEIN: CPT | Performed by: INTERNAL MEDICINE

## 2020-12-29 PROCEDURE — 96365 THER/PROPH/DIAG IV INF INIT: CPT

## 2020-12-29 PROCEDURE — 80053 COMPREHEN METABOLIC PANEL: CPT | Performed by: INTERNAL MEDICINE

## 2020-12-29 PROCEDURE — 250N000011 HC RX IP 250 OP 636: Performed by: INTERNAL MEDICINE

## 2020-12-29 PROCEDURE — 258N000003 HC RX IP 258 OP 636: Performed by: INTERNAL MEDICINE

## 2020-12-29 PROCEDURE — 82248 BILIRUBIN DIRECT: CPT | Performed by: INTERNAL MEDICINE

## 2020-12-29 RX ORDER — HEPARIN SODIUM,PORCINE 10 UNIT/ML
5 VIAL (ML) INTRAVENOUS
Status: CANCELLED | OUTPATIENT
Start: 2021-02-23

## 2020-12-29 RX ORDER — HEPARIN SODIUM (PORCINE) LOCK FLUSH IV SOLN 100 UNIT/ML 100 UNIT/ML
5 SOLUTION INTRAVENOUS
Status: CANCELLED | OUTPATIENT
Start: 2021-02-23

## 2020-12-29 RX ADMIN — SODIUM CHLORIDE 250 ML: 9 INJECTION, SOLUTION INTRAVENOUS at 09:24

## 2020-12-29 RX ADMIN — VEDOLIZUMAB 300 MG: 300 INJECTION, POWDER, LYOPHILIZED, FOR SOLUTION INTRAVENOUS at 09:24

## 2020-12-29 ASSESSMENT — PAIN SCALES - GENERAL: PAINLEVEL: NO PAIN (0)

## 2020-12-29 NOTE — PATIENT INSTRUCTIONS
Return in 8 wks for next Entyvio infusion. No labs needed prior to treatment.     Biologic Infusion Post Education: Call the triage nurse at your clinic or seek medical attention if you have chills and/or temperature greater than or equal to 100.5, uncontrolled nausea/vomiting, diarrhea, constipation, dizziness, shortness of breath, chest pain, heart palpitations, weakness or any other new or concerning symptoms, questions or concerns.  You cannot have any live virus vaccines prior to or during treatment or up to 6 months post infusion.  If you have an upcoming surgery, medical procedure or dental procedure during treatment, this should be discussed with your ordering physician and your surgeon/dentist.  If you are having any concerning symptom, if you are unsure if you should get your next infusion or wish to speak to a provider before your next infusion, please call your care coordinator or triage nurse at your clinic to notify them so we can adequately serve you.

## 2020-12-29 NOTE — PROGRESS NOTES
Infusion Nursing Note:  Mainor Grande presents today for Entyvio.    Patient seen by provider today: No   present during visit today: Not Applicable.    Note: Patient denies pain or new symptoms. Ulcerative Colitis is controlled and doing better. VSS, afebrile. States he just tapered off and completed Prednisone therapy.   Treatment labs drawn prior to arrival.   Patient elected to have asymtompatic Covid swab during this visit. Collected without difficulty and specimen sent to lab.    Intravenous Access:  Peripheral IV placed.    Treatment Conditions:  Biological Infusion Checklist:  ~~~ NOTE: If the patient answers yes to any of the questions below, hold the infusion and contact ordering provider or on-call provider.    1. Have you recently had an elevated temperature, fever, chills, productive cough, coughing for 3 weeks or longer or hemoptysis, abnormal vital signs, night sweats,  chest pain or have you noticed a decrease in your appetite, unexplained weight loss or fatigue? No  2. Do you have any open wounds or new incisions? No  3. Do you have any recent or upcoming hospitalizations, surgeries or dental procedures? No  4. Do you currently have or recently have had any signs of illness or infection or are you on any antibiotics? No  5. Have you had any new, sudden or worsening abdominal pain? No  6. Have you or anyone in your household received a live vaccination in the past 4 weeks? Please note:  No live vaccines while on biologic/chemotherapy until 6 months after the last treatment.  Patient can receive the flu vaccine (shot only) and the pneumovax.  It is optimal for the patient to get these vaccines mid cycle, but they can be given at any time as long as it is not on the day of the infusion. No  7. Have you recently been diagnosed with any new nervous system diseases (ie. Multiple sclerosis, Guillain [place], seizures, neurological changes) or cancer diagnosis? No  8. Are you on any form of  radiation or chemotherapy? No  9. Are you pregnant or breast feeding or do you have plans of pregnancy in the future? N/A  10. Have you been having any signs of worsening depression or suicidal ideations?  (benlysta only)N/A  11. Have there been any other new onset medical symptoms? No        Post Infusion Assessment:  Patient tolerated infusion without incident.  Blood return noted pre and post infusion.  Site patent and intact, free from redness, edema or discomfort.  No evidence of extravasations.  PIV access discontinued per protocol.    Biologic Infusion Post Education: Call the triage nurse at your clinic or seek medical attention if you have chills and/or temperature greater than or equal to 100.5, uncontrolled nausea/vomiting, diarrhea, constipation, dizziness, shortness of breath, chest pain, heart palpitations, weakness or any other new or concerning symptoms, questions or concerns.  You cannot have any live virus vaccines prior to or during treatment or up to 6 months post infusion.  If you have an upcoming surgery, medical procedure or dental procedure during treatment, this should be discussed with your ordering physician and your surgeon/dentist.  If you are having any concerning symptom, if you are unsure if you should get your next infusion or wish to speak to a provider before your next infusion, please call your care coordinator or triage nurse at your clinic to notify them so we can adequately serve you.       Discharge Plan:   Copy of AVS reviewed with patient and/or family.  Patient will return 2/24/2021 for next appointment.  Patient discharged in stable condition accompanied by: self.  Departure Mode: Ambulatory.    Keren Flores RN

## 2021-01-07 DIAGNOSIS — K51.00 ULCERATIVE PANCOLITIS WITHOUT COMPLICATION (H): ICD-10-CM

## 2021-01-08 NOTE — TELEPHONE ENCOUNTER
predniSONE (DELTASONE) 10 MG tablet      Last Written Prescription Date:  11/11/20  Last Fill Quantity: 120,   # refills: 1  Last Office Visit : 12/9/20  Future Office visit:  2/24/21    Routing refill request to provider for review/approval because:  Drug not on the [facility], [facility] or [facility] refill protocol

## 2021-01-09 ENCOUNTER — HEALTH MAINTENANCE LETTER (OUTPATIENT)
Age: 66
End: 2021-01-09

## 2021-01-11 RX ORDER — PREDNISONE 10 MG/1
40 TABLET ORAL DAILY
Qty: 120 TABLET | Refills: 1 | OUTPATIENT
Start: 2021-01-11

## 2021-01-12 NOTE — TELEPHONE ENCOUNTER
LPN spoke with patient and he verified that he has been off the prednisone for about a week now and doing well.     Mago Brown LPN

## 2021-01-28 ENCOUNTER — MYC MEDICAL ADVICE (OUTPATIENT)
Dept: GASTROENTEROLOGY | Facility: CLINIC | Age: 66
End: 2021-01-28

## 2021-01-28 ENCOUNTER — APPOINTMENT (OUTPATIENT)
Dept: URGENT CARE | Facility: CLINIC | Age: 66
End: 2021-01-28
Payer: COMMERCIAL

## 2021-01-28 NOTE — TELEPHONE ENCOUNTER
Patient contacted, provider schedule is booked on 2/24/2021.  Next first available is 3/3/2021 at 10:30 AM.  Patient will be getting his infusion on 2/24/2021, rescheduled follow up with Dr. Eaton for the following week.    Jane Alvarenga RN

## 2021-02-02 DIAGNOSIS — K51.00 ULCERATIVE PANCOLITIS WITHOUT COMPLICATION (H): ICD-10-CM

## 2021-02-02 NOTE — TELEPHONE ENCOUNTER
Pending Prescriptions:                       Disp   Refills    predniSONE (DELTASONE) 10 MG tablet        120 ta*1        Sig: Take 4 tablets (40 mg) by mouth daily    Routing refill request to provider for review/approval because:  Drug not on the [facility] refill protocol

## 2021-02-03 RX ORDER — PREDNISONE 10 MG/1
40 TABLET ORAL DAILY
Qty: 120 TABLET | Refills: 1 | OUTPATIENT
Start: 2021-02-03

## 2021-02-24 ENCOUNTER — INFUSION THERAPY VISIT (OUTPATIENT)
Dept: INFUSION THERAPY | Facility: CLINIC | Age: 66
End: 2021-02-24
Attending: INTERNAL MEDICINE
Payer: COMMERCIAL

## 2021-02-24 VITALS
OXYGEN SATURATION: 98 % | DIASTOLIC BLOOD PRESSURE: 61 MMHG | WEIGHT: 196.3 LBS | TEMPERATURE: 98.1 F | RESPIRATION RATE: 16 BRPM | HEART RATE: 69 BPM | BODY MASS INDEX: 28.17 KG/M2 | SYSTOLIC BLOOD PRESSURE: 124 MMHG

## 2021-02-24 DIAGNOSIS — K51.00 ULCERATIVE PANCOLITIS WITHOUT COMPLICATION (H): Primary | ICD-10-CM

## 2021-02-24 PROCEDURE — 250N000011 HC RX IP 250 OP 636: Performed by: INTERNAL MEDICINE

## 2021-02-24 PROCEDURE — 96365 THER/PROPH/DIAG IV INF INIT: CPT

## 2021-02-24 PROCEDURE — 258N000003 HC RX IP 258 OP 636: Performed by: INTERNAL MEDICINE

## 2021-02-24 RX ORDER — HEPARIN SODIUM (PORCINE) LOCK FLUSH IV SOLN 100 UNIT/ML 100 UNIT/ML
5 SOLUTION INTRAVENOUS
Status: CANCELLED | OUTPATIENT
Start: 2021-04-20

## 2021-02-24 RX ORDER — HEPARIN SODIUM,PORCINE 10 UNIT/ML
5 VIAL (ML) INTRAVENOUS
Status: CANCELLED | OUTPATIENT
Start: 2021-04-20

## 2021-02-24 RX ADMIN — SODIUM CHLORIDE 250 ML: 9 INJECTION, SOLUTION INTRAVENOUS at 12:02

## 2021-02-24 RX ADMIN — VEDOLIZUMAB 300 MG: 300 INJECTION, POWDER, LYOPHILIZED, FOR SOLUTION INTRAVENOUS at 12:06

## 2021-02-24 ASSESSMENT — PAIN SCALES - GENERAL: PAINLEVEL: NO PAIN (0)

## 2021-02-24 NOTE — PROGRESS NOTES
.              Infusion Nursing Note:  Mainor Grande presents today for Entyvio.    Patient seen by provider today: No   present during visit today: Not Applicable.    Note: N/A.  Patient  did meet criteria for an asymptomatic covid-19 PCR test in infusion today. Patient declined the covid-19 test.    Intravenous Access:  Peripheral IV placed.    Treatment Conditions:  Biological Infusion Checklist:  ~~~ NOTE: If the patient answers yes to any of the questions below, hold the infusion and contact ordering provider or on-call provider.    1. Have you recently had an elevated temperature, fever, chills, productive cough, coughing for 3 weeks or longer or hemoptysis, abnormal vital signs, night sweats,  chest pain or have you noticed a decrease in your appetite, unexplained weight loss or fatigue? No  2. Do you have any open wounds or new incisions? No  3. Do you have any recent or upcoming hospitalizations, surgeries or dental procedures? No  4. Do you currently have or recently have had any signs of illness or infection or are you on any antibiotics? No  5. Have you had any new, sudden or worsening abdominal pain? No  6. Have you or anyone in your household received a live vaccination in the past 4 weeks? Please note:  No live vaccines while on biologic/chemotherapy until 6 months after the last treatment.  Patient can receive the flu vaccine (shot only) and the pneumovax.  It is optimal for the patient to get these vaccines mid cycle, but they can be given at any time as long as it is not on the day of the infusion. No  7. Have you recently been diagnosed with any new nervous system diseases (ie. Multiple sclerosis, Guillain [place], seizures, neurological changes) or cancer diagnosis? No  8. Are you on any form of radiation or chemotherapy? No  9. Are you pregnant or breast feeding or do you have plans of pregnancy in the future? No  10. Have you been having any signs of worsening depression or suicidal  ideations?  (benlysta only) No  11. Have there been any other new onset medical symptoms? No        Post Infusion Assessment:  Patient tolerated infusion without incident.  Patient observed for 15 minutes post Entyvio.  Site patent and intact, free from redness, edema or discomfort.  No evidence of extravasations.  Access discontinued per protocol.       Discharge Plan:   Discharge instructions reviewed with: Patient.  Patient discharged in stable condition accompanied by: self.  Departure Mode: Ambulatory.    Marianela Fischer RN

## 2021-03-03 ENCOUNTER — VIRTUAL VISIT (OUTPATIENT)
Dept: GASTROENTEROLOGY | Facility: CLINIC | Age: 66
End: 2021-03-03
Payer: COMMERCIAL

## 2021-03-03 DIAGNOSIS — K51.00 ULCERATIVE PANCOLITIS WITHOUT COMPLICATION (H): Primary | ICD-10-CM

## 2021-03-03 DIAGNOSIS — K83.01 PSC (PRIMARY SCLEROSING CHOLANGITIS) (H): ICD-10-CM

## 2021-03-03 PROCEDURE — 99214 OFFICE O/P EST MOD 30 MIN: CPT | Mod: 95 | Performed by: INTERNAL MEDICINE

## 2021-03-03 NOTE — PATIENT INSTRUCTIONS
I'm glad you are doing so well.  Whenever is convenient for you, please have repeat blood work done.  I would also like to repeat a colonoscopy to monitor how you are responding to treatment.    Please make a follow-up appointment with Dr. Fu the hepatologist.       Currently, there is limited data to make recommendations regarding the COVID-19 vaccine. Unfortunately, people with conditions that cause low-immunity and people on medications that suppress their immune system were not included in the currently published clinical trials ([place] Journal of Medicine, December 2020). We are therefore using the best information we have to make the safest decision. We understand there are also individual circumstances that may not fall into a single recommendation.     - The COVID-19 vaccine in the [place] is NOT a live virus vaccine  - There is no risk of COVID infection with this vaccine  - There may be unknown risks as this is a new type of vaccine (no other current vaccines are like this)  - There may still be immune related side effects as this vaccine does stimulate the immune system (for example: fevers, sore throat, runny nose, pain at injection site)  - The vaccine may be less effective in people with IBD, especially if on medications that affect the immune system. On the other hand, this vaccine may be better than other vaccines (such as flu) because it can elicit a stronger immune response.     New immune reactions are a potential risk for all people, including people with inflammatory bowel disease. In the short term (2-3 months) this has not been seen in the general population.  It is important to know this is a potential risk, and not yet an observed risk.     At this point, given the overall risk of COVID and likely safety of the COVID vaccine, patients with inflammatory bowel disease should get vaccinated when available. We also think people on medications that affect the immune system should get  vaccinated. If you are concerned, then please (1) talk to your IBD provider and (2) make sure that everyone in your household does get the vaccine. Vaccinating the people around you will also help to protect you.     Also, even after getting the vaccine it is still essential to wear a mask, wash your hands frequently, and practice social distancing. We will need to maintain these basic safety precautions until all people are vaccinated.     Lastly, we are currently conducting research to see how patient's on immunosuppression respond to getting the covid vaccine - if you are interested in participating in this, please e-mail [email or web address] before your first injection - this is completely voluntary.    [facility]

## 2021-03-03 NOTE — PROGRESS NOTES
Mainor is a 65 year old who is being evaluated via a billable telephone visit.      What phone number would you like to be contacted at?[phone]  How would you like to obtain your AVS? MyChart  Phone call duration:  Minutes  Andrey Pettit CMA

## 2021-03-03 NOTE — PROGRESS NOTES
HPI:    Leno presents today for a telephone visit to follow-up of UC.  Overall doing well - has been off prednisone since the end of December.  Tolerating entyvio without issue.  BMs are normal - no further diarrhea.  No blood in the stool.  No abdominal pain. Has gained weight back - is back to his baseline.  Is working out regularly.    Has cut out dairy but otherwise isn't restricting his diet in any way.  Minimal alcohol use.  Trying to eat healthier in general.    Hasn't gotten covid vaccine yet but is planning to whenever it is available for him.    Past Medical History:   Diagnosis Date     Allergic rhinitis, cause unspecified      Fatty liver 2004    elevated LFT, saw GI for a consultation     Mild persistent asthma 9/12/2008     Reflux esophagitis      Unspecified disease of respiratory system     RAD     Viremia, unspecified     acute       Past Surgical History:   Procedure Laterality Date     COLONOSCOPY  9/4/2013    Procedure: COLONOSCOPY;  colonoscopy;  Surgeon: Beto Keen MD;  Location: [facility]     COLONOSCOPY N/A 7/31/2020    Procedure: Colonoscopy, With Polypectomy And Biopsy;  Surgeon: Lily Eaton DO;  Location: [facility] OR     COLONOSCOPY WITH CO2 INSUFFLATION N/A 7/31/2020    Procedure: COLONOSCOPY, WITH CO2 INSUFFLATION;  Surgeon: Lily Eaton DO;  Location: [facility] OR     COMBINED ESOPHAGOSCOPY, GASTROSCOPY, DUODENOSCOPY (EGD) WITH CO2 INSUFFLATION N/A 7/31/2020    Procedure: ESOPHAGOGASTRODUODENOSCOPY, WITH CO2 INSUFFLATION;  Surgeon: Lily Eaton DO;  Location: [facility] OR     ESOPHAGOSCOPY, GASTROSCOPY, DUODENOSCOPY (EGD), COMBINED N/A 7/31/2020    Procedure: Esophagogastroduodenoscopy, With Biopsy;  Surgeon: Lily Eaton DO;  Location: [facility] OR     HC COLONOSCOPY THRU STOMA, DIAGNOSTIC  2005    normal     HC VASECTOMY UNILAT/BILAT W POSTOP SEMEN  1992    Vasectomy       Family History   Problem Relation Age of Onset     Cancer Mother         bladder cancer     Neurologic  Disorder Father         alzheimers dementia     Gastrointestinal Disease Brother         divertculitis       Social History     Tobacco Use     Smoking status: Never Smoker     Smokeless tobacco: Never Used   Substance Use Topics     Alcohol use: Yes     Alcohol/week: 1.0 - 2.0 standard drinks     Types: 1 - 2 Standard drinks or equivalent per week     Comment: caffeine 6 per day        Assessment and Plan:    # UC pancolitis - doing well on entyvio - in clinical remission.  Will plan for repeat colonoscopy to assess response to therapy.  Due for repeat labs in the next 1-2 months as well. Did discuss covid vaccine today - will get whenever it is available for him    # [facility] - follows with Dr. Fu - encouraged to make a follow-up appointment    Vaccinations:  -- Influenza (every year): Last given this year per patient  -- TdaP (every 10 years): Last given 2013  -- Pneumococcal Pneumonia (once then every 5 years): Last given UTD     -- Due to the immunosuppression in this patient, I would not advise administration of live vaccines such as varicella/VZV, intranasal influenza, MMR, or yellow fever vaccine (if travelling).       Bone mineral density screening   -- Recommend all patients supplement with calcium and vitamin D  -- Given prior steroid use recommnd DEXA - will discuss at next visit        Skin cancer screening: Annual visual exam of skin by dermatologist since patient is immunocompromised        Misc:  -- Avoid tobacco use  -- Avoid NSAIDs as there is potentially a 25% chance of causing an IBD flare    RTC 4 months    Lily Eaton DO     Phone call duration: 11 minutes

## 2021-03-06 DIAGNOSIS — J31.0 CHRONIC RHINITIS: ICD-10-CM

## 2021-03-08 NOTE — TELEPHONE ENCOUNTER
Pending Prescriptions:                       Disp   Refills    azelastine (ASTELIN) 0.1 % nasal spray [Ph*       3        Sig: SPRAY 2 SPRAYS IN EACH NOSTRIL TWO TIMES A DAY      Routing refill request to provider for review/approval because:  Patient is outside age range    Maryam Joyner RN on 3/8/2021 at 2:25 PM

## 2021-03-09 RX ORDER — AZELASTINE 1 MG/ML
SPRAY, METERED NASAL
Qty: 30 ML | Refills: 3 | Status: SHIPPED | OUTPATIENT
Start: 2021-03-09

## 2021-03-20 DIAGNOSIS — Z11.59 ENCOUNTER FOR SCREENING FOR OTHER VIRAL DISEASES: ICD-10-CM

## 2021-03-22 DIAGNOSIS — K51.00 ULCERATIVE PANCOLITIS WITHOUT COMPLICATION (H): ICD-10-CM

## 2021-03-22 DIAGNOSIS — I25.10 CORONARY ARTERY CALCIFICATION: ICD-10-CM

## 2021-03-22 DIAGNOSIS — R97.20 ELEVATED PROSTATE SPECIFIC ANTIGEN (PSA): ICD-10-CM

## 2021-03-22 DIAGNOSIS — K51.00 ULCERATIVE PANCOLITIS WITHOUT COMPLICATION (H): Primary | ICD-10-CM

## 2021-03-22 LAB
ALBUMIN SERPL-MCNC: 3.4 G/DL (ref 3.4–5)
ALP SERPL-CCNC: 286 U/L (ref 40–150)
ALT SERPL W P-5'-P-CCNC: 50 U/L (ref 0–70)
ANION GAP SERPL CALCULATED.3IONS-SCNC: 3 MMOL/L (ref 3–14)
AST SERPL W P-5'-P-CCNC: 38 U/L (ref 0–45)
BILIRUB SERPL-MCNC: 0.6 MG/DL (ref 0.2–1.3)
BUN SERPL-MCNC: 14 MG/DL (ref 7–30)
CALCIUM SERPL-MCNC: 8.7 MG/DL (ref 8.5–10.1)
CHLORIDE SERPL-SCNC: 107 MMOL/L (ref 94–109)
CHOLEST SERPL-MCNC: 148 MG/DL
CO2 SERPL-SCNC: 28 MMOL/L (ref 20–32)
CREAT SERPL-MCNC: 1 MG/DL (ref 0.66–1.25)
CRP SERPL-MCNC: <2.9 MG/L (ref 0–8)
ERYTHROCYTE [DISTWIDTH] IN BLOOD BY AUTOMATED COUNT: 15.7 % (ref 10–15)
FERRITIN SERPL-MCNC: 5 NG/ML (ref 26–388)
GFR SERPL CREATININE-BSD FRML MDRD: 77 ML/MIN/{1.73_M2}
GLUCOSE SERPL-MCNC: 91 MG/DL (ref 70–99)
HCT VFR BLD AUTO: 34.3 % (ref 40–53)
HDLC SERPL-MCNC: 96 MG/DL
HGB BLD-MCNC: 10.8 G/DL (ref 13.3–17.7)
IRON SATN MFR SERPL: 6 % (ref 15–46)
IRON SERPL-MCNC: 24 UG/DL (ref 35–180)
LDLC SERPL CALC-MCNC: 44 MG/DL
MCH RBC QN AUTO: 24.9 PG (ref 26.5–33)
MCHC RBC AUTO-ENTMCNC: 31.5 G/DL (ref 31.5–36.5)
MCV RBC AUTO: 79 FL (ref 78–100)
NONHDLC SERPL-MCNC: 52 MG/DL
PLATELET # BLD AUTO: 253 10E9/L (ref 150–450)
POTASSIUM SERPL-SCNC: 4.4 MMOL/L (ref 3.4–5.3)
PROT SERPL-MCNC: 6.9 G/DL (ref 6.8–8.8)
PSA SERPL-MCNC: 2.64 UG/L (ref 0–4)
RBC # BLD AUTO: 4.34 10E12/L (ref 4.4–5.9)
SODIUM SERPL-SCNC: 138 MMOL/L (ref 133–144)
TIBC SERPL-MCNC: 394 UG/DL (ref 240–430)
TRIGL SERPL-MCNC: 42 MG/DL
WBC # BLD AUTO: 6.8 10E9/L (ref 4–11)

## 2021-03-22 PROCEDURE — 85027 COMPLETE CBC AUTOMATED: CPT | Performed by: INTERNAL MEDICINE

## 2021-03-22 PROCEDURE — 84153 ASSAY OF PSA TOTAL: CPT | Performed by: INTERNAL MEDICINE

## 2021-03-22 PROCEDURE — 86140 C-REACTIVE PROTEIN: CPT | Performed by: INTERNAL MEDICINE

## 2021-03-22 PROCEDURE — 36415 COLL VENOUS BLD VENIPUNCTURE: CPT | Performed by: INTERNAL MEDICINE

## 2021-03-22 PROCEDURE — 80061 LIPID PANEL: CPT | Performed by: INTERNAL MEDICINE

## 2021-03-22 PROCEDURE — 80053 COMPREHEN METABOLIC PANEL: CPT | Performed by: INTERNAL MEDICINE

## 2021-03-22 PROCEDURE — 83540 ASSAY OF IRON: CPT | Performed by: INTERNAL MEDICINE

## 2021-03-22 PROCEDURE — 83550 IRON BINDING TEST: CPT | Performed by: INTERNAL MEDICINE

## 2021-03-22 PROCEDURE — 82728 ASSAY OF FERRITIN: CPT | Performed by: INTERNAL MEDICINE

## 2021-03-24 DIAGNOSIS — D50.0 IRON DEFICIENCY ANEMIA DUE TO CHRONIC BLOOD LOSS: Primary | ICD-10-CM

## 2021-03-24 RX ORDER — FERROUS GLUCONATE 324(38)MG
324 TABLET ORAL 2 TIMES DAILY WITH MEALS
Qty: 60 TABLET | Refills: 3 | Status: SHIPPED | OUTPATIENT
Start: 2021-03-24 | End: 2023-05-11

## 2021-03-26 RX ORDER — SODIUM, POTASSIUM,MAG SULFATES 17.5-3.13G
2 SOLUTION, RECONSTITUTED, ORAL ORAL SEE ADMIN INSTRUCTIONS
Qty: 354 ML | Refills: 0 | Status: SHIPPED | OUTPATIENT
Start: 2021-03-26 | End: 2021-07-02

## 2021-03-26 RX ORDER — BISACODYL 5 MG/1
15 TABLET, DELAYED RELEASE ORAL SEE ADMIN INSTRUCTIONS
Qty: 3 TABLET | Refills: 0 | Status: SHIPPED | OUTPATIENT
Start: 2021-03-26 | End: 2021-08-11

## 2021-03-26 RX ORDER — CHLORAL HYDRATE 500 MG
2 CAPSULE ORAL DAILY
COMMUNITY
End: 2023-07-12

## 2021-03-26 ASSESSMENT — MIFFLIN-ST. JEOR: SCORE: 1672.57

## 2021-03-30 ENCOUNTER — VIRTUAL VISIT (OUTPATIENT)
Dept: CARDIOLOGY | Facility: CLINIC | Age: 66
End: 2021-03-30
Payer: COMMERCIAL

## 2021-03-30 DIAGNOSIS — Z13.6 SCREENING FOR HEART DISEASE: ICD-10-CM

## 2021-03-30 DIAGNOSIS — I25.10 CORONARY ARTERY DISEASE INVOLVING NATIVE CORONARY ARTERY OF NATIVE HEART WITHOUT ANGINA PECTORIS: Primary | ICD-10-CM

## 2021-03-30 DIAGNOSIS — E78.5 DYSLIPIDEMIA: ICD-10-CM

## 2021-03-30 DIAGNOSIS — D64.9 ANEMIA, UNSPECIFIED TYPE: ICD-10-CM

## 2021-03-30 PROCEDURE — 99214 OFFICE O/P EST MOD 30 MIN: CPT | Mod: 95 | Performed by: INTERNAL MEDICINE

## 2021-03-30 NOTE — PROGRESS NOTES
"The patient has been notified of following:     \"This video visit will be conducted via a call between you and your physician/provider. We have found that certain health care needs can be provided without the need for an in-person physical exam.  This service lets us provide the care you need with a video conversation.  If a prescription is necessary we can send it directly to your pharmacy.  If lab work is needed we can place an order for that and you can then stop by our lab to have the test done at a later time.    Video visits are billed at different rates depending on your insurance coverage.  Please reach out to your insurance provider with any questions.    If during the course of the call the physician/provider feels a video visit is not appropriate, you will not be charged for this service.\"    Patient has given verbal consent for video visit? Yes    How would you like to obtain your AVS? Mail    Video-Visit Details    Type of service:  Video Visit    Video Start Time:733am    Video End Time:802am    Total visit time including video visit, chart review, charting, coordination of care =47min    Originating Location (pt. Location):patient home      Distant Location (provider location):  home office    Platform used for Video Visit: Leon    See dictation #[id number]  "

## 2021-03-30 NOTE — PROGRESS NOTES
2021              Karen Rodrgiuez MD   [facility]    [address] [address]      Patient:  Mainor Grande   MRN:  [id number]   :  1955      Dear Dr. Rodriguez:      It was a pleasure participating in the care of your patient, . Mainor Grande.  As you know, he is a 65-year-old gentleman who I saw today over virtual video visit via [employer] for coronary artery disease and dyslipidemia.      His past medical history is significant for the followin.  Ulcerative colitis.   2.  Eczema.   3.  Asthma/allergic rhinitis.     4.  Fatty liver.   5.  Erectile dysfunction.   6.  Gastroesophageal reflux disease.      He denies having a significant history of cardiac disease.      In terms of his present symptoms, he had a chest CT performed on 2020 that showed severe coronary artery calcifications.  A lipid panel and an exercise nuclear stress test were performed in September and the stress test showed a small amount of apical ischemia.      I last saw him 2020.  At that time, we had started aspirin and Lipitor.  He presents today for continuing care.      Since our last visit, he has mostly been troubled by his ulcerative colitis and was very sick last fall.  However, from a heart standpoint, he has been working out consistently most days of the week for 15-25 minutes on the elliptical machine doing some light weights and stretching otherwise.  He has not had any significant chest pain, shortness of breath, PND, orthopnea, edema, palpitations, syncope or near-syncope.  His blood pressures at home have been stable and normal and he and his wife are carefully watching his diet.      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  He feels good and essentially has no other complaints.      A 10-point review of systems is positive for manifestations from his ulcerative  colitis, otherwise unremarkable.      CURRENT MEDICATIONS:  He is taking aspirin enteric-coated 81 mg a day, Lipitor 40 mg a day, iron, vitamins.      PHYSICAL EXAMINATION:   VITAL SIGNS:  His blood pressure recorded from home is 123/66 with a pulse of 68.  His weight is 193 pounds.  He says he is 5 feet 11 inches.   GENERAL:  He appears comfortable, well groomed.   PSYCHIATRIC:  He is alert and oriented x3.   HEENT:  His eyes do not appear grossly erythematous or have exudate.   RESPIRATORY:  He is breathing comfortably without gross cough.      The remainder of the comprehensive physical exam was deferred secondary to the COVID-19 pandemic and secondary to video visit restrictions.      LABORATORY DATA (03/22/2021):  Total cholesterol 148, triglycerides normal, LDL 44, HDL 96, potassium 4.4, GFR normal, hemoglobin was slightly low at 10.8, ALT was 50.      Pharmacologic nuclear stress test 09/03/2020 reveals a possible small amount of mild ischemia in the apex with normal ejection fraction of 55%.        IMPRESSION:      Leno is a 65-year-old gentleman who has several active issues:     1.  Coronary artery disease:      Chest CT 07/02/2020 revealed severe coronary artery calcifications.  This obviously represents underlying coronary artery disease, the burden of which was significant enough to preclude coronary CTA in the future from being a useful or reliable diagnostic test.      Exercise nuclear stress test 09/03/2020 revealed only a small amount of apical ischemia that was a relatively borderline finding.      More importantly, from a clinical standpoint, the patient is able to exercise for 20 minutes at a time on the elliptical machine without exertional limitation or symptoms.  We will continue our efforts at secondary prevention medical therapy.     2.  Hyperlipidemia:      The patient's cholesterol has decreased nicely with high-intensity statin therapy, with LDL in the 40s.  We will continue to follow.         PLAN:     1.  Continue present medications at present doses.     2.  Virtual video visit followup in 1 year with lab work prior, earlier if needed.      Once again, it was a pleasure participating in the care of your patient, Mr. Claudia Grande.  Please feel free to contact me at any time with any questions regarding his care in the future.         Sincerely,      KINGS ARROYO MD             D: 2021   T: 2021   MT: [id number]      Name:     CLAUDIA GRANDE   MRN:      [id number]        Account:      [id number]   :      1955      Document: [id number]       cc: Karen Rodriguez MD

## 2021-04-01 DIAGNOSIS — Z11.59 ENCOUNTER FOR SCREENING FOR OTHER VIRAL DISEASES: ICD-10-CM

## 2021-04-01 LAB
SARS-COV-2 RNA RESP QL NAA+PROBE: NORMAL
SPECIMEN SOURCE: NORMAL

## 2021-04-01 PROCEDURE — U0003 INFECTIOUS AGENT DETECTION BY NUCLEIC ACID (DNA OR RNA); SEVERE ACUTE RESPIRATORY SYNDROME CORONAVIRUS 2 (SARS-COV-2) (CORONAVIRUS DISEASE [COVID-19]), AMPLIFIED PROBE TECHNIQUE, MAKING USE OF HIGH THROUGHPUT TECHNOLOGIES AS DESCRIBED BY CMS-2020-01-R: HCPCS | Performed by: INTERNAL MEDICINE

## 2021-04-01 PROCEDURE — U0005 INFEC AGEN DETEC AMPLI PROBE: HCPCS | Performed by: INTERNAL MEDICINE

## 2021-04-02 LAB
LABORATORY COMMENT REPORT: NORMAL
SARS-COV-2 RNA RESP QL NAA+PROBE: NEGATIVE
SPECIMEN SOURCE: NORMAL

## 2021-04-02 RX ORDER — NALOXONE HYDROCHLORIDE 0.4 MG/ML
0.4 INJECTION, SOLUTION INTRAMUSCULAR; INTRAVENOUS; SUBCUTANEOUS
Status: CANCELLED | OUTPATIENT
Start: 2021-04-02

## 2021-04-02 RX ORDER — NALOXONE HYDROCHLORIDE 0.4 MG/ML
0.2 INJECTION, SOLUTION INTRAMUSCULAR; INTRAVENOUS; SUBCUTANEOUS
Status: CANCELLED | OUTPATIENT
Start: 2021-04-02

## 2021-04-02 RX ORDER — PROCHLORPERAZINE MALEATE 5 MG
5 TABLET ORAL EVERY 6 HOURS PRN
Status: CANCELLED | OUTPATIENT
Start: 2021-04-02

## 2021-04-02 RX ORDER — FLUMAZENIL 0.1 MG/ML
0.2 INJECTION, SOLUTION INTRAVENOUS
Status: CANCELLED | OUTPATIENT
Start: 2021-04-02 | End: 2021-04-03

## 2021-04-02 RX ORDER — ONDANSETRON 4 MG/1
4 TABLET, ORALLY DISINTEGRATING ORAL EVERY 6 HOURS PRN
Status: CANCELLED | OUTPATIENT
Start: 2021-04-02

## 2021-04-02 RX ORDER — ONDANSETRON 2 MG/ML
4 INJECTION INTRAMUSCULAR; INTRAVENOUS EVERY 6 HOURS PRN
Status: CANCELLED | OUTPATIENT
Start: 2021-04-02

## 2021-04-05 ENCOUNTER — HOSPITAL ENCOUNTER (OUTPATIENT)
Facility: AMBULATORY SURGERY CENTER | Age: 66
Discharge: HOME | End: 2021-04-05
Attending: INTERNAL MEDICINE | Admitting: INTERNAL MEDICINE
Payer: COMMERCIAL

## 2021-04-05 VITALS
SYSTOLIC BLOOD PRESSURE: 110 MMHG | HEIGHT: 71 IN | WEIGHT: 193 LBS | HEART RATE: 69 BPM | TEMPERATURE: 97.3 F | OXYGEN SATURATION: 100 % | DIASTOLIC BLOOD PRESSURE: 67 MMHG | RESPIRATION RATE: 18 BRPM | BODY MASS INDEX: 27.02 KG/M2

## 2021-04-05 DIAGNOSIS — Z12.11 COLON CANCER SCREENING: Primary | ICD-10-CM

## 2021-04-05 LAB — COLONOSCOPY: NORMAL

## 2021-04-05 PROCEDURE — G8907 PT DOC NO EVENTS ON DISCHARG: HCPCS

## 2021-04-05 PROCEDURE — G8918 PT W/O PREOP ORDER IV AB PRO: HCPCS

## 2021-04-05 PROCEDURE — 45385 COLONOSCOPY W/LESION REMOVAL: CPT

## 2021-04-05 PROCEDURE — 45380 COLONOSCOPY AND BIOPSY: CPT | Mod: XS

## 2021-04-05 PROCEDURE — 88305 TISSUE EXAM BY PATHOLOGIST: CPT | Performed by: PATHOLOGY

## 2021-04-05 RX ORDER — ONDANSETRON 2 MG/ML
4 INJECTION INTRAMUSCULAR; INTRAVENOUS
Status: DISCONTINUED | OUTPATIENT
Start: 2021-04-05 | End: 2021-04-06 | Stop reason: HOSPADM

## 2021-04-05 RX ORDER — FENTANYL CITRATE 50 UG/ML
INJECTION, SOLUTION INTRAMUSCULAR; INTRAVENOUS PRN
Status: DISCONTINUED | OUTPATIENT
Start: 2021-04-05 | End: 2021-04-05 | Stop reason: HOSPADM

## 2021-04-05 RX ORDER — LIDOCAINE 40 MG/G
CREAM TOPICAL
Status: DISCONTINUED | OUTPATIENT
Start: 2021-04-05 | End: 2021-04-06 | Stop reason: HOSPADM

## 2021-04-08 LAB — COPATH REPORT: NORMAL

## 2021-04-14 ENCOUNTER — TELEPHONE (OUTPATIENT)
Dept: GASTROENTEROLOGY | Facility: CLINIC | Age: 66
End: 2021-04-14

## 2021-04-14 NOTE — TELEPHONE ENCOUNTER
[facility] Call Center    Phone Message    May a detailed message be left on voicemail: yes     Reason for Call: Other: Leno is calling in with as message for Dr. Fu. He states that he was unable to speak with the doctor for their 04/14 appointment, but he would like to speak with him to set up another time. Patient says that it is okay to call him or to send him a message via [employer]. Please review and respond accordingly.     Action Taken: Message routed to:  [facility] ([facility]):  Hepatology    Travel Screening: Not Applicable

## 2021-04-15 ENCOUNTER — DOCUMENTATION ONLY (OUTPATIENT)
Dept: GASTROENTEROLOGY | Facility: CLINIC | Age: 66
End: 2021-04-15

## 2021-04-21 ENCOUNTER — INFUSION THERAPY VISIT (OUTPATIENT)
Dept: INFUSION THERAPY | Facility: CLINIC | Age: 66
End: 2021-04-21
Attending: INTERNAL MEDICINE
Payer: COMMERCIAL

## 2021-04-21 VITALS
DIASTOLIC BLOOD PRESSURE: 78 MMHG | WEIGHT: 199.9 LBS | SYSTOLIC BLOOD PRESSURE: 135 MMHG | RESPIRATION RATE: 16 BRPM | OXYGEN SATURATION: 98 % | TEMPERATURE: 97.9 F | HEART RATE: 59 BPM | BODY MASS INDEX: 27.88 KG/M2

## 2021-04-21 DIAGNOSIS — K51.00 ULCERATIVE PANCOLITIS WITHOUT COMPLICATION (H): Primary | ICD-10-CM

## 2021-04-21 PROCEDURE — 250N000011 HC RX IP 250 OP 636: Performed by: INTERNAL MEDICINE

## 2021-04-21 PROCEDURE — 258N000003 HC RX IP 258 OP 636: Performed by: INTERNAL MEDICINE

## 2021-04-21 PROCEDURE — 96365 THER/PROPH/DIAG IV INF INIT: CPT

## 2021-04-21 RX ORDER — HEPARIN SODIUM (PORCINE) LOCK FLUSH IV SOLN 100 UNIT/ML 100 UNIT/ML
5 SOLUTION INTRAVENOUS
Status: CANCELLED | OUTPATIENT
Start: 2021-06-16

## 2021-04-21 RX ORDER — HEPARIN SODIUM,PORCINE 10 UNIT/ML
5 VIAL (ML) INTRAVENOUS
Status: CANCELLED | OUTPATIENT
Start: 2021-06-16

## 2021-04-21 RX ADMIN — SODIUM CHLORIDE 250 ML: 9 INJECTION, SOLUTION INTRAVENOUS at 12:14

## 2021-04-21 RX ADMIN — VEDOLIZUMAB 300 MG: 300 INJECTION, POWDER, LYOPHILIZED, FOR SOLUTION INTRAVENOUS at 12:19

## 2021-04-21 ASSESSMENT — PAIN SCALES - GENERAL: PAINLEVEL: NO PAIN (0)

## 2021-04-29 DIAGNOSIS — K83.01 PSC (PRIMARY SCLEROSING CHOLANGITIS) (H): Primary | ICD-10-CM

## 2021-05-11 ENCOUNTER — TELEPHONE (OUTPATIENT)
Dept: FAMILY MEDICINE | Facility: OTHER | Age: 66
End: 2021-05-11

## 2021-05-11 DIAGNOSIS — J45.30 MILD PERSISTENT ASTHMA WITHOUT COMPLICATION: ICD-10-CM

## 2021-05-11 DIAGNOSIS — J30.2 SEASONAL ALLERGIC RHINITIS, UNSPECIFIED TRIGGER: ICD-10-CM

## 2021-05-11 RX ORDER — ALBUTEROL SULFATE 90 UG/1
AEROSOL, METERED RESPIRATORY (INHALATION)
Qty: 18 G | Refills: 0 | Status: SHIPPED | OUTPATIENT
Start: 2021-05-11 | End: 2021-06-18

## 2021-05-11 NOTE — TELEPHONE ENCOUNTER
Prescription approved per [facility] Refill Protocol.  Boogie Carlin, RINKUN, RN, PHN  [facility] ~ Registered Nurse  Clinic Triage ~ [facility]  May 11, 2021

## 2021-06-07 DIAGNOSIS — J45.31 MILD PERSISTENT ASTHMA WITH ACUTE EXACERBATION: ICD-10-CM

## 2021-06-08 NOTE — TELEPHONE ENCOUNTER
Pending Prescriptions:                       Disp   Refills    SYMBICORT 160-4.5 MCG/ACT Inhaler [Pharmac*30.6 g 11       Sig: INHALE TWO PUFFS BY MOUTH TWICE A DAY      Routing refill request to provider for review/approval because:  Labs not current:  Act- sent through jaret Joyner RN on 6/8/2021 at 10:52 AM    I sent the patent a My Chart message letting him know the above medication was refills as a 1 time tiffanie refill and that he would need to schedule an appointment before further medication was called in.

## 2021-06-09 ENCOUNTER — VIRTUAL VISIT (OUTPATIENT)
Dept: GASTROENTEROLOGY | Facility: CLINIC | Age: 66
End: 2021-06-09
Attending: INTERNAL MEDICINE
Payer: COMMERCIAL

## 2021-06-09 DIAGNOSIS — E55.9 VITAMIN D DEFICIENCY: ICD-10-CM

## 2021-06-09 DIAGNOSIS — K83.01 PSC (PRIMARY SCLEROSING CHOLANGITIS) (H): Primary | ICD-10-CM

## 2021-06-09 DIAGNOSIS — Z79.52 LONG TERM (CURRENT) USE OF SYSTEMIC STEROIDS: ICD-10-CM

## 2021-06-09 PROCEDURE — 99214 OFFICE O/P EST MOD 30 MIN: CPT | Mod: 95 | Performed by: INTERNAL MEDICINE

## 2021-06-09 ASSESSMENT — PAIN SCALES - GENERAL: PAINLEVEL: NO PAIN (0)

## 2021-06-09 NOTE — LETTER
6/9/2021         RE: Mainor Grande  [id number] [facility] [id number]        Dear Colleague,    Thank you for referring your patient, Mainor Grande, to the [facility] [place]. Please see a copy of my visit note below.    Mainor is a 65 year old who is being evaluated via a billable video visit.      GI CLINIC VISIT    CC/REFERRING PROVIDER:  Referred Self  REASON FOR CONSULTATION: PSC    HPI: 65 year old male male with new diagnosis of ulcerative colitis on Entyvio; and primary sclerosing cholangitis, seen for evaluation and follow up.  This patient was last seen 7/2020    PSC  Diagnosed 7/2020  MRCP 7/2020: No masses identified  Episodes of cholangitis: None    UC:  Diagnosed 8/2020  Extent of disease: E3 (pancolitis)  Current Medication: Entyvio  IBD surgery: None  Prior medication: Prednisone  Last colonoscopy:  4/5/2021, Fernandez 1 disease  HBV status: HBsAg negative, HbsAb 0.74  TPMT:  28.6 (normal)  TB Quant Gold: negative (7/2020)    Since his diagnosis of his ulcerative colitis, he has been on prednisone and then Entyvio and has clinical remission. He currently denies any bloody diarrhea, fevers or chills, or abdominal pain. He currently has about 2-3 bowel movements per day, and they are soft. He has no nausea or vomiting, he has good appetite, and his weight is stable.  He was also diagnosed with PSC. He has not had any admissions into the hospital with symptoms/signs concerning for acute cholangitis. His last MRCP was 7/2020 and did not show any suspicious masses. He has no itching, no jaundice, no fatigue, no confusion, no sleep-wake cycle disturbances, no abdominal swelling, no leg swelling, no melena, and no hematemesis.  He is currently taking over-the-counter vitamin D, in addition to his medications and he has not had any fractures.    ROS: 10pt ROS performed and otherwise negative.    PERTINENT PAST MEDICAL/SURGICAL HISTORY:  Past Medical History:    Diagnosis Date     Allergic rhinitis, cause unspecified      Fatty liver 2004    elevated LFT, saw GI for a consultation     Mild persistent asthma 9/12/2008     Reflux esophagitis      Unspecified disease of respiratory system     RAD     Viremia, unspecified     acute      PERTINENT MEDICATIONS  Current Outpatient Medications   Medication     albuterol (VENTOLIN HFA) 108 (90 Base) MCG/ACT inhaler     ASPIRIN PO     atorvastatin (LIPITOR) 40 MG tablet     azelastine (ASTELIN) 0.1 % nasal spray     bisacodyl (DULCOLAX) 5 MG EC tablet     esomeprazole (NEXIUM) 20 MG capsule     ferrous gluconate (FERGON) 324 (38 Fe) MG tablet     fish oil-omega-3 fatty acids 1000 MG capsule     fluocinonide (LIDEX) 0.05 % external cream     fluticasone (FLONASE) 50 MCG/ACT nasal spray     Ibuprofen (ADVIL PO)     melatonin 1 MG TABS     Multiple Vitamins-Minerals (MULTIVITAMIN ADULT PO)     Naphazoline-Glycerin-Zinc Sulf (CLEAR EYES MAXIMUM ITCHY EYE) 0.012-0.25-0.25 % SOLN     Simethicone 125 MG TABS     SYMBICORT 160-4.5 MCG/ACT Inhaler     triamcinolone (KENALOG) 0.1 % external cream     vitamin D3 (CHOLECALCIFEROL) 50 mcg (2000 units) tablet     mesalamine (ASACOL HD) 800 MG EC tablet     Na Sulfate-K Sulfate-Mg Sulf (SUPREP BOWEL PREP KIT) solution     Na Sulfate-K Sulfate-Mg Sulf (SUPREP BOWEL PREP KIT) solution     polyethylene glycol (GOLYTELY) 236 g suspension     polyethylene glycol (GOLYTELY) 236 g suspension     predniSONE (DELTASONE) 10 MG tablet     predniSONE (DELTASONE) 10 MG tablet     Simethicone 125 MG TABS     No current facility-administered medications for this visit.        PHYSICAL EXAMINATION  There were no vitals taken for this visit.  Wt Readings from Last 2 Encounters:   04/21/21 90.7 kg (199 lb 14.4 oz)   03/26/21 87.5 kg (193 lb)     Gen: aaox3, cooperative, pleasant, not diaphoretic, nad  HEENT: ncat, neck supple,   Resp/CV without acute findings  Skin:  no jaundice  Neuro: grossly intact, no  asterixis noted    PERTINENT STUDIES:     Lab Results   Component Value Date     03/22/2021    Lab Results   Component Value Date    CHLORIDE 107 03/22/2021    Lab Results   Component Value Date    BUN 14 03/22/2021      Lab Results   Component Value Date    POTASSIUM 4.4 03/22/2021    Lab Results   Component Value Date    CO2 28 03/22/2021    Lab Results   Component Value Date    CR 1.00 03/22/2021        Lab Results   Component Value Date    WBC 6.8 03/22/2021    HGB 10.8 (L) 03/22/2021    HCT 34.3 (L) 03/22/2021    MCV 79 03/22/2021     03/22/2021     Lab Results   Component Value Date    AST 38 03/22/2021    ALT 50 03/22/2021     (H) 09/09/2020    ALKPHOS 286 (H) 03/22/2021    BILITOTAL 0.6 03/22/2021    BILICONJ 0.0 09/02/2010     Lab Results   Component Value Date    INR 0.94 06/02/2016        ASSESSMENT/PLAN:  65 year old male male with new diagnosis of ulcerative colitis on Entyvio; and primary sclerosing cholangitis, seen for evaluation and follow up.    1. Primary sclerosing cholangitis:  Seems stable, with normal bilirubin, no episodse of cholangitis and no evidence of hepatic dysfunction. At this time, he has no obstructive symptoms (abdominal pain, pruritus, jaundice).    We discussed the disease course of PSC, including the risks of cholangiocarcinoma (10-year incidence of 7-9%), gall bladder cancer (4% risk of gall bladder cancer and 26% risk of stone disease) and colorectal cancer (much higher compared to UC alone). We discussed the risk of vitamin D malabsorption and osteomalacia related to biliary obstruction in PSC. We also discussed the treatment of rapidly progressive disease including live transplant.     He is up to date with CRC screening (Colonoscopy 4/2021)  His last MRCP was 7/2020. We will repeat MRI/MRCP (evalaute for CCA and gallbladder as well). We will also repeat labs and vitamin D level.   We will also obtain a DEXA scan - if normal, then we will repeat in 3  years    2. Ulcerative colitis:  Follow with Dr. Eaton, on Entyvio. No symptoms concerning for a flare today    PLAN:  -- MRI/MRCP  -- DEXA scan  -- CMP, CBC, INR, Vitamin D  -- Follow up with Dr. Eaton as planned  -- RTC in 6 months       RTC 6 months, sooner if symptomatic.       The visit lasted up to 49 minutes, with more than half of the time spent on counseling and education.  All questions were answered to patient's satisfaction    Patient seen and discussed with staff GI physician, Dr. Fu, who agrees with my assessment and plan.      Kiana Mandel MD  Transplant Hepatology fellow  PGY 6  [phone]     Attestation:  This patient has been seen and evaluated by me, Ute Fu.  Discussed with the house staff team or resident(s) and agree with the findings and plan in this note.     Again, thank you for allowing me to participate in the care of your patient.      Sincerely,    Ute Fu MD

## 2021-06-09 NOTE — PROGRESS NOTES
Mainor is a 65 year old who is being evaluated via a billable video visit.       How would you like to obtain your AVS? MyChart  If the video visit is dropped, the invitation should be resent by: Send to e-mail at: [email or web address]  Will anyone else be joining your video visit? No       Video Start Time: 0804    Video-Visit Details     Type of service:  Video Visit     Video End Time: 0853    Originating Location (pt. Location): Home     Distant Location (provider location):  [facility] [place]      Platform used for Video Visit: AmWell    =================================================================================    GI CLINIC VISIT    CC/REFERRING PROVIDER:  Referred Self  REASON FOR CONSULTATION: PSC    HPI: 65 year old male male with new diagnosis of ulcerative colitis on Entyvio; and primary sclerosing cholangitis, seen for evaluation and follow up.  This patient was last seen 7/2020    PSC  Diagnosed 7/2020  MRCP 7/2020: No masses identified  Episodes of cholangitis: None    UC:  Diagnosed 8/2020  Extent of disease: E3 (pancolitis)  Current Medication: Entyvio  IBD surgery: None  Prior medication: Prednisone  Last colonoscopy:  4/5/2021, Fernandez 1 disease  HBV status: HBsAg negative, HbsAb 0.74  TPMT:  28.6 (normal)  TB Quant Gold: negative (7/2020)    Since his diagnosis of his ulcerative colitis, he has been on prednisone and then Entyvio and has clinical remission. He currently denies any bloody diarrhea, fevers or chills, or abdominal pain. He currently has about 2-3 bowel movements per day, and they are soft. He has no nausea or vomiting, he has good appetite, and his weight is stable.  He was also diagnosed with PSC. He has not had any admissions into the hospital with symptoms/signs concerning for acute cholangitis. His last MRCP was 7/2020 and did not show any suspicious masses. He has no itching, no jaundice, no fatigue, no confusion, no sleep-wake cycle  disturbances, no abdominal swelling, no leg swelling, no melena, and no hematemesis.  He is currently taking over-the-counter vitamin D, in addition to his medications and he has not had any fractures.    ROS: 10pt ROS performed and otherwise negative.    PERTINENT PAST MEDICAL/SURGICAL HISTORY:  Past Medical History:   Diagnosis Date     Allergic rhinitis, cause unspecified      Fatty liver 2004    elevated LFT, saw GI for a consultation     Mild persistent asthma 9/12/2008     Reflux esophagitis      Unspecified disease of respiratory system     RAD     Viremia, unspecified     acute      PERTINENT MEDICATIONS  Current Outpatient Medications   Medication     albuterol (VENTOLIN HFA) 108 (90 Base) MCG/ACT inhaler     ASPIRIN PO     atorvastatin (LIPITOR) 40 MG tablet     azelastine (ASTELIN) 0.1 % nasal spray     bisacodyl (DULCOLAX) 5 MG EC tablet     esomeprazole (NEXIUM) 20 MG capsule     ferrous gluconate (FERGON) 324 (38 Fe) MG tablet     fish oil-omega-3 fatty acids 1000 MG capsule     fluocinonide (LIDEX) 0.05 % external cream     fluticasone (FLONASE) 50 MCG/ACT nasal spray     Ibuprofen (ADVIL PO)     melatonin 1 MG TABS     Multiple Vitamins-Minerals (MULTIVITAMIN ADULT PO)     Naphazoline-Glycerin-Zinc Sulf (CLEAR EYES MAXIMUM ITCHY EYE) 0.012-0.25-0.25 % SOLN     Simethicone 125 MG TABS     SYMBICORT 160-4.5 MCG/ACT Inhaler     triamcinolone (KENALOG) 0.1 % external cream     vitamin D3 (CHOLECALCIFEROL) 50 mcg (2000 units) tablet     mesalamine (ASACOL HD) 800 MG EC tablet     Na Sulfate-K Sulfate-Mg Sulf (SUPREP BOWEL PREP KIT) solution     Na Sulfate-K Sulfate-Mg Sulf (SUPREP BOWEL PREP KIT) solution     polyethylene glycol (GOLYTELY) 236 g suspension     polyethylene glycol (GOLYTELY) 236 g suspension     predniSONE (DELTASONE) 10 MG tablet     predniSONE (DELTASONE) 10 MG tablet     Simethicone 125 MG TABS     No current facility-administered medications for this visit.        PHYSICAL  EXAMINATION  There were no vitals taken for this visit.  Wt Readings from Last 2 Encounters:   04/21/21 90.7 kg (199 lb 14.4 oz)   03/26/21 87.5 kg (193 lb)     Gen: aaox3, cooperative, pleasant, not diaphoretic, nad  HEENT: ncat, neck supple,   Resp/CV without acute findings  Skin:  no jaundice  Neuro: grossly intact, no asterixis noted    PERTINENT STUDIES:     Lab Results   Component Value Date     03/22/2021    Lab Results   Component Value Date    CHLORIDE 107 03/22/2021    Lab Results   Component Value Date    BUN 14 03/22/2021      Lab Results   Component Value Date    POTASSIUM 4.4 03/22/2021    Lab Results   Component Value Date    CO2 28 03/22/2021    Lab Results   Component Value Date    CR 1.00 03/22/2021        Lab Results   Component Value Date    WBC 6.8 03/22/2021    HGB 10.8 (L) 03/22/2021    HCT 34.3 (L) 03/22/2021    MCV 79 03/22/2021     03/22/2021     Lab Results   Component Value Date    AST 38 03/22/2021    ALT 50 03/22/2021     (H) 09/09/2020    ALKPHOS 286 (H) 03/22/2021    BILITOTAL 0.6 03/22/2021    BILICONJ 0.0 09/02/2010     Lab Results   Component Value Date    INR 0.94 06/02/2016        ASSESSMENT/PLAN:  65 year old male male with new diagnosis of ulcerative colitis on Entyvio; and primary sclerosing cholangitis, seen for evaluation and follow up.    1. Primary sclerosing cholangitis:  Seems stable, with normal bilirubin, no episodse of cholangitis and no evidence of hepatic dysfunction. At this time, he has no obstructive symptoms (abdominal pain, pruritus, jaundice).    We discussed the disease course of PSC, including the risks of cholangiocarcinoma (10-year incidence of 7-9%), gall bladder cancer (4% risk of gall bladder cancer and 26% risk of stone disease) and colorectal cancer (much higher compared to UC alone). We discussed the risk of vitamin D malabsorption and osteomalacia related to biliary obstruction in PSC. We also discussed the treatment of rapidly  progressive disease including live transplant.     He is up to date with CRC screening (Colonoscopy 4/2021)  His last MRCP was 7/2020. We will repeat MRI/MRCP (evalaute for CCA and gallbladder as well). We will also repeat labs and vitamin D level.   We will also obtain a DEXA scan - if normal, then we will repeat in 3 years    2. Ulcerative colitis:  Follow with Dr. Eaton, on Entyvio. No symptoms concerning for a flare today    PLAN:  -- MRI/MRCP  -- DEXA scan  -- CMP, CBC, INR, Vitamin D  -- Follow up with Dr. Eaton as planned  -- RTC in 6 months       RTC 6 months, sooner if symptomatic.       The visit lasted up to 49 minutes, with more than half of the time spent on counseling and education.  All questions were answered to patient's satisfaction    Patient seen and discussed with staff GI physician, Dr. Fu, who agrees with my assessment and plan.      Kiana Mandel MD  Transplant Hepatology fellow  PGY 6  [phone]     Attestation:  This patient has been seen and evaluated by me, Ute Fu.  Discussed with the house staff team or resident(s) and agree with the findings and plan in this note.

## 2021-06-10 RX ORDER — BUDESONIDE AND FORMOTEROL FUMARATE DIHYDRATE 160; 4.5 UG/1; UG/1
AEROSOL RESPIRATORY (INHALATION)
Qty: 30.6 G | Refills: 0 | Status: SHIPPED | OUTPATIENT
Start: 2021-06-10 | End: 2021-07-02

## 2021-06-16 ENCOUNTER — APPOINTMENT (OUTPATIENT)
Dept: LAB | Facility: CLINIC | Age: 66
End: 2021-06-16
Payer: COMMERCIAL

## 2021-06-16 ENCOUNTER — INFUSION THERAPY VISIT (OUTPATIENT)
Dept: INFUSION THERAPY | Facility: CLINIC | Age: 66
End: 2021-06-16
Attending: INTERNAL MEDICINE
Payer: COMMERCIAL

## 2021-06-16 VITALS
DIASTOLIC BLOOD PRESSURE: 69 MMHG | RESPIRATION RATE: 18 BRPM | TEMPERATURE: 97.9 F | OXYGEN SATURATION: 100 % | HEART RATE: 69 BPM | SYSTOLIC BLOOD PRESSURE: 126 MMHG

## 2021-06-16 DIAGNOSIS — K51.00 ULCERATIVE PANCOLITIS WITHOUT COMPLICATION (H): ICD-10-CM

## 2021-06-16 DIAGNOSIS — E55.9 VITAMIN D DEFICIENCY: ICD-10-CM

## 2021-06-16 DIAGNOSIS — E78.5 DYSLIPIDEMIA: ICD-10-CM

## 2021-06-16 DIAGNOSIS — K83.01 PSC (PRIMARY SCLEROSING CHOLANGITIS) (H): Primary | ICD-10-CM

## 2021-06-16 LAB
ALBUMIN SERPL-MCNC: 3.7 G/DL (ref 3.4–5)
ALP SERPL-CCNC: 391 U/L (ref 40–150)
ALT SERPL W P-5'-P-CCNC: 129 U/L (ref 0–70)
ANION GAP SERPL CALCULATED.3IONS-SCNC: 5 MMOL/L (ref 3–14)
AST SERPL W P-5'-P-CCNC: 88 U/L (ref 0–45)
BILIRUB DIRECT SERPL-MCNC: 0.3 MG/DL (ref 0–0.2)
BILIRUB SERPL-MCNC: 0.7 MG/DL (ref 0.2–1.3)
BUN SERPL-MCNC: 12 MG/DL (ref 7–30)
CALCIUM SERPL-MCNC: 8.5 MG/DL (ref 8.5–10.1)
CHLORIDE SERPL-SCNC: 102 MMOL/L (ref 94–109)
CHOLEST SERPL-MCNC: 155 MG/DL
CO2 SERPL-SCNC: 29 MMOL/L (ref 20–32)
CREAT SERPL-MCNC: 0.9 MG/DL (ref 0.66–1.25)
CRP SERPL-MCNC: <2.9 MG/L (ref 0–8)
ERYTHROCYTE [DISTWIDTH] IN BLOOD BY AUTOMATED COUNT: 17.7 % (ref 10–15)
ERYTHROCYTE [SEDIMENTATION RATE] IN BLOOD BY WESTERGREN METHOD: 9 MM/H (ref 0–20)
GFR SERPL CREATININE-BSD FRML MDRD: 88 ML/MIN/{1.73_M2}
GLUCOSE SERPL-MCNC: 115 MG/DL (ref 70–99)
HCT VFR BLD AUTO: 41.2 % (ref 40–53)
HDLC SERPL-MCNC: 88 MG/DL
HGB BLD-MCNC: 13.4 G/DL (ref 13.3–17.7)
INR PPP: 1.01 (ref 0.86–1.14)
LDLC SERPL CALC-MCNC: 55 MG/DL
MCH RBC QN AUTO: 28.5 PG (ref 26.5–33)
MCHC RBC AUTO-ENTMCNC: 32.5 G/DL (ref 31.5–36.5)
MCV RBC AUTO: 88 FL (ref 78–100)
NONHDLC SERPL-MCNC: 67 MG/DL
PLATELET # BLD AUTO: 306 10E9/L (ref 150–450)
POTASSIUM SERPL-SCNC: 4 MMOL/L (ref 3.4–5.3)
PROT SERPL-MCNC: 7.8 G/DL (ref 6.8–8.8)
RBC # BLD AUTO: 4.7 10E12/L (ref 4.4–5.9)
SODIUM SERPL-SCNC: 136 MMOL/L (ref 133–144)
TRIGL SERPL-MCNC: 61 MG/DL
WBC # BLD AUTO: 7.3 10E9/L (ref 4–11)

## 2021-06-16 PROCEDURE — 96365 THER/PROPH/DIAG IV INF INIT: CPT

## 2021-06-16 PROCEDURE — 80076 HEPATIC FUNCTION PANEL: CPT | Performed by: INTERNAL MEDICINE

## 2021-06-16 PROCEDURE — 250N000011 HC RX IP 250 OP 636: Performed by: INTERNAL MEDICINE

## 2021-06-16 PROCEDURE — 82306 VITAMIN D 25 HYDROXY: CPT | Performed by: INTERNAL MEDICINE

## 2021-06-16 PROCEDURE — 85610 PROTHROMBIN TIME: CPT | Performed by: INTERNAL MEDICINE

## 2021-06-16 PROCEDURE — 80061 LIPID PANEL: CPT | Performed by: INTERNAL MEDICINE

## 2021-06-16 PROCEDURE — 85652 RBC SED RATE AUTOMATED: CPT | Performed by: INTERNAL MEDICINE

## 2021-06-16 PROCEDURE — 258N000003 HC RX IP 258 OP 636: Performed by: INTERNAL MEDICINE

## 2021-06-16 PROCEDURE — 80048 BASIC METABOLIC PNL TOTAL CA: CPT | Performed by: INTERNAL MEDICINE

## 2021-06-16 PROCEDURE — 85027 COMPLETE CBC AUTOMATED: CPT | Performed by: INTERNAL MEDICINE

## 2021-06-16 PROCEDURE — 36415 COLL VENOUS BLD VENIPUNCTURE: CPT | Performed by: INTERNAL MEDICINE

## 2021-06-16 PROCEDURE — 86140 C-REACTIVE PROTEIN: CPT | Performed by: INTERNAL MEDICINE

## 2021-06-16 RX ORDER — HEPARIN SODIUM,PORCINE 10 UNIT/ML
5 VIAL (ML) INTRAVENOUS
Status: CANCELLED | OUTPATIENT
Start: 2021-08-11

## 2021-06-16 RX ORDER — HEPARIN SODIUM (PORCINE) LOCK FLUSH IV SOLN 100 UNIT/ML 100 UNIT/ML
5 SOLUTION INTRAVENOUS
Status: CANCELLED | OUTPATIENT
Start: 2021-08-11

## 2021-06-16 RX ADMIN — SODIUM CHLORIDE 250 ML: 9 INJECTION, SOLUTION INTRAVENOUS at 13:59

## 2021-06-16 RX ADMIN — VEDOLIZUMAB 300 MG: 300 INJECTION, POWDER, LYOPHILIZED, FOR SOLUTION INTRAVENOUS at 14:04

## 2021-06-16 ASSESSMENT — PAIN SCALES - GENERAL: PAINLEVEL: NO PAIN (0)

## 2021-06-16 NOTE — PROGRESS NOTES
Infusion Nursing Note:  Mainor Grande presents today for Entyvio.    Patient seen by provider today: No   present during visit today: Not Applicable.    Note: Patient denies new symptoms or concerns. Ulcerative Colitis is stable, no symptoms. VSS, afebrile, denies pain.  Patient declined Covid swab today.     Intravenous Access:  Peripheral IV placed.    Treatment Conditions:  Biological Infusion Checklist:  ~~~ NOTE: If the patient answers yes to any of the questions below, hold the infusion and contact ordering provider or on-call provider.    1. Have you recently had an elevated temperature, fever, chills, productive cough, coughing for 3 weeks or longer or hemoptysis, abnormal vital signs, night sweats,  chest pain or have you noticed a decrease in your appetite, unexplained weight loss or fatigue? No  2. Do you have any open wounds or new incisions? No  3. Do you have any recent or upcoming hospitalizations, surgeries or dental procedures? No  4. Do you currently have or recently have had any signs of illness or infection or are you on any antibiotics? No  5. Have you had any new, sudden or worsening abdominal pain? No  6. Have you or anyone in your household received a live vaccination in the past 4 weeks? Please note:  No live vaccines while on biologic/chemotherapy until 6 months after the last treatment.  Patient can receive the flu vaccine (shot only) and the pneumovax.  It is optimal for the patient to get these vaccines mid cycle, but they can be given at any time as long as it is not on the day of the infusion. No  7. Have you recently been diagnosed with any new nervous system diseases (ie. Multiple sclerosis, Guillain Ione, seizures, neurological changes) or cancer diagnosis? No  8. Are you on any form of radiation or chemotherapy? No  9. Are you pregnant or breast feeding or do you have plans of pregnancy in the future? N/A  10. Have you been having any signs of worsening depression or  suicidal ideations?  (benlysta only) N/A  11. Have there been any other new onset medical symptoms? No        Post Infusion Assessment:  Patient tolerated infusion without incident.  Blood return noted pre and post infusion.  Site patent and intact, free from redness, edema or discomfort.  No evidence of extravasations.  PIV access discontinued per protocol.  Biologic Infusion Post Education: Call the triage nurse at your clinic or seek medical attention if you have chills and/or temperature greater than or equal to 100.5, uncontrolled nausea/vomiting, diarrhea, constipation, dizziness, shortness of breath, chest pain, heart palpitations, weakness or any other new or concerning symptoms, questions or concerns.  You cannot have any live virus vaccines prior to or during treatment or up to 6 months post infusion.  If you have an upcoming surgery, medical procedure or dental procedure during treatment, this should be discussed with your ordering physician and your surgeon/dentist.  If you are having any concerning symptom, if you are unsure if you should get your next infusion or wish to speak to a provider before your next infusion, please call your care coordinator or triage nurse at your clinic to notify them so we can adequately serve you.       Discharge Plan:   Copy of AVS reviewed with patient. Patient will return 8/11 for next appointment.  Patient discharged in stable condition accompanied by: self.  Departure Mode: Ambulatory.      Keren Flores RN

## 2021-06-17 LAB — DEPRECATED CALCIDIOL+CALCIFEROL SERPL-MC: 50 UG/L (ref 20–75)

## 2021-06-18 DIAGNOSIS — J45.30 MILD PERSISTENT ASTHMA WITHOUT COMPLICATION: ICD-10-CM

## 2021-06-18 DIAGNOSIS — J30.2 SEASONAL ALLERGIC RHINITIS, UNSPECIFIED TRIGGER: ICD-10-CM

## 2021-06-18 RX ORDER — ALBUTEROL SULFATE 90 UG/1
AEROSOL, METERED RESPIRATORY (INHALATION)
Qty: 18 G | Refills: 0 | Status: SHIPPED | OUTPATIENT
Start: 2021-06-18 | End: 2021-08-11 | Stop reason: DRUGHIGH

## 2021-06-18 NOTE — TELEPHONE ENCOUNTER
Pending Prescriptions:                       Disp   Refills    VENTOLIN  (90 Base) MCG/ACT inhaler*18 g   0        Sig: INHALE TWO PUFFS BY MOUTH EVERY 4 HOURS AS NEEDED FOR           SHORTNESS OF BREATH OR WHEEZING      Routing refill request to provider for review/approval because:  Labs not current:  renetta Joyner RN on 6/18/2021 at 2:52 PM

## 2021-06-28 ENCOUNTER — TELEPHONE (OUTPATIENT)
Dept: GASTROENTEROLOGY | Facility: CLINIC | Age: 66
End: 2021-06-28

## 2021-06-28 NOTE — TELEPHONE ENCOUNTER
"LPN returned call to patients spouse, Rani. Rani stated that [facility] is telling her that she needs a documented authorization from the provider that it is okay for patient to have his infusions early or his infusion may not be covered. Rani stated \"I talked to our insurance company and they told me the same thing.\" Rani is asking if there is a window that can be documented on how early patient can have the infusions as they are retired and like to travel so she would like to plan better. LPN stated a message would be sent to provider to advise.     Lily Eaton MD  You 43 minutes ago (1:36 PM)     He can have the infusion a few days early - he should just be able to schedule an earlier appointment with the infusion center - if that doesn't work just have the infusion center contact me and I'll give them whatever they need for this   Lily Dukes     Message text          Mago Brown LPN    "

## 2021-06-28 NOTE — TELEPHONE ENCOUNTER
[facility] Call Center    Phone Message    May a detailed message be left on voicemail: yes     Reason for Call: The patient's spouse stated that they will be going on vacation on 10/6/2021 which is when the patient would be due for an infusion  They are requesting a call to discuss permission from Dr. Eaton to have the infusion a day or two early.  Please advise.  Thank you.     Action Taken: Message routed to:  Adult Clinics: Gastroenterology (GI) p [id number]    Travel Screening: Not Applicable

## 2021-06-29 NOTE — TELEPHONE ENCOUNTER
LPN spoke with patients spouse, Rani and notified her that the letter has been written by Dr. Eaton and sent to the infusion finance team to be submitted to patients insurance. Copy of letter mailed to patient.     Mago Brown LPN

## 2021-06-30 NOTE — PROGRESS NOTES
"SUBJECTIVE:   Mainor Grande is a 65 year old male who presents for Preventive Visit.      Patient has been advised of split billing requirements and indicates understanding: Yes   Are you in the first 12 months of your Medicare coverage?  No    Healthy Habits:     In general, how would you rate your overall health?  Excellent    Frequency of exercise:  4-5 days/week    Duration of exercise:  45-60 minutes    Do you usually eat at least 4 servings of fruit and vegetables a day, include whole grains    & fiber and avoid regularly eating high fat or \"junk\" foods?  Yes    Taking medications regularly:  Yes    Medication side effects:  None    Ability to successfully perform activities of daily living:  No assistance needed    Home Safety:  No safety concerns identified    Hearing Impairment:  No hearing concerns    In the past 6 months, have you been bothered by leaking of urine? Yes    In general, how would you rate your overall mental or emotional health?  Good      PHQ-2 Total Score: 0    Additional concerns today:  No    Do you feel safe in your environment? Yes    Have you ever done Advance Care Planning? (For example, a Health Directive, POLST, or a discussion with a medical provider or your loved ones about your wishes): No, advance care planning information given to patient to review.  Patient declined advance care planning discussion at this time.      Fall risk  Fallen 2 or more times in the past year?: (P) No  Any fall with injury in the past year?: (P) No  click delete button to remove this line now  Cognitive Screening   1) Repeat 3 items (Leader, Season, Table)    2) Clock draw: NORMAL  3) 3 item recall: Recalls NO objects   Results: 0 items recalled: PROBABLE COGNITIVE IMPAIRMENT, **INFORM PROVIDER**    Mini-CogTM Copyright RYLIE Yates. Licensed by the author for use in [facility]; reprinted with permission (ramonita@.[facility]). All rights reserved.      Do you have sleep apnea, excessive snoring or " daytime drowsiness?: no    Reviewed and updated as needed this visit by clinical staff                 Reviewed and updated as needed this visit by Provider                Social History     Tobacco Use     Smoking status: Never Smoker     Smokeless tobacco: Never Used   Substance Use Topics     Alcohol use: Yes     Alcohol/week: 1.0 - 2.0 standard drinks     Types: 1 - 2 Standard drinks or equivalent per week     Comment: caffeine 6 per day     If you drink alcohol do you typically have >3 drinks per day or >7 drinks per week? No    Alcohol Use 12/5/2019   Prescreen: >3 drinks/day or >7 drinks/week? No   Prescreen: >3 drinks/day or >7 drinks/week? -           -------------------------------------    Current providers sharing in care for this patient include:   Patient Care Team:  Karen Rodriguez MD as PCP - General (Family Practice)  Karen Rodriguez MD as Assigned PCP  VegasChristina florentino MD as Assigned Surgical Provider  [facility] MD as Assigned Heart and Vascular Provider  Lily Eaton MD as Assigned Gastroenterology Provider    The following health maintenance items are reviewed in Epic and correct as of today:  Health Maintenance Due   Topic Date Due     ANNUAL REVIEW OF HM ORDERS  Never done     ASTHMA ACTION PLAN  02/12/2020     MEDICARE ANNUAL WELLNESS VISIT  12/05/2020     FALL RISK ASSESSMENT  12/05/2020     ASTHMA CONTROL TEST  02/28/2021     BP Readings from Last 3 Encounters:   07/02/21 116/60   06/16/21 126/69   04/21/21 135/78    Wt Readings from Last 3 Encounters:   07/02/21 84.8 kg (187 lb)   04/21/21 90.7 kg (199 lb 14.4 oz)   03/26/21 87.5 kg (193 lb)                  Patient Active Problem List   Diagnosis     Reflux esophagitis     Abnormal levels of other serum enzymes     Erectile dysfunction     Moderate persistent asthma with acute exacerbation     HYPERLIPIDEMIA LDL GOAL <160     Seasonal allergic rhinitis     Deviated nasal septum     Chronic nasal congestion     Nummular  eczema     Memory changes     Ulcerative pancolitis without complication (H)     PSC (primary sclerosing cholangitis)     Past Surgical History:   Procedure Laterality Date     COLONOSCOPY  9/4/2013    Procedure: COLONOSCOPY;  colonoscopy;  Surgeon: Beto Keen MD;  Location: [facility]     COLONOSCOPY N/A 7/31/2020    Procedure: Colonoscopy, With Polypectomy And Biopsy;  Surgeon: Lily Eaton DO;  Location: [facility]     COLONOSCOPY N/A 4/5/2021    Procedure: Colonoscopy, With Polypectomy And Biopsy;  Surgeon: Lily Eaton DO;  Location: [facility]     COLONOSCOPY WITH CO2 INSUFFLATION N/A 7/31/2020    Procedure: COLONOSCOPY, WITH CO2 INSUFFLATION;  Surgeon: Lily Eaton DO;  Location: [facility]     COLONOSCOPY WITH CO2 INSUFFLATION N/A 4/5/2021    Procedure: COLONOSCOPY, WITH CO2 INSUFFLATION;  Surgeon: Lily Eaton DO;  Location: [facility]     COMBINED ESOPHAGOSCOPY, GASTROSCOPY, DUODENOSCOPY (EGD) WITH CO2 INSUFFLATION N/A 7/31/2020    Procedure: ESOPHAGOGASTRODUODENOSCOPY, WITH CO2 INSUFFLATION;  Surgeon: Lily Etaon DO;  Location: [facility]     ESOPHAGOSCOPY, GASTROSCOPY, DUODENOSCOPY (EGD), COMBINED N/A 7/31/2020    Procedure: Esophagogastroduodenoscopy, With Biopsy;  Surgeon: Lily Eaton DO;  Location: [facility]     HC COLONOSCOPY THRU STOMA, DIAGNOSTIC  2005    normal     HC VASECTOMY UNILAT/BILAT W POSTOP SEMEN  1992    Vasectomy       Social History     Tobacco Use     Smoking status: Never Smoker     Smokeless tobacco: Never Used   Substance Use Topics     Alcohol use: Yes     Alcohol/week: 1.0 - 2.0 standard drinks     Types: 1 - 2 Standard drinks or equivalent per week     Comment: caffeine 6 per day     Family History   Problem Relation Age of Onset     Cancer Mother         bladder cancer     Neurologic Disorder Father         alzheimers dementia     Gastrointestinal Disease Brother         divertculitis         Current Outpatient Medications   Medication Sig Dispense Refill     albuterol  (PROAIR HFA/PROVENTIL HFA/VENTOLIN HFA) 108 (90 Base) MCG/ACT inhaler Inhale 2 puffs into the lungs every 6 hours 18 g 3     albuterol (VENTOLIN HFA) 108 (90 Base) MCG/ACT inhaler INHALE TWO PUFFS BY MOUTH EVERY 4 HOURS AS NEEDED FOR SHORTNESS OF BREATH OR WHEEZING 18 g 0     ASPIRIN PO Take 81 mg by mouth at onset of headache for moderate pain        atorvastatin (LIPITOR) 40 MG tablet Take 1 tablet (40 mg) by mouth daily 90 tablet 3     azelastine (ASTELIN) 0.1 % nasal spray SPRAY 2 SPRAYS IN EACH NOSTRIL TWO TIMES A DAY 30 mL 3     bisacodyl (DULCOLAX) 5 MG EC tablet Take 3 tablets (15 mg) by mouth See Admin Instructions --Take at 5 PM day prior to procedure 3 tablet 0     budesonide-formoterol (SYMBICORT) 160-4.5 MCG/ACT Inhaler INHALE TWO PUFFS BY MOUTH TWICE A DAY 30.6 g 5     esomeprazole (NEXIUM) 20 MG capsule Take 20 mg by mouth daily Take 30-60 minutes before eating. Takes clear baudilio       ferrous gluconate (FERGON) 324 (38 Fe) MG tablet Take 1 tablet (324 mg) by mouth 2 times daily (with meals) 60 tablet 3     fish oil-omega-3 fatty acids 1000 MG capsule Take 2 g by mouth daily       fluticasone (FLONASE) 50 MCG/ACT nasal spray Spray 1-2 sprays into both nostrils daily 16 g 11     Ibuprofen (ADVIL PO) Take 200 mg by mouth every 4 hours as needed        melatonin 1 MG TABS Take 1 mg by mouth nightly as needed for sleep       Multiple Vitamins-Minerals (MULTIVITAMIN ADULT PO) Take 1 tablet by mouth daily        Naphazoline-Glycerin-Zinc Sulf (CLEAR EYES MAXIMUM ITCHY EYE) 0.012-0.25-0.25 % SOLN Apply to eye as needed (for allergies)       vitamin D3 (CHOLECALCIFEROL) 50 mcg (2000 units) tablet Take 1 tablet (50 mcg) by mouth daily 30 tablet 11     Simethicone 125 MG TABS Take 125 mg by mouth See Admin Instructions --Take tablet after finishing second half of Suprep with half a glass of water. (Patient not taking: Reported on 4/14/2021) 1 tablet 0     Allergies   Allergen Reactions     No Known Drug  "Allergies      Seasonal Allergies      Any new diagnosis of family breast, ovarian, or bowel cancer? No    FHS-7: No flowsheet data found.  click delete button to remove this line now    Review of Systems  Constitutional, HEENT, cardiovascular, pulmonary, GI, , musculoskeletal, neuro, skin, endocrine and psych systems are negative, except as otherwise noted.    OBJECTIVE:   There were no vitals taken for this visit. Estimated body mass index is 27.88 kg/m  as calculated from the following:    Height as of 3/26/21: 1.803 m (5' 11\").    Weight as of 4/21/21: 90.7 kg (199 lb 14.4 oz).  Physical Exam  GENERAL: healthy, alert and no distress  EYES: Eyes grossly normal to inspection, PERRL and conjunctivae and sclerae normal  HENT: ear canals and TM's normal, nose and mouth without ulcers or lesions  NECK: no adenopathy, no asymmetry, masses, or scars and thyroid normal to palpation  RESP: lungs clear to auscultation - no rales, rhonchi or wheezes  CV: regular rate and rhythm, normal S1 S2, no S3 or S4, no murmur, click or rub, no peripheral edema and peripheral pulses strong  ABDOMEN: soft, nontender, no hepatosplenomegaly, no masses and bowel sounds normal  MS: no gross musculoskeletal defects noted, no edema  SKIN: no suspicious lesions or rashes  NEURO: Normal strength and tone, mentation intact and speech normal  PSYCH: mentation appears normal, affect normal/bright    Diagnostic Test Results:  Labs reviewed in Epic    ASSESSMENT / PLAN:     Problem List Items Addressed This Visit     Reflux esophagitis     New Prilosec over-the-counter.         Relevant Medications    albuterol (PROAIR HFA/PROVENTIL HFA/VENTOLIN HFA) 108 (90 Base) MCG/ACT inhaler    budesonide-formoterol (SYMBICORT) 160-4.5 MCG/ACT Inhaler    Moderate persistent asthma with acute exacerbation     ACT 21.  Continue current dose of Symbicort and albuterol as needed.         Relevant Medications    albuterol (PROAIR HFA/PROVENTIL HFA/VENTOLIN HFA) " 108 (90 Base) MCG/ACT inhaler    budesonide-formoterol (SYMBICORT) 160-4.5 MCG/ACT Inhaler    HYPERLIPIDEMIA LDL GOAL <160     Continue atorvastatin.  Last lipid panel done earlier this year shows normal levels.         Relevant Medications    atorvastatin (LIPITOR) 40 MG tablet    Seasonal allergic rhinitis    Relevant Medications    albuterol (PROAIR HFA/PROVENTIL HFA/VENTOLIN HFA) 108 (90 Base) MCG/ACT inhaler    budesonide-formoterol (SYMBICORT) 160-4.5 MCG/ACT Inhaler    Nummular eczema     Symptoms in remission.  Was previously on Kenalog and clobetasol as needed.         Relevant Medications    albuterol (PROAIR HFA/PROVENTIL HFA/VENTOLIN HFA) 108 (90 Base) MCG/ACT inhaler    budesonide-formoterol (SYMBICORT) 160-4.5 MCG/ACT Inhaler    Memory changes     Failed memory screen but this does not seem to be impacting his day-to-day life.  We will continue to monitor for now.         Ulcerative pancolitis without complication (H) - Primary     Followed by gastroenterology.  Currently on entyvio with symptoms in remission.  Continue iron supplementation.  Reviewed labs done by specialty.         PSC (primary sclerosing cholangitis)     Following her hepatology with stable liver function tests.  Continue to monitor for now           Other Visit Diagnoses     Coronary artery calcification        Relevant Medications    atorvastatin (LIPITOR) 40 MG tablet    Mild persistent asthma without complication        Relevant Medications    albuterol (PROAIR HFA/PROVENTIL HFA/VENTOLIN HFA) 108 (90 Base) MCG/ACT inhaler    budesonide-formoterol (SYMBICORT) 160-4.5 MCG/ACT Inhaler    Iron deficiency anemia due to chronic blood loss        Eczema, unspecified type        Relevant Medications    albuterol (PROAIR HFA/PROVENTIL HFA/VENTOLIN HFA) 108 (90 Base) MCG/ACT inhaler    budesonide-formoterol (SYMBICORT) 160-4.5 MCG/ACT Inhaler    Mild persistent asthma with acute exacerbation        Relevant Medications    albuterol (PROAIR  "HFA/PROVENTIL HFA/VENTOLIN HFA) 108 (90 Base) MCG/ACT inhaler    budesonide-formoterol (SYMBICORT) 160-4.5 MCG/ACT Inhaler          Patient has been advised of split billing requirements and indicates understanding: Yes  COUNSELING:  Reviewed preventive health counseling, as reflected in patient instructions       Regular exercise       Healthy diet/nutrition    Estimated body mass index is 27.88 kg/m  as calculated from the following:    Height as of 3/26/21: 1.803 m (5' 11\").    Weight as of 4/21/21: 90.7 kg (199 lb 14.4 oz).        He reports that he has never smoked. He has never used smokeless tobacco.      Appropriate preventive services were discussed with this patient, including applicable screening as appropriate for cardiovascular disease, diabetes, osteopenia/osteoporosis, and glaucoma.  As appropriate for age/gender, discussed screening for colorectal cancer, prostate cancer, breast cancer, and cervical cancer. Checklist reviewing preventive services available has been given to the patient.    Reviewed patients plan of care and provided an AVS. The Basic Care Plan (routine screening as documented in Health Maintenance) for Mainor meets the Care Plan requirement. This Care Plan has been established and reviewed with the Patient.    Counseling Resources:  ATP IV Guidelines  Pooled Cohorts Equation Calculator  Breast Cancer Risk Calculator  Breast Cancer: Medication to Reduce Risk  FRAX Risk Assessment  ICSI Preventive Guidelines  Dietary Guidelines for Americans, 2010  [employer]'s MyPlate  ASA Prophylaxis  Lung CA Screening    Karen Rodriguez MD  [facility]    Identified Health Risks:  "

## 2021-07-02 ENCOUNTER — HOSPITAL ENCOUNTER (OUTPATIENT)
Dept: BONE DENSITY | Facility: CLINIC | Age: 66
End: 2021-07-02
Attending: INTERNAL MEDICINE
Payer: COMMERCIAL

## 2021-07-02 ENCOUNTER — HOSPITAL ENCOUNTER (OUTPATIENT)
Dept: MRI IMAGING | Facility: CLINIC | Age: 66
End: 2021-07-02
Attending: INTERNAL MEDICINE
Payer: COMMERCIAL

## 2021-07-02 ENCOUNTER — OFFICE VISIT (OUTPATIENT)
Dept: FAMILY MEDICINE | Facility: OTHER | Age: 66
End: 2021-07-02
Payer: COMMERCIAL

## 2021-07-02 VITALS
RESPIRATION RATE: 16 BRPM | BODY MASS INDEX: 26.77 KG/M2 | OXYGEN SATURATION: 98 % | SYSTOLIC BLOOD PRESSURE: 116 MMHG | TEMPERATURE: 98.9 F | WEIGHT: 187 LBS | DIASTOLIC BLOOD PRESSURE: 60 MMHG | HEART RATE: 70 BPM | HEIGHT: 70 IN

## 2021-07-02 DIAGNOSIS — K51.00 ULCERATIVE PANCOLITIS WITHOUT COMPLICATION (H): Primary | ICD-10-CM

## 2021-07-02 DIAGNOSIS — K21.00 GASTROESOPHAGEAL REFLUX DISEASE WITH ESOPHAGITIS WITHOUT HEMORRHAGE: ICD-10-CM

## 2021-07-02 DIAGNOSIS — K83.01 PSC (PRIMARY SCLEROSING CHOLANGITIS) (H): ICD-10-CM

## 2021-07-02 DIAGNOSIS — J30.2 SEASONAL ALLERGIC RHINITIS, UNSPECIFIED TRIGGER: ICD-10-CM

## 2021-07-02 DIAGNOSIS — E78.5 HYPERLIPIDEMIA LDL GOAL <160: ICD-10-CM

## 2021-07-02 DIAGNOSIS — J45.41 MODERATE PERSISTENT ASTHMA WITH ACUTE EXACERBATION: ICD-10-CM

## 2021-07-02 DIAGNOSIS — J45.31 MILD PERSISTENT ASTHMA WITH ACUTE EXACERBATION: ICD-10-CM

## 2021-07-02 DIAGNOSIS — R41.3 MEMORY CHANGES: ICD-10-CM

## 2021-07-02 DIAGNOSIS — D50.0 IRON DEFICIENCY ANEMIA DUE TO CHRONIC BLOOD LOSS: ICD-10-CM

## 2021-07-02 DIAGNOSIS — I25.10 CORONARY ARTERY CALCIFICATION: ICD-10-CM

## 2021-07-02 DIAGNOSIS — L30.9 ECZEMA, UNSPECIFIED TYPE: ICD-10-CM

## 2021-07-02 DIAGNOSIS — L30.0 NUMMULAR ECZEMA: ICD-10-CM

## 2021-07-02 DIAGNOSIS — J45.30 MILD PERSISTENT ASTHMA WITHOUT COMPLICATION: ICD-10-CM

## 2021-07-02 PROBLEM — J01.90 ACUTE SINUSITIS WITH SYMPTOMS > 10 DAYS: Status: RESOLVED | Noted: 2019-02-12 | Resolved: 2021-07-02

## 2021-07-02 PROBLEM — R19.7 DIARRHEA, UNSPECIFIED TYPE: Status: RESOLVED | Noted: 2018-11-30 | Resolved: 2021-07-02

## 2021-07-02 PROCEDURE — 77080 DXA BONE DENSITY AXIAL: CPT

## 2021-07-02 PROCEDURE — 99213 OFFICE O/P EST LOW 20 MIN: CPT | Mod: 25 | Performed by: FAMILY MEDICINE

## 2021-07-02 PROCEDURE — 255N000002 HC RX 255 OP 636: Performed by: INTERNAL MEDICINE

## 2021-07-02 PROCEDURE — 99397 PER PM REEVAL EST PAT 65+ YR: CPT | Performed by: FAMILY MEDICINE

## 2021-07-02 PROCEDURE — 74183 MRI ABD W/O CNTR FLWD CNTR: CPT

## 2021-07-02 PROCEDURE — 250N000009 HC RX 250: Performed by: INTERNAL MEDICINE

## 2021-07-02 PROCEDURE — A9585 GADOBUTROL INJECTION: HCPCS | Performed by: INTERNAL MEDICINE

## 2021-07-02 RX ORDER — TRIAMCINOLONE ACETONIDE 1 MG/G
CREAM TOPICAL 2 TIMES DAILY
Qty: 80 G | Refills: 3 | Status: CANCELLED | OUTPATIENT
Start: 2021-07-02

## 2021-07-02 RX ORDER — ATORVASTATIN CALCIUM 40 MG/1
40 TABLET, FILM COATED ORAL DAILY
Qty: 90 TABLET | Refills: 3 | Status: SHIPPED | OUTPATIENT
Start: 2021-07-02 | End: 2022-03-22

## 2021-07-02 RX ORDER — ALBUTEROL SULFATE 90 UG/1
AEROSOL, METERED RESPIRATORY (INHALATION)
Qty: 18 G | Refills: 0 | Status: CANCELLED | OUTPATIENT
Start: 2021-07-02

## 2021-07-02 RX ORDER — GADOBUTROL 604.72 MG/ML
10 INJECTION INTRAVENOUS ONCE
Status: COMPLETED | OUTPATIENT
Start: 2021-07-02 | End: 2021-07-02

## 2021-07-02 RX ORDER — BUDESONIDE AND FORMOTEROL FUMARATE DIHYDRATE 160; 4.5 UG/1; UG/1
AEROSOL RESPIRATORY (INHALATION)
Qty: 30.6 G | Refills: 5 | Status: SHIPPED | OUTPATIENT
Start: 2021-07-02 | End: 2022-06-08

## 2021-07-02 RX ORDER — ALBUTEROL SULFATE 90 UG/1
2 AEROSOL, METERED RESPIRATORY (INHALATION) EVERY 6 HOURS
Qty: 18 G | Refills: 3 | Status: SHIPPED | OUTPATIENT
Start: 2021-07-02 | End: 2022-06-28

## 2021-07-02 RX ORDER — FLUOCINONIDE 0.5 MG/G
CREAM TOPICAL
Qty: 60 G | Refills: 3 | Status: CANCELLED | OUTPATIENT
Start: 2021-07-02

## 2021-07-02 RX ORDER — NAPHAZOLINE/ZINC SULF/GLYCERIN 0.012-0.25
DROPS OPHTHALMIC (EYE) PRN
Status: CANCELLED | OUTPATIENT
Start: 2021-07-02

## 2021-07-02 RX ORDER — FERROUS GLUCONATE 324(38)MG
324 TABLET ORAL 2 TIMES DAILY WITH MEALS
Qty: 60 TABLET | Refills: 3 | Status: CANCELLED | OUTPATIENT
Start: 2021-07-02

## 2021-07-02 RX ADMIN — SODIUM CHLORIDE 30 ML: 9 INJECTION, SOLUTION INTRAVENOUS at 09:04

## 2021-07-02 RX ADMIN — GADOBUTROL 10 ML: 604.72 INJECTION INTRAVENOUS at 09:05

## 2021-07-02 ASSESSMENT — ENCOUNTER SYMPTOMS
DIARRHEA: 0
DYSURIA: 1
NERVOUS/ANXIOUS: 0
JOINT SWELLING: 0
ABDOMINAL PAIN: 0
DIZZINESS: 0
HEMATOCHEZIA: 0
CHILLS: 0
ARTHRALGIAS: 0
WEAKNESS: 0
SORE THROAT: 0
HEMATURIA: 0
SHORTNESS OF BREATH: 0
PARESTHESIAS: 0
NAUSEA: 0
COUGH: 0
CONSTIPATION: 0
HEADACHES: 0
EYE PAIN: 0
MYALGIAS: 0
PALPITATIONS: 0
HEARTBURN: 0
FEVER: 0
FREQUENCY: 1

## 2021-07-02 ASSESSMENT — ACTIVITIES OF DAILY LIVING (ADL): CURRENT_FUNCTION: NO ASSISTANCE NEEDED

## 2021-07-02 ASSESSMENT — MIFFLIN-ST. JEOR: SCORE: 1629.48

## 2021-07-02 NOTE — ASSESSMENT & PLAN NOTE
Followed by gastroenterology.  Currently on entyvio with symptoms in remission.  Continue iron supplementation.  Reviewed labs done by specialty.

## 2021-07-02 NOTE — ASSESSMENT & PLAN NOTE
Failed memory screen but this does not seem to be impacting his day-to-day life.  We will continue to monitor for now.

## 2021-07-02 NOTE — LETTER
My Asthma Action Plan    Name: Mainor Grande   YOB: 1955  Date: 7/2/2021   My doctor: Karen Rodriguez MD   My clinic: [facility]        My Control Medicine: Budesonide + formoterol (Symbicort HFA) -  160/4.5 mcg .  My Rescue Medicine: Albuterol (Proair/Ventolin/Proventil HFA) 2-4 puffs EVERY 4 HOURS as needed. Use a spacer if recommended by your provider.  My Oral Steroid Medicine: none My Asthma Severity:   Mild Persistent  Know your asthma triggers: upper respiratory infections               GREEN ZONE   Good Control    I feel good    No cough or wheeze    Can work, sleep and play without asthma symptoms       Take your asthma control medicine every day.     1. If exercise triggers your asthma, take your rescue medication    15 minutes before exercise or sports, and    During exercise if you have asthma symptoms  2. Spacer to use with inhaler: If you have a spacer, make sure to use it with your inhaler             YELLOW ZONE Getting Worse  I have ANY of these:    I do not feel good    Cough or wheeze    Chest feels tight    Wake up at night   1. Keep taking your Green Zone medications  2. Start taking your rescue medicine:    every 20 minutes for up to 1 hour. Then every 4 hours for 24-48 hours.  3. If you stay in the Yellow Zone for more than 12-24 hours, contact your doctor.  4. If you do not return to the Green Zone in 12-24 hours or you get worse, start taking your oral steroid medicine if prescribed by your provider.           RED ZONE Medical Alert - Get Help  I have ANY of these:    I feel awful    Medicine is not helping    Breathing getting harder    Trouble walking or talking    Nose opens wide to breathe       1. Take your rescue medicine NOW  2. If your provider has prescribed an oral steroid medicine, start taking it NOW  3. Call your doctor NOW  4. If you are still in the Red Zone after 20 minutes and you have not reached your doctor:    Take your rescue  medicine again and    Call 911 or go to the emergency room right away    See your regular doctor within 2 weeks of an Emergency Room or Urgent Care visit for follow-up treatment.          Annual Reminders:  Meet with Asthma Educator,  Flu Shot in the Fall, consider Pneumonia Vaccination for patients with asthma (aged 19 and older).    Pharmacy: [id number] - [place], [place] - [address] [facility]    Electronically signed by Karen Rodriguez MD   Date: 07/02/21                      Asthma Triggers  How To Control Things That Make Your Asthma Worse    Triggers are things that make your asthma worse.  Look at the list below to help you find your triggers and what you can do about them.  You can help prevent asthma flare-ups by staying away from your triggers.      Trigger                                                          What you can do   Cigarette Smoke  Tobacco smoke can make asthma worse. Do not allow smoking in your home, car or around you.  Be sure no one smokes at a child s day care or school.  If you smoke, ask your health care provider for ways to help you quit.  Ask family members to quit too.  Ask your health care provider for a referral to Quit Plan to help you quit smoking, or call 1-620-115-PLAN.     Colds, Flu, Bronchitis  These are common triggers of asthma. Wash your hands often.  Don t touch your eyes, nose or mouth.  Get a flu shot every year.     Dust Mites  These are tiny bugs that live in cloth or carpet. They are too small to see. Wash sheets and blankets in hot water every week.   Encase pillows and mattress in dust mite proof covers.  Avoid having carpet if you can. If you have carpet, vacuum weekly.   Use a dust mask and HEPA vacuum.   Pollen and Outdoor Mold  Some people are allergic to trees, grass, or weed pollen, or molds. Try to keep your windows closed.  Limit time out doors when pollen count is high.   Ask you health care provider about taking medicine during allergy season.      Animal Dander  Some people are allergic to skin flakes, urine or saliva from pets with fur or feathers. Keep pets with fur or feathers out of your home.    If you can t keep the pet outdoors, then keep the pet out of your bedroom.  Keep the bedroom door closed.  Keep pets off cloth furniture and away from stuffed toys.     Mice, Rats, and Cockroaches   Some people are allergic to the waste from these pests.   Cover food and garbage.  Clean up spills and food crumbs.  Store grease in the refrigerator.   Keep food out of the bedroom.   Indoor Mold  This can be a trigger if your home has high moisture. Fix leaking faucets, pipes, or other sources of water.   Clean moldy surfaces.  Dehumidify basement if it is damp and smelly.   Smoke, Strong Odors, and Sprays  These can reduce air quality. Stay away from strong odors and sprays, such as perfume, powder, hair spray, paints, smoke incense, paint, cleaning products, candles and new carpet.   Exercise or Sports  Some people with asthma have this trigger. Be active!  Ask your doctor about taking medicine before sports or exercise to prevent symptoms.    Warm up for 5-10 minutes before and after sports or exercise.     Other Triggers of Asthma  Cold air:  Cover your nose and mouth with a scarf.  Sometimes laughing or crying can be a trigger.  Some medicines and food can trigger asthma.

## 2021-07-03 ASSESSMENT — ASTHMA QUESTIONNAIRES: ACT_TOTALSCORE: 21

## 2021-08-10 ENCOUNTER — TELEPHONE (OUTPATIENT)
Dept: GASTROENTEROLOGY | Facility: CLINIC | Age: 66
End: 2021-08-10

## 2021-08-10 DIAGNOSIS — K51.00 ULCERATIVE PANCOLITIS WITHOUT COMPLICATION (H): Primary | ICD-10-CM

## 2021-08-10 RX ORDER — HEPARIN SODIUM,PORCINE 10 UNIT/ML
5 VIAL (ML) INTRAVENOUS
Status: CANCELLED | OUTPATIENT
Start: 2021-08-11

## 2021-08-10 RX ORDER — HEPARIN SODIUM (PORCINE) LOCK FLUSH IV SOLN 100 UNIT/ML 100 UNIT/ML
5 SOLUTION INTRAVENOUS
Status: CANCELLED | OUTPATIENT
Start: 2021-08-11

## 2021-08-10 NOTE — TELEPHONE ENCOUNTER
Received call from Marianela with the [facility].  Patient is scheduled for his Entyvio Infusion tomorrow at 1 PM but his current therapy plan orders are .      Forwarding to provider for order update if appropriate.  Patient's last office visit was 3/3/2021, colonoscopy on 2021.      Jane Alvarenga RN

## 2021-08-11 ENCOUNTER — INFUSION THERAPY VISIT (OUTPATIENT)
Dept: INFUSION THERAPY | Facility: CLINIC | Age: 66
End: 2021-08-11
Attending: INTERNAL MEDICINE
Payer: COMMERCIAL

## 2021-08-11 VITALS
SYSTOLIC BLOOD PRESSURE: 126 MMHG | BODY MASS INDEX: 27.46 KG/M2 | RESPIRATION RATE: 16 BRPM | HEIGHT: 70 IN | HEART RATE: 70 BPM | WEIGHT: 191.8 LBS | TEMPERATURE: 98.2 F | OXYGEN SATURATION: 97 % | DIASTOLIC BLOOD PRESSURE: 64 MMHG

## 2021-08-11 DIAGNOSIS — K51.00 ULCERATIVE PANCOLITIS WITHOUT COMPLICATION (H): Primary | ICD-10-CM

## 2021-08-11 PROCEDURE — 96365 THER/PROPH/DIAG IV INF INIT: CPT

## 2021-08-11 PROCEDURE — 258N000003 HC RX IP 258 OP 636: Performed by: INTERNAL MEDICINE

## 2021-08-11 PROCEDURE — 250N000011 HC RX IP 250 OP 636: Performed by: INTERNAL MEDICINE

## 2021-08-11 RX ORDER — HEPARIN SODIUM,PORCINE 10 UNIT/ML
5 VIAL (ML) INTRAVENOUS
Status: CANCELLED | OUTPATIENT
Start: 2021-10-06

## 2021-08-11 RX ORDER — HEPARIN SODIUM (PORCINE) LOCK FLUSH IV SOLN 100 UNIT/ML 100 UNIT/ML
5 SOLUTION INTRAVENOUS
Status: CANCELLED | OUTPATIENT
Start: 2021-10-06

## 2021-08-11 RX ADMIN — SODIUM CHLORIDE 250 ML: 9 INJECTION, SOLUTION INTRAVENOUS at 13:20

## 2021-08-11 RX ADMIN — VEDOLIZUMAB 300 MG: 300 INJECTION, POWDER, LYOPHILIZED, FOR SOLUTION INTRAVENOUS at 13:42

## 2021-08-11 ASSESSMENT — MIFFLIN-ST. JEOR: SCORE: 1651.25

## 2021-08-11 NOTE — PROGRESS NOTES
Infusion Nursing Note:  Mainor Grande presents today for Entyvio.    Patient seen by provider today: No   present during visit today: Not Applicable.    Note: Patient denies any new symptoms today, states he thinks the Entyvio is working well.      Intravenous Access:  Peripheral IV placed.    Treatment Conditions:  Biological Infusion Checklist:  ~~~ NOTE: If the patient answers yes to any of the questions below, hold the infusion and contact ordering provider or on-call provider.    1. Have you recently had an elevated temperature, fever, chills, productive cough, coughing for 3 weeks or longer or hemoptysis, abnormal vital signs, night sweats,  chest pain or have you noticed a decrease in your appetite, unexplained weight loss or fatigue? No  2. Do you have any open wounds or new incisions? No  3. Do you have any recent or upcoming hospitalizations, surgeries or dental procedures? No  4. Do you currently have or recently have had any signs of illness or infection or are you on any antibiotics? No  5. Have you had any new, sudden or worsening abdominal pain? No  6. Have you or anyone in your household received a live vaccination in the past 4 weeks? Please note:  No live vaccines while on biologic/chemotherapy until 6 months after the last treatment.  Patient can receive the flu vaccine (shot only) and the pneumovax.  It is optimal for the patient to get these vaccines mid cycle, but they can be given at any time as long as it is not on the day of the infusion. No  7. Have you recently been diagnosed with any new nervous system diseases (ie. Multiple sclerosis, Guillain [place], seizures, neurological changes) or cancer diagnosis? No  8. Are you on any form of radiation or chemotherapy? No  9. Are you pregnant or breast feeding or do you have plans of pregnancy in the future? No  10. Have you been having any signs of worsening depression or suicidal ideations?  (benlysta only) No  11. Have there been  any other new onset medical symptoms? No        Post Infusion Assessment:  Patient tolerated infusion without incident.  Blood return noted pre and post infusion.  Site patent and intact, free from redness, edema or discomfort.  No evidence of extravasations.  Access discontinued per protocol.  Biologic Infusion Post Education: Call the triage nurse at your clinic or seek medical attention if you have chills and/or temperature greater than or equal to 100.5, uncontrolled nausea/vomiting, diarrhea, constipation, dizziness, shortness of breath, chest pain, heart palpitations, weakness or any other new or concerning symptoms, questions or concerns.  You cannot have any live virus vaccines prior to or during treatment or up to 6 months post infusion.  If you have an upcoming surgery, medical procedure or dental procedure during treatment, this should be discussed with your ordering physician and your surgeon/dentist.  If you are having any concerning symptom, if you are unsure if you should get your next infusion or wish to speak to a provider before your next infusion, please call your care coordinator or triage nurse at your clinic to notify them so we can adequately serve you.       Discharge Plan:   Discharge instructions reviewed with: Patient.  Patient and/or family verbalized understanding of discharge instructions and all questions answered.  Patient discharged in stable condition accompanied by: self.  Departure Mode: Ambulatory.      Edel Schmidt RN

## 2021-10-06 ENCOUNTER — APPOINTMENT (OUTPATIENT)
Dept: LAB | Facility: CLINIC | Age: 66
End: 2021-10-06
Payer: COMMERCIAL

## 2021-10-06 ENCOUNTER — INFUSION THERAPY VISIT (OUTPATIENT)
Dept: INFUSION THERAPY | Facility: CLINIC | Age: 66
End: 2021-10-06
Attending: INTERNAL MEDICINE
Payer: COMMERCIAL

## 2021-10-06 VITALS
DIASTOLIC BLOOD PRESSURE: 75 MMHG | RESPIRATION RATE: 18 BRPM | WEIGHT: 203 LBS | SYSTOLIC BLOOD PRESSURE: 131 MMHG | HEART RATE: 58 BPM | OXYGEN SATURATION: 99 % | TEMPERATURE: 98 F | BODY MASS INDEX: 29.13 KG/M2

## 2021-10-06 DIAGNOSIS — K51.00 ULCERATIVE PANCOLITIS WITHOUT COMPLICATION (H): Primary | ICD-10-CM

## 2021-10-06 LAB
ALBUMIN SERPL-MCNC: 3.4 G/DL (ref 3.4–5)
ALP SERPL-CCNC: 244 U/L (ref 40–150)
ALT SERPL W P-5'-P-CCNC: 68 U/L (ref 0–70)
AST SERPL W P-5'-P-CCNC: 51 U/L (ref 0–45)
BASOPHILS # BLD AUTO: 0.1 10E3/UL (ref 0–0.2)
BASOPHILS NFR BLD AUTO: 2 %
BILIRUB DIRECT SERPL-MCNC: 0.3 MG/DL (ref 0–0.2)
BILIRUB SERPL-MCNC: 0.8 MG/DL (ref 0.2–1.3)
CRP SERPL-MCNC: <2.9 MG/L (ref 0–8)
EOSINOPHIL # BLD AUTO: 0.5 10E3/UL (ref 0–0.7)
EOSINOPHIL NFR BLD AUTO: 8 %
ERYTHROCYTE [DISTWIDTH] IN BLOOD BY AUTOMATED COUNT: 13.1 % (ref 10–15)
ERYTHROCYTE [SEDIMENTATION RATE] IN BLOOD BY WESTERGREN METHOD: 8 MM/HR (ref 0–20)
HCT VFR BLD AUTO: 38.9 % (ref 40–53)
HGB BLD-MCNC: 13.2 G/DL (ref 13.3–17.7)
IMM GRANULOCYTES # BLD: 0 10E3/UL
IMM GRANULOCYTES NFR BLD: 0 %
LYMPHOCYTES # BLD AUTO: 2.3 10E3/UL (ref 0.8–5.3)
LYMPHOCYTES NFR BLD AUTO: 37 %
MCH RBC QN AUTO: 31.4 PG (ref 26.5–33)
MCHC RBC AUTO-ENTMCNC: 33.9 G/DL (ref 31.5–36.5)
MCV RBC AUTO: 92 FL (ref 78–100)
MONOCYTES # BLD AUTO: 0.9 10E3/UL (ref 0–1.3)
MONOCYTES NFR BLD AUTO: 14 %
NEUTROPHILS # BLD AUTO: 2.5 10E3/UL (ref 1.6–8.3)
NEUTROPHILS NFR BLD AUTO: 39 %
NRBC # BLD AUTO: 0 10E3/UL
NRBC BLD AUTO-RTO: 0 /100
PLATELET # BLD AUTO: 195 10E3/UL (ref 150–450)
PROT SERPL-MCNC: 7 G/DL (ref 6.8–8.8)
RBC # BLD AUTO: 4.21 10E6/UL (ref 4.4–5.9)
WBC # BLD AUTO: 6.3 10E3/UL (ref 4–11)

## 2021-10-06 PROCEDURE — 80076 HEPATIC FUNCTION PANEL: CPT | Performed by: INTERNAL MEDICINE

## 2021-10-06 PROCEDURE — 250N000011 HC RX IP 250 OP 636: Performed by: INTERNAL MEDICINE

## 2021-10-06 PROCEDURE — 85025 COMPLETE CBC W/AUTO DIFF WBC: CPT | Performed by: INTERNAL MEDICINE

## 2021-10-06 PROCEDURE — 96365 THER/PROPH/DIAG IV INF INIT: CPT

## 2021-10-06 PROCEDURE — 82040 ASSAY OF SERUM ALBUMIN: CPT | Performed by: INTERNAL MEDICINE

## 2021-10-06 PROCEDURE — 85652 RBC SED RATE AUTOMATED: CPT | Performed by: INTERNAL MEDICINE

## 2021-10-06 PROCEDURE — 258N000003 HC RX IP 258 OP 636: Performed by: INTERNAL MEDICINE

## 2021-10-06 PROCEDURE — 86140 C-REACTIVE PROTEIN: CPT | Performed by: INTERNAL MEDICINE

## 2021-10-06 PROCEDURE — 36415 COLL VENOUS BLD VENIPUNCTURE: CPT | Performed by: INTERNAL MEDICINE

## 2021-10-06 RX ORDER — HEPARIN SODIUM,PORCINE 10 UNIT/ML
5 VIAL (ML) INTRAVENOUS
Status: CANCELLED | OUTPATIENT
Start: 2021-12-01

## 2021-10-06 RX ORDER — HEPARIN SODIUM (PORCINE) LOCK FLUSH IV SOLN 100 UNIT/ML 100 UNIT/ML
5 SOLUTION INTRAVENOUS
Status: CANCELLED | OUTPATIENT
Start: 2021-12-01

## 2021-10-06 RX ADMIN — VEDOLIZUMAB 300 MG: 300 INJECTION, POWDER, LYOPHILIZED, FOR SOLUTION INTRAVENOUS at 09:07

## 2021-10-06 RX ADMIN — SODIUM CHLORIDE 250 ML: 9 INJECTION, SOLUTION INTRAVENOUS at 08:44

## 2021-10-06 ASSESSMENT — PAIN SCALES - GENERAL: PAINLEVEL: NO PAIN (0)

## 2021-10-06 NOTE — LETTER
October 6, 2021      Mainor Grande  [id number] [facility] [address]        Dear ,    We are writing to inform you of your test results.    Your blood work is all normal for you. Please contact me if you have any questions.    Resulted Orders   Hepatic panel   Result Value Ref Range    Bilirubin Total 0.8 0.2 - 1.3 mg/dL    Bilirubin Direct 0.3 (H) 0.0 - 0.2 mg/dL    Protein Total 7.0 6.8 - 8.8 g/dL    Albumin 3.4 3.4 - 5.0 g/dL    Alkaline Phosphatase 244 (H) 40 - 150 U/L    AST 51 (H) 0 - 45 U/L    ALT 68 0 - 70 U/L   CRP inflammation   Result Value Ref Range    CRP Inflammation <2.9 0.0 - 8.0 mg/L   Erythrocyte sedimentation rate auto   Result Value Ref Range    Erythrocyte Sedimentation Rate 8 0 - 20 mm/hr   CBC with platelets and differential   Result Value Ref Range    WBC Count 6.3 4.0 - 11.0 10e3/uL    RBC Count 4.21 (L) 4.40 - 5.90 10e6/uL    Hemoglobin 13.2 (L) 13.3 - 17.7 g/dL    Hematocrit 38.9 (L) 40.0 - 53.0 %    MCV 92 78 - 100 fL    MCH 31.4 26.5 - 33.0 pg    MCHC 33.9 31.5 - 36.5 g/dL    RDW 13.1 10.0 - 15.0 %    Platelet Count 195 150 - 450 10e3/uL    % Neutrophils 39 %    % Lymphocytes 37 %    % Monocytes 14 %    % Eosinophils 8 %    % Basophils 2 %    % Immature Granulocytes 0 %    NRBCs per 100 WBC 0 <1 /100    Absolute Neutrophils 2.5 1.6 - 8.3 10e3/uL    Absolute Lymphocytes 2.3 0.8 - 5.3 10e3/uL    Absolute Monocytes 0.9 0.0 - 1.3 10e3/uL    Absolute Eosinophils 0.5 0.0 - 0.7 10e3/uL    Absolute Basophils 0.1 0.0 - 0.2 10e3/uL    Absolute Immature Granulocytes 0.0 <=0.0 10e3/uL    Absolute NRBCs 0.0 10e3/uL       If you have any questions or concerns, please call the clinic at the number listed above.       Sincerely,      Lily Eaton DO

## 2021-10-23 ENCOUNTER — HEALTH MAINTENANCE LETTER (OUTPATIENT)
Age: 66
End: 2021-10-23

## 2021-12-01 ENCOUNTER — INFUSION THERAPY VISIT (OUTPATIENT)
Dept: INFUSION THERAPY | Facility: CLINIC | Age: 66
End: 2021-12-01
Attending: INTERNAL MEDICINE
Payer: COMMERCIAL

## 2021-12-01 VITALS
WEIGHT: 196.6 LBS | DIASTOLIC BLOOD PRESSURE: 71 MMHG | TEMPERATURE: 97.7 F | HEART RATE: 63 BPM | RESPIRATION RATE: 16 BRPM | OXYGEN SATURATION: 97 % | BODY MASS INDEX: 28.21 KG/M2 | SYSTOLIC BLOOD PRESSURE: 117 MMHG

## 2021-12-01 DIAGNOSIS — K51.00 ULCERATIVE PANCOLITIS WITHOUT COMPLICATION (H): Primary | ICD-10-CM

## 2021-12-01 PROCEDURE — 250N000011 HC RX IP 250 OP 636: Performed by: INTERNAL MEDICINE

## 2021-12-01 PROCEDURE — 96365 THER/PROPH/DIAG IV INF INIT: CPT

## 2021-12-01 PROCEDURE — 258N000003 HC RX IP 258 OP 636: Performed by: INTERNAL MEDICINE

## 2021-12-01 RX ORDER — HEPARIN SODIUM (PORCINE) LOCK FLUSH IV SOLN 100 UNIT/ML 100 UNIT/ML
5 SOLUTION INTRAVENOUS
Status: CANCELLED | OUTPATIENT
Start: 2022-01-26

## 2021-12-01 RX ORDER — HEPARIN SODIUM,PORCINE 10 UNIT/ML
5 VIAL (ML) INTRAVENOUS
Status: CANCELLED | OUTPATIENT
Start: 2022-01-26

## 2021-12-01 RX ADMIN — VEDOLIZUMAB 300 MG: 300 INJECTION, POWDER, LYOPHILIZED, FOR SOLUTION INTRAVENOUS at 09:03

## 2021-12-01 RX ADMIN — SODIUM CHLORIDE 250 ML: 9 INJECTION, SOLUTION INTRAVENOUS at 08:35

## 2021-12-01 ASSESSMENT — PAIN SCALES - GENERAL: PAINLEVEL: NO PAIN (0)

## 2021-12-01 NOTE — PROGRESS NOTES
Infusion Nursing Note:  Mainor Grande presents today for entyvio.    Patient seen by provider today: No   present during visit today: Not Applicable.    Note: N/A.      Intravenous Access:  Peripheral IV placed.    Treatment Conditions:    Prior to Infusion of biological medications or any of these as listed:    1. Elevated temperature, fever, chills, productive cough or abnormal vital signs, night sweats, coughing up blood or sputum, no appetite or abnormal vital signs : NO    2. Open wounds or new incisions: NO    3. Recent hospitalization: NO    4.  Recent surgeries:  NO    5. Any upcoming surgeries or dental procedures?:NO    6. Any current or recent bouts of illness or infection? On any antibiotics? : NO    7. Any new, sudden or worsening abdominal pain :NO    8. Vaccination within 4 weeks? Patient or someone in the household is scheduled to receive vaccination? No live virus vaccines prior to or during treatment :NO    9. Any nervous system diseases [i.e. multiple sclerosis, Guillain-[place], seizures, neurological  changes]: NO    10. Pregnant or breast feeding; or plans on pregnancy in the future: NO    11. Signs of worsening depression or suicidal ideations while taking benlysta:NO    12. New-onset medical symptoms: NO    13.  New cancer diagnosis or on chemotherapy or radiation NO    14.  Evaluate for any sign of active TB [Unexplained weight loss, Loss of appetite, Night sweats, Fever, Fatigue, Chills, Coughing for 3 weeks or longer, Hemoptysis (coughing up blood), Chest pain]: NO    **Note: If answered yes to any of the above, hold the infusion and contact ordering rheumatologist or on-call rheumatologist. .      Post Infusion Assessment:  Patient tolerated infusion without incident.  Patient observed for 15 minutes post entyvio.  Site patent and intact, free from redness, edema or discomfort.  No evidence of extravasations.  Access discontinued per protocol.  Biologic Infusion Post Education:  Call the triage nurse at your clinic or seek medical attention if you have chills and/or temperature greater than or equal to 100.5, uncontrolled nausea/vomiting, diarrhea, constipation, dizziness, shortness of breath, chest pain, heart palpitations, weakness or any other new or concerning symptoms, questions or concerns.  You cannot have any live virus vaccines prior to or during treatment or up to 6 months post infusion.  If you have an upcoming surgery, medical procedure or dental procedure during treatment, this should be discussed with your ordering physician and your surgeon/dentist.  If you are having any concerning symptom, if you are unsure if you should get your next infusion or wish to speak to a provider before your next infusion, please call your care coordinator or triage nurse at your clinic to notify them so we can adequately serve you.       Discharge Plan:   Discharge instructions reviewed with: Patient.  Patient discharged in stable condition accompanied by: self.  Departure Mode: Ambulatory.      Marianela Fischer RN

## 2021-12-30 ENCOUNTER — TELEPHONE (OUTPATIENT)
Dept: CARDIOLOGY | Facility: CLINIC | Age: 66
End: 2021-12-30
Payer: COMMERCIAL

## 2021-12-30 NOTE — TELEPHONE ENCOUNTER
12/30 provided phone number [phone] to schedule Return in about 1 year (around 3/30/2022).       Dixie Keyes  Procedure    Cardiology, Rheumatology, GI, Pulmonology, Nephrology Specialties   [facility]  [phone]

## 2022-01-20 ENCOUNTER — TELEPHONE (OUTPATIENT)
Dept: CARDIOLOGY | Facility: CLINIC | Age: 67
End: 2022-01-20
Payer: COMMERCIAL

## 2022-01-20 NOTE — TELEPHONE ENCOUNTER
1/20 [facility] provided patient with callback number [phone]. Return in about 1 year (around 3/30/2022).    Dixie Keyes  Procedure    Cardiology, Rheumatology, GI, Pulmonology, Nephrology Specialties   [facility]  [phone]

## 2022-01-26 ENCOUNTER — APPOINTMENT (OUTPATIENT)
Dept: LAB | Facility: CLINIC | Age: 67
End: 2022-01-26
Payer: COMMERCIAL

## 2022-01-26 ENCOUNTER — INFUSION THERAPY VISIT (OUTPATIENT)
Dept: INFUSION THERAPY | Facility: CLINIC | Age: 67
End: 2022-01-26
Attending: INTERNAL MEDICINE
Payer: COMMERCIAL

## 2022-01-26 VITALS
WEIGHT: 198.41 LBS | DIASTOLIC BLOOD PRESSURE: 72 MMHG | TEMPERATURE: 98.2 F | OXYGEN SATURATION: 97 % | SYSTOLIC BLOOD PRESSURE: 126 MMHG | BODY MASS INDEX: 28.47 KG/M2 | RESPIRATION RATE: 16 BRPM | HEART RATE: 53 BPM

## 2022-01-26 DIAGNOSIS — K51.00 ULCERATIVE PANCOLITIS WITHOUT COMPLICATION (H): Primary | ICD-10-CM

## 2022-01-26 LAB
ALBUMIN SERPL-MCNC: 3.4 G/DL (ref 3.4–5)
ALP SERPL-CCNC: 326 U/L (ref 40–150)
ALT SERPL W P-5'-P-CCNC: 108 U/L (ref 0–70)
AST SERPL W P-5'-P-CCNC: 86 U/L (ref 0–45)
BASOPHILS # BLD AUTO: 0.1 10E3/UL (ref 0–0.2)
BASOPHILS NFR BLD AUTO: 2 %
BILIRUB DIRECT SERPL-MCNC: 0.3 MG/DL (ref 0–0.2)
BILIRUB SERPL-MCNC: 0.9 MG/DL (ref 0.2–1.3)
CRP SERPL-MCNC: 3.8 MG/L (ref 0–8)
EOSINOPHIL # BLD AUTO: 0.6 10E3/UL (ref 0–0.7)
EOSINOPHIL NFR BLD AUTO: 10 %
ERYTHROCYTE [DISTWIDTH] IN BLOOD BY AUTOMATED COUNT: 12.7 % (ref 10–15)
ERYTHROCYTE [SEDIMENTATION RATE] IN BLOOD BY WESTERGREN METHOD: 10 MM/HR (ref 0–20)
HCT VFR BLD AUTO: 43.9 % (ref 40–53)
HGB BLD-MCNC: 14.2 G/DL (ref 13.3–17.7)
IMM GRANULOCYTES # BLD: 0 10E3/UL
IMM GRANULOCYTES NFR BLD: 0 %
LYMPHOCYTES # BLD AUTO: 1.9 10E3/UL (ref 0.8–5.3)
LYMPHOCYTES NFR BLD AUTO: 31 %
MCH RBC QN AUTO: 31.3 PG (ref 26.5–33)
MCHC RBC AUTO-ENTMCNC: 32.3 G/DL (ref 31.5–36.5)
MCV RBC AUTO: 97 FL (ref 78–100)
MONOCYTES # BLD AUTO: 0.9 10E3/UL (ref 0–1.3)
MONOCYTES NFR BLD AUTO: 14 %
NEUTROPHILS # BLD AUTO: 2.6 10E3/UL (ref 1.6–8.3)
NEUTROPHILS NFR BLD AUTO: 43 %
NRBC # BLD AUTO: 0 10E3/UL
NRBC BLD AUTO-RTO: 0 /100
PLATELET # BLD AUTO: 221 10E3/UL (ref 150–450)
PROT SERPL-MCNC: 7.4 G/DL (ref 6.8–8.8)
RBC # BLD AUTO: 4.53 10E6/UL (ref 4.4–5.9)
WBC # BLD AUTO: 6 10E3/UL (ref 4–11)

## 2022-01-26 PROCEDURE — 250N000011 HC RX IP 250 OP 636: Performed by: INTERNAL MEDICINE

## 2022-01-26 PROCEDURE — 96366 THER/PROPH/DIAG IV INF ADDON: CPT

## 2022-01-26 PROCEDURE — 82040 ASSAY OF SERUM ALBUMIN: CPT | Performed by: INTERNAL MEDICINE

## 2022-01-26 PROCEDURE — 258N000003 HC RX IP 258 OP 636: Performed by: INTERNAL MEDICINE

## 2022-01-26 PROCEDURE — 85652 RBC SED RATE AUTOMATED: CPT | Performed by: INTERNAL MEDICINE

## 2022-01-26 PROCEDURE — 85025 COMPLETE CBC W/AUTO DIFF WBC: CPT | Performed by: INTERNAL MEDICINE

## 2022-01-26 PROCEDURE — 96365 THER/PROPH/DIAG IV INF INIT: CPT

## 2022-01-26 PROCEDURE — 86140 C-REACTIVE PROTEIN: CPT | Performed by: INTERNAL MEDICINE

## 2022-01-26 PROCEDURE — 36415 COLL VENOUS BLD VENIPUNCTURE: CPT | Performed by: INTERNAL MEDICINE

## 2022-01-26 RX ORDER — HEPARIN SODIUM (PORCINE) LOCK FLUSH IV SOLN 100 UNIT/ML 100 UNIT/ML
5 SOLUTION INTRAVENOUS
Status: CANCELLED | OUTPATIENT
Start: 2022-03-23

## 2022-01-26 RX ORDER — HEPARIN SODIUM,PORCINE 10 UNIT/ML
5 VIAL (ML) INTRAVENOUS
Status: CANCELLED | OUTPATIENT
Start: 2022-03-23

## 2022-01-26 RX ADMIN — VEDOLIZUMAB 300 MG: 300 INJECTION, POWDER, LYOPHILIZED, FOR SOLUTION INTRAVENOUS at 09:10

## 2022-01-26 RX ADMIN — SODIUM CHLORIDE 250 ML: 9 INJECTION, SOLUTION INTRAVENOUS at 08:37

## 2022-01-26 ASSESSMENT — PAIN SCALES - GENERAL: PAINLEVEL: NO PAIN (0)

## 2022-01-26 NOTE — PROGRESS NOTES
Infusion Nursing Note:  Mainor Grande presents today for Entyvio.    Patient seen by provider today: No   present during visit today: Not Applicable.    Note: N/A.      Intravenous Access:  Peripheral IV placed.    Treatment Conditions:  Biological Infusion Checklist:  ~~~ NOTE: If the patient answers yes to any of the questions below, hold the infusion and contact ordering provider or on-call provider.    1. Have you recently had an elevated temperature, fever, chills, productive cough, coughing for 3 weeks or longer or hemoptysis, abnormal vital signs, night sweats,  chest pain or have you noticed a decrease in your appetite, unexplained weight loss or fatigue? No  2. Do you have any open wounds or new incisions? No  3. Do you have any recent or upcoming hospitalizations, surgeries or dental procedures? No  4. Do you currently have or recently have had any signs of illness or infection or are you on any antibiotics? No  5. Have you had any new, sudden or worsening abdominal pain? No  6. Have you or anyone in your household received a live vaccination in the past 4 weeks? Please note:  No live vaccines while on biologic/chemotherapy until 6 months after the last treatment.  Patient can receive the flu vaccine (shot only) and the pneumovax.  It is optimal for the patient to get these vaccines mid cycle, but they can be given at any time as long as it is not on the day of the infusion. No  7. Have you recently been diagnosed with any new nervous system diseases (ie. Multiple sclerosis, Guillain [place], seizures, neurological changes) or cancer diagnosis? No  8. Are you on any form of radiation or chemotherapy? No  9. Are you pregnant or breast feeding or do you have plans of pregnancy in the future? No  10. Have you been having any signs of worsening depression or suicidal ideations?  (benlysta only) No  11. Have there been any other new onset medical symptoms? No        Post Infusion Assessment:  Patient  tolerated infusion without incident.  Patient observed for 15 minutes post Entyvio.  Site patent and intact, free from redness, edema or discomfort.  No evidence of extravasations.  Access discontinued per protocol.  Biologic Infusion Post Education: Call the triage nurse at your clinic or seek medical attention if you have chills and/or temperature greater than or equal to 100.5, uncontrolled nausea/vomiting, diarrhea, constipation, dizziness, shortness of breath, chest pain, heart palpitations, weakness or any other new or concerning symptoms, questions or concerns.  You cannot have any live virus vaccines prior to or during treatment or up to 6 months post infusion.  If you have an upcoming surgery, medical procedure or dental procedure during treatment, this should be discussed with your ordering physician and your surgeon/dentist.  If you are having any concerning symptom, if you are unsure if you should get your next infusion or wish to speak to a provider before your next infusion, please call your care coordinator or triage nurse at your clinic to notify them so we can adequately serve you.       Discharge Plan:   Discharge instructions reviewed with: Patient.  Patient discharged in stable condition accompanied by: self.  Departure Mode: Ambulatory.      Marianela Fischer RN

## 2022-02-07 DIAGNOSIS — K51.00 ULCERATIVE PANCOLITIS WITHOUT COMPLICATION (H): Primary | ICD-10-CM

## 2022-03-10 ENCOUNTER — TELEPHONE (OUTPATIENT)
Dept: FAMILY MEDICINE | Facility: OTHER | Age: 67
End: 2022-03-10
Payer: COMMERCIAL

## 2022-03-10 NOTE — TELEPHONE ENCOUNTER
Pt calls requesting a refill of atorvastatin. Advised him that a year supply was sent to [facility] in [place] on 7/2/21 so should have refills remaining. He will contact the pharmacy.     Sigrid Lin, RINKUN, RN, PHN  Registered Nurse-Clinic Triage  [facility]  3/10/2022 at 11:35 AM

## 2022-03-17 ENCOUNTER — LAB (OUTPATIENT)
Dept: LAB | Facility: OTHER | Age: 67
End: 2022-03-17
Payer: COMMERCIAL

## 2022-03-17 DIAGNOSIS — D64.9 ANEMIA, UNSPECIFIED TYPE: ICD-10-CM

## 2022-03-17 DIAGNOSIS — E78.5 DYSLIPIDEMIA: ICD-10-CM

## 2022-03-17 DIAGNOSIS — I25.10 CORONARY ARTERY DISEASE INVOLVING NATIVE CORONARY ARTERY OF NATIVE HEART WITHOUT ANGINA PECTORIS: ICD-10-CM

## 2022-03-17 LAB
ANION GAP SERPL CALCULATED.3IONS-SCNC: 4 MMOL/L (ref 3–14)
BUN SERPL-MCNC: 9 MG/DL (ref 7–30)
CALCIUM SERPL-MCNC: 8.8 MG/DL (ref 8.5–10.1)
CHLORIDE BLD-SCNC: 102 MMOL/L (ref 94–109)
CO2 SERPL-SCNC: 29 MMOL/L (ref 20–32)
CREAT SERPL-MCNC: 1 MG/DL (ref 0.66–1.25)
ERYTHROCYTE [DISTWIDTH] IN BLOOD BY AUTOMATED COUNT: 12.9 % (ref 10–15)
GFR SERPL CREATININE-BSD FRML MDRD: 82 ML/MIN/1.73M2
GLUCOSE BLD-MCNC: 80 MG/DL (ref 70–99)
HCT VFR BLD AUTO: 40.5 % (ref 40–53)
HGB BLD-MCNC: 13.2 G/DL (ref 13.3–17.7)
MCH RBC QN AUTO: 30.8 PG (ref 26.5–33)
MCHC RBC AUTO-ENTMCNC: 32.6 G/DL (ref 31.5–36.5)
MCV RBC AUTO: 94 FL (ref 78–100)
PLATELET # BLD AUTO: 217 10E3/UL (ref 150–450)
POTASSIUM BLD-SCNC: 4.1 MMOL/L (ref 3.4–5.3)
RBC # BLD AUTO: 4.29 10E6/UL (ref 4.4–5.9)
SODIUM SERPL-SCNC: 135 MMOL/L (ref 133–144)
TSH SERPL DL<=0.005 MIU/L-ACNC: 1.31 MU/L (ref 0.4–4)
WBC # BLD AUTO: 7.6 10E3/UL (ref 4–11)

## 2022-03-17 PROCEDURE — 84443 ASSAY THYROID STIM HORMONE: CPT

## 2022-03-17 PROCEDURE — 80048 BASIC METABOLIC PNL TOTAL CA: CPT

## 2022-03-17 PROCEDURE — 85027 COMPLETE CBC AUTOMATED: CPT

## 2022-03-17 PROCEDURE — 36415 COLL VENOUS BLD VENIPUNCTURE: CPT

## 2022-03-22 ENCOUNTER — VIRTUAL VISIT (OUTPATIENT)
Dept: CARDIOLOGY | Facility: CLINIC | Age: 67
End: 2022-03-22
Payer: COMMERCIAL

## 2022-03-22 DIAGNOSIS — I25.10 CORONARY ARTERY CALCIFICATION: ICD-10-CM

## 2022-03-22 DIAGNOSIS — E78.5 DYSLIPIDEMIA: Primary | ICD-10-CM

## 2022-03-22 PROCEDURE — 99215 OFFICE O/P EST HI 40 MIN: CPT | Mod: 95 | Performed by: INTERNAL MEDICINE

## 2022-03-22 RX ORDER — ATORVASTATIN CALCIUM 40 MG/1
40 TABLET, FILM COATED ORAL DAILY
Qty: 90 TABLET | Refills: 3 | Status: SHIPPED | OUTPATIENT
Start: 2022-03-22 | End: 2023-07-12

## 2022-03-22 NOTE — NURSING NOTE
Chief Complaint   Patient presents with     Follow Up     6 month, no questions while rooming     Patient denies any changes regarding medication and allergies and states all information remains accurate since last reviewed.  Cherelle Dao, EDIN/CMA

## 2022-03-22 NOTE — PROGRESS NOTES
Visit Date: 2022      Karen Rodriguez MD  [address] [id number]    Patient:  Mainor Grande  MRN:  [id number]  :  1955    Dear Dr. Rodriguez:      It was a pleasure participating in the care of your patient, . Mainor Grande.  As you know, he is a 66-year-old gentleman whom I saw over a virtual video visit today via "[employer]" for coronary artery disease and hyperlipidemia.    PAST MEDICAL HISTORY:      1.  Hyperlipidemia.  2.  Ulcerative colitis.  3.  Primary sclerosing cholangitis.  4.  Eczema.  5.  Asthma/allergic rhinitis.  6.  Fatty liver.  7.  Erectile dysfunction.  8.  Gastroesophageal reflux disease.    As you recall, he had a chest CT performed 2020 that showed severe coronary artery calcifications.  Exercise nuclear stress test showed a small amount of apical ischemia and he was started on aspirin and Lipitor.    I last saw him 2021.  At that time, he was doing adequately.  He presents today for continuing care.    Since our last visit, he has done well.  He has been diagnosed with primary sclerosing cholangitis, but currently he has no gross manifestations.  He has done well from an activity standpoint as he continues to work out 5 days a week.  He worked with a  and lost quite a bit of weight.  He does lifting, strengthening, stretching, core exercises and toning.  He works out on the elliptical machine for at least about 20 minutes 5 days a week and works up a good sweat.  He denies any significant limitations, chest pain, shortness of breath or other symptomatology.  He similarly denies any other PND, orthopnea, edema, palpitations, syncope or near syncope.  His weight has been stable at 194 pounds and his blood pressures have been stable at 114/58 taken at the dentist's office this morning.    A 10-point review of systems is positive for some manifestations from his ulcerative colitis and  occasional constipation from his iron, but otherwise unremarkable.    CURRENT MEDICATIONS:  He is taking aspirin 81 mg a day, Lipitor 40 mg a day, iron, omeprazole.    PHYSICAL EXAMINATION:      VITAL SIGNS:  Blood pressure is 114/58, his heart rate is in the 70s.  His weight is 194 pounds.  GENERAL:  He appears comfortable, well groomed.  PSYCHIATRIC:  He is alert and oriented x3.  HEENT:  Eyes do not appear grossly erythematous or have exudate.  RESPIRATORY:  He is breathing comfortably without gross cough.    The remainder of the comprehensive physical exam was deferred secondary to the COVID-19 pandemic and secondary to video visit restrictions.    LABORATORY:  On 06/16/2021, LDL 55.  On 03/17/2022, potassium 4.1, GFR normal, TSH normal, hemoglobin 13.2.    Pharmacologic nuclear stress test on 09/03/2020 revealed a possible small area of apical ischemia, otherwise normal ejection fraction.      IMPRESSION:      Leno is a 66-year-old gentleman with several active issues:    1.  Subclinical coronary artery disease.    Chest CT of 07/02/2020 revealed severe coronary artery calcifications.  This obviously represents the presence of underlying coronary artery disease; however, it does not indicate hemodynamically significance.    Exercise nuclear stress test of 09/03/2020 revealed a small amount of apical ischemia that was a relatively borderline finding.    More importantly, from a clinical standpoint, the patient can exercise for 20 minutes on the elliptical machine without symptoms and he works out 5 days a week without gross limitation.  We will focus our efforts at secondary prevention therapy.    2.  Hyperlipidemia.      Last fasting lipid check showed that his LDL was within the goal range of less than 70.  Continue to follow.      PLAN:       1.  Continue daily baby aspirin and high intensity statin with goal LDL less than 70.    2.  Virtual video visit followup in 1 year with lab work prior, earlier if  needed.    Once again, it was a pleasure participating in the care of your patient, Mr. Claudia Grande.  Please feel free to contact me at any time if any questions regarding his care in the future.            Fareed Lara MD        D: 2022   T: 2022   MT: GISELLE    Name:     CLAUDIA GRANDE  MRN:      [id number]        Account:    [id number]   :      1955           Visit Date: 2022     Document: [id number]    cc:  Karen Rodriguez MD

## 2022-03-22 NOTE — PROGRESS NOTES
"Mainor Grande  is being evaluated via a billable video visit.      How would you like to obtain your AVS? [facility]  For the video visit, send the invitation by: pt using [employer]  Will anyone else be joining your video visit? No   EDIN Benitez/TIESHA    The patient has been notified of following:     \"This video visit will be conducted via a call between you and your physician/provider. We have found that certain health care needs can be provided without the need for an in-person physical exam.  This service lets us provide the care you need with a video conversation.  If a prescription is necessary we can send it directly to your pharmacy.  If lab work is needed we can place an order for that and you can then stop by our lab to have the test done at a later time.    Video visits are billed at different rates depending on your insurance coverage.  Please reach out to your insurance provider with any questions.    If during the course of the call the physician/provider feels a video visit is not appropriate, you will not be charged for this service.\"    Patient has given verbal consent for video visit? Yes    How would you like to obtain your AVS? Mail    Video-Visit Details    Type of service:  Video Visit    Video Start Time:1130am    Video End Time:1146am    Total visit time including video visit, chart review, charting, coordination of care =46min    Originating Location (pt. Location):patient home      Distant Location (provider location):  home office    Platform used for Video Visit: Leon    See dictation #[id number]    CSN#:[id number]      "

## 2022-03-23 ENCOUNTER — INFUSION THERAPY VISIT (OUTPATIENT)
Dept: INFUSION THERAPY | Facility: CLINIC | Age: 67
End: 2022-03-23
Attending: INTERNAL MEDICINE
Payer: COMMERCIAL

## 2022-03-23 VITALS
SYSTOLIC BLOOD PRESSURE: 131 MMHG | HEART RATE: 56 BPM | TEMPERATURE: 97.5 F | RESPIRATION RATE: 16 BRPM | DIASTOLIC BLOOD PRESSURE: 73 MMHG | BODY MASS INDEX: 27.46 KG/M2 | WEIGHT: 191.4 LBS | OXYGEN SATURATION: 99 %

## 2022-03-23 DIAGNOSIS — K51.00 ULCERATIVE PANCOLITIS WITHOUT COMPLICATION (H): Primary | ICD-10-CM

## 2022-03-23 PROCEDURE — 250N000011 HC RX IP 250 OP 636: Performed by: INTERNAL MEDICINE

## 2022-03-23 PROCEDURE — 258N000003 HC RX IP 258 OP 636: Performed by: INTERNAL MEDICINE

## 2022-03-23 PROCEDURE — 96365 THER/PROPH/DIAG IV INF INIT: CPT

## 2022-03-23 RX ORDER — HEPARIN SODIUM (PORCINE) LOCK FLUSH IV SOLN 100 UNIT/ML 100 UNIT/ML
5 SOLUTION INTRAVENOUS
Status: CANCELLED | OUTPATIENT
Start: 2022-05-18

## 2022-03-23 RX ORDER — HEPARIN SODIUM (PORCINE) LOCK FLUSH IV SOLN 100 UNIT/ML 100 UNIT/ML
5 SOLUTION INTRAVENOUS
Status: DISCONTINUED | OUTPATIENT
Start: 2022-03-23 | End: 2022-03-23 | Stop reason: HOSPADM

## 2022-03-23 RX ORDER — HEPARIN SODIUM,PORCINE 10 UNIT/ML
5 VIAL (ML) INTRAVENOUS
Status: DISCONTINUED | OUTPATIENT
Start: 2022-03-23 | End: 2022-03-23 | Stop reason: HOSPADM

## 2022-03-23 RX ORDER — HEPARIN SODIUM,PORCINE 10 UNIT/ML
5 VIAL (ML) INTRAVENOUS
Status: CANCELLED | OUTPATIENT
Start: 2022-05-18

## 2022-03-23 RX ADMIN — SODIUM CHLORIDE 250 ML: 9 INJECTION, SOLUTION INTRAVENOUS at 08:55

## 2022-03-23 RX ADMIN — VEDOLIZUMAB 300 MG: 300 INJECTION, POWDER, LYOPHILIZED, FOR SOLUTION INTRAVENOUS at 09:04

## 2022-03-23 NOTE — PROGRESS NOTES
Infusion Nursing Note:  Mainor Grande presents today for Entyvio infusion.    Patient seen by provider today: No   present during visit today: Not Applicable.    Note: tolerated well. .      Intravenous Access:  Peripheral IV placed.    Treatment Conditions:  Biological Infusion Checklist:  ~~~ NOTE: If the patient answers yes to any of the questions below, hold the infusion and contact ordering provider or on-call provider.    1. Have you recently had an elevated temperature, fever, chills, productive cough, coughing for 3 weeks or longer or hemoptysis, abnormal vital signs, night sweats,  chest pain or have you noticed a decrease in your appetite, unexplained weight loss or fatigue? No  2. Do you have any open wounds or new incisions? No  3. Do you have any recent or upcoming hospitalizations, surgeries or dental procedures? No  4. Do you currently have or recently have had any signs of illness or infection or are you on any antibiotics? No  5. Have you had any new, sudden or worsening abdominal pain? No  6. Have you or anyone in your household received a live vaccination in the past 4 weeks? Please note:  No live vaccines while on biologic/chemotherapy until 6 months after the last treatment.  Patient can receive the flu vaccine (shot only) and the pneumovax.  It is optimal for the patient to get these vaccines mid cycle, but they can be given at any time as long as it is not on the day of the infusion. No  7. Have you recently been diagnosed with any new nervous system diseases (ie. Multiple sclerosis, Guillain [place], seizures, neurological changes) or cancer diagnosis? No  8. Are you on any form of radiation or chemotherapy? No  9. Are you pregnant or breast feeding or do you have plans of pregnancy in the future? No  10. Have you been having any signs of worsening depression or suicidal ideations?  (benlysta only) No  11. Have there been any other new onset medical symptoms? No        Post  Infusion Assessment:  Patient tolerated infusion without incident.  Site patent and intact, free from redness, edema or discomfort.  No evidence of extravasations.  Access discontinued per protocol.       Discharge Plan:   Discharge instructions reviewed with: Patient.  Patient and/or family verbalized understanding of discharge instructions and all questions answered.  Patient discharged in stable condition accompanied by: self.  Departure Mode: Ambulatory.      Shannon Vasquez RN

## 2022-04-04 ENCOUNTER — TELEPHONE (OUTPATIENT)
Dept: GASTROENTEROLOGY | Facility: CLINIC | Age: 67
End: 2022-04-04
Payer: COMMERCIAL

## 2022-04-04 NOTE — TELEPHONE ENCOUNTER
Screening Questions  BlueKIND OF PREP RedLOCATION [review exclusion criteria] GreenSEDATION TYPE  1. Have you had a positive covid test in the last 90 days? N     2. Are you active on mychart? Y    3. What insurance is in the chart? [facility] MEDICARE     3.   Ordering/Referring Provider: [id number]      4. BMI 27.5 [BMI OVER 40-EXTENDED PREP]  If greater than 40 review exclusion criteria [PAC APPT IF @ [facility]]        5.  Respiratory Screening :  [If yes to any of the following HOSPITAL setting only]     Do you use daily home oxygen? N    Do you have mod to severe Obstructive Sleep Apnea? N  [OKAY @ [facility] SH PH RI]   Do you have Pulmonary Hypertension? N     Do you have UNCONTROLLED asthma? N        6.   Have you had a heart or lung transplant? N      7.   Are you currently on dialysis? N [ If yes, G-PREP & HOSPITAL setting only]     8.   Do you have chronic kidney disease? N [ If yes, G-PREP ]    9.   Have you had a stroke or Transient ischemic attack (TIA - aka  mini stroke ) within 6 months?  N (If yes, please review exclusion criteria)    10.   In the past 6 months, have you had any heart related issues including cardiomyopathy or heart attack? N           If yes, did it require cardiac stenting or other implantable device? N      11.   Do you have any implantable devices in your body (pacemaker, defib, LVAD)? N (If yes, please review exclusion criteria)    12.   Do you take nitroglycerin? N           If yes, how often? N  (if yes, HOSPITAL setting ONLY)    13.   Are you currently taking any blood thinners? N           [IF YES, INFORM PATIENT TO FOLLOW UP W/ ORDERING PROVIDER FOR BRIDGING INSTRUCTIONS]     14.   Do you have a diagnosis of diabetes? N   [ If yes, G-PREP ]    15.   [FEMALES] Are you currently pregnant? N    If yes, how many weeks? N    16.   Are you taking any prescription pain medications on a routine schedule?  N  [ If yes, EXTENDED PREP.] [If yes, MAC]    17.   Do you have any chemical  dependencies such as alcohol, street drugs, or methadone?  N [If yes, MAC]    18.   Do you have any history of post-traumatic stress syndrome, severe anxiety or history of psychosis?  N  [If yes, MAC]    19.   Do you have a significant intellectual disability?  N [If yes, MAC]    20.   Do you transfer independently?  Y    21.  On a regular basis do you go 3-5 days between bowel movements? N   [ If yes, EXTENDED PREP.]    22.   Preferred LOCAL Pharmacy for Pre Prescription  [id number] - [place], [place] - [address] [facility]      Scheduling Details      Caller : Mainor Grande  (Please ask for phone number if not scheduled by patient)    Type of Procedure Scheduled: COLON  Which Colonoscopy Prep was Sent?: MIRLAX  KHORUTS CF PATIENTS & GROEN'S PATIENTS NEEDS EXTENDED PREP  Surgeon: PAUL  Date of Procedure: 5/3/22  Location: [facility]      Sedation Type: CS  Conscious Sedation- Needs  for 6 hours after the procedure  MAC/General-Needs  for 24 hours after procedure    Pre-op Required at [facility] [place] [place] and OR for MAC sedation:   (advise patient they will need a pre-op prior to procedure -)      Informed patient they will need an adult  Y  Cannot take any type of public or medical transportation alone    Pre-Procedure Covid test to be completed at [facility] or Externally: BERTHA ACEVEDO 4/29    Confirmed Nurse will call to complete assessment Y    Additional comments: N

## 2022-04-05 DIAGNOSIS — Z11.59 ENCOUNTER FOR SCREENING FOR OTHER VIRAL DISEASES: Primary | ICD-10-CM

## 2022-04-20 ENCOUNTER — VIRTUAL VISIT (OUTPATIENT)
Dept: GASTROENTEROLOGY | Facility: CLINIC | Age: 67
End: 2022-04-20
Payer: COMMERCIAL

## 2022-04-20 ENCOUNTER — TELEPHONE (OUTPATIENT)
Dept: GASTROENTEROLOGY | Facility: CLINIC | Age: 67
End: 2022-04-20

## 2022-04-20 DIAGNOSIS — K83.01 PSC (PRIMARY SCLEROSING CHOLANGITIS) (H): Primary | ICD-10-CM

## 2022-04-20 DIAGNOSIS — K51.00 ULCERATIVE PANCOLITIS WITHOUT COMPLICATION (H): Primary | ICD-10-CM

## 2022-04-20 PROCEDURE — 99213 OFFICE O/P EST LOW 20 MIN: CPT | Mod: 95 | Performed by: INTERNAL MEDICINE

## 2022-04-20 NOTE — PROGRESS NOTES
HPI:    Leno presents today for a video visit for follow-up of UC pancolitis.  Doing well on entyvio every 8 weeks.  No diarrhea or blood in the stool.  Was started on iron pills and has been struggling with constipation since - manages this with prunes which helps although sometimes the response is unpredictable.  Does occasionally notices some abdominal cramping/gas - improves when he has a BM.  No blood in the stool.  Tolerating entyvio without issue.  [place] to potentially arrange infusions down there while he is gone.  Scheduled for follow-up colonoscopy with me in the next few weeks.    Following with Dr. Fu re:PSC - last seen 6/2021 - will help arrange follow-up.      Past Medical History:   Diagnosis Date     Allergic rhinitis, cause unspecified      Fatty liver 2004    elevated LFT, saw GI for a consultation     Mild persistent asthma 9/12/2008     Reflux esophagitis      Unspecified disease of respiratory system     RAD     Viremia, unspecified     acute       Past Surgical History:   Procedure Laterality Date     COLONOSCOPY  9/4/2013    Procedure: COLONOSCOPY;  colonoscopy;  Surgeon: Beto Keen MD;  Location: [facility]     COLONOSCOPY N/A 7/31/2020    Procedure: Colonoscopy, With Polypectomy And Biopsy;  Surgeon: Lily Eaton DO;  Location: [facility]     COLONOSCOPY N/A 4/5/2021    Procedure: Colonoscopy, With Polypectomy And Biopsy;  Surgeon: Lily Eaton DO;  Location: [facility]     COLONOSCOPY WITH CO2 INSUFFLATION N/A 7/31/2020    Procedure: COLONOSCOPY, WITH CO2 INSUFFLATION;  Surgeon: Lily Eaton DO;  [facility]     COLONOSCOPY WITH CO2 INSUFFLATION N/A 4/5/2021    Procedure: COLONOSCOPY, WITH CO2 INSUFFLATION;  Surgeon: Lily Eaton DO;  [facility]     COMBINED ESOPHAGOSCOPY, GASTROSCOPY, DUODENOSCOPY (EGD) WITH CO2 INSUFFLATION N/A 7/31/2020    Procedure: ESOPHAGOGASTRODUODENOSCOPY, WITH CO2 INSUFFLATION;   Surgeon: Lily Eaton DO;  [facility]     ESOPHAGOSCOPY, GASTROSCOPY, DUODENOSCOPY (EGD), COMBINED N/A 7/31/2020    Procedure: Esophagogastroduodenoscopy, With Biopsy;  Surgeon: Lily Eaton DO;  Location: [facility]     HC VASECTOMY UNILAT/BILAT W POSTOP SEMEN  1992    Vasectomy     ZZHC COLONOSCOPY THRU STOMA, DIAGNOSTIC  2005    normal       Family History   Problem Relation Age of Onset     Cancer Mother         bladder cancer     Neurologic Disorder Father         alzheimers dementia     Gastrointestinal Disease Brother         divertculitis       Social History     Tobacco Use     Smoking status: Never Smoker     Smokeless tobacco: Never Used   Substance Use Topics     Alcohol use: Yes     Alcohol/week: 1.0 - 2.0 standard drink     Types: 1 - 2 Standard drinks or equivalent per week     Comment: caffeine 6 per day        O:    Gen: no acute distress  HEENT: NCAT  Neck: normal ROM  Resp: nonlabored breathing  Neuro: no gross deficits  Psych: appropriate mood and affect    Assessment and Plan:    # UC pancolitis - doing well on entyvio - main issue currently is constipation (likely related to PO iron).  Will start miralax.  Will also check iron studies with next set of labs as will hopefully be able to stop supplements.  Scheduled for follow-up colonoscopy    # PSC - following with Dr. Fu - will help arrange follow-up appointment.    RTC 6 months  Vaccinations:  -- Influenza (every year): Last given this year per patient  -- TdaP (every 10 years): Last given 2013  -- Pneumococcal Pneumonia (once then every 5 years): Last given UTD     -- Due to the immunosuppression in this patient, I would not advise administration of live vaccines such as varicella/VZV, intranasal influenza, MMR, or yellow fever vaccine (if travelling).       Bone mineral density screening   -- Recommend all patients supplement with calcium and vitamin D  -- Given prior steroid use recommnd DEXA - will discuss at next  visit        Skin cancer screening: Annual visual exam of skin by dermatologist since patient is immunocompromised        Misc:  -- Avoid tobacco use  -- Avoid NSAIDs as there is potentially a 25% chance of causing an IBD flare    RTC 6 months    Lily Eaton, DO     Video-Visit Details     Type of service:  Video Visit     Video Start Time: 8:31 AM  Video End Time (time video stopped): 8:41 AM    Originating Location (pt. Location): home     Distant Location (provider location):  [facility]      Mode of Communication:  Video Conference via [employer]

## 2022-04-20 NOTE — PROGRESS NOTES
Mainor is a 66 year old who is being evaluated via a billable video visit.      How would you like to obtain your AVS? MyChart  If the video visit is dropped, the invitation should be resent by: Send to e-mail at: [email or web address]  Will anyone else be joining your video visit? No

## 2022-04-20 NOTE — PATIENT INSTRUCTIONS
Try adding miralax every other day (you can increase to daily if needed) to see if it helps with constipation.  I have added iron studies to your next set of blood work - hopefully we will be able to stop your supplements.    Please schedule a follow-up with Dr. Fu (liver doctor) - we will help arrange this for you.

## 2022-04-20 NOTE — H&P (VIEW-ONLY)
HPI:    Leno presents today for a video visit for follow-up of UC pancolitis.  Doing well on entyvio every 8 weeks.  No diarrhea or blood in the stool.  Was started on iron pills and has been struggling with constipation since - manages this with prunes which helps although sometimes the response is unpredictable.  Does occasionally notices some abdominal cramping/gas - improves when he has a BM.  No blood in the stool.  Tolerating entyvio without issue.  [place] - working to potentially arrange infusions down there while he is gone.  Scheduled for follow-up colonoscopy with me in the next few weeks.    Following with Dr. Fu re:PSC - last seen 6/2021 - will help arrange follow-up.      Past Medical History:   Diagnosis Date     Allergic rhinitis, cause unspecified      Fatty liver 2004    elevated LFT, saw GI for a consultation     Mild persistent asthma 9/12/2008     Reflux esophagitis      Unspecified disease of respiratory system     RAD     Viremia, unspecified     acute       Past Surgical History:   Procedure Laterality Date     COLONOSCOPY  9/4/2013    Procedure: COLONOSCOPY;  colonoscopy;  Surgeon: Beto Keen MD;  Location: [facility]     COLONOSCOPY N/A 7/31/2020    Procedure: Colonoscopy, With Polypectomy And Biopsy;  Surgeon: Lily Eaton DO;  [facility]     COLONOSCOPY N/A 4/5/2021    Procedure: Colonoscopy, With Polypectomy And Biopsy;  Surgeon: Lily Eaton DO;  [facility]     COLONOSCOPY WITH CO2 INSUFFLATION N/A 7/31/2020    Procedure: COLONOSCOPY, WITH CO2 INSUFFLATION;  Surgeon: Lily Eaton DO;  [facility]     COLONOSCOPY WITH CO2 INSUFFLATION N/A 4/5/2021    Procedure: COLONOSCOPY, WITH CO2 INSUFFLATION;  Surgeon: Lily Eaton DO;  [facility]     COMBINED ESOPHAGOSCOPY, GASTROSCOPY, DUODENOSCOPY (EGD) WITH CO2 INSUFFLATION N/A 7/31/2020    Procedure: ESOPHAGOGASTRODUODENOSCOPY, WITH CO2 INSUFFLATION;   Surgeon: Lily Eaton DO;  [facility]     ESOPHAGOSCOPY, GASTROSCOPY, DUODENOSCOPY (EGD), COMBINED N/A 7/31/2020    Procedure: Esophagogastroduodenoscopy, With Biopsy;  Surgeon: Lily Eaton DO;  Location: [facility]     HC VASECTOMY UNILAT/BILAT W POSTOP SEMEN  1992    Vasectomy     ZZHC COLONOSCOPY THRU STOMA, DIAGNOSTIC  2005    normal       Family History   Problem Relation Age of Onset     Cancer Mother         bladder cancer     Neurologic Disorder Father         alzheimers dementia     Gastrointestinal Disease Brother         divertculitis       Social History     Tobacco Use     Smoking status: Never Smoker     Smokeless tobacco: Never Used   Substance Use Topics     Alcohol use: Yes     Alcohol/week: 1.0 - 2.0 standard drink     Types: 1 - 2 Standard drinks or equivalent per week     Comment: caffeine 6 per day        O:    Gen: no acute distress  HEENT: NCAT  Neck: normal ROM  Resp: nonlabored breathing  Neuro: no gross deficits  Psych: appropriate mood and affect    Assessment and Plan:    # UC pancolitis - doing well on entyvio - main issue currently is constipation (likely related to PO iron).  Will start miralax.  Will also check iron studies with next set of labs as will hopefully be able to stop supplements.  Scheduled for follow-up colonoscopy    # PSC - following with Dr. Fu - will help arrange follow-up appointment.    RTC 6 months  Vaccinations:  -- Influenza (every year): Last given this year per patient  -- TdaP (every 10 years): Last given 2013  -- Pneumococcal Pneumonia (once then every 5 years): Last given UTD     -- Due to the immunosuppression in this patient, I would not advise administration of live vaccines such as varicella/VZV, intranasal influenza, MMR, or yellow fever vaccine (if travelling).       Bone mineral density screening   -- Recommend all patients supplement with calcium and vitamin D  -- Given prior steroid use recommnd DEXA - will discuss at next  visit        Skin cancer screening: Annual visual exam of skin by dermatologist since patient is immunocompromised        Misc:  -- Avoid tobacco use  -- Avoid NSAIDs as there is potentially a 25% chance of causing an IBD flare    RTC 6 months    Lily Eaton, DO     Video-Visit Details     Type of service:  Video Visit     Video Start Time: 8:31 AM  Video End Time (time video stopped): 8:41 AM    Originating Location (pt. Location): home     Distant Location (provider location):  [facility]      Mode of Communication:  Video Conference via [employer]

## 2022-04-26 RX ORDER — BISACODYL 5 MG/1
15 TABLET, DELAYED RELEASE ORAL SEE ADMIN INSTRUCTIONS
Qty: 3 TABLET | Refills: 0 | Status: SHIPPED | OUTPATIENT
Start: 2022-04-26 | End: 2022-06-08

## 2022-04-29 ENCOUNTER — LAB (OUTPATIENT)
Dept: LAB | Facility: OTHER | Age: 67
End: 2022-04-29
Payer: COMMERCIAL

## 2022-04-29 DIAGNOSIS — Z11.59 ENCOUNTER FOR SCREENING FOR OTHER VIRAL DISEASES: ICD-10-CM

## 2022-04-29 LAB — SARS-COV-2 RNA RESP QL NAA+PROBE: NEGATIVE

## 2022-04-29 PROCEDURE — U0003 INFECTIOUS AGENT DETECTION BY NUCLEIC ACID (DNA OR RNA); SEVERE ACUTE RESPIRATORY SYNDROME CORONAVIRUS 2 (SARS-COV-2) (CORONAVIRUS DISEASE [COVID-19]), AMPLIFIED PROBE TECHNIQUE, MAKING USE OF HIGH THROUGHPUT TECHNOLOGIES AS DESCRIBED BY CMS-2020-01-R: HCPCS

## 2022-04-29 PROCEDURE — U0005 INFEC AGEN DETEC AMPLI PROBE: HCPCS

## 2022-05-03 ENCOUNTER — HOSPITAL ENCOUNTER (OUTPATIENT)
Facility: AMBULATORY SURGERY CENTER | Age: 67
Discharge: HOME | End: 2022-05-03
Attending: INTERNAL MEDICINE | Admitting: INTERNAL MEDICINE
Payer: COMMERCIAL

## 2022-05-03 VITALS
DIASTOLIC BLOOD PRESSURE: 86 MMHG | HEIGHT: 70 IN | OXYGEN SATURATION: 98 % | WEIGHT: 193 LBS | HEART RATE: 75 BPM | BODY MASS INDEX: 27.63 KG/M2 | RESPIRATION RATE: 16 BRPM | SYSTOLIC BLOOD PRESSURE: 103 MMHG | TEMPERATURE: 98.2 F

## 2022-05-03 DIAGNOSIS — R21 RASH: Primary | ICD-10-CM

## 2022-05-03 DIAGNOSIS — Z12.11 SCREEN FOR COLON CANCER: ICD-10-CM

## 2022-05-03 LAB — COLONOSCOPY: NORMAL

## 2022-05-03 PROCEDURE — 45385 COLONOSCOPY W/LESION REMOVAL: CPT

## 2022-05-03 PROCEDURE — 45380 COLONOSCOPY AND BIOPSY: CPT | Mod: XS

## 2022-05-03 PROCEDURE — G8918 PT W/O PREOP ORDER IV AB PRO: HCPCS

## 2022-05-03 PROCEDURE — G8907 PT DOC NO EVENTS ON DISCHARG: HCPCS

## 2022-05-03 RX ORDER — FENTANYL CITRATE 50 UG/ML
INJECTION, SOLUTION INTRAMUSCULAR; INTRAVENOUS PRN
Status: DISCONTINUED | OUTPATIENT
Start: 2022-05-03 | End: 2022-05-03 | Stop reason: HOSPADM

## 2022-05-03 RX ORDER — ONDANSETRON 2 MG/ML
4 INJECTION INTRAMUSCULAR; INTRAVENOUS EVERY 6 HOURS PRN
Status: DISCONTINUED | OUTPATIENT
Start: 2022-05-03 | End: 2022-05-04 | Stop reason: HOSPADM

## 2022-05-03 RX ORDER — FLUMAZENIL 0.1 MG/ML
0.2 INJECTION, SOLUTION INTRAVENOUS
Status: DISCONTINUED | OUTPATIENT
Start: 2022-05-03 | End: 2022-05-04 | Stop reason: HOSPADM

## 2022-05-03 RX ORDER — NALOXONE HYDROCHLORIDE 0.4 MG/ML
0.4 INJECTION, SOLUTION INTRAMUSCULAR; INTRAVENOUS; SUBCUTANEOUS
Status: DISCONTINUED | OUTPATIENT
Start: 2022-05-03 | End: 2022-05-04 | Stop reason: HOSPADM

## 2022-05-03 RX ORDER — PROCHLORPERAZINE MALEATE 5 MG
5 TABLET ORAL EVERY 6 HOURS PRN
Status: DISCONTINUED | OUTPATIENT
Start: 2022-05-03 | End: 2022-05-04 | Stop reason: HOSPADM

## 2022-05-03 RX ORDER — NALOXONE HYDROCHLORIDE 0.4 MG/ML
0.2 INJECTION, SOLUTION INTRAMUSCULAR; INTRAVENOUS; SUBCUTANEOUS
Status: DISCONTINUED | OUTPATIENT
Start: 2022-05-03 | End: 2022-05-04 | Stop reason: HOSPADM

## 2022-05-03 RX ORDER — ONDANSETRON 4 MG/1
4 TABLET, ORALLY DISINTEGRATING ORAL EVERY 6 HOURS PRN
Status: DISCONTINUED | OUTPATIENT
Start: 2022-05-03 | End: 2022-05-04 | Stop reason: HOSPADM

## 2022-05-03 NOTE — INTERVAL H&P NOTE
"I have reviewed the surgical (or preoperative) H&P that is linked to this encounter, and examined the patient. There are no significant changes  CV: RRR  Resp: CTA b.l  Skin: rash on b/l lower extremities including feet    Clinical Conditions Present on Arrival:  Clinically Significant Risk Factors Present on Admission                  # Platelet Defect: home medication list includes an antiplatelet medication  # Overweight: Estimated body mass index is 27.69 kg/m  as calculated from the following:    Height as of this encounter: 1.778 m (5' 10\").    Weight as of this encounter: 87.5 kg (193 lb).       "

## 2022-05-05 ENCOUNTER — TELEPHONE (OUTPATIENT)
Dept: GASTROENTEROLOGY | Facility: CLINIC | Age: 67
End: 2022-05-05
Payer: COMMERCIAL

## 2022-05-05 LAB
PATH REPORT.COMMENTS IMP SPEC: NORMAL
PATH REPORT.FINAL DX SPEC: NORMAL
PATH REPORT.GROSS SPEC: NORMAL
PATH REPORT.MICROSCOPIC SPEC OTHER STN: NORMAL
PATH REPORT.RELEVANT HX SPEC: NORMAL
PHOTO IMAGE: NORMAL

## 2022-05-05 PROCEDURE — 88305 TISSUE EXAM BY PATHOLOGIST: CPT | Performed by: PATHOLOGY

## 2022-05-05 NOTE — TELEPHONE ENCOUNTER
[facility] called patient per Dr. Eaton's request. Doctor asked GI Team to let patient know that she referred patient to the Dermatology clinic for rash. Doctor also would like patient to schedule a follow up visit in 6 months. [facility] informed patient of referral and instructed patient to call dermatology clinic at [phone] to schedule if they don't call him with in the next week. [facility] scheduled patient for a virtual follow up visit on November 9, 2022 at 8:30AM with Dr. Lily Eaton. Patient will follow as scheduled.    Margot Gerard CMA

## 2022-05-10 ENCOUNTER — TELEPHONE (OUTPATIENT)
Dept: GASTROENTEROLOGY | Facility: CLINIC | Age: 67
End: 2022-05-10

## 2022-05-10 ENCOUNTER — OFFICE VISIT (OUTPATIENT)
Dept: DERMATOLOGY | Facility: CLINIC | Age: 67
End: 2022-05-10
Payer: COMMERCIAL

## 2022-05-10 DIAGNOSIS — K51.00 ULCERATIVE PANCOLITIS WITHOUT COMPLICATION (H): Primary | ICD-10-CM

## 2022-05-10 DIAGNOSIS — D48.5 NEOPLASM OF UNCERTAIN BEHAVIOR OF SKIN: ICD-10-CM

## 2022-05-10 PROCEDURE — 11102 TANGNTL BX SKIN SINGLE LES: CPT | Performed by: DERMATOLOGY

## 2022-05-10 PROCEDURE — 88305 TISSUE EXAM BY PATHOLOGIST: CPT | Performed by: DERMATOLOGY

## 2022-05-10 NOTE — LETTER
5/10/2022         RE: Mainor Grande  [id number] [facility] [id number]        Dear Colleague,    Thank you for referring your patient, Mainor Grande, to the [facility]. Please see a copy of my visit note below.    Visit Date: 05/10/2022    SUBJECTIVE:  Leno comes in because of a rash that began about three months ago on his anterior lower legs.  He recalls a child he had a pill for fungus because apparently he has had onychomycosis much of his life.  He is on  a variety of medications and has some severe medical problems, but they are being addressed, and plans are in place for evaluations in the upcoming weeks and months.    OBJECTIVE:  On exam, he is a very pleasant gentleman in no distress.  The rash is pretty much confined to his lower legs.  On the feet and anterior lower legs bilaterally is a diffuse dermatosis, characterized as pink, reddish in color, no Piero striae are seen, but the rash to me resembles lichen planus.  It has been fairly itchy.  He has been scratching a good deal.  He has found hydrocortisone 1% ointment useful in reducing the itch.  Nonetheless the lesions persist.  The eruption does not cover the legs, but occur in patches.  There are some smaller lesions of about 1 cm in size.    ASSESSMENT:  Possible lichen planus, possible lichen simplex chronicus.  He does have onychomycosis.  We did not check his skin above the knees.  This is not affecting the mucous membranes in a gentleman who is on some medications that might possibly tie in with this lichen planus.    PLAN:  I felt a biopsy was appropriate since this has been going on so long, and that we should figure out whether this is an eczema or lichen planus.  He agreed and we did a shave biopsy of an individual lesion just below the knee that had a pink-red color and some whiteness as well to it.  This small specimen was submitted to [facility] for evaluation and interpretation.  Bleeding was  stopped with aluminum chloride and a Band-Aid applied, and aftercare discussed.      We will call him with the result.  If this is lichen planus or even if it is more suggestive of lichen simplex, we will probably call in a stronger steroid, probably a triamcinolone 0.1 ointment in that he has benefited from the very weak 1% hydrocortisone over-the-counter product.  I would then expect that he would need to follow up in a few months to see how things are going.      Return pending the biopsy report and the initiation of therapy.    MEDICATIONS AND ALLERGIES:  Reviewed.     JUSTINO Freitas MD        D: 05/10/2022   T: 05/10/2022   MT: SKYLAR    Name:     CLAUDIA CARDONA  MRN:      [id number]        Account:    [id number]   :      1955           Visit Date: 05/10/2022     Document: [id number]      Again, thank you for allowing me to participate in the care of your patient.        Sincerely,        JUSTINO Freitas MD

## 2022-05-10 NOTE — TELEPHONE ENCOUNTER
Returned Rani's call.  Let her know Dr. Fu is okay with virtual appointment but prefers in person visit since the patient has never been seen in person.  Patient is due for MRCP, number given to schedule.     No further questions or concerns.    Millie OZUNA LPN  Hepatology Clinic       [facility]    Phone Message    May a detailed message be left on voicemail: yes     Reason for Call: Other:     Rani is calling with questions about July visit.  Can it be a video visit or did Dr. Fu want an in person visit this time.  Does Tank Leos want imaging completed prior to visit.  Please call Rani to discuss.    Action Taken: Message routed to:  [facility] ([facility] hep    Travel Screening: Not Applicable

## 2022-05-10 NOTE — NURSING NOTE
The following medication was given:     MEDICATION:  Lidocaine with epinephrine 1% [id number]  ROUTE: SQ  SITE: see procedure note  DOSE: see procedure note  LOT #: [id number]  : Juanis  EXPIRATION DATE: 12/1/22  NDC#: 2750-0443-86  Was there drug waste? No    Multi-dose vial: Yes          Ayse Portillo on 5/10/2022 at 11:54 AM

## 2022-05-10 NOTE — PATIENT INSTRUCTIONS
Your leg rash resembles a condition called lichen planus. Does not have the features of a fungal rash ( your toenails have fungal changes as you know).

## 2022-05-10 NOTE — PROGRESS NOTES
Visit Date: 05/10/2022    SUBJECTIVE:  Leno comes in because of a rash that began about three months ago on his anterior lower legs.  He recalls a child he had a pill for fungus because apparently he has had onychomycosis much of his life.  He is on  a variety of medications and has some severe medical problems, but they are being addressed, and plans are in place for evaluations in the upcoming weeks and months.    OBJECTIVE:  On exam, he is a very pleasant gentleman in no distress.  The rash is pretty much confined to his lower legs.  On the feet and anterior lower legs bilaterally is a diffuse dermatosis, characterized as pink, reddish in color, no Pireo striae are seen, but the rash to me resembles lichen planus.  It has been fairly itchy.  He has been scratching a good deal.  He has found hydrocortisone 1% ointment useful in reducing the itch.  Nonetheless the lesions persist.  The eruption does not cover the legs, but occur in patches.  There are some smaller lesions of about 1 cm in size.    ASSESSMENT:  Possible lichen planus, possible lichen simplex chronicus.  He does have onychomycosis.  We did not check his skin above the knees.  This is not affecting the mucous membranes in a gentleman who is on some medications that might possibly tie in with this lichen planus.    PLAN:  I felt a biopsy was appropriate since this has been going on so long, and that we should figure out whether this is an eczema or lichen planus.  He agreed and we did a shave biopsy of an individual lesion just below the knee that had a pink-red color and some whiteness as well to it.  This small specimen was submitted to [facility] for evaluation and interpretation.  Bleeding was stopped with aluminum chloride and a Band-Aid applied, and aftercare discussed.      We will call him with the result.  If this is lichen planus or even if it is more suggestive of lichen simplex, we will probably call in a stronger steroid, probably a  triamcinolone 0.1 ointment in that he has benefited from the very weak 1% hydrocortisone over-the-counter product.  I would then expect that he would need to follow up in a few months to see how things are going.      Return pending the biopsy report and the initiation of therapy.    MEDICATIONS AND ALLERGIES:  Reviewed.     H Vernon Freitas MD        D: 05/10/2022   T: 05/10/2022   MT: SKYLAR    Name:     CLAUDIA CARDONA  MRN:      [id number]        Account:    [id number]   :      1955           Visit Date: 05/10/2022     Document: [id number]

## 2022-05-10 NOTE — NURSING NOTE
Mainor Grande's goals for this visit include:   Chief Complaint   Patient presents with     Rash     Rash bilateral feet x 8 months. Using Cortizone 10 ointment prn       He requests these members of his care team be copied on today's visit information:     PCP: Karen Rodriguez    Referring Provider:  No referring provider defined for this encounter.    There were no vitals taken for this visit.    Do you need any medication refills at today's visit? Mariza Portillo on 5/10/2022 at 9:15 AM

## 2022-05-12 LAB
PATH REPORT.COMMENTS IMP SPEC: NORMAL
PATH REPORT.COMMENTS IMP SPEC: NORMAL
PATH REPORT.FINAL DX SPEC: NORMAL
PATH REPORT.GROSS SPEC: NORMAL
PATH REPORT.MICROSCOPIC SPEC OTHER STN: NORMAL
PATH REPORT.RELEVANT HX SPEC: NORMAL

## 2022-05-17 DIAGNOSIS — K83.01 PSC (PRIMARY SCLEROSING CHOLANGITIS) (H): Primary | ICD-10-CM

## 2022-05-20 ENCOUNTER — INFUSION THERAPY VISIT (OUTPATIENT)
Dept: INFUSION THERAPY | Facility: CLINIC | Age: 67
End: 2022-05-20
Attending: INTERNAL MEDICINE
Payer: COMMERCIAL

## 2022-05-20 VITALS
WEIGHT: 199 LBS | DIASTOLIC BLOOD PRESSURE: 76 MMHG | BODY MASS INDEX: 28.55 KG/M2 | OXYGEN SATURATION: 100 % | SYSTOLIC BLOOD PRESSURE: 124 MMHG | HEART RATE: 60 BPM | TEMPERATURE: 97.6 F | RESPIRATION RATE: 18 BRPM

## 2022-05-20 DIAGNOSIS — K51.00 ULCERATIVE PANCOLITIS WITHOUT COMPLICATION (H): Primary | ICD-10-CM

## 2022-05-20 PROCEDURE — 96365 THER/PROPH/DIAG IV INF INIT: CPT

## 2022-05-20 PROCEDURE — 258N000003 HC RX IP 258 OP 636: Performed by: INTERNAL MEDICINE

## 2022-05-20 PROCEDURE — 250N000011 HC RX IP 250 OP 636: Performed by: INTERNAL MEDICINE

## 2022-05-20 RX ORDER — HEPARIN SODIUM,PORCINE 10 UNIT/ML
5 VIAL (ML) INTRAVENOUS
Status: CANCELLED | OUTPATIENT
Start: 2022-07-13

## 2022-05-20 RX ORDER — HEPARIN SODIUM (PORCINE) LOCK FLUSH IV SOLN 100 UNIT/ML 100 UNIT/ML
5 SOLUTION INTRAVENOUS
Status: CANCELLED | OUTPATIENT
Start: 2022-07-13

## 2022-05-20 RX ADMIN — SODIUM CHLORIDE 250 ML: 9 INJECTION, SOLUTION INTRAVENOUS at 08:34

## 2022-05-20 RX ADMIN — VEDOLIZUMAB 300 MG: 300 INJECTION, POWDER, LYOPHILIZED, FOR SOLUTION INTRAVENOUS at 08:37

## 2022-05-20 ASSESSMENT — PAIN SCALES - GENERAL: PAINLEVEL: NO PAIN (0)

## 2022-05-20 NOTE — PROGRESS NOTES
Infusion Nursing Note:  Mainor Grande presents today for Entyvio.    Patient seen by provider today: No   present during visit today: Not Applicable.    Note: Patient denies new symptoms or concerns today. No signs of illness or infection. VSS, afebrile.       Intravenous Access:  Peripheral IV placed.    Treatment Conditions:  Biological Infusion Checklist:  ~~~ NOTE: If the patient answers yes to any of the questions below, hold the infusion and contact ordering provider or on-call provider.    1. Have you recently had an elevated temperature, fever, chills, productive cough, coughing for 3 weeks or longer or hemoptysis, abnormal vital signs, night sweats,  chest pain or have you noticed a decrease in your appetite, unexplained weight loss or fatigue? No  2. Do you have any open wounds or new incisions? No  3. Do you have any recent or upcoming hospitalizations, surgeries or dental procedures? No  4. Do you currently have or recently have had any signs of illness or infection or are you on any antibiotics? No  5. Have you had any new, sudden or worsening abdominal pain? No  6. Have you or anyone in your household received a live vaccination in the past 4 weeks? Please note:  No live vaccines while on biologic/chemotherapy until 6 months after the last treatment.  Patient can receive the flu vaccine (shot only) and the pneumovax.  It is optimal for the patient to get these vaccines mid cycle, but they can be given at any time as long as it is not on the day of the infusion. No  7. Have you recently been diagnosed with any new nervous system diseases (ie. Multiple sclerosis, Guillain [place], seizures, neurological changes) or cancer diagnosis? No  8. Are you on any form of radiation or chemotherapy? No  9. Are you pregnant or breast feeding or do you have plans of pregnancy in the future? N/A  10. Have you been having any signs of worsening depression or suicidal ideations?  (benlysta only) N/A  11. Have  there been any other new onset medical symptoms? No     Some labs (ESR, CRP) are not current since last visit. Pt feels he does not need to be drawn today. If Dr. Eaton would need those done prior to his next visit in July he will have them drawn in [place]. Msg sent to Dr. Eaton re: this.       Post Infusion Assessment:  Patient tolerated infusion without incident.  Blood return noted pre and post infusion.  Site patent and intact, free from redness, edema or discomfort.  No evidence of extravasations.  PIV access discontinued per protocol.    Biologic Infusion Post Education: Call the triage nurse at your clinic or seek medical attention if you have chills and/or temperature greater than or equal to 100.5, uncontrolled nausea/vomiting, diarrhea, constipation, dizziness, shortness of breath, chest pain, heart palpitations, weakness or any other new or concerning symptoms, questions or concerns.  You cannot have any live virus vaccines prior to or during treatment or up to 6 months post infusion.  If you have an upcoming surgery, medical procedure or dental procedure during treatment, this should be discussed with your ordering physician and your surgeon/dentist.  If you are having any concerning symptom, if you are unsure if you should get your next infusion or wish to speak to a provider before your next infusion, please call your care coordinator or triage nurse at your clinic to notify them so we can adequately serve you.       Discharge Plan:   AVS to patient via [employer].  Patient will return 7/15 for next appointment.   Patient discharged in stable condition accompanied by: self.  Departure Mode: Ambulatory.      Keren Flores RN

## 2022-05-23 ENCOUNTER — TELEPHONE (OUTPATIENT)
Dept: DERMATOLOGY | Facility: CLINIC | Age: 67
End: 2022-05-23
Payer: COMMERCIAL

## 2022-05-23 NOTE — TELEPHONE ENCOUNTER
There are currently no open appointment slots with Dr. Freitas in 2-3 months.  Patient has been scheduled with Dr. Campos on 9/7/2022.

## 2022-05-23 NOTE — TELEPHONE ENCOUNTER
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     I would like him to try the triamcinolone 0.1% ointment that Dr. Freitas prescribed at last visit and he should make a follow-up with Dr. Freitas in the next 2-3 months to see how things are going.     Thanks!    Joselo Simpson MD  Pronouns: he/him/his    Department of Dermatology  [facility]: Phone: [phone], Fax:[phone]  [facility]: Phone: [phone] Fax: [phone]      ----- Message -----  From: Ayse Portillo  Sent: 5/20/2022   2:11 PM CDT  To: Joselo Simpson MD  Subject: Path Results                                     Hi ,    Dr. Freitas saw this patient on 5/10/22 and did a biopsy. [place] Freitas [place] and he mentioned seeing if you could comment on the results. I am not sure if Dr. Freitas sent you a message regarding this or not so I just wanted to make sure you were aware.  Are you able to review the results and route it to our pool so we can call the patient?        Thank you!        [facility]

## 2022-06-07 ENCOUNTER — NURSE TRIAGE (OUTPATIENT)
Dept: NURSING | Facility: CLINIC | Age: 67
End: 2022-06-07
Payer: COMMERCIAL

## 2022-06-07 ENCOUNTER — TELEPHONE (OUTPATIENT)
Dept: NURSING | Facility: CLINIC | Age: 67
End: 2022-06-07
Payer: COMMERCIAL

## 2022-06-07 NOTE — TELEPHONE ENCOUNTER
Pt's wife calling back after getting disconnected.    Discussed disposition of needing to be seen within 24 hours. Care advice reviewed, wife verbalized understanding.    Pt able to get appt tomorrow morning at 0720 in [place].     aMddy Mcmahon, RN, BSN  [facility]   Triage Nurse Advisor

## 2022-06-07 NOTE — TELEPHONE ENCOUNTER
Patient has a rash in both lower extremities.     [place]  Started as a rash at the ankle   Started a couple months ago     Getting more red as time goes on and more itching    Looks like cellulitis when compared to pictures online    Red and swelling     Itches like crazy, but not really painful      Went to a dermatologist about a month ago   Did a biopsy   -did not find anything     Not getting any better   Used all sorts of creams     No fever    Call disconnected.   Attempted to call back but there was no answer    Reason for Disposition    Poison ivy, oak, or sumac contact suspected    [1] Increasing redness around poison ivy AND [2] larger than 2 inches (5 cm)    [1] Looks infected (e.g., soft yellow scabs, pus or spreading redness) AND [2] no fever    Additional Information    Negative: [1] Sudden onset of rash (within last 2 hours) AND [2] difficulty with breathing or swallowing    Negative: Sounds like a life-threatening emergency to the triager    Negative: Doesn't match the SYMPTOMS of poison ivy, oak, or sumac    Negative: [1] Difficulty breathing or severe coughing AND [2] followed exposure to burning weeds    Protocols used: RASH OR REDNESS - HBDFLFROL-S-LU, POISON IVY - OAK - SUMAC-A-AH

## 2022-06-08 ENCOUNTER — OFFICE VISIT (OUTPATIENT)
Dept: FAMILY MEDICINE | Facility: OTHER | Age: 67
End: 2022-06-08
Payer: COMMERCIAL

## 2022-06-08 VITALS
RESPIRATION RATE: 16 BRPM | OXYGEN SATURATION: 96 % | BODY MASS INDEX: 28.34 KG/M2 | HEART RATE: 54 BPM | TEMPERATURE: 97.6 F | WEIGHT: 197.5 LBS | SYSTOLIC BLOOD PRESSURE: 118 MMHG | DIASTOLIC BLOOD PRESSURE: 68 MMHG

## 2022-06-08 DIAGNOSIS — R60.0 PERIPHERAL EDEMA: ICD-10-CM

## 2022-06-08 DIAGNOSIS — L30.9 ECZEMA, UNSPECIFIED TYPE: Primary | ICD-10-CM

## 2022-06-08 DIAGNOSIS — I87.2 VENOUS (PERIPHERAL) INSUFFICIENCY: ICD-10-CM

## 2022-06-08 PROCEDURE — 99214 OFFICE O/P EST MOD 30 MIN: CPT | Performed by: PHYSICIAN ASSISTANT

## 2022-06-08 RX ORDER — CEPHALEXIN 500 MG/1
500 CAPSULE ORAL 2 TIMES DAILY
Qty: 14 CAPSULE | Refills: 0 | Status: SHIPPED | OUTPATIENT
Start: 2022-06-08 | End: 2022-07-15

## 2022-06-08 RX ORDER — CLOBETASOL PROPIONATE 0.5 MG/G
OINTMENT TOPICAL 2 TIMES DAILY
Qty: 30 G | Refills: 0 | Status: SHIPPED | OUTPATIENT
Start: 2022-06-08 | End: 2022-07-05

## 2022-06-08 ASSESSMENT — ASTHMA QUESTIONNAIRES: ACT_TOTALSCORE: 16

## 2022-06-08 ASSESSMENT — PAIN SCALES - GENERAL: PAINLEVEL: NO PAIN (0)

## 2022-06-08 NOTE — PATIENT INSTRUCTIONS
Apply the Clobetasol thin layer over any of the rash area at night.  Cover with Saran wrap and then use ACE bandage wrap loosely from the toes to the knee  In the morning apply Cerave liberally over the lower legs  Do this for 14 days and schedule a recheck to make sure it is improving  Over the counter Cetirizine/Zyrtec can take daily as well. Ok to take Benadryl   Compression socks very generic to start to see if they help, need to go to the knee.   In the future could consider prescribed compression wear  Also in the future consider evaluation of varicose veins in leg.   Cephalexin - start if there is more spreading redness, increased warmth, drainage from the scabs, etc.

## 2022-06-08 NOTE — PROGRESS NOTES
Assessment & Plan     Eczema, unspecified type  Rash to bilateral anterior lower legs x 4 months. Was seen and evaluated by Dermatology one month ago and biopsy obtained which showed lichen simplex chronicus. He was placed on triamcinolone cream which he has been using without relief. He also has been using several OTC treatments including antihistamines and moisturizing creams without relief. See exam and pictures of the rash below. Based on evaluation he likely needs a stronger potency steroid. Will start him on clobetasol ointment which he should apply at night with occlusive dressing. Also encouraged him to apply a hydrating moisturizing such as Cerave during the day. He should do this regimen for 2 weeks and then follow up in the clinic for recheck. We also discussed having him take a daily oral antihistamine such as Zyrtec and as needed Benadryl for any severe itching. Additional patient instructions regarding treatment were provided to the patient. His wife was concerned about possible cellulitis. He does has erythema and mild warmth to the right lower leg, but the erythema is well demarcated and associated with the areas of plaques. The warm is very minimal and feels more consistent with inflammation vs infectious process. Will prescribed Keflex which the patient should start taking if he develops spreading redness, increased warmth, drainage from the scabs, fevers or other systemic symptoms. The patient and his wife expressed understanding of these instructions and are in agreement with the treatment plan and scheduled follow up in 2 weeks. They will contact the clinic sooner if he has any new/worsening symptoms.  - clobetasol (TEMOVATE) 0.05 % external ointment; Apply topically 2 times daily  - cephALEXin (KEFLEX) 500 MG capsule; Take 1 capsule (500 mg) by mouth 2 times daily     Venous insufficiency  Peripheral Edema  There are signs of venous insufficiency which is likely the cause or contributing  factor the peripherla edema in the legs.  Encouraged doing a good job to moisturize the legs once the rash is fully resolved as to prevent chronic skin changes including stasis dermatitis. Encouraged OTC compression wear to start, consider fitted compression stockings in the future if needed as well as US venous assessment and referral if necessary. He is very active which keeps the legs healthy as well. Encouraged to elevate when sitting to help as well.      Return in 2 weeks (on 6/22/2022) for Recheck.    Options for treatment and follow-up care were reviewed with the patient and/or guardian. Patient and/or guardian engaged in the decision making process and verbalized understanding of the options discussed and agreed with the final plan.    I, Francisco Hicks PA-C, was present with the Physician Assistant student who participated in the service and in the documentation of the note.  I have verified the history and personally performed the physical exam and medical decision making.  I agree with the assessment and plan of care as documented in the note.       Kira Foy PA-S2  [facility] PA Program    RALET FreemanC  [facility] BERTHA Hayward is a 66 year old who presents for the following health issues accompanied by his spouse.    History of Present Illness       Reason for visit:  Lower leg rash    He eats 2-3 servings of fruits and vegetables daily.He consumes 0 sweetened beverage(s) daily.He exercises with enough effort to increase his heart rate 30 to 60 minutes per day.  He exercises with enough effort to increase his heart rate 5 days per week.   He is taking medications regularly.     Rash  Onset/Duration: 4 months ago  Description  Location: bilateral lower legs, R>L  Character: red, spotty-was seen by Dermatology in early May and had a biopsy performed which showed lichen simplex chronicus.   Itching: moderate/severe  Intensity:   moderate  Progression of Symptoms:  worsening  Accompanying signs and symptoms:  Right leg swelling which worsens throughout the day  Fever: no  Body aches or joint pain: no  Sore throat symptoms: no  Recent cold symptoms: no  History:           Previous episodes of similar rash: None  New exposures:  None  Recent travel: no  Exposure to similar rash: no  Precipitating or alleviating factors:   Therapies tried and outcome: was told Cortizone by derm - didn't really help. Has tried many OTC medications including moisturizing creams/lotions, antihistamines, steroid creams with out much relief.     Review of Systems   Constitutional, HEENT, cardiovascular, pulmonary, GI, , musculoskeletal, neuro, skin, endocrine and psych systems are negative, except as otherwise noted.        Objective    /68   Pulse 54   Temp 97.6  F (36.4  C) (Temporal)   Resp 16   Wt 89.6 kg (197 lb 8 oz)   SpO2 96%   BMI 28.34 kg/m    Body mass index is 28.34 kg/m .  Physical Exam   GENERAL: healthy, alert and no distress  EYES: Eyes grossly normal to inspection, PERRL and conjunctivae and sclerae normal  NECK: no adenopathy, no asymmetry, masses, or scars and thyroid normal to palpation  RESP: lungs clear to auscultation - no rales, rhonchi or wheezes  CV: regular rate and rhythm, normal S1 S2, no S3 or S4, no murmur, click or rub, peripheral pulses strong  MS: no gross musculoskeletal defects noted, varicosities noted to bilateral lower extremities, peripheral edema to the right lower extremity from the foot to mid-calf.   SKIN: Anterior lower legs with diffuse dermatosis changes which are reddish in color with overlying scaly plaques. There are small patches involving the left lower extremity. Right lower extremity has a large area of plaque formation over the shin with associated excoriation and erythema. Erythema is well demarcated. There is mild associated warmth overlying the area of redness. See pictures below.  NEURO: Normal  strength and tone, mentation intact and speech normal  PSYCH: mentation appears normal, affect normal/bright    Right Leg:      Left Leg:        No results found for this or any previous visit (from the past 24 hour(s)).

## 2022-06-27 DIAGNOSIS — J45.31 MILD PERSISTENT ASTHMA WITH ACUTE EXACERBATION: ICD-10-CM

## 2022-06-27 NOTE — TELEPHONE ENCOUNTER
"Pending Prescriptions:                       Disp   Refills    albuterol (PROAIR HFA/PROVENTIL HFA/VENTOL*18 g   3        Sig: Inhale 2 puffs into the lungs every 6 hours    Routing refill request to provider for review/approval because:  Requested Prescriptions   Pending Prescriptions Disp Refills    albuterol (PROAIR HFA/PROVENTIL HFA/VENTOLIN HFA) 108 (90 Base) MCG/ACT inhaler 18 g 3     Sig: Inhale 2 puffs into the lungs every 6 hours        Asthma Maintenance Inhalers - Anticholinergics Failed - 6/27/2022 11:52 AM        Failed - Asthma control assessment score within normal limits in last 6 months     Please review ACT score.           Passed - Patient is age 12 years or older        Passed - Medication is active on med list        Passed - Recent (6 mo) or future (30 days) visit within the authorizing provider's specialty     Patient had office visit in the last 6 months or has a visit in the next 30 days with authorizing provider or within the authorizing provider's specialty.  See \"Patient Info\" tab in inbasket, or \"Choose Columns\" in Meds & Orders section of the refill encounter.           Short-Acting Beta Agonist Inhalers Protocol  Failed - 6/27/2022 11:52 AM        Failed - Asthma control assessment score within normal limits in last 6 months     Please review ACT score.   ACT Total Scores 6/8/2022   ACT TOTAL SCORE -   ASTHMA ER VISITS -   ASTHMA HOSPITALIZATIONS -   ACT TOTAL SCORE (Goal Greater than or Equal to 20) 16   In the past 12 months, how many times did you visit the emergency room for your asthma without being admitted to the hospital? 0   In the past 12 months, how many times were you hospitalized overnight because of your asthma? 0            Passed - Patient is age 12 or older        Passed - Medication is active on med list        Passed - Recent (6 mo) or future (30 days) visit within the authorizing provider's specialty     Patient had office visit in the last 6 months or has a visit " "in the next 30 days with authorizing provider or within the authorizing provider's specialty.  See \"Patient Info\" tab in inbasket, or \"Choose Columns\" in Meds & Orders section of the refill encounter.                Sigrid Lin RN on 6/27/2022 at 11:54 AM        "

## 2022-06-27 NOTE — TELEPHONE ENCOUNTER
Pt calls requesting a refill.     Pending Prescriptions:                       Disp   Refills    albuterol (PROAIR HFA/PROVENTIL HFA/COREY*18 g   3            Sig: Inhale 2 puffs into the lungs every 6 hours    RINKU GordonN, RN, PHN  Registered Nurse-Clinic Triage  [facility]  6/27/2022 at 11:52 AM

## 2022-06-28 RX ORDER — ALBUTEROL SULFATE 90 UG/1
2 AEROSOL, METERED RESPIRATORY (INHALATION) EVERY 6 HOURS
Qty: 18 G | Refills: 3 | Status: SHIPPED | OUTPATIENT
Start: 2022-06-28 | End: 2022-09-23

## 2022-07-01 ENCOUNTER — HOSPITAL ENCOUNTER (OUTPATIENT)
Dept: MRI IMAGING | Facility: CLINIC | Age: 67
Discharge: HOME | End: 2022-07-01
Attending: INTERNAL MEDICINE | Admitting: INTERNAL MEDICINE
Payer: COMMERCIAL

## 2022-07-01 ENCOUNTER — LAB (OUTPATIENT)
Dept: LAB | Facility: CLINIC | Age: 67
End: 2022-07-01
Payer: COMMERCIAL

## 2022-07-01 DIAGNOSIS — K83.01 PSC (PRIMARY SCLEROSING CHOLANGITIS) (H): ICD-10-CM

## 2022-07-01 DIAGNOSIS — E78.5 DYSLIPIDEMIA: ICD-10-CM

## 2022-07-01 DIAGNOSIS — K51.00 ULCERATIVE PANCOLITIS WITHOUT COMPLICATION (H): ICD-10-CM

## 2022-07-01 DIAGNOSIS — I25.10 CORONARY ARTERY CALCIFICATION: ICD-10-CM

## 2022-07-01 LAB
ALBUMIN SERPL-MCNC: 3.4 G/DL (ref 3.4–5)
ALP SERPL-CCNC: 263 U/L (ref 40–150)
ALT SERPL W P-5'-P-CCNC: 82 U/L (ref 0–70)
ANION GAP SERPL CALCULATED.3IONS-SCNC: 7 MMOL/L (ref 3–14)
AST SERPL W P-5'-P-CCNC: 56 U/L (ref 0–45)
BILIRUB DIRECT SERPL-MCNC: 0.4 MG/DL (ref 0–0.2)
BILIRUB SERPL-MCNC: 1 MG/DL (ref 0.2–1.3)
BUN SERPL-MCNC: 13 MG/DL (ref 7–30)
CALCIUM SERPL-MCNC: 9 MG/DL (ref 8.5–10.1)
CHLORIDE BLD-SCNC: 102 MMOL/L (ref 94–109)
CHOLEST SERPL-MCNC: 127 MG/DL
CO2 SERPL-SCNC: 27 MMOL/L (ref 20–32)
CREAT SERPL-MCNC: 0.9 MG/DL (ref 0.66–1.25)
ERYTHROCYTE [DISTWIDTH] IN BLOOD BY AUTOMATED COUNT: 12.7 % (ref 10–15)
FASTING STATUS PATIENT QL REPORTED: YES
FERRITIN SERPL-MCNC: 97 NG/ML (ref 26–388)
GFR SERPL CREATININE-BSD FRML MDRD: >90 ML/MIN/1.73M2
GLUCOSE BLD-MCNC: 101 MG/DL (ref 70–99)
HCT VFR BLD AUTO: 42.2 % (ref 40–53)
HDLC SERPL-MCNC: 79 MG/DL
HGB BLD-MCNC: 14.4 G/DL (ref 13.3–17.7)
INR PPP: 1.13 (ref 0.85–1.15)
IRON SATN MFR SERPL: 20 % (ref 15–46)
IRON SERPL-MCNC: 58 UG/DL (ref 35–180)
LDLC SERPL CALC-MCNC: 38 MG/DL
MCH RBC QN AUTO: 31.1 PG (ref 26.5–33)
MCHC RBC AUTO-ENTMCNC: 34.1 G/DL (ref 31.5–36.5)
MCV RBC AUTO: 91 FL (ref 78–100)
NONHDLC SERPL-MCNC: 48 MG/DL
PLATELET # BLD AUTO: 192 10E3/UL (ref 150–450)
POTASSIUM BLD-SCNC: 3.9 MMOL/L (ref 3.4–5.3)
PROT SERPL-MCNC: 7.4 G/DL (ref 6.8–8.8)
RBC # BLD AUTO: 4.63 10E6/UL (ref 4.4–5.9)
SODIUM SERPL-SCNC: 136 MMOL/L (ref 133–144)
TIBC SERPL-MCNC: 285 UG/DL (ref 240–430)
TRIGL SERPL-MCNC: 50 MG/DL
TSH SERPL DL<=0.005 MIU/L-ACNC: 0.93 MU/L (ref 0.4–4)
WBC # BLD AUTO: 6 10E3/UL (ref 4–11)

## 2022-07-01 PROCEDURE — 83550 IRON BINDING TEST: CPT

## 2022-07-01 PROCEDURE — 82728 ASSAY OF FERRITIN: CPT

## 2022-07-01 PROCEDURE — 85027 COMPLETE CBC AUTOMATED: CPT

## 2022-07-01 PROCEDURE — 36415 COLL VENOUS BLD VENIPUNCTURE: CPT

## 2022-07-01 PROCEDURE — 255N000002 HC RX 255 OP 636: Performed by: INTERNAL MEDICINE

## 2022-07-01 PROCEDURE — 82248 BILIRUBIN DIRECT: CPT

## 2022-07-01 PROCEDURE — A9585 GADOBUTROL INJECTION: HCPCS | Performed by: INTERNAL MEDICINE

## 2022-07-01 PROCEDURE — 80053 COMPREHEN METABOLIC PANEL: CPT

## 2022-07-01 PROCEDURE — 85610 PROTHROMBIN TIME: CPT

## 2022-07-01 PROCEDURE — 80061 LIPID PANEL: CPT

## 2022-07-01 PROCEDURE — 74183 MRI ABD W/O CNTR FLWD CNTR: CPT

## 2022-07-01 PROCEDURE — 84443 ASSAY THYROID STIM HORMONE: CPT

## 2022-07-01 RX ORDER — GADOBUTROL 604.72 MG/ML
10 INJECTION INTRAVENOUS ONCE
Status: COMPLETED | OUTPATIENT
Start: 2022-07-01 | End: 2022-07-01

## 2022-07-01 RX ADMIN — GADOBUTROL 10 ML: 604.72 INJECTION INTRAVENOUS at 08:50

## 2022-07-03 DIAGNOSIS — L30.9 ECZEMA, UNSPECIFIED TYPE: ICD-10-CM

## 2022-07-05 ENCOUNTER — TELEPHONE (OUTPATIENT)
Dept: CARDIOLOGY | Facility: CLINIC | Age: 67
End: 2022-07-05

## 2022-07-05 RX ORDER — CLOBETASOL PROPIONATE 0.5 MG/G
OINTMENT TOPICAL
Qty: 30 G | Refills: 0 | Status: SHIPPED | OUTPATIENT
Start: 2022-07-05 | End: 2022-07-15

## 2022-07-05 NOTE — TELEPHONE ENCOUNTER
"Called patient to deliver Dr Lara's findings on the lab work. Labs noted, continue present course.  Followup primary MD for borderline elevated glucose.\"     Left message with patient with callback number.     Alan Her, EMT  Clinic Support  [facility]    [phone]    Employed by [facility] [employer]        "

## 2022-07-13 ENCOUNTER — DOCUMENTATION ONLY (OUTPATIENT)
Dept: LAB | Facility: OTHER | Age: 67
End: 2022-07-13

## 2022-07-13 DIAGNOSIS — K83.01 PSC (PRIMARY SCLEROSING CHOLANGITIS) (H): Primary | ICD-10-CM

## 2022-07-13 NOTE — PROGRESS NOTES
Patient is coming in for labs 7/25/22  Please place orders as needed   Thank you  Yoselyn WISEMAN) [facility]

## 2022-07-15 ENCOUNTER — APPOINTMENT (OUTPATIENT)
Dept: LAB | Facility: CLINIC | Age: 67
End: 2022-07-15
Payer: COMMERCIAL

## 2022-07-15 ENCOUNTER — INFUSION THERAPY VISIT (OUTPATIENT)
Dept: INFUSION THERAPY | Facility: CLINIC | Age: 67
End: 2022-07-15
Attending: INTERNAL MEDICINE
Payer: COMMERCIAL

## 2022-07-15 VITALS
SYSTOLIC BLOOD PRESSURE: 131 MMHG | OXYGEN SATURATION: 98 % | WEIGHT: 190.7 LBS | DIASTOLIC BLOOD PRESSURE: 75 MMHG | BODY MASS INDEX: 27.36 KG/M2 | RESPIRATION RATE: 16 BRPM | TEMPERATURE: 97.7 F | HEART RATE: 58 BPM

## 2022-07-15 DIAGNOSIS — K51.00 ULCERATIVE PANCOLITIS WITHOUT COMPLICATION (H): Primary | ICD-10-CM

## 2022-07-15 LAB
ALBUMIN SERPL-MCNC: 3.3 G/DL (ref 3.4–5)
ALP SERPL-CCNC: 216 U/L (ref 40–150)
ALT SERPL W P-5'-P-CCNC: 68 U/L (ref 0–70)
AST SERPL W P-5'-P-CCNC: 50 U/L (ref 0–45)
BASOPHILS # BLD AUTO: 0.1 10E3/UL (ref 0–0.2)
BASOPHILS NFR BLD AUTO: 2 %
BILIRUB DIRECT SERPL-MCNC: 0.3 MG/DL (ref 0–0.2)
BILIRUB SERPL-MCNC: 0.8 MG/DL (ref 0.2–1.3)
CRP SERPL-MCNC: <2.9 MG/L (ref 0–8)
EOSINOPHIL # BLD AUTO: 0.4 10E3/UL (ref 0–0.7)
EOSINOPHIL NFR BLD AUTO: 7 %
ERYTHROCYTE [DISTWIDTH] IN BLOOD BY AUTOMATED COUNT: 12.6 % (ref 10–15)
ERYTHROCYTE [SEDIMENTATION RATE] IN BLOOD BY WESTERGREN METHOD: 9 MM/HR (ref 0–20)
HCT VFR BLD AUTO: 43.1 % (ref 40–53)
HGB BLD-MCNC: 14.6 G/DL (ref 13.3–17.7)
IMM GRANULOCYTES # BLD: 0 10E3/UL
IMM GRANULOCYTES NFR BLD: 0 %
LYMPHOCYTES # BLD AUTO: 2 10E3/UL (ref 0.8–5.3)
LYMPHOCYTES NFR BLD AUTO: 36 %
MCH RBC QN AUTO: 31.9 PG (ref 26.5–33)
MCHC RBC AUTO-ENTMCNC: 33.9 G/DL (ref 31.5–36.5)
MCV RBC AUTO: 94 FL (ref 78–100)
MONOCYTES # BLD AUTO: 0.5 10E3/UL (ref 0–1.3)
MONOCYTES NFR BLD AUTO: 9 %
NEUTROPHILS # BLD AUTO: 2.5 10E3/UL (ref 1.6–8.3)
NEUTROPHILS NFR BLD AUTO: 46 %
NRBC # BLD AUTO: 0 10E3/UL
NRBC BLD AUTO-RTO: 0 /100
PLATELET # BLD AUTO: 228 10E3/UL (ref 150–450)
PROT SERPL-MCNC: 7.1 G/DL (ref 6.8–8.8)
RBC # BLD AUTO: 4.58 10E6/UL (ref 4.4–5.9)
WBC # BLD AUTO: 5.5 10E3/UL (ref 4–11)

## 2022-07-15 PROCEDURE — 96365 THER/PROPH/DIAG IV INF INIT: CPT

## 2022-07-15 PROCEDURE — 258N000003 HC RX IP 258 OP 636: Performed by: INTERNAL MEDICINE

## 2022-07-15 PROCEDURE — 85025 COMPLETE CBC W/AUTO DIFF WBC: CPT | Performed by: INTERNAL MEDICINE

## 2022-07-15 PROCEDURE — 82040 ASSAY OF SERUM ALBUMIN: CPT | Performed by: INTERNAL MEDICINE

## 2022-07-15 PROCEDURE — 36415 COLL VENOUS BLD VENIPUNCTURE: CPT | Performed by: INTERNAL MEDICINE

## 2022-07-15 PROCEDURE — 86140 C-REACTIVE PROTEIN: CPT | Performed by: INTERNAL MEDICINE

## 2022-07-15 PROCEDURE — 250N000011 HC RX IP 250 OP 636: Performed by: INTERNAL MEDICINE

## 2022-07-15 PROCEDURE — 85652 RBC SED RATE AUTOMATED: CPT | Performed by: INTERNAL MEDICINE

## 2022-07-15 RX ORDER — HEPARIN SODIUM,PORCINE 10 UNIT/ML
5 VIAL (ML) INTRAVENOUS
Status: CANCELLED | OUTPATIENT
Start: 2022-09-09

## 2022-07-15 RX ORDER — HEPARIN SODIUM (PORCINE) LOCK FLUSH IV SOLN 100 UNIT/ML 100 UNIT/ML
5 SOLUTION INTRAVENOUS
Status: CANCELLED | OUTPATIENT
Start: 2022-09-09

## 2022-07-15 RX ADMIN — SODIUM CHLORIDE 250 ML: 9 INJECTION, SOLUTION INTRAVENOUS at 08:23

## 2022-07-15 RX ADMIN — VEDOLIZUMAB 300 MG: 300 INJECTION, POWDER, LYOPHILIZED, FOR SOLUTION INTRAVENOUS at 08:27

## 2022-07-15 ASSESSMENT — PAIN SCALES - GENERAL: PAINLEVEL: NO PAIN (0)

## 2022-07-15 NOTE — PATIENT INSTRUCTIONS
EDUCATION POST BIOLOGICAL/CHEMOTHERAPY INFUSION  Call the triage nurse at your clinic or seek medical attention if you have chills and/or temperature greater than or equal to 100.5, uncontrolled nausea/vomiting, diarrhea, constipation, dizziness, shortness of breath, chest pain, heart palpitations, weakness or any other new or concerning symptoms, questions or concerns.  You can not have any live virus vaccines prior to or during treatment or up to 6 months post infusion.  If you have an upcoming surgery, medical procedure or dental procedure during treatment, this should be discussed with your ordering physician and your surgeon/dentist.  If you are having any concerning symptom, if you are unsure if you should get your next infusion or wish to speak to a provider before your next infusion, please call your care coordinator or triage nurse at your clinic to notify them so we can adequately serve you.

## 2022-07-18 NOTE — PROGRESS NOTES
Lab appointment will be canceled.   Recent labs 7/15 are all that is needed.    Millie OZUNA LPN  Hepatology Clinic

## 2022-07-26 ENCOUNTER — OFFICE VISIT (OUTPATIENT)
Dept: GASTROENTEROLOGY | Facility: CLINIC | Age: 67
End: 2022-07-26
Attending: INTERNAL MEDICINE
Payer: COMMERCIAL

## 2022-07-26 VITALS
WEIGHT: 194.8 LBS | TEMPERATURE: 97.6 F | DIASTOLIC BLOOD PRESSURE: 86 MMHG | SYSTOLIC BLOOD PRESSURE: 145 MMHG | HEART RATE: 57 BPM | BODY MASS INDEX: 27.95 KG/M2 | OXYGEN SATURATION: 99 %

## 2022-07-26 DIAGNOSIS — K83.01 PSC (PRIMARY SCLEROSING CHOLANGITIS) (H): Primary | ICD-10-CM

## 2022-07-26 PROCEDURE — 99214 OFFICE O/P EST MOD 30 MIN: CPT | Performed by: INTERNAL MEDICINE

## 2022-07-26 PROCEDURE — G0463 HOSPITAL OUTPT CLINIC VISIT: HCPCS

## 2022-07-26 RX ORDER — URSODIOL 300 MG/1
300 CAPSULE ORAL 2 TIMES DAILY
Qty: 180 CAPSULE | Refills: 1 | Status: SHIPPED | OUTPATIENT
Start: 2022-07-26 | End: 2023-01-24

## 2022-07-26 ASSESSMENT — PAIN SCALES - GENERAL: PAINLEVEL: NO PAIN (0)

## 2022-07-26 NOTE — PROGRESS NOTES
[facility] Hepatology    Follow-up Visit    CONSULTING HEALTHCARE PROVIDER:  Lily Eaton DO     CHIEF COMPLAINT AND REASON FOR THE VISIT:  Primary sclerosing cholangitis.    SUBJECTIVE:  Mr. Grande is a 66-year-old male who has pancolitis, followed by Dr. Lily Eaton, and also primary sclerosing cholangitis. We saw him for the latter.      Mr. Grande had his last colonoscopy with Dr. Eaton on 05/03/2022.  At that time, he had a mild Fernandez score of 1 pan-ulcerative colitis.  As far as the liver is concerned, he did stay also stable.  Comparing his imaging of 07/01/2022 to that of 06/09/2021 there no evidence of any new findings.  It also showed no new liver lesions.      Today he is complaining of mild pruritus.  Otherwise, his weight has remained quasi stable.  He is not jaundiced.  He has minimal edema.  Appetite is good.  He does not have any abdominal pain, nausea, vomiting.  He is moving his bowels twice a day with no blood in it and he otherwise has no new findings.    Medical hx Surgical hx   Past Medical History:   Diagnosis Date     Allergic rhinitis, cause unspecified      Fatty liver 2004    elevated LFT, saw GI for a consultation     Mild persistent asthma 9/12/2008     Reflux esophagitis      Unspecified disease of respiratory system     RAD     Viremia, unspecified     acute      Past Surgical History:   Procedure Laterality Date     COLONOSCOPY  9/4/2013    Procedure: COLONOSCOPY;  colonoscopy;  Surgeon: Beto Keen MD;  Location:  GI     COLONOSCOPY N/A 7/31/2020    Procedure: Colonoscopy, With Polypectomy And Biopsy;  Surgeon: Lily Eaton DO;  Location: [facility] OR     COLONOSCOPY N/A 4/5/2021    Procedure: Colonoscopy, With Polypectomy And Biopsy;  Surgeon: Lily Eaton DO;  Location: [facility] OR     COLONOSCOPY N/A 5/3/2022    Procedure: COLONOSCOPY, WITH POLYPECTOMY AND BIOPSY;  Surgeon: Lily Eaton DO;  Location: [facility] OR     COLONOSCOPY N/A 5/3/2022    Procedure:  COLONOSCOPY, FLEXIBLE, WITH LESION REMOVAL USING SNARE;  Surgeon: Lily Eaton DO;  Location: [facility] OR     COLONOSCOPY WITH CO2 INSUFFLATION N/A 7/31/2020    Procedure: COLONOSCOPY, WITH CO2 INSUFFLATION;  Surgeon: Lily Eaton DO;  Location: [facility] OR     COLONOSCOPY WITH CO2 INSUFFLATION N/A 4/5/2021    Procedure: COLONOSCOPY, WITH CO2 INSUFFLATION;  Surgeon: Lily Eaton DO;  Location: [facility] OR     COLONOSCOPY WITH CO2 INSUFFLATION N/A 5/3/2022    Procedure: COLONOSCOPY, WITH CO2 INSUFFLATION;  Surgeon: Lily Eaton DO;  Location: [facility] OR     COMBINED ESOPHAGOSCOPY, GASTROSCOPY, DUODENOSCOPY (EGD) WITH CO2 INSUFFLATION N/A 7/31/2020    Procedure: ESOPHAGOGASTRODUODENOSCOPY, WITH CO2 INSUFFLATION;  Surgeon: Lily Eaton DO;  Location: [facility] OR     ESOPHAGOSCOPY, GASTROSCOPY, DUODENOSCOPY (EGD), COMBINED N/A 7/31/2020    Procedure: Esophagogastroduodenoscopy, With Biopsy;  Surgeon: Lily Eaton DO;  Location: [facility] OR     HC VASECTOMY UNILAT/BILAT W POSTOP SEMEN  1992    Vasectomy     ZZHC COLONOSCOPY THRU STOMA, DIAGNOSTIC  2005    normal          Medications  Prior to Admission medications    Medication Sig Start Date End Date Taking? Authorizing Provider   albuterol (PROAIR HFA/PROVENTIL HFA/VENTOLIN HFA) 108 (90 Base) MCG/ACT inhaler Inhale 2 puffs into the lungs every 6 hours 6/28/22  Yes Karen Rodriguez MD   ASPIRIN PO Take 81 mg by mouth at onset of headache for moderate pain    Yes Reported, Patient   atorvastatin (LIPITOR) 40 MG tablet Take 1 tablet (40 mg) by mouth daily 3/22/22  Yes Fareed Lara MD   azelastine (ASTELIN) 0.1 % nasal spray SPRAY 2 SPRAYS IN EACH NOSTRIL TWO TIMES A DAY 3/9/21  Yes Karen Rodriguez MD   esomeprazole (NEXIUM) 20 MG DR capsule Take 20 mg by mouth daily Take 30-60 minutes before eating. Takes clear baudilio   Yes Reported, Patient   ferrous gluconate (FERGON) 324 (38 Fe) MG tablet Take 1 tablet (324 mg) by mouth 2 times daily (with meals) 3/24/21  Yes  Lily Eaton DO   fish oil-omega-3 fatty acids 1000 MG capsule Take 2 g by mouth daily   Yes Reported, Patient   fluticasone (FLONASE) 50 MCG/ACT nasal spray Spray 1-2 sprays into both nostrils daily 9/16/15  Yes Karen Rodriguez MD   Ibuprofen (ADVIL PO) Take 200 mg by mouth every 4 hours as needed    Yes Reported, Patient   melatonin 1 MG TABS Take 1 mg by mouth nightly as needed for sleep   Yes Reported, Patient   Multiple Vitamins-Minerals (MULTIVITAMIN ADULT PO) Take 1 tablet by mouth daily    Yes Reported, Patient   Naphazoline-Glycerin-Zinc Sulf (CLEAR EYES MAXIMUM ITCHY EYE) 0.012-0.25-0.25 % SOLN Apply to eye as needed (for allergies)   Yes Reported, Patient   ursodiol (ACTIGALL) 300 MG capsule Take 1 capsule (300 mg) by mouth 2 times daily for 90 days 7/26/22 10/24/22 Yes Ute Fu MD   vitamin D3 (CHOLECALCIFEROL) 50 mcg (2000 units) tablet Take 1 tablet (50 mcg) by mouth daily 11/19/20  Yes Lily Eaton DO       Allergies  Allergies   Allergen Reactions     No Known Drug Allergies      Seasonal Allergies        Review of systems  A 10-point review of systems was negative    Examination  BP (!) 145/86   Pulse 57   Temp 97.6  F (36.4  C) (Oral)   Wt 88.4 kg (194 lb 12.8 oz)   SpO2 99%   BMI 27.95 kg/m    Body mass index is 27.95 kg/m .    Gen- well, NAD, A+Ox3, normal color  Lym- no palpable LAD  CVS- RRR  RS- CTA  Abd- Not tender and not distended.  Extr- hands normal, no MIN  Skin- no rash or jaundice  Neuro- no asterixis  Psych- normal mood    Laboratory  Lab Results   Component Value Date     07/01/2022     06/16/2021    POTASSIUM 3.9 07/01/2022    POTASSIUM 4.0 06/16/2021    CHLORIDE 102 07/01/2022    CHLORIDE 102 06/16/2021    CO2 27 07/01/2022    CO2 29 06/16/2021    BUN 13 07/01/2022    BUN 12 06/16/2021    CR 0.90 07/01/2022    CR 0.90 06/16/2021       Lab Results   Component Value Date    BILITOTAL 0.8 07/15/2022    BILITOTAL 0.7 06/16/2021    ALT  68 07/15/2022     06/16/2021    AST 50 07/15/2022    AST 88 06/16/2021    ALKPHOS 216 07/15/2022    ALKPHOS 391 06/16/2021       Lab Results   Component Value Date    ALBUMIN 3.3 07/15/2022    ALBUMIN 3.7 06/16/2021    PROTTOTAL 7.1 07/15/2022    PROTTOTAL 7.8 06/16/2021        Lab Results   Component Value Date    WBC 5.5 07/15/2022    WBC 7.3 06/16/2021    HGB 14.6 07/15/2022    HGB 13.4 06/16/2021    MCV 94 07/15/2022    MCV 88 06/16/2021     07/15/2022     06/16/2021       Lab Results   Component Value Date    INR 1.13 07/01/2022    INR 1.01 06/16/2021       Radiology    ASSESSMENT AND PLAN:  Primary sclerosing cholangitis.  Mr. Grande has primary sclerosing cholangitis.  This was diagnosed recently.  He also has, together with it, ulcerative pancolitis.  He has remained stable for the last 1 year, although his hepatic panel just done recently showed that his alkaline phosphatase remained at 260 and his AST was 50 while his ALT was normal at 68.  He has minimal pruritus.      Otherwise, he is doing quite well, so we will start him on ursodiol and see if that helps him with his pruritus.  We did discuss also with them the risk of liver cancer, more of cholangiocarcinoma, and he will need to have, at least on a yearly basis, his MRI/MRCP.  He will be seen here within the coming 6 months and in the meantime, he will follow up with Dr. Eaton for his ulcerative colitis.      For all his other medical issues, he will follow also with his primary care physician.      I spent 40 minutes on this encounter of 07/26/2022 in chart reviewing, history taking, physical examination and documentation.  Some of the time was also used for coordination of care and counseling.    Ute Fu MD  Hepatology  [facility]

## 2022-07-26 NOTE — NURSING NOTE
Chief Complaint   Patient presents with     RECHECK       BP (!) 145/86   Pulse 57   Temp 97.6  F (36.4  C) (Oral)   Wt 88.4 kg (194 lb 12.8 oz)   SpO2 99%   BMI 27.95 kg/m      Landon Mays on 7/26/2022 at 9:12 AM

## 2022-07-26 NOTE — LETTER
7/26/2022         RE: Mainor Grande  [id number] [facility] [address]        Dear Colleague,    Thank you for referring your patient, Mainor Grande, to the [facility] [place]. Please see a copy of my visit note below.    [facility] Hepatology    Follow-up Visit    CONSULTING HEALTHCARE PROVIDER:  Lily Eaton DO     CHIEF COMPLAINT AND REASON FOR THE VISIT:  Primary sclerosing cholangitis.    SUBJECTIVE:  Mr. Grande is a 66-year-old male who has pancolitis, followed by Dr. Lily Eaton, and also primary sclerosing cholangitis. We saw him for the latter.      Mr. Grande had his last colonoscopy with Dr. Eaton on 05/03/2022.  At that time, he had a mild Fernandez score of 1 pan-ulcerative colitis.  As far as the liver is concerned, he did stay also stable.  Comparing his imaging of 07/01/2022 to that of 06/09/2021 there no evidence of any new findings.  It also showed no new liver lesions.      Today he is complaining of mild pruritus.  Otherwise, his weight has remained quasi stable.  He is not jaundiced.  He has minimal edema.  Appetite is good.  He does not have any abdominal pain, nausea, vomiting.  He is moving his bowels twice a day with no blood in it and he otherwise has no new findings.    Medical hx Surgical hx   Past Medical History:   Diagnosis Date     Allergic rhinitis, cause unspecified      Fatty liver 2004    elevated LFT, saw GI for a consultation     Mild persistent asthma 9/12/2008     Reflux esophagitis      Unspecified disease of respiratory system     RAD     Viremia, unspecified     acute      Past Surgical History:   Procedure Laterality Date     COLONOSCOPY  9/4/2013    Procedure: COLONOSCOPY;  colonoscopy;  Surgeon: Beto Keen MD;  Location:  GI     COLONOSCOPY N/A 7/31/2020    Procedure: Colonoscopy, With Polypectomy And Biopsy;  Surgeon: Lily Eaton DO;  Location: [facility] OR     COLONOSCOPY N/A 4/5/2021    Procedure:  Colonoscopy, With Polypectomy And Biopsy;  Surgeon: Lily Eaton DO;  Location: [facility] OR     COLONOSCOPY N/A 5/3/2022    Procedure: COLONOSCOPY, WITH POLYPECTOMY AND BIOPSY;  Surgeon: Lily Eaton DO;  Location: [facility] OR     COLONOSCOPY N/A 5/3/2022    Procedure: COLONOSCOPY, FLEXIBLE, WITH LESION REMOVAL USING SNARE;  Surgeon: Lily Eaton DO;  Location: [facility] OR     COLONOSCOPY WITH CO2 INSUFFLATION N/A 7/31/2020    Procedure: COLONOSCOPY, WITH CO2 INSUFFLATION;  Surgeon: Lily Eaton DO;  Location: [facility] OR     COLONOSCOPY WITH CO2 INSUFFLATION N/A 4/5/2021    Procedure: COLONOSCOPY, WITH CO2 INSUFFLATION;  Surgeon: Lily Eaton DO;  Location: [facility] OR     COLONOSCOPY WITH CO2 INSUFFLATION N/A 5/3/2022    Procedure: COLONOSCOPY, WITH CO2 INSUFFLATION;  Surgeon: Lily Eaton DO;  Location: [facility] OR     COMBINED ESOPHAGOSCOPY, GASTROSCOPY, DUODENOSCOPY (EGD) WITH CO2 INSUFFLATION N/A 7/31/2020    Procedure: ESOPHAGOGASTRODUODENOSCOPY, WITH CO2 INSUFFLATION;  Surgeon: Lily Eaton DO;  Location: [facility] OR     ESOPHAGOSCOPY, GASTROSCOPY, DUODENOSCOPY (EGD), COMBINED N/A 7/31/2020    Procedure: Esophagogastroduodenoscopy, With Biopsy;  Surgeon: Lily Eaton DO;  Location: [facility] OR     HC VASECTOMY UNILAT/BILAT W POSTOP SEMEN  1992    Vasectomy     ZZHC COLONOSCOPY THRU STOMA, DIAGNOSTIC  2005    normal          Medications  Prior to Admission medications    Medication Sig Start Date End Date Taking? Authorizing Provider   albuterol (PROAIR HFA/PROVENTIL HFA/VENTOLIN HFA) 108 (90 Base) MCG/ACT inhaler Inhale 2 puffs into the lungs every 6 hours 6/28/22  Yes Karen Rodriguez MD   ASPIRIN PO Take 81 mg by mouth at onset of headache for moderate pain    Yes Reported, Patient   atorvastatin (LIPITOR) 40 MG tablet Take 1 tablet (40 mg) by mouth daily 3/22/22  Yes Fareed Lara MD   azelastine (ASTELIN) 0.1 % nasal spray SPRAY 2 SPRAYS IN EACH NOSTRIL TWO TIMES A DAY 3/9/21  Yes Karen Rodriguez MD    esomeprazole (NEXIUM) 20 MG DR capsule Take 20 mg by mouth daily Take 30-60 minutes before eating. Takes clear baudilio   Yes Reported, Patient   ferrous gluconate (FERGON) 324 (38 Fe) MG tablet Take 1 tablet (324 mg) by mouth 2 times daily (with meals) 3/24/21  Yes Lily Eaton DO   fish oil-omega-3 fatty acids 1000 MG capsule Take 2 g by mouth daily   Yes Reported, Patient   fluticasone (FLONASE) 50 MCG/ACT nasal spray Spray 1-2 sprays into both nostrils daily 9/16/15  Yes Karen Rodriguez MD   Ibuprofen (ADVIL PO) Take 200 mg by mouth every 4 hours as needed    Yes Reported, Patient   melatonin 1 MG TABS Take 1 mg by mouth nightly as needed for sleep   Yes Reported, Patient   Multiple Vitamins-Minerals (MULTIVITAMIN ADULT PO) Take 1 tablet by mouth daily    Yes Reported, Patient   Naphazoline-Glycerin-Zinc Sulf (CLEAR EYES MAXIMUM ITCHY EYE) 0.012-0.25-0.25 % SOLN Apply to eye as needed (for allergies)   Yes Reported, Patient   ursodiol (ACTIGALL) 300 MG capsule Take 1 capsule (300 mg) by mouth 2 times daily for 90 days 7/26/22 10/24/22 Yes Ute Fu MD   vitamin D3 (CHOLECALCIFEROL) 50 mcg (2000 units) tablet Take 1 tablet (50 mcg) by mouth daily 11/19/20  Yes Lily Eaton DO       Allergies  Allergies   Allergen Reactions     No Known Drug Allergies      Seasonal Allergies        Review of systems  A 10-point review of systems was negative    Examination  BP (!) 145/86   Pulse 57   Temp 97.6  F (36.4  C) (Oral)   Wt 88.4 kg (194 lb 12.8 oz)   SpO2 99%   BMI 27.95 kg/m    Body mass index is 27.95 kg/m .    Gen- well, NAD, A+Ox3, normal color  Lym- no palpable LAD  CVS- RRR  RS- CTA  Abd- Not tender and not distended.  Extr- hands normal, no MIN  Skin- no rash or jaundice  Neuro- no asterixis  Psych- normal mood    Laboratory  Lab Results   Component Value Date     07/01/2022     06/16/2021    POTASSIUM 3.9 07/01/2022    POTASSIUM 4.0 06/16/2021    CHLORIDE 102  07/01/2022    CHLORIDE 102 06/16/2021    CO2 27 07/01/2022    CO2 29 06/16/2021    BUN 13 07/01/2022    BUN 12 06/16/2021    CR 0.90 07/01/2022    CR 0.90 06/16/2021       Lab Results   Component Value Date    BILITOTAL 0.8 07/15/2022    BILITOTAL 0.7 06/16/2021    ALT 68 07/15/2022     06/16/2021    AST 50 07/15/2022    AST 88 06/16/2021    ALKPHOS 216 07/15/2022    ALKPHOS 391 06/16/2021       Lab Results   Component Value Date    ALBUMIN 3.3 07/15/2022    ALBUMIN 3.7 06/16/2021    PROTTOTAL 7.1 07/15/2022    PROTTOTAL 7.8 06/16/2021        Lab Results   Component Value Date    WBC 5.5 07/15/2022    WBC 7.3 06/16/2021    HGB 14.6 07/15/2022    HGB 13.4 06/16/2021    MCV 94 07/15/2022    MCV 88 06/16/2021     07/15/2022     06/16/2021       Lab Results   Component Value Date    INR 1.13 07/01/2022    INR 1.01 06/16/2021       Radiology    ASSESSMENT AND PLAN:  Primary sclerosing cholangitis.  Mr. Grande has primary sclerosing cholangitis.  This was diagnosed recently.  He also has, together with it, ulcerative pancolitis.  He has remained stable for the last 1 year, although his hepatic panel just done recently showed that his alkaline phosphatase remained at 260 and his AST was 50 while his ALT was normal at 68.  He has minimal pruritus.      Otherwise, he is doing quite well, so we will start him on ursodiol and see if that helps him with his pruritus.  We did discuss also with them the risk of liver cancer, more of cholangiocarcinoma, and he will need to have, at least on a yearly basis, his MRI/MRCP.  He will be seen here within the coming 6 months and in the meantime, he will follow up with Dr. Eaton for his ulcerative colitis.      For all his other medical issues, he will follow also with his primary care physician.      I spent 40 minutes on this encounter of 07/26/2022 in chart reviewing, history taking, physical examination and documentation.  Some of the time was also used for  coordination of care and counseling.    Ute Fu MD  Hepatology  [facility]

## 2022-08-09 ENCOUNTER — TELEPHONE (OUTPATIENT)
Dept: DERMATOLOGY | Facility: CLINIC | Age: 67
End: 2022-08-09

## 2022-08-15 ENCOUNTER — TELEPHONE (OUTPATIENT)
Dept: DERMATOLOGY | Facility: CLINIC | Age: 67
End: 2022-08-15

## 2022-08-15 NOTE — TELEPHONE ENCOUNTER
LVM for pt to call [phone]. 9/7/22 appt needs to be rescheduled as Andres won't be in office. Okay to offer a phone visit per Liat. Per Andres/[facility] staff, okay to schedule on [facility] schedule. If pt wants to be scheduled at [facility], send a telephone encounter to their pool and they will handle scheduling.    Dario Ny  In Clinic Visit Facilitator

## 2022-08-24 ENCOUNTER — DOCUMENTATION ONLY (OUTPATIENT)
Dept: LAB | Facility: OTHER | Age: 67
End: 2022-08-24

## 2022-08-24 PROCEDURE — 83993 ASSAY FOR CALPROTECTIN FECAL: CPT

## 2022-08-24 NOTE — PROGRESS NOTES
Patient brought in stool containers and has a calprotectin feces order if you would like any additional tests please place orders.

## 2022-08-26 LAB — CALPROTECTIN STL-MCNT: 338 MG/KG (ref 0–49.9)

## 2022-09-01 ENCOUNTER — MYC MEDICAL ADVICE (OUTPATIENT)
Dept: GASTROENTEROLOGY | Facility: CLINIC | Age: 67
End: 2022-09-01

## 2022-09-02 ENCOUNTER — TELEPHONE (OUTPATIENT)
Dept: GASTROENTEROLOGY | Facility: CLINIC | Age: 67
End: 2022-09-02

## 2022-09-02 NOTE — TELEPHONE ENCOUNTER
"LPN left message for patient requesting a return call to discuss fecal calprotectin results and also change in Entyvio infusion frequency. See messages below with result note and PA for Entyvio every 4 weeks.     Mago Castro LPN      Dear Mr. Grande     Your fecal calprotectin (inflammatory marker) is improved but still higher than I would like it to be - I think we should try to increase the frequency of your entyvio infusions - this will need to go through your insurance first so please stay on your infusions as previously scheduled for now and we will let you know when we hear back. Please contact me if you have any questions.     DO Lisandro Rousseau, Mago Yeboah LPN; P [facility]  I spoke with his insurance and we are approved for \"as prescribed\" so we are approved for the every 4 week dosing     Thank you,   Niraj Mcmahon    - Infusion Therapy    [facility]    [email or web address]  [email or web address]               Previous Messages       ----- Message -----   From: Mago Castro LPN   Sent: 8/29/2022  11:13 AM CDT   To: Niraj Mcmahon, *     Tomy!     Dr. Eaton would like to increase this patients Entyvio to every 4 weeks. Can you please initiate a PA for this? Please provide us with updates!     Thank you!   DENICE Mercedes     ----- Message -----   From: Lily Eaton DO   Sent: 8/29/2022  11:08 AM CDT   To: [facility]     Hey -   I'd like to increase Leno's entyvio to every 4 weeks - I put in the order   Thanks!       "

## 2022-09-07 NOTE — TELEPHONE ENCOUNTER
LPN left message for patient requesting a return call to discuss Entyvio PA every 4 week dosing. PA was approved. Patient is scheduled for his next infusion on 9/9/22 and will need to schedule an infusion for 4 weeks after.     Mago Castro LPN

## 2022-09-08 NOTE — TELEPHONE ENCOUNTER
Lily Eaton, DO  You 1 hour ago (6:58 AM)       They would probably stay at every 4-6 weeks (although hopefully the injectable form will be approved soon) - I'm also not sure this will even be approved - we can discuss further when we know if the insurance will cover it      MyChart message sent to patient with above provider message.     Lily Berumen RN

## 2022-09-09 ENCOUNTER — INFUSION THERAPY VISIT (OUTPATIENT)
Dept: INFUSION THERAPY | Facility: CLINIC | Age: 67
End: 2022-09-09
Attending: INTERNAL MEDICINE
Payer: COMMERCIAL

## 2022-09-09 VITALS
BODY MASS INDEX: 28.14 KG/M2 | TEMPERATURE: 98.4 F | SYSTOLIC BLOOD PRESSURE: 135 MMHG | DIASTOLIC BLOOD PRESSURE: 77 MMHG | OXYGEN SATURATION: 100 % | HEART RATE: 60 BPM | WEIGHT: 196.1 LBS | RESPIRATION RATE: 16 BRPM

## 2022-09-09 DIAGNOSIS — K51.00 ULCERATIVE PANCOLITIS WITHOUT COMPLICATION (H): Primary | ICD-10-CM

## 2022-09-09 PROCEDURE — 250N000011 HC RX IP 250 OP 636: Performed by: INTERNAL MEDICINE

## 2022-09-09 PROCEDURE — 96365 THER/PROPH/DIAG IV INF INIT: CPT

## 2022-09-09 PROCEDURE — 258N000003 HC RX IP 258 OP 636: Performed by: INTERNAL MEDICINE

## 2022-09-09 RX ORDER — HEPARIN SODIUM,PORCINE 10 UNIT/ML
5 VIAL (ML) INTRAVENOUS
Status: CANCELLED | OUTPATIENT
Start: 2022-09-26

## 2022-09-09 RX ORDER — HEPARIN SODIUM (PORCINE) LOCK FLUSH IV SOLN 100 UNIT/ML 100 UNIT/ML
5 SOLUTION INTRAVENOUS
Status: CANCELLED | OUTPATIENT
Start: 2022-09-26

## 2022-09-09 RX ADMIN — SODIUM CHLORIDE 250 ML: 9 INJECTION, SOLUTION INTRAVENOUS at 08:24

## 2022-09-09 RX ADMIN — VEDOLIZUMAB 300 MG: 300 INJECTION, POWDER, LYOPHILIZED, FOR SOLUTION INTRAVENOUS at 08:39

## 2022-09-09 ASSESSMENT — PAIN SCALES - GENERAL: PAINLEVEL: NO PAIN (0)

## 2022-09-09 NOTE — TELEPHONE ENCOUNTER
Patient was sent a [employer] message on 9/1/22 regarding the approval of every 4 week Entyvio infusions. Patient is now scheduled for the next 4 infusions at every 4 week intervals.    Mago Castro LPN

## 2022-09-09 NOTE — PROGRESS NOTES
Infusion Nursing Note:  Mainor Grande presents today for Entyvio.    Patient seen by provider today: No   present during visit today: Not Applicable.    Note: Patient has no complaints except hay fever, seasonal allergies. Appointments updated to every 4 weeks with labs every other infusion. .    Intravenous Access:  Peripheral IV placed.    Treatment Conditions:  Biological Infusion Checklist:  ~~~ NOTE: If the patient answers yes to any of the questions below, hold the infusion and contact ordering provider or on-call provider.    1. Have you recently had an elevated temperature, fever, chills, productive cough, coughing for 3 weeks or longer or hemoptysis, abnormal vital signs, night sweats,  chest pain or have you noticed a decrease in your appetite, unexplained weight loss or fatigue? No  2. Do you have any open wounds or new incisions? No  3. Do you have any recent or upcoming hospitalizations, surgeries or dental procedures? No  4. Do you currently have or recently have had any signs of illness or infection or are you on any antibiotics? No  5. Have you had any new, sudden or worsening abdominal pain? No  6. Have you or anyone in your household received a live vaccination in the past 4 weeks? Please note:  No live vaccines while on biologic/chemotherapy until 6 months after the last treatment.  Patient can receive the flu vaccine (shot only) and the pneumovax.  It is optimal for the patient to get these vaccines mid cycle, but they can be given at any time as long as it is not on the day of the infusion. No  7. Have you recently been diagnosed with any new nervous system diseases (ie. Multiple sclerosis, Guillain [place], seizures, neurological changes) or cancer diagnosis? No  8. Are you on any form of radiation or chemotherapy? No  9. Are you pregnant or breast feeding or do you have plans of pregnancy in the future? No  10. Have you been having any signs of worsening depression or suicidal  ideations?  (benlysta only) No  11. Have there been any other new onset medical symptoms? No      Post Infusion Assessment:  Patient tolerated infusion without incident.  Blood return noted pre and post infusion.  Site patent and intact, free from redness, edema or discomfort.  Access discontinued per protocol.   EDUCATION POST BIOLOGICAL/CHEMOTHERAPY INFUSION  Call the triage nurse at your clinic or seek medical attention if you have chills and/or temperature greater than or equal to 100.5, uncontrolled nausea/vomiting, diarrhea, constipation, dizziness, shortness of breath, chest pain, heart palpitations, weakness or any other new or concerning symptoms, questions or concerns.  You can not have any live virus vaccines prior to or during treatment or up to 6 months post infusion.  If you have an upcoming surgery, medical procedure or dental procedure during treatment, this should be discussed with your ordering physician and your surgeon/dentist.  If you are having any concerning symptom, if you are unsure if you should get your next infusion or wish to speak to a provider before your next infusion, please call your care coordinator or triage nurse at your clinic to notify them so we can adequately serve you.  Discharge Plan:   Discharge instructions reviewed with: self  Patient discharged in stable condition accompanied by: self.  Departure Mode: Ambulatory.      Gilda Mays RN

## 2022-09-15 ENCOUNTER — TELEPHONE (OUTPATIENT)
Dept: GASTROENTEROLOGY | Facility: CLINIC | Age: 67
End: 2022-09-15

## 2022-09-15 NOTE — TELEPHONE ENCOUNTER
Spoke to patient and wife Rani gil every 4 week infusions.   Patient has infusions scheduled 10/7, 11/4, 12/2 and 12/30.   Rani states that they will be traveling to [place] December 15 til early February - is wondering if patient could change the 12/2 infusion to 12/14 (just before they leave for [place]) and then resume every 4 weeks upon their return in February.   Writer stated that she would forward this inquiry to provider.   Rani appreciative.    Lily Berumen RN

## 2022-09-15 NOTE — TELEPHONE ENCOUNTER
Juan J message sent to patient/wife stating that Dr. Eaton was okay with changing the 12/2 infusion to 12/14 - just before they leave for [place] and then resume every 4 weeks upon their return in February.     Lily Berumen RN

## 2022-09-15 NOTE — TELEPHONE ENCOUNTER
Health Call Center    Phone Message    May a detailed message be left on voicemail: yes     Reason for Call: Other: Patient has his wife, Rani, were returning call to Rani about the infusion schedule change from every 8 weeks, to every 4 weeks.  Please have Mago follow up with patient.  If you don't reach patient, please call Rani at [phone].     Action Taken: Message routed to:  [facility]    Travel Screening: Not Applicable

## 2022-09-23 ENCOUNTER — OFFICE VISIT (OUTPATIENT)
Dept: FAMILY MEDICINE | Facility: OTHER | Age: 67
End: 2022-09-23
Payer: COMMERCIAL

## 2022-09-23 VITALS
HEIGHT: 70 IN | HEART RATE: 72 BPM | TEMPERATURE: 97.6 F | SYSTOLIC BLOOD PRESSURE: 128 MMHG | WEIGHT: 195.5 LBS | BODY MASS INDEX: 27.99 KG/M2 | OXYGEN SATURATION: 96 % | DIASTOLIC BLOOD PRESSURE: 70 MMHG

## 2022-09-23 DIAGNOSIS — K51.00 ULCERATIVE PANCOLITIS WITHOUT COMPLICATION (H): ICD-10-CM

## 2022-09-23 DIAGNOSIS — E78.5 HYPERLIPIDEMIA LDL GOAL <160: ICD-10-CM

## 2022-09-23 DIAGNOSIS — J45.31 MILD PERSISTENT ASTHMA WITH ACUTE EXACERBATION: ICD-10-CM

## 2022-09-23 DIAGNOSIS — L28.0 LICHEN SIMPLEX CHRONICUS: ICD-10-CM

## 2022-09-23 DIAGNOSIS — J45.41 MODERATE PERSISTENT ASTHMA WITH ACUTE EXACERBATION: ICD-10-CM

## 2022-09-23 DIAGNOSIS — R41.3 MEMORY CHANGES: ICD-10-CM

## 2022-09-23 DIAGNOSIS — Z00.00 ENCOUNTER FOR MEDICARE ANNUAL WELLNESS EXAM: Primary | ICD-10-CM

## 2022-09-23 DIAGNOSIS — K83.01 PSC (PRIMARY SCLEROSING CHOLANGITIS) (H): ICD-10-CM

## 2022-09-23 PROCEDURE — 90677 PCV20 VACCINE IM: CPT | Performed by: FAMILY MEDICINE

## 2022-09-23 PROCEDURE — 99213 OFFICE O/P EST LOW 20 MIN: CPT | Mod: 25 | Performed by: FAMILY MEDICINE

## 2022-09-23 PROCEDURE — G0008 ADMIN INFLUENZA VIRUS VAC: HCPCS | Performed by: FAMILY MEDICINE

## 2022-09-23 PROCEDURE — 91313 COVID-19,PF,MODERNA BIVALENT: CPT | Performed by: FAMILY MEDICINE

## 2022-09-23 PROCEDURE — G0439 PPPS, SUBSEQ VISIT: HCPCS | Performed by: FAMILY MEDICINE

## 2022-09-23 PROCEDURE — G0009 ADMIN PNEUMOCOCCAL VACCINE: HCPCS | Performed by: FAMILY MEDICINE

## 2022-09-23 PROCEDURE — 90662 IIV NO PRSV INCREASED AG IM: CPT | Performed by: FAMILY MEDICINE

## 2022-09-23 PROCEDURE — 0134A COVID-19,PF,MODERNA BIVALENT: CPT | Performed by: FAMILY MEDICINE

## 2022-09-23 RX ORDER — CLOBETASOL PROPIONATE 0.5 MG/G
CREAM TOPICAL 2 TIMES DAILY
Qty: 45 G | Refills: 0 | Status: SHIPPED | OUTPATIENT
Start: 2022-09-23 | End: 2022-10-07

## 2022-09-23 RX ORDER — ALBUTEROL SULFATE 90 UG/1
2 AEROSOL, METERED RESPIRATORY (INHALATION) EVERY 6 HOURS
Qty: 18 G | Refills: 3 | Status: SHIPPED | OUTPATIENT
Start: 2022-09-23 | End: 2023-03-14

## 2022-09-23 ASSESSMENT — ENCOUNTER SYMPTOMS
PALPITATIONS: 0
SORE THROAT: 0
MYALGIAS: 0
HEADACHES: 0
ABDOMINAL PAIN: 0
SHORTNESS OF BREATH: 0
HEMATURIA: 0
NAUSEA: 0
HEMATOCHEZIA: 0
HEARTBURN: 1
CHILLS: 0
DIZZINESS: 0
PARESTHESIAS: 0
ARTHRALGIAS: 0
WEAKNESS: 0
NERVOUS/ANXIOUS: 1
FREQUENCY: 1
COUGH: 1
EYE PAIN: 0
JOINT SWELLING: 0
CONSTIPATION: 1
FEVER: 0
DYSURIA: 0
DIARRHEA: 0

## 2022-09-23 ASSESSMENT — ACTIVITIES OF DAILY LIVING (ADL): CURRENT_FUNCTION: NO ASSISTANCE NEEDED

## 2022-09-23 ASSESSMENT — ASTHMA QUESTIONNAIRES
QUESTION_1 LAST FOUR WEEKS HOW MUCH OF THE TIME DID YOUR ASTHMA KEEP YOU FROM GETTING AS MUCH DONE AT WORK, SCHOOL OR AT HOME: NONE OF THE TIME
QUESTION_2 LAST FOUR WEEKS HOW OFTEN HAVE YOU HAD SHORTNESS OF BREATH: NOT AT ALL
QUESTION_5 LAST FOUR WEEKS HOW WOULD YOU RATE YOUR ASTHMA CONTROL: SOMEWHAT CONTROLLED
QUESTION_4 LAST FOUR WEEKS HOW OFTEN HAVE YOU USED YOUR RESCUE INHALER OR NEBULIZER MEDICATION (SUCH AS ALBUTEROL): ONCE A WEEK OR LESS
ACT_TOTALSCORE: 21
ACT_TOTALSCORE: 21
QUESTION_3 LAST FOUR WEEKS HOW OFTEN DID YOUR ASTHMA SYMPTOMS (WHEEZING, COUGHING, SHORTNESS OF BREATH, CHEST TIGHTNESS OR PAIN) WAKE YOU UP AT NIGHT OR EARLIER THAN USUAL IN THE MORNING: ONCE OR TWICE

## 2022-09-23 ASSESSMENT — PAIN SCALES - GENERAL: PAINLEVEL: NO PAIN (0)

## 2022-09-23 NOTE — ASSESSMENT & PLAN NOTE
Wife reports worsening lapses in memory though he seems to be functioning well based on the conversation. He would like to have a baseline memory testing . I think this is reasonable. Referral placed for neuropsych testing

## 2022-09-23 NOTE — PATIENT INSTRUCTIONS
Patient Education   Personalized Prevention Plan  You are due for the preventive services outlined below.  Your care team is available to assist you in scheduling these services.  If you have already completed any of these items, please share that information with your care team to update in your medical record.  Health Maintenance Due   Topic Date Due     Asthma Action Plan - yearly  07/02/2022

## 2022-09-23 NOTE — PROGRESS NOTES
"SUBJECTIVE:   Mainor is a 67 year old who presents for Preventive Visit.      Patient has been advised of split billing requirements and indicates understanding: Yes  Are you in the first 12 months of your Medicare coverage?  No    Healthy Habits:     In general, how would you rate your overall health?  Good    Frequency of exercise:  4-5 days/week    Duration of exercise:  45-60 minutes    Do you usually eat at least 4 servings of fruit and vegetables a day, include whole grains    & fiber and avoid regularly eating high fat or \"junk\" foods?  Yes    Taking medications regularly:  Yes    Medication side effects:  None    Ability to successfully perform activities of daily living:  No assistance needed    Home Safety:  No safety concerns identified    Hearing Impairment:  No hearing concerns    In the past 6 months, have you been bothered by leaking of urine?  No    In general, how would you rate your overall mental or emotional health?  Good      PHQ-2 Total Score: 2    Additional concerns today:  No    Do you feel safe in your environment? Yes    Have you ever done Advance Care Planning? (For example, a Health Directive, POLST, or a discussion with a medical provider or your loved ones about your wishes): Yes, patient states has an Advance Care Planning document and will bring a copy to the clinic.       Fall risk  Fallen 2 or more times in the past year?: No  Any fall with injury in the past year?: No    Cognitive Screening   1) Repeat 3 items (Leader, Season, Table)    2) Clock draw: NORMAL  3) 3 item recall: Recalls 1 object   Results: NORMAL clock, 1-2 items recalled: COGNITIVE IMPAIRMENT LESS LIKELY    Mini-CogTM Copyright RYLIE Yates. Licensed by the author for use in [facility]; reprinted with permission (ramonita@.[facility]). All rights reserved.      Do you have sleep apnea, excessive snoring or daytime drowsiness?: no    Reviewed and updated as needed this visit by clinical staff   Tobacco  Allergies "  Meds  Problems  Med Hx  Surg Hx  Fam Hx  Soc   Hx          Reviewed and updated as needed this visit by Provider     Meds  Problems              Social History     Tobacco Use     Smoking status: Never Smoker     Smokeless tobacco: Never Used   Substance Use Topics     Alcohol use: Yes     Alcohol/week: 1.0 - 2.0 standard drink     Types: 1 - 2 Standard drinks or equivalent per week     Comment: occasional         Alcohol Use 9/23/2022   Prescreen: >3 drinks/day or >7 drinks/week? No   Prescreen: >3 drinks/day or >7 drinks/week? -       Cough, more so seasonally in spring and fall. No other symptoms with it  Wondering if he needs to see heart dr or if that is something you can monitor       Current providers sharing in care for this patient include:   Patient Care Team:  Karen Rodriguez MD as PCP - General (Family Practice)  Karen Rodriguez MD as Assigned PCP  Fareed Lara MD as Assigned Heart and Vascular Provider  JUSTINO Freitas MD as Assigned Surgical Provider  Ute Fu MD as Assigned Gastroenterology Provider  Lily Eaton DO as MD (Gastroenterology)    The following health maintenance items are reviewed in Epic and correct as of today:  Health Maintenance   Topic Date Due     ASTHMA ACTION PLAN  07/02/2022     ASTHMA CONTROL TEST  03/23/2023     ANNUAL REVIEW OF HM ORDERS  06/08/2023     DTAP/TDAP/TD IMMUNIZATION (2 - Td or Tdap) 06/30/2023     MEDICARE ANNUAL WELLNESS VISIT  09/23/2023     FALL RISK ASSESSMENT  09/23/2023     LIPID  07/01/2027     ADVANCE CARE PLANNING  09/23/2027     COLORECTAL CANCER SCREENING  05/03/2032     HEPATITIS C SCREENING  Completed     PHQ-2 (once per calendar year)  Completed     INFLUENZA VACCINE  Completed     Pneumococcal Vaccine: 65+ Years  Completed     ZOSTER IMMUNIZATION  Completed     AORTIC ANEURYSM SCREENING (SYSTEM ASSIGNED)  Completed     COVID-19 Vaccine  Completed     IPV IMMUNIZATION  Aged Out     MENINGITIS  IMMUNIZATION  Aged Out     HEPATITIS B IMMUNIZATION  Aged Out     Labs reviewed in EPIC  BP Readings from Last 3 Encounters:   09/23/22 128/70   09/09/22 135/77   07/26/22 (!) 145/86    Wt Readings from Last 3 Encounters:   09/23/22 88.7 kg (195 lb 8 oz)   09/09/22 89 kg (196 lb 1.6 oz)   07/26/22 88.4 kg (194 lb 12.8 oz)                  Patient Active Problem List   Diagnosis     Reflux esophagitis     Abnormal levels of other serum enzymes     Erectile dysfunction     Moderate persistent asthma with acute exacerbation     HYPERLIPIDEMIA LDL GOAL <160     Seasonal allergic rhinitis     Deviated nasal septum     Chronic nasal congestion     Nummular eczema     Memory changes     Ulcerative pancolitis without complication (H)     PSC (primary sclerosing cholangitis)     Past Surgical History:   Procedure Laterality Date     COLONOSCOPY  9/4/2013    Procedure: COLONOSCOPY;  colonoscopy;  Surgeon: Beto Keen MD;  Location: [facility]     COLONOSCOPY N/A 7/31/2020    Procedure: Colonoscopy, With Polypectomy And Biopsy;  Surgeon: Lily Eaton DO;  Location: [facility]     COLONOSCOPY N/A 4/5/2021    Procedure: Colonoscopy, With Polypectomy And Biopsy;  Surgeon: Lily Eaton DO;  Location: [facility]     COLONOSCOPY N/A 5/3/2022    Procedure: COLONOSCOPY, WITH POLYPECTOMY AND BIOPSY;  Surgeon: Lily Eaton DO;  Location: [facility]     COLONOSCOPY N/A 5/3/2022    Procedure: COLONOSCOPY, FLEXIBLE, WITH LESION REMOVAL USING SNARE;  Surgeon: Lily Eaton DO;  Location: [facility]     COLONOSCOPY WITH CO2 INSUFFLATION N/A 7/31/2020    Procedure: COLONOSCOPY, WITH CO2 INSUFFLATION;  Surgeon: Lily Eaton DO;  Location: [facility]     COLONOSCOPY WITH CO2 INSUFFLATION N/A 4/5/2021    Procedure: COLONOSCOPY, WITH CO2 INSUFFLATION;  Surgeon: Lily Eaton DO;  Location[facility]     COLONOSCOPY WITH CO2 INSUFFLATION N/A 5/3/2022    Procedure: COLONOSCOPY, WITH CO2 INSUFFLATION;  Surgeon: Lily Eaton DO;  Location[facility]     COMBINED ESOPHAGOSCOPY, GASTROSCOPY, DUODENOSCOPY (EGD) WITH CO2 INSUFFLATION N/A 7/31/2020    Procedure: ESOPHAGOGASTRODUODENOSCOPY, WITH CO2 INSUFFLATION;  Surgeon: Lily Eaton DO;  Location: [facility]     ESOPHAGOSCOPY, GASTROSCOPY, DUODENOSCOPY (EGD), COMBINED N/A 7/31/2020    Procedure: Esophagogastroduodenoscopy, With Biopsy;  Surgeon: Lily Eaton DO;  Location: [facility]     HC VASECTOMY UNILAT/BILAT W POSTOP SEMEN  1992    Vasectomy     ZZHC COLONOSCOPY THRU STOMA, DIAGNOSTIC  2005    normal       Social History     Tobacco Use     Smoking status: Never Smoker     Smokeless tobacco: Never Used   Substance Use Topics     Alcohol use: Yes     Alcohol/week: 1.0 - 2.0 standard drink     Types: 1 - 2 Standard drinks or equivalent per week     Comment: occasional     Family History   Problem Relation Age of Onset     Cancer Mother         bladder cancer     Neurologic Disorder Father         alzheimers dementia     Gastrointestinal Disease Brother         divertculitis         Current Outpatient Medications   Medication Sig Dispense Refill     albuterol (PROAIR HFA/PROVENTIL HFA/VENTOLIN HFA) 108 (90 Base) MCG/ACT inhaler Inhale 2 puffs into the lungs every 6 hours 18 g 3     ASPIRIN PO Take 81 mg by mouth at onset of headache for moderate pain        atorvastatin (LIPITOR) 40 MG tablet Take 1 tablet (40 mg) by mouth daily 90 tablet 3     azelastine (ASTELIN) 0.1 % nasal spray SPRAY 2 SPRAYS IN EACH NOSTRIL TWO TIMES A DAY 30 mL 3     clobetasol (TEMOVATE) 0.05 % external cream Apply topically 2 times daily for 14 days 45 g 0     esomeprazole (NEXIUM) 20 MG DR capsule Take 20 mg by mouth daily Take 30-60 minutes before eating. Takes clear baudilio       ferrous gluconate (FERGON) 324 (38 Fe) MG tablet Take 1 tablet (324 mg) by mouth 2 times daily (with meals) 60 tablet 3     fish oil-omega-3 fatty acids 1000 MG capsule Take 2 g by mouth daily       fluticasone (FLONASE) 50 MCG/ACT nasal spray Spray 1-2  "sprays into both nostrils daily 16 g 11     melatonin 1 MG TABS Take 1 mg by mouth nightly as needed for sleep       Multiple Vitamins-Minerals (MULTIVITAMIN ADULT PO) Take 1 tablet by mouth daily        Naphazoline-Glycerin-Zinc Sulf (CLEAR EYES MAXIMUM ITCHY EYE) 0.012-0.25-0.25 % SOLN Apply to eye as needed (for allergies)       ursodiol (ACTIGALL) 300 MG capsule Take 1 capsule (300 mg) by mouth 2 times daily for 90 days 180 capsule 1     vitamin D3 (CHOLECALCIFEROL) 50 mcg (2000 units) tablet Take 1 tablet (50 mcg) by mouth daily 30 tablet 11     Allergies   Allergen Reactions     Seasonal Allergies        Review of Systems   Constitutional: Negative for chills and fever.   HENT: Positive for congestion. Negative for ear pain, hearing loss and sore throat.    Eyes: Negative for pain and visual disturbance.   Respiratory: Positive for cough. Negative for shortness of breath.    Cardiovascular: Negative for chest pain, palpitations and peripheral edema.   Gastrointestinal: Positive for constipation and heartburn. Negative for abdominal pain, diarrhea, hematochezia and nausea.   Genitourinary: Positive for frequency and urgency. Negative for dysuria, genital sores, hematuria, impotence and penile discharge.   Musculoskeletal: Negative for arthralgias, joint swelling and myalgias.   Skin: Positive for rash.   Neurological: Negative for dizziness, weakness, headaches and paresthesias.   Psychiatric/Behavioral: Positive for mood changes. The patient is nervous/anxious.          OBJECTIVE:   /70   Pulse 72   Temp 97.6  F (36.4  C) (Temporal)   Ht 1.785 m (5' 10.28\")   Wt 88.7 kg (195 lb 8 oz)   SpO2 96%   BMI 27.83 kg/m   Estimated body mass index is 27.83 kg/m  as calculated from the following:    Height as of this encounter: 1.785 m (5' 10.28\").    Weight as of this encounter: 88.7 kg (195 lb 8 oz).  Physical Exam  GENERAL: healthy, alert and no distress  EYES: Eyes grossly normal to inspection, PERRL and " conjunctivae and sclerae normal  HENT: ear canals and TM's normal, nose and mouth without ulcers or lesions  NECK: no adenopathy, no asymmetry, masses, or scars and thyroid normal to palpation  RESP: lungs clear to auscultation - no rales, rhonchi or wheezes  CV: regular rate and rhythm, normal S1 S2, no S3 or S4, no murmur, click or rub, no peripheral edema and peripheral pulses strong  ABDOMEN: soft, nontender, no hepatosplenomegaly, no masses and bowel sounds normal  MS: no gross musculoskeletal defects noted, no edema  SKIN: Rashes on lower extremities consistent lichen simplex chronicus  NEURO: Normal strength and tone, mentation intact and speech normal  PSYCH: mentation appears normal, affect normal/bright        ASSESSMENT / PLAN:     Problem List Items Addressed This Visit     Moderate persistent asthma with acute exacerbation     ACT -21. Uses albuterol prn.            Relevant Medications    albuterol (PROAIR HFA/PROVENTIL HFA/VENTOLIN HFA) 108 (90 Base) MCG/ACT inhaler    HYPERLIPIDEMIA LDL GOAL <160     Well controlled cholesterol levels           Memory changes     Wife reports worsening lapses in memory though he seems to be functioning well based on the conversation. He would like to have a baseline memory testing . I think this is reasonable. Referral placed for neuropsych testing           Relevant Orders    Adult Neuropsychology Referral    Ulcerative pancolitis without complication (H)     Mild pancolitis . Last colonoscopy in may 2022. Improving inflammatory markers and calprotectin levels. Managed by GI. On entyvio           Relevant Medications    albuterol (PROAIR HFA/PROVENTIL HFA/VENTOLIN HFA) 108 (90 Base) MCG/ACT inhaler    PSC (primary sclerosing cholangitis)     Dexa scan q3 yrs  Monitor Vitamin D levels.   Liver function test q3 months             Other Visit Diagnoses     Lichen simplex chronicus    -  Primary    Relevant Medications    clobetasol (TEMOVATE) 0.05 % external cream     "albuterol (PROAIR HFA/PROVENTIL HFA/VENTOLIN HFA) 108 (90 Base) MCG/ACT inhaler    Mild persistent asthma with acute exacerbation        Relevant Medications    albuterol (PROAIR HFA/PROVENTIL HFA/VENTOLIN HFA) 108 (90 Base) MCG/ACT inhaler          COUNSELING:  Reviewed preventive health counseling, as reflected in patient instructions       Regular exercise       Healthy diet/nutrition    Estimated body mass index is 27.83 kg/m  as calculated from the following:    Height as of this encounter: 1.785 m (5' 10.28\").    Weight as of this encounter: 88.7 kg (195 lb 8 oz).        He reports that he has never smoked. He has never used smokeless tobacco.      Appropriate preventive services were discussed with this patient, including applicable screening as appropriate for cardiovascular disease, diabetes, osteopenia/osteoporosis, and glaucoma.  As appropriate for age/gender, discussed screening for colorectal cancer, prostate cancer, breast cancer, and cervical cancer. Checklist reviewing preventive services available has been given to the patient.    Reviewed patients plan of care and provided an AVS. The Basic Care Plan (routine screening as documented in Health Maintenance) for Mainor meets the Care Plan requirement. This Care Plan has been established and reviewed with the Patient.    Counseling Resources:  ATP IV Guidelines  Pooled Cohorts Equation Calculator  Breast Cancer Risk Calculator  Breast Cancer: Medication to Reduce Risk  FRAX Risk Assessment  ICSI Preventive Guidelines  Dietary Guidelines for Americans, 2010  USDA's MyPlate  ASA Prophylaxis  Lung CA Screening    Karen Rodriguez MD  [facility]    Identified Health Risks:  "

## 2022-09-23 NOTE — ASSESSMENT & PLAN NOTE
Mild pancolitis . Last colonoscopy in may 2022. Improving inflammatory markers and calprotectin levels. Managed by GI. On entyvio

## 2022-09-23 NOTE — NURSING NOTE
Prior to immunization administration, verified patients identity using patient s name and date of birth. Please see Immunization Activity for additional information.     Screening Questionnaire for Adult Immunization    Are you sick today?   No   Do you have allergies to medications, food, a vaccine component or latex?   No   Have you ever had a serious reaction after receiving a vaccination?   No   Do you have a long-term health problem with heart, lung, kidney, or metabolic disease (e.g., diabetes), asthma, a blood disorder, no spleen, complement component deficiency, a cochlear implant, or a spinal fluid leak?  Are you on long-term aspirin therapy?   No   Do you have cancer, leukemia, HIV/AIDS, or any other immune system problem?   No   Do you have a parent, brother, or sister with an immune system problem?   No   In the past 3 months, have you taken medications that affect  your immune system, such as prednisone, other steroids, or anticancer drugs; drugs for the treatment of rheumatoid arthritis, Crohn s disease, or psoriasis; or have you had radiation treatments?   No   Have you had a seizure, or a brain or other nervous system problem?   No   During the past year, have you received a transfusion of blood or blood    products, or been given immune (gamma) globulin or antiviral drug?   No   For women: Are you pregnant or is there a chance you could become       pregnant during the next month?   No   Have you received any vaccinations in the past 4 weeks?   No     Immunization questionnaire answers were all negative.        Per orders of Dr. Rodriguez, injection of prevnar, flu and covid given by Mercedez Tovar CMA. Patient instructed to remain in clinic for 15 minutes afterwards, and to report any adverse reaction to me immediately.       Screening performed by Mercedez Tovar CMA on 9/23/2022 at 9:07 AM.

## 2022-10-05 ENCOUNTER — TELEPHONE (OUTPATIENT)
Dept: NEUROPSYCHOLOGY | Facility: CLINIC | Age: 67
End: 2022-10-05

## 2022-10-05 ENCOUNTER — DOCUMENTATION ONLY (OUTPATIENT)
Dept: OTHER | Facility: CLINIC | Age: 67
End: 2022-10-05

## 2022-10-05 NOTE — TELEPHONE ENCOUNTER
[facility] Call Center    Phone Message    May a detailed message be left on voicemail: yes     Reason for Call: Appointment Intake    Referring Provider Name: DARIAN TRIVEDI  Diagnosis and/or Symptoms: Memory Changes     Please call patient back to schedule their neuropsychology referral at # [phone]    Action Taken: Message routed to:  [facility] ([facility] Neuropsychology     Travel Screening: Not Applicable

## 2022-10-07 ENCOUNTER — INFUSION THERAPY VISIT (OUTPATIENT)
Dept: INFUSION THERAPY | Facility: CLINIC | Age: 67
End: 2022-10-07
Attending: FAMILY MEDICINE
Payer: COMMERCIAL

## 2022-10-07 ENCOUNTER — APPOINTMENT (OUTPATIENT)
Dept: LAB | Facility: CLINIC | Age: 67
End: 2022-10-07
Payer: COMMERCIAL

## 2022-10-07 VITALS
SYSTOLIC BLOOD PRESSURE: 122 MMHG | RESPIRATION RATE: 18 BRPM | OXYGEN SATURATION: 100 % | WEIGHT: 199.6 LBS | HEART RATE: 58 BPM | DIASTOLIC BLOOD PRESSURE: 69 MMHG | TEMPERATURE: 97.6 F | BODY MASS INDEX: 28.42 KG/M2

## 2022-10-07 DIAGNOSIS — K51.00 ULCERATIVE PANCOLITIS WITHOUT COMPLICATION (H): Primary | ICD-10-CM

## 2022-10-07 LAB
ALBUMIN SERPL-MCNC: 3.7 G/DL (ref 3.4–5)
ALP SERPL-CCNC: 151 U/L (ref 40–150)
ALT SERPL W P-5'-P-CCNC: 32 U/L (ref 0–70)
AST SERPL W P-5'-P-CCNC: 32 U/L (ref 0–45)
BASOPHILS # BLD AUTO: 0.1 10E3/UL (ref 0–0.2)
BASOPHILS NFR BLD AUTO: 1 %
BILIRUB DIRECT SERPL-MCNC: 0.4 MG/DL (ref 0–0.2)
BILIRUB SERPL-MCNC: 0.8 MG/DL (ref 0.2–1.3)
CRP SERPL-MCNC: <2.9 MG/L (ref 0–8)
EOSINOPHIL # BLD AUTO: 0.5 10E3/UL (ref 0–0.7)
EOSINOPHIL NFR BLD AUTO: 7 %
ERYTHROCYTE [DISTWIDTH] IN BLOOD BY AUTOMATED COUNT: 12.3 % (ref 10–15)
ERYTHROCYTE [SEDIMENTATION RATE] IN BLOOD BY WESTERGREN METHOD: 8 MM/HR (ref 0–20)
HCT VFR BLD AUTO: 43.8 % (ref 40–53)
HGB BLD-MCNC: 14.7 G/DL (ref 13.3–17.7)
IMM GRANULOCYTES # BLD: 0 10E3/UL
IMM GRANULOCYTES NFR BLD: 0 %
LYMPHOCYTES # BLD AUTO: 2.6 10E3/UL (ref 0.8–5.3)
LYMPHOCYTES NFR BLD AUTO: 35 %
MCH RBC QN AUTO: 31.4 PG (ref 26.5–33)
MCHC RBC AUTO-ENTMCNC: 33.6 G/DL (ref 31.5–36.5)
MCV RBC AUTO: 94 FL (ref 78–100)
MONOCYTES # BLD AUTO: 1 10E3/UL (ref 0–1.3)
MONOCYTES NFR BLD AUTO: 13 %
NEUTROPHILS # BLD AUTO: 3.2 10E3/UL (ref 1.6–8.3)
NEUTROPHILS NFR BLD AUTO: 44 %
NRBC # BLD AUTO: 0 10E3/UL
NRBC BLD AUTO-RTO: 0 /100
PLATELET # BLD AUTO: 236 10E3/UL (ref 150–450)
PROT SERPL-MCNC: 7.8 G/DL (ref 6.8–8.8)
RBC # BLD AUTO: 4.68 10E6/UL (ref 4.4–5.9)
WBC # BLD AUTO: 7.4 10E3/UL (ref 4–11)

## 2022-10-07 PROCEDURE — 250N000011 HC RX IP 250 OP 636: Performed by: INTERNAL MEDICINE

## 2022-10-07 PROCEDURE — 85025 COMPLETE CBC W/AUTO DIFF WBC: CPT | Performed by: INTERNAL MEDICINE

## 2022-10-07 PROCEDURE — 36415 COLL VENOUS BLD VENIPUNCTURE: CPT

## 2022-10-07 PROCEDURE — 80076 HEPATIC FUNCTION PANEL: CPT | Performed by: INTERNAL MEDICINE

## 2022-10-07 PROCEDURE — 86140 C-REACTIVE PROTEIN: CPT | Performed by: INTERNAL MEDICINE

## 2022-10-07 PROCEDURE — 258N000003 HC RX IP 258 OP 636: Performed by: INTERNAL MEDICINE

## 2022-10-07 PROCEDURE — 85652 RBC SED RATE AUTOMATED: CPT | Performed by: INTERNAL MEDICINE

## 2022-10-07 PROCEDURE — 96365 THER/PROPH/DIAG IV INF INIT: CPT

## 2022-10-07 PROCEDURE — 36415 COLL VENOUS BLD VENIPUNCTURE: CPT | Performed by: INTERNAL MEDICINE

## 2022-10-07 RX ORDER — HEPARIN SODIUM (PORCINE) LOCK FLUSH IV SOLN 100 UNIT/ML 100 UNIT/ML
5 SOLUTION INTRAVENOUS
Status: CANCELLED | OUTPATIENT
Start: 2022-11-04

## 2022-10-07 RX ORDER — HEPARIN SODIUM,PORCINE 10 UNIT/ML
5 VIAL (ML) INTRAVENOUS
Status: CANCELLED | OUTPATIENT
Start: 2022-11-04

## 2022-10-07 RX ADMIN — SODIUM CHLORIDE 250 ML: 9 INJECTION, SOLUTION INTRAVENOUS at 09:54

## 2022-10-07 RX ADMIN — VEDOLIZUMAB 300 MG: 300 INJECTION, POWDER, LYOPHILIZED, FOR SOLUTION INTRAVENOUS at 09:54

## 2022-10-07 ASSESSMENT — PAIN SCALES - GENERAL: PAINLEVEL: NO PAIN (0)

## 2022-10-07 NOTE — PROGRESS NOTES
Infusion Nursing Note:  Mainor Grande presents today for Entyvio.    Patient seen by provider today: No   present during visit today: Not Applicable.    Note: Patient is experiencing some mild symptoms with his asthma and seasonal allergies. Denies symptoms of illness or infection. No GI flares but Dr. Eaton determined patient could benefit from every 4 wk treatments based on some recent labwork. VSS, afebrile.     Intravenous Access:  Peripheral IV placed.  Labs drawn in Lab dept prior to arrival.     Treatment Conditions:    Biological Infusion Checklist:  ~~~ NOTE: If the patient answers yes to any of the questions below, hold the infusion and contact ordering provider or on-call provider.    1. Have you recently had an elevated temperature, fever, chills, productive cough, coughing for 3 weeks or longer or hemoptysis, abnormal vital signs, night sweats,  chest pain or have you noticed a decrease in your appetite, unexplained weight loss or fatigue? No  2. Do you have any open wounds or new incisions? No  3. Do you have any recent or upcoming hospitalizations, surgeries or dental procedures? No  4. Do you currently have or recently have had any signs of illness or infection or are you on any antibiotics? No  5. Have you had any new, sudden or worsening abdominal pain? No  6. Have you or anyone in your household received a live vaccination in the past 4 weeks? Please note:  No live vaccines while on biologic/chemotherapy until 6 months after the last treatment.  Patient can receive the flu vaccine (shot only) and the pneumovax.  It is optimal for the patient to get these vaccines mid cycle, but they can be given at any time as long as it is not on the day of the infusion. No  7. Have you recently been diagnosed with any new nervous system diseases (ie. Multiple sclerosis, Guillain [place], seizures, neurological changes) or cancer diagnosis? No  8. Are you on any form of radiation or chemotherapy?  No  9. Are you pregnant or breast feeding or do you have plans of pregnancy in the future? N/A  10. Have you been having any signs of worsening depression or suicidal ideations?  (benlysta only) N/A  11. Have there been any other new onset medical symptoms? No      Post Infusion Assessment:  Patient tolerated infusion without incident.  Blood return noted pre and post infusion.  Site patent and intact, free from redness, edema or discomfort.  No evidence of extravasations.  PIV access discontinued per protocol.    Biologic Infusion Post Education: Call the triage nurse at your clinic or seek medical attention if you have chills and/or temperature greater than or equal to 100.5, uncontrolled nausea/vomiting, diarrhea, constipation, dizziness, shortness of breath, chest pain, heart palpitations, weakness or any other new or concerning symptoms, questions or concerns.  You cannot have any live virus vaccines prior to or during treatment or up to 6 months post infusion.  If you have an upcoming surgery, medical procedure or dental procedure during treatment, this should be discussed with your ordering physician and your surgeon/dentist.  If you are having any concerning symptom, if you are unsure if you should get your next infusion or wish to speak to a provider before your next infusion, please call your care coordinator or triage nurse at your clinic to notify them so we can adequately serve you.     Discharge Plan:   AVS to patient via [employer].  Patient will return 11/4 for next appointment.   Patient discharged in stable condition accompanied by: self.  Departure Mode: Ambulatory.      Keren Flores RN

## 2022-10-24 DIAGNOSIS — D50.0 IRON DEFICIENCY ANEMIA DUE TO CHRONIC BLOOD LOSS: ICD-10-CM

## 2022-10-24 NOTE — TELEPHONE ENCOUNTER
ferrous gluconate (FERGON) 324 (38 Fe) MG tablet      Last Written Prescription Date:  3/24/21  Last Fill Quantity: 60,   # refills: 3  Last Office Visit : 4/20/22  Future Office visit:  11/9/22    Routing refill request to provider for review/approval because:  Gap in therapy?  Taking?

## 2022-11-01 ENCOUNTER — OFFICE VISIT (OUTPATIENT)
Dept: FAMILY MEDICINE | Facility: OTHER | Age: 67
End: 2022-11-01
Payer: COMMERCIAL

## 2022-11-01 VITALS
BODY MASS INDEX: 27.63 KG/M2 | SYSTOLIC BLOOD PRESSURE: 118 MMHG | HEART RATE: 68 BPM | OXYGEN SATURATION: 97 % | TEMPERATURE: 98.6 F | HEIGHT: 70 IN | DIASTOLIC BLOOD PRESSURE: 78 MMHG | WEIGHT: 193 LBS

## 2022-11-01 DIAGNOSIS — J45.41 MODERATE PERSISTENT ASTHMA WITH ACUTE EXACERBATION: Primary | ICD-10-CM

## 2022-11-01 PROCEDURE — 99214 OFFICE O/P EST MOD 30 MIN: CPT | Performed by: FAMILY MEDICINE

## 2022-11-01 RX ORDER — BUDESONIDE AND FORMOTEROL FUMARATE DIHYDRATE 80; 4.5 UG/1; UG/1
AEROSOL RESPIRATORY (INHALATION)
Qty: 20.4 G | Refills: 11 | Status: SHIPPED | OUTPATIENT
Start: 2022-11-01 | End: 2023-03-17

## 2022-11-01 RX ORDER — ALBUTEROL SULFATE 90 UG/1
2 AEROSOL, METERED RESPIRATORY (INHALATION) EVERY 6 HOURS
Qty: 18 G | Refills: 3 | Status: CANCELLED | OUTPATIENT
Start: 2022-11-01

## 2022-11-01 RX ORDER — BENZONATATE 100 MG/1
100 CAPSULE ORAL 3 TIMES DAILY PRN
Qty: 30 CAPSULE | Refills: 0 | Status: SHIPPED | OUTPATIENT
Start: 2022-11-01 | End: 2022-11-15

## 2022-11-01 RX ORDER — FERROUS GLUCONATE 324(38)MG
324 TABLET ORAL 2 TIMES DAILY WITH MEALS
Qty: 60 TABLET | Refills: 3 | OUTPATIENT
Start: 2022-11-01

## 2022-11-01 ASSESSMENT — PAIN SCALES - GENERAL: PAINLEVEL: NO PAIN (0)

## 2022-11-01 NOTE — PROGRESS NOTES
Assessment & Plan         Moderate persistent asthma with acute exacerbation  Symptoms consistent with asthma exacerbation especially due to fall allergies  Discussed a trial of SMART therapy with inhaled steroids, avoiding allergen exposure, daily Antihistamine and compliance with azelastine spray for symptomatic relief  Discussed home care  Reportable signs and symptoms discussed  RTC if symptoms persist or fail to improve    - budesonide-formoterol (SYMBICORT) 80-4.5 MCG/ACT Inhaler; Inhale 2 puffs once daily plus 1-2 puffs as needed. May use up to 12 puffs per day.  - benzonatate (TESSALON) 100 MG capsule; Take 1 capsule (100 mg) by mouth 3 times daily as needed for cough        Return in about 2 weeks (around 11/15/2022).    Karen Rodriguez MD  [facility] BERTHA Hayward is a 67 year old, presenting for the following health issues:  No chief complaint on file.      History of Present Illness       Reason for visit:  Upper respiratory cold congestion allergies    He eats 4 or more servings of fruits and vegetables daily.He consumes 0 sweetened beverage(s) daily.He exercises with enough effort to increase his heart rate 30 to 60 minutes per day.  He exercises with enough effort to increase his heart rate 5 days per week. He is missing 6 dose(s) of medications per week.  He is not taking prescribed medications regularly due to remembering to take.       Acute Illness  Acute illness concerns:   Onset/Duration: Started about a month ago. Started as a cough and has progressively worsened. Last 2 weeks has been hard. Usually try to keep the head elevated. Robitussin helps a little bit.  The phlegm is like light yellow colored.  Gets bad enough at night.   Symptoms:  Fever: No  Chills/Sweats: No  Headache (location?): No  Sinus Pressure: YES  Conjunctivitis:  No  Ear Pain: no  Rhinorrhea: YES  Congestion: YES  Sore Throat: raspy  Cough: YES-productive of yellow sputum  Wheeze:  "No  Decreased Appetite: No  Nausea: No  Vomiting: No  Diarrhea: No  Dysuria/Freq.: No  Dysuria or Hematuria: No  Fatigue/Achiness: YES  Sick/Strep Exposure: No  Therapies tried and outcome: mucinex and robitussin       Review of Systems   Constitutional, HEENT, cardiovascular, pulmonary, gi and gu systems are negative, except as otherwise noted.      Objective    /78   Pulse 68   Temp 98.6  F (37  C) (Temporal)   Ht 1.785 m (5' 10.28\")   Wt 87.5 kg (193 lb)   SpO2 97%   BMI 27.48 kg/m    Body mass index is 27.48 kg/m .  Physical Exam   GENERAL: healthy, alert and no distress  EYES: Eyes grossly normal to inspection, PERRL and conjunctivae and sclerae normal  HENT: ear canals and TM's normal, nose and mouth without ulcers or lesions  NECK: no adenopathy, no asymmetry, masses, or scars and thyroid normal to palpation  RESP: bronchial breath sounds right >left  CV: regular rate and rhythm, normal S1 S2, no S3 or S4, no murmur, click or rub, no peripheral edema and peripheral pulses strong  PSYCH: mentation appears normal, affect normal/bright      "

## 2022-11-01 NOTE — TELEPHONE ENCOUNTER
ferrous gluconate (FERGON) 324 (38 Fe) MG tablet      Last Written Prescription Date:  3/24/21  Last Fill Quantity: 60,   # refills: 3  Last Office Visit : 4/20/22  Future Office visit:  11/9/22       Routing refill request to provider for review/approval because:  Gap in therapy?  Taking?    Mallory Kessler RN

## 2022-11-01 NOTE — TELEPHONE ENCOUNTER
Lily Eaton,  43 minutes ago (11:44 AM)     KM  He can stop the iron supplements  Thanks            Bloominous message sent to patient to update on the above message from provider.    Mallory Kessler RN

## 2022-11-04 ENCOUNTER — INFUSION THERAPY VISIT (OUTPATIENT)
Dept: INFUSION THERAPY | Facility: CLINIC | Age: 67
End: 2022-11-04
Attending: INTERNAL MEDICINE
Payer: COMMERCIAL

## 2022-11-04 VITALS
SYSTOLIC BLOOD PRESSURE: 145 MMHG | HEART RATE: 66 BPM | RESPIRATION RATE: 16 BRPM | WEIGHT: 194 LBS | OXYGEN SATURATION: 98 % | TEMPERATURE: 98.6 F | BODY MASS INDEX: 27.62 KG/M2 | DIASTOLIC BLOOD PRESSURE: 80 MMHG

## 2022-11-04 DIAGNOSIS — K51.00 ULCERATIVE PANCOLITIS WITHOUT COMPLICATION (H): Primary | ICD-10-CM

## 2022-11-04 PROCEDURE — 250N000011 HC RX IP 250 OP 636: Performed by: INTERNAL MEDICINE

## 2022-11-04 PROCEDURE — 96365 THER/PROPH/DIAG IV INF INIT: CPT

## 2022-11-04 PROCEDURE — 258N000003 HC RX IP 258 OP 636: Performed by: INTERNAL MEDICINE

## 2022-11-04 RX ORDER — HEPARIN SODIUM (PORCINE) LOCK FLUSH IV SOLN 100 UNIT/ML 100 UNIT/ML
5 SOLUTION INTRAVENOUS
Status: CANCELLED | OUTPATIENT
Start: 2022-12-02

## 2022-11-04 RX ORDER — HEPARIN SODIUM,PORCINE 10 UNIT/ML
5 VIAL (ML) INTRAVENOUS
Status: CANCELLED | OUTPATIENT
Start: 2022-12-02

## 2022-11-04 RX ADMIN — VEDOLIZUMAB 300 MG: 300 INJECTION, POWDER, LYOPHILIZED, FOR SOLUTION INTRAVENOUS at 08:35

## 2022-11-04 RX ADMIN — SODIUM CHLORIDE 250 ML: 9 INJECTION, SOLUTION INTRAVENOUS at 08:21

## 2022-11-04 ASSESSMENT — PAIN SCALES - GENERAL: PAINLEVEL: NO PAIN (0)

## 2022-11-04 NOTE — PROGRESS NOTES
Infusion Nursing Note:  Mainor Grande presents today for entyvio.    Patient seen by provider today: No   present during visit today: Not Applicable.    Note: N/A.    Intravenous Access:  Peripheral IV placed.    Treatment Conditions:  Biological Infusion Checklist:  ~~~ NOTE: If the patient answers yes to any of the questions below, hold the infusion and contact ordering provider or on-call provider.    1. Have you recently had an elevated temperature, fever, chills, productive cough, coughing for 3 weeks or longer or hemoptysis, abnormal vital signs, night sweats,  chest pain or have you noticed a decrease in your appetite, unexplained weight loss or fatigue? No  2. Do you have any open wounds or new incisions? No  3. Do you have any recent or upcoming hospitalizations, surgeries or dental procedures? No  4. Do you currently have or recently have had any signs of illness or infection or are you on any antibiotics? No  5. Have you had any new, sudden or worsening abdominal pain? No  6. Have you or anyone in your household received a live vaccination in the past 4 weeks? Please note:  No live vaccines while on biologic/chemotherapy until 6 months after the last treatment.  Patient can receive the flu vaccine (shot only) and the pneumovax.  It is optimal for the patient to get these vaccines mid cycle, but they can be given at any time as long as it is not on the day of the infusion. No  7. Have you recently been diagnosed with any new nervous system diseases (ie. Multiple sclerosis, Guillain Dalton, seizures, neurological changes) or cancer diagnosis? No  8. Are you on any form of radiation or chemotherapy? No  9. Are you pregnant or breast feeding or do you have plans of pregnancy in the future? No  10. Have you been having any signs of worsening depression or suicidal ideations?  (benlysta only) No  11. Have there been any other new onset medical symptoms? No      Post Infusion Assessment:  Patient  tolerated infusion without incident.  Patient observed for 15 minutes post entyvio per protocol.  Site patent and intact, free from redness, edema or discomfort.  No evidence of extravasations.  Access discontinued per protocol.  Biologic Infusion Post Education: Call the triage nurse at your clinic or seek medical attention if you have chills and/or temperature greater than or equal to 100.5, uncontrolled nausea/vomiting, diarrhea, constipation, dizziness, shortness of breath, chest pain, heart palpitations, weakness or any other new or concerning symptoms, questions or concerns.  You cannot have any live virus vaccines prior to or during treatment or up to 6 months post infusion.  If you have an upcoming surgery, medical procedure or dental procedure during treatment, this should be discussed with your ordering physician and your surgeon/dentist.  If you are having any concerning symptom, if you are unsure if you should get your next infusion or wish to speak to a provider before your next infusion, please call your care coordinator or triage nurse at your clinic to notify them so we can adequately serve you.     Discharge Plan:   Discharge instructions reviewed with: Patient.  Patient and/or family verbalized understanding of discharge instructions and all questions answered.  AVS to patient via [email or web address].  Patient will return 12/14/22 for next appointment.   Patient discharged in stable condition accompanied by: self.  Departure Mode: Ambulatory.      Brenda Herrera RN

## 2022-11-09 ENCOUNTER — TELEPHONE (OUTPATIENT)
Dept: GASTROENTEROLOGY | Facility: CLINIC | Age: 67
End: 2022-11-09

## 2022-11-09 ENCOUNTER — ANCILLARY PROCEDURE (OUTPATIENT)
Dept: GENERAL RADIOLOGY | Facility: OTHER | Age: 67
End: 2022-11-09
Attending: INTERNAL MEDICINE
Payer: COMMERCIAL

## 2022-11-09 ENCOUNTER — VIRTUAL VISIT (OUTPATIENT)
Dept: GASTROENTEROLOGY | Facility: CLINIC | Age: 67
End: 2022-11-09
Payer: COMMERCIAL

## 2022-11-09 VITALS — BODY MASS INDEX: 27.48 KG/M2 | WEIGHT: 193 LBS

## 2022-11-09 DIAGNOSIS — K51.00 ULCERATIVE PANCOLITIS WITHOUT COMPLICATION (H): ICD-10-CM

## 2022-11-09 DIAGNOSIS — K51.00 ULCERATIVE PANCOLITIS WITHOUT COMPLICATION (H): Primary | ICD-10-CM

## 2022-11-09 DIAGNOSIS — R05.3 CHRONIC COUGH: ICD-10-CM

## 2022-11-09 PROCEDURE — 99214 OFFICE O/P EST MOD 30 MIN: CPT | Mod: 95 | Performed by: INTERNAL MEDICINE

## 2022-11-09 PROCEDURE — 71046 X-RAY EXAM CHEST 2 VIEWS: CPT | Mod: TC | Performed by: RADIOLOGY

## 2022-11-09 ASSESSMENT — PAIN SCALES - GENERAL: PAINLEVEL: NO PAIN (0)

## 2022-11-09 NOTE — PROGRESS NOTES
Mainor is a 67 year old who is being evaluated via a billable video visit.      How would you like to obtain your AVS? MyChart  If the video visit is dropped, the invitation should be resent by: Send to e-mail at: [email or web address] anyone else be joining your video visit? No  Video-Visit Details    Video Start Time: 8:32 AM    Type of service:  Video Visit    Video End Time:8:48 AM    Originating Location (pt. Location): Home        Distant Location (provider location):  On-site    Platform used for Video Visit: TIDAL [employer]ROLEUM

## 2022-11-09 NOTE — TELEPHONE ENCOUNTER
Lily Eaton,   P [facility] Gastroenterology-Adult-[place]  Bruce Burr needs a chest x-ray and stool studies, preferably at [place] but he can also go to [place].   He also doesn't answer his phone most of the time - he reinforced to me that its okay to leave messages and that if you can't reach him, its fine to call his wife who does answer her phone.   Thanks!   Lily       Writer reached out to patient to provide him with the number to call to set up his chest x-ray in [place] or the [place] location per provider message above. Writer did also update patient that he will need to  the containers for the stool studies at [facility] lab. Patient verbalized understanding.    Mallory Kessler RN

## 2022-11-09 NOTE — PROGRESS NOTES
HPI:    Leno  presents today for a video visit with his wife to follow-up UC.  Main concern is a cough that he has been struggling with since this summer that has been progressively worsening.  Thinks its related to allergies as he has suffered from them in the past but this is the worst it has ever been.  No fevers.  Cough is so bad that even going up stairs can provoke a severe coughing fit.  Was given albuterol, symbicort and tessalon which haven't helped.  Does get some relief from tea which he has been drinking a lot of lately.  Has also noticed stomach pain and some worsening diarrhea with this.  No blood in the stool. Occasional heartburn - remains on once daily nexium.  Appetite has been low since cough began - taking more of a liquid diet.       Past Medical History:   Diagnosis Date     Allergic rhinitis, cause unspecified      Fatty liver 2004    elevated LFT, saw GI for a consultation     Mild persistent asthma 9/12/2008     Reflux esophagitis      Unspecified disease of respiratory system     RAD     Viremia, unspecified     acute       Past Surgical History:   Procedure Laterality Date     COLONOSCOPY  9/4/2013    Procedure: COLONOSCOPY;  colonoscopy;  Surgeon: Beto Keen MD;  Location:  GI     COLONOSCOPY N/A 7/31/2020    Procedure: Colonoscopy, With Polypectomy And Biopsy;  Surgeon: Lily Eaton DO;  Location: [facility] OR     COLONOSCOPY N/A 4/5/2021    Procedure: Colonoscopy, With Polypectomy And Biopsy;  Surgeon: Lily Eaton DO;  Location: [facility] OR     COLONOSCOPY N/A 5/3/2022    Procedure: COLONOSCOPY, WITH POLYPECTOMY AND BIOPSY;  Surgeon: Lily Eaton DO;  Location: [facility] OR     COLONOSCOPY N/A 5/3/2022    Procedure: COLONOSCOPY, FLEXIBLE, WITH LESION REMOVAL USING SNARE;  Surgeon: Lily Eaton DO;  Location: [facility] OR     COLONOSCOPY WITH CO2 INSUFFLATION N/A 7/31/2020    Procedure: COLONOSCOPY, WITH CO2 INSUFFLATION;  Surgeon: Lily Eaton DO;  Location: [facility] OR      COLONOSCOPY WITH CO2 INSUFFLATION N/A 4/5/2021    Procedure: COLONOSCOPY, WITH CO2 INSUFFLATION;  Surgeon: Lily Eaton DO;  Location: [facility] OR     COLONOSCOPY WITH CO2 INSUFFLATION N/A 5/3/2022    Procedure: COLONOSCOPY, WITH CO2 INSUFFLATION;  Surgeon: Lily Eaton DO;  Location: [facility] OR     COMBINED ESOPHAGOSCOPY, GASTROSCOPY, DUODENOSCOPY (EGD) WITH CO2 INSUFFLATION N/A 7/31/2020    Procedure: ESOPHAGOGASTRODUODENOSCOPY, WITH CO2 INSUFFLATION;  Surgeon: Lily Eaton DO;  Location: [facility] OR     ESOPHAGOSCOPY, GASTROSCOPY, DUODENOSCOPY (EGD), COMBINED N/A 7/31/2020    Procedure: Esophagogastroduodenoscopy, With Biopsy;  Surgeon: Lily Eaton DO;  Location: [facility] OR     HC VASECTOMY UNILAT/BILAT W POSTOP SEMEN  1992    Vasectomy     ZZHC COLONOSCOPY THRU STOMA, DIAGNOSTIC  2005    normal       Family History   Problem Relation Age of Onset     Cancer Mother         bladder cancer     Neurologic Disorder Father         alzheimers dementia     Gastrointestinal Disease Brother         divertculitis       Social History     Tobacco Use     Smoking status: Never     Smokeless tobacco: Never   Substance Use Topics     Alcohol use: Yes     Alcohol/week: 1.0 - 2.0 standard drink     Types: 1 - 2 Standard drinks or equivalent per week     Comment: occasional        O:    Gen: no acute distress  HEENT: NCAT  Neck: normal ROM  Resp: nonlabored breathing  Neuro: no gross deficits  Psych: appropriate mood and affect    Impression:               - Mild (Fernandez Score 1) pancolitis ulcerative                             colitis, improved since the last examination.                             Biopsied.                             - Pseudopolyps in the descending colon, at the                             splenic flexure, in the transverse colon, at the                             hepatic flexure and in the ascending colon. One                             large polyp removed with cold snare - one clip                              placed                             - Nodular mucosa in the ascending colon. Biopsied.   Assessment and Plan:     # UC pancolitis - persistent disease activity on most recent colonoscopy although clinically feeling well.  Increased entyvio to every 4 weeks which he thinks helps.  Worsening diarrhea recently - could be related to systemic illness but will evaluate further with stool studies and follow-up fecal calprotectin    # PSC - stable - follows with Dr. Fu    # cough - longstanding - no improvement with current therapies.  Although entyvio is gut specific just having UC and PSC does make Leno somewhat immunocompromised - will get CXR for further evaluation.  Is also planning to follow-up with PCP    RTC 6 months    Lily Eaton, DO     Video-Visit Details     Type of service:  Video Visit    Distant Location (provider location):  [facility]      Mode of Communication:  Video Conference via [employer]

## 2022-11-12 DIAGNOSIS — J45.41 MODERATE PERSISTENT ASTHMA WITH ACUTE EXACERBATION: ICD-10-CM

## 2022-11-15 ENCOUNTER — TELEPHONE (OUTPATIENT)
Dept: GASTROENTEROLOGY | Facility: CLINIC | Age: 67
End: 2022-11-15

## 2022-11-15 ENCOUNTER — APPOINTMENT (OUTPATIENT)
Dept: LAB | Facility: OTHER | Age: 67
End: 2022-11-15
Payer: COMMERCIAL

## 2022-11-15 PROCEDURE — 83993 ASSAY FOR CALPROTECTIN FECAL: CPT | Performed by: INTERNAL MEDICINE

## 2022-11-15 PROCEDURE — 87493 C DIFF AMPLIFIED PROBE: CPT | Performed by: INTERNAL MEDICINE

## 2022-11-15 RX ORDER — BENZONATATE 100 MG/1
CAPSULE ORAL
Qty: 30 CAPSULE | Refills: 0 | Status: SHIPPED | OUTPATIENT
Start: 2022-11-15 | End: 2022-11-28

## 2022-11-15 NOTE — TELEPHONE ENCOUNTER
[facility] Call Center    Phone Message    May a detailed message be left on voicemail: yes     Reason for Call: Other: Pt's spouse called to ask where do they get the container for pt's stool sample? Does the pharmacist give it to them, or can they use any container? Please call spouse at [phone] to answer her question. Thank you     Action Taken: Other: [facility]    Travel Screening: Not Applicable

## 2022-11-15 NOTE — TELEPHONE ENCOUNTER
Call to patient, spoke to patients wife Rani who is asking where she can get the containers for the stool studies that were ordered. Writer encouraged her to call her nearest [facility] lab and request to  these containers. Rani states she will call the [place] [facility] to request this. Rani appreciative of call back.    Mallory Kessler RN

## 2022-11-16 LAB — C DIFF TOX B STL QL: NEGATIVE

## 2022-11-17 LAB — CALPROTECTIN STL-MCNT: 238 MG/KG (ref 0–49.9)

## 2022-11-18 DIAGNOSIS — K51.00 ULCERATIVE PANCOLITIS WITHOUT COMPLICATION (H): Primary | ICD-10-CM

## 2022-11-27 DIAGNOSIS — J45.41 MODERATE PERSISTENT ASTHMA WITH ACUTE EXACERBATION: ICD-10-CM

## 2022-11-28 RX ORDER — BENZONATATE 100 MG/1
CAPSULE ORAL
Qty: 30 CAPSULE | Refills: 0 | Status: SHIPPED | OUTPATIENT
Start: 2022-11-28 | End: 2023-01-11

## 2022-12-14 ENCOUNTER — APPOINTMENT (OUTPATIENT)
Dept: LAB | Facility: CLINIC | Age: 67
End: 2022-12-14
Payer: COMMERCIAL

## 2022-12-14 ENCOUNTER — INFUSION THERAPY VISIT (OUTPATIENT)
Dept: INFUSION THERAPY | Facility: CLINIC | Age: 67
End: 2022-12-14
Attending: INTERNAL MEDICINE
Payer: COMMERCIAL

## 2022-12-14 VITALS
TEMPERATURE: 98.2 F | RESPIRATION RATE: 16 BRPM | WEIGHT: 203.2 LBS | BODY MASS INDEX: 28.93 KG/M2 | SYSTOLIC BLOOD PRESSURE: 132 MMHG | HEART RATE: 71 BPM | DIASTOLIC BLOOD PRESSURE: 73 MMHG | OXYGEN SATURATION: 98 %

## 2022-12-14 DIAGNOSIS — K51.00 ULCERATIVE PANCOLITIS WITHOUT COMPLICATION (H): Primary | ICD-10-CM

## 2022-12-14 LAB
ALBUMIN SERPL-MCNC: 3.5 G/DL (ref 3.4–5)
ALP SERPL-CCNC: 125 U/L (ref 40–150)
ALT SERPL W P-5'-P-CCNC: 31 U/L (ref 0–70)
AST SERPL W P-5'-P-CCNC: 31 U/L (ref 0–45)
BASOPHILS # BLD AUTO: 0.1 10E3/UL (ref 0–0.2)
BASOPHILS NFR BLD AUTO: 1 %
BILIRUB DIRECT SERPL-MCNC: 0.3 MG/DL (ref 0–0.2)
BILIRUB SERPL-MCNC: 0.7 MG/DL (ref 0.2–1.3)
CRP SERPL-MCNC: <2.9 MG/L (ref 0–8)
EOSINOPHIL # BLD AUTO: 0.3 10E3/UL (ref 0–0.7)
EOSINOPHIL NFR BLD AUTO: 5 %
ERYTHROCYTE [DISTWIDTH] IN BLOOD BY AUTOMATED COUNT: 12.5 % (ref 10–15)
ERYTHROCYTE [SEDIMENTATION RATE] IN BLOOD BY WESTERGREN METHOD: 8 MM/HR (ref 0–20)
HCT VFR BLD AUTO: 42.9 % (ref 40–53)
HGB BLD-MCNC: 13.9 G/DL (ref 13.3–17.7)
IMM GRANULOCYTES # BLD: 0 10E3/UL
IMM GRANULOCYTES NFR BLD: 0 %
LYMPHOCYTES # BLD AUTO: 1.8 10E3/UL (ref 0.8–5.3)
LYMPHOCYTES NFR BLD AUTO: 27 %
MCH RBC QN AUTO: 30.7 PG (ref 26.5–33)
MCHC RBC AUTO-ENTMCNC: 32.4 G/DL (ref 31.5–36.5)
MCV RBC AUTO: 95 FL (ref 78–100)
MONOCYTES # BLD AUTO: 0.8 10E3/UL (ref 0–1.3)
MONOCYTES NFR BLD AUTO: 11 %
NEUTROPHILS # BLD AUTO: 3.8 10E3/UL (ref 1.6–8.3)
NEUTROPHILS NFR BLD AUTO: 56 %
NRBC # BLD AUTO: 0 10E3/UL
NRBC BLD AUTO-RTO: 0 /100
PLATELET # BLD AUTO: 239 10E3/UL (ref 150–450)
PROT SERPL-MCNC: 7.2 G/DL (ref 6.8–8.8)
RBC # BLD AUTO: 4.53 10E6/UL (ref 4.4–5.9)
WBC # BLD AUTO: 6.8 10E3/UL (ref 4–11)

## 2022-12-14 PROCEDURE — 85652 RBC SED RATE AUTOMATED: CPT | Performed by: INTERNAL MEDICINE

## 2022-12-14 PROCEDURE — 258N000003 HC RX IP 258 OP 636: Performed by: INTERNAL MEDICINE

## 2022-12-14 PROCEDURE — 85004 AUTOMATED DIFF WBC COUNT: CPT | Performed by: INTERNAL MEDICINE

## 2022-12-14 PROCEDURE — 96365 THER/PROPH/DIAG IV INF INIT: CPT

## 2022-12-14 PROCEDURE — 80076 HEPATIC FUNCTION PANEL: CPT | Performed by: INTERNAL MEDICINE

## 2022-12-14 PROCEDURE — 36415 COLL VENOUS BLD VENIPUNCTURE: CPT | Performed by: INTERNAL MEDICINE

## 2022-12-14 PROCEDURE — 250N000011 HC RX IP 250 OP 636: Performed by: INTERNAL MEDICINE

## 2022-12-14 PROCEDURE — 86140 C-REACTIVE PROTEIN: CPT | Performed by: INTERNAL MEDICINE

## 2022-12-14 RX ORDER — HEPARIN SODIUM (PORCINE) LOCK FLUSH IV SOLN 100 UNIT/ML 100 UNIT/ML
5 SOLUTION INTRAVENOUS
Status: CANCELLED | OUTPATIENT
Start: 2022-12-30

## 2022-12-14 RX ORDER — HEPARIN SODIUM,PORCINE 10 UNIT/ML
5 VIAL (ML) INTRAVENOUS
Status: CANCELLED | OUTPATIENT
Start: 2022-12-30

## 2022-12-14 RX ADMIN — VEDOLIZUMAB 300 MG: 300 INJECTION, POWDER, LYOPHILIZED, FOR SOLUTION INTRAVENOUS at 08:31

## 2022-12-14 RX ADMIN — SODIUM CHLORIDE 250 ML: 9 INJECTION, SOLUTION INTRAVENOUS at 08:25

## 2022-12-14 ASSESSMENT — PAIN SCALES - GENERAL: PAINLEVEL: NO PAIN (0)

## 2022-12-14 NOTE — PROGRESS NOTES
Infusion Nursing Note:  Mainor Grande presents today for [facility].    Patient seen by provider today: No   present during visit today: Not Applicable.    Note: Patient doing well. Leaving for [place] for awhile after appt. Today. .    Intravenous Access:  Peripheral IV placed.    Treatment Conditions:  Lab Results   Component Value Date    HGB 13.9 12/14/2022    WBC 6.8 12/14/2022    ANEU 5.3 12/01/2020    ANEUTAUTO 3.8 12/14/2022     12/14/2022      Lab Results   Component Value Date     07/01/2022    POTASSIUM 3.9 07/01/2022    CR 0.90 07/01/2022    BREANNA 9.0 07/01/2022    BILITOTAL 0.7 12/14/2022    ALBUMIN 3.5 12/14/2022    ALT 31 12/14/2022    AST 31 12/14/2022     Results reviewed, labs MET treatment parameters, ok to proceed with treatment.    Post Infusion Assessment:  Patient tolerated infusion without incident.  Site patent and intact, free from redness, edema or discomfort.  Access discontinued per protocol.     Discharge Plan:   Discharge instructions reviewed with: Patient.  Patient discharged in stable condition accompanied by: self.  Departure Mode: Ambulatory.      Gilda Mays RN                    
Spontaneous, unlabored and symmetrical

## 2022-12-16 ENCOUNTER — TELEPHONE (OUTPATIENT)
Dept: GASTROENTEROLOGY | Facility: CLINIC | Age: 67
End: 2022-12-16

## 2022-12-16 NOTE — TELEPHONE ENCOUNTER
Call to patient to remind him to schedule an appointment with hepatology q6 months per provider request below. No answer, message left with scheduling info left on VM. Writer will also send a letter to update his as well.    Mallory Kessler RN    From: Margot Hernandez CMA   Sent: 11/28/2022  12:00 AM CST   To: [email or web address] Gastroenterology-[facility]   Subject: Schedule                                         Per Dr. Uma Barrera -   Can you make sure Leno gets in for a follow-up appointment with Dr. Fu in hepatology re:PSC follow-up - I think his recalls get screwed up because he is seeing 2 different GI people but he needs to see each of us every 6 months.

## 2023-01-11 DIAGNOSIS — J45.41 MODERATE PERSISTENT ASTHMA WITH ACUTE EXACERBATION: ICD-10-CM

## 2023-01-11 NOTE — TELEPHONE ENCOUNTER
Pending Prescriptions:                       Disp   Refills    benzonatate (TESSALON) 100 MG capsule      30 cap*0        Sig: Take 1 capsule (100 mg) by mouth 3 times daily as           needed for cough    Routing refill request to provider for review/approval because:  Drug not on the [facility] refill protocol     Sigrid Lin RN on 1/11/2023 at 3:57 PM

## 2023-01-11 NOTE — TELEPHONE ENCOUNTER
Patient to calling to follow up on medication request. Informed patient request has been sent to provider and she will address as soon as she is able.     Stephanie DOBBSN, RN  [facility]

## 2023-01-12 RX ORDER — BENZONATATE 100 MG/1
100 CAPSULE ORAL 3 TIMES DAILY PRN
Qty: 30 CAPSULE | Refills: 0 | Status: SHIPPED | OUTPATIENT
Start: 2023-01-12 | End: 2023-05-11

## 2023-01-24 DIAGNOSIS — K83.01 PSC (PRIMARY SCLEROSING CHOLANGITIS) (H): ICD-10-CM

## 2023-01-24 RX ORDER — URSODIOL 300 MG/1
CAPSULE ORAL
Qty: 180 CAPSULE | Refills: 1 | Status: SHIPPED | OUTPATIENT
Start: 2023-01-24 | End: 2023-12-06

## 2023-02-10 ENCOUNTER — INFUSION THERAPY VISIT (OUTPATIENT)
Dept: INFUSION THERAPY | Facility: CLINIC | Age: 68
End: 2023-02-10
Attending: INTERNAL MEDICINE
Payer: COMMERCIAL

## 2023-02-10 VITALS
DIASTOLIC BLOOD PRESSURE: 83 MMHG | WEIGHT: 194.2 LBS | SYSTOLIC BLOOD PRESSURE: 138 MMHG | HEART RATE: 60 BPM | TEMPERATURE: 97.7 F | BODY MASS INDEX: 27.65 KG/M2 | OXYGEN SATURATION: 97 % | RESPIRATION RATE: 18 BRPM

## 2023-02-10 DIAGNOSIS — K51.00 ULCERATIVE PANCOLITIS WITHOUT COMPLICATION (H): Primary | ICD-10-CM

## 2023-02-10 LAB
ALBUMIN SERPL BCG-MCNC: 3.9 G/DL (ref 3.5–5.2)
ALP SERPL-CCNC: 192 U/L (ref 40–129)
ALT SERPL W P-5'-P-CCNC: 68 U/L (ref 10–50)
BASOPHILS # BLD AUTO: 0.1 10E3/UL (ref 0–0.2)
BASOPHILS NFR BLD AUTO: 2 %
BILIRUB DIRECT SERPL-MCNC: <0.2 MG/DL (ref 0–0.3)
BILIRUB SERPL-MCNC: 0.9 MG/DL
CRP SERPL-MCNC: <3 MG/L
EOSINOPHIL # BLD AUTO: 0.8 10E3/UL (ref 0–0.7)
EOSINOPHIL NFR BLD AUTO: 13 %
ERYTHROCYTE [DISTWIDTH] IN BLOOD BY AUTOMATED COUNT: 12.4 % (ref 10–15)
ERYTHROCYTE [SEDIMENTATION RATE] IN BLOOD BY WESTERGREN METHOD: 8 MM/HR (ref 0–20)
HCT VFR BLD AUTO: 42.6 % (ref 40–53)
HGB BLD-MCNC: 14.1 G/DL (ref 13.3–17.7)
IMM GRANULOCYTES # BLD: 0 10E3/UL
IMM GRANULOCYTES NFR BLD: 0 %
LYMPHOCYTES # BLD AUTO: 2.1 10E3/UL (ref 0.8–5.3)
LYMPHOCYTES NFR BLD AUTO: 35 %
MCH RBC QN AUTO: 30.5 PG (ref 26.5–33)
MCHC RBC AUTO-ENTMCNC: 33.1 G/DL (ref 31.5–36.5)
MCV RBC AUTO: 92 FL (ref 78–100)
MONOCYTES # BLD AUTO: 0.7 10E3/UL (ref 0–1.3)
MONOCYTES NFR BLD AUTO: 11 %
NEUTROPHILS # BLD AUTO: 2.3 10E3/UL (ref 1.6–8.3)
NEUTROPHILS NFR BLD AUTO: 39 %
NRBC # BLD AUTO: 0 10E3/UL
NRBC BLD AUTO-RTO: 0 /100
PLATELET # BLD AUTO: 220 10E3/UL (ref 150–450)
PROT SERPL-MCNC: 7 G/DL (ref 6.4–8.3)
RBC # BLD AUTO: 4.62 10E6/UL (ref 4.4–5.9)
WBC # BLD AUTO: 6 10E3/UL (ref 4–11)

## 2023-02-10 PROCEDURE — 80076 HEPATIC FUNCTION PANEL: CPT | Performed by: INTERNAL MEDICINE

## 2023-02-10 PROCEDURE — 36415 COLL VENOUS BLD VENIPUNCTURE: CPT | Performed by: INTERNAL MEDICINE

## 2023-02-10 PROCEDURE — 86140 C-REACTIVE PROTEIN: CPT | Performed by: INTERNAL MEDICINE

## 2023-02-10 PROCEDURE — 85652 RBC SED RATE AUTOMATED: CPT | Performed by: INTERNAL MEDICINE

## 2023-02-10 PROCEDURE — 258N000003 HC RX IP 258 OP 636: Performed by: INTERNAL MEDICINE

## 2023-02-10 PROCEDURE — 96365 THER/PROPH/DIAG IV INF INIT: CPT

## 2023-02-10 PROCEDURE — 85025 COMPLETE CBC W/AUTO DIFF WBC: CPT | Performed by: INTERNAL MEDICINE

## 2023-02-10 PROCEDURE — 250N000011 HC RX IP 250 OP 636: Performed by: INTERNAL MEDICINE

## 2023-02-10 RX ORDER — HEPARIN SODIUM,PORCINE 10 UNIT/ML
5 VIAL (ML) INTRAVENOUS
Status: CANCELLED | OUTPATIENT
Start: 2023-03-08

## 2023-02-10 RX ORDER — HEPARIN SODIUM (PORCINE) LOCK FLUSH IV SOLN 100 UNIT/ML 100 UNIT/ML
5 SOLUTION INTRAVENOUS
Status: CANCELLED | OUTPATIENT
Start: 2023-03-08

## 2023-02-10 RX ADMIN — SODIUM CHLORIDE 250 ML: 9 INJECTION, SOLUTION INTRAVENOUS at 08:32

## 2023-02-10 RX ADMIN — VEDOLIZUMAB 300 MG: 300 INJECTION, POWDER, LYOPHILIZED, FOR SOLUTION INTRAVENOUS at 08:37

## 2023-02-10 ASSESSMENT — PAIN SCALES - GENERAL: PAINLEVEL: NO PAIN (0)

## 2023-02-10 NOTE — PROGRESS NOTES
Infusion Nursing Note:  Mainor Grande presents today for labs with Entyvio infusion.    Patient seen by provider today: No   present during visit today: Not Applicable.    Note: Pt feeling well.    Intravenous Access:  Peripheral IV placed.    Treatment Conditions:  Lab Results   Component Value Date    HGB 14.1 02/10/2023    WBC 6.0 02/10/2023    ANEU 5.3 12/01/2020    ANEUTAUTO 2.3 02/10/2023     02/10/2023        Post Infusion Assessment:  Patient tolerated infusion without incident.     Discharge Plan:   Patient discharged in stable condition accompanied by: self.  Departure Mode: Ambulatory.      Lydia Diallo RN

## 2023-02-17 ENCOUNTER — TELEPHONE (OUTPATIENT)
Dept: NEUROPSYCHOLOGY | Facility: CLINIC | Age: 68
End: 2023-02-17
Payer: COMMERCIAL

## 2023-02-17 NOTE — TELEPHONE ENCOUNTER
Left Voicemail (2nd Attempt) for the patient to call back and schedule the following:    Appointment type: [facility] NeuroPsychology  Provider: Dr. Luis  Return date: next available  Specialty phone number: [phone]  Additional appointment(s) needed:   Additonal Notes:     Dr. Luis is not available on 3/7/2023, appointment needs to be rescheduled.    2nd vm left, MyChart message and appointment reschedule letter sent.    No communication from the patient.    Leora R/Procedure    [facility]   Neurology, NeuroSurgery, NeuroPsychology and Pain Management Specialties  Medical/Surgical Adult Specialties

## 2023-02-24 ENCOUNTER — TELEPHONE (OUTPATIENT)
Dept: NEUROPSYCHOLOGY | Facility: CLINIC | Age: 68
End: 2023-02-24
Payer: COMMERCIAL

## 2023-02-24 NOTE — TELEPHONE ENCOUNTER
Patient call:     Appointment type: Neuropsych   Provider: Any provider-[facility]   Return date:N/A   Speciality phone number: [phone]  Additional appointment(s) needed: N/A   Additional notes: Phone went straight to  2/24

## 2023-02-24 NOTE — TELEPHONE ENCOUNTER
Pt's wife Rani returned phone call to reschedule.  Unable to reach anyone at the time she called.  Please try calling her back to reschedule.  She can be reached at [phone].  Thanks.

## 2023-03-06 ENCOUNTER — OFFICE VISIT (OUTPATIENT)
Dept: NEUROPSYCHOLOGY | Facility: CLINIC | Age: 68
End: 2023-03-06
Attending: FAMILY MEDICINE
Payer: COMMERCIAL

## 2023-03-06 DIAGNOSIS — R41.3 MEMORY CHANGES: ICD-10-CM

## 2023-03-06 PROCEDURE — 99207 PR NO CHARGE LOS: CPT

## 2023-03-07 NOTE — PROGRESS NOTES
The patient was seen for neuropsychological evaluation at the request of Karen Rodriguez MD for the purposes of diagnostic clarification and treatment planning. 256 minutes of test administration and scoring were provided by this writer. Please see Dr. Dixie Choi's report for a full interpretation of the findings.    Kira Chase  Psychometrist

## 2023-03-08 ENCOUNTER — VIRTUAL VISIT (OUTPATIENT)
Dept: NEUROPSYCHOLOGY | Facility: CLINIC | Age: 68
End: 2023-03-08
Payer: COMMERCIAL

## 2023-03-08 DIAGNOSIS — F03.90 DEMENTIA, UNSPECIFIED DEMENTIA SEVERITY, UNSPECIFIED DEMENTIA TYPE, UNSPECIFIED WHETHER BEHAVIORAL, PSYCHOTIC, OR MOOD DISTURBANCE OR ANXIETY (H): Primary | ICD-10-CM

## 2023-03-08 PROCEDURE — 96139 PSYCL/NRPSYC TST TECH EA: CPT | Mod: VID

## 2023-03-08 PROCEDURE — 96138 PSYCL/NRPSYC TECH 1ST: CPT | Mod: VID

## 2023-03-08 PROCEDURE — 90791 PSYCH DIAGNOSTIC EVALUATION: CPT | Mod: VID

## 2023-03-08 PROCEDURE — 96133 NRPSYC TST EVAL PHYS/QHP EA: CPT | Mod: VID

## 2023-03-08 PROCEDURE — 96132 NRPSYC TST EVAL PHYS/QHP 1ST: CPT | Mod: VID

## 2023-03-08 NOTE — LETTER
3/8/2023      RE: Mainor Grande  [id number] [facility] [id number]       NEUROPSYCHOLOGICAL EVALUATION    RELEVANT HISTORY AND REASON FOR REFERRAL    Mainor Grande is a 67 year old, right handed,  retired  with 18 years of formal education. Information was obtained via interview with the patient and his wife, and review of his medical records. Records indicate a history of asthma, ulcerative pancolitis, and primary sclerosing cholangitis, with concerns regarding memory decline over the last few years. He was referred for neuropsychological evaluation by Karen Rodriguez M.D., for further characterization of any cognitive difficulties, and to evaluate mood.      CLINICAL INTERVIEW FINDINGS    A portion of the interview took place with Mr. Grande and his wife together. With his permission, and at the request of his wife, the remainder of the interview was conducted separately. After his portion of the interview was complete, he began testing with the psychometrist, and his wife was interviewed.    When they were together during the interview, Ms. Grande provided a timeline of the changes she had noticed.  She began noticing some changes in 2016. Specifically, she noticed that he was getting uptight and anxious over things, and was forgetting events that they were going to go to.  In 2017 she noticed increasing memory difficulties and he would sometimes forget events that had happened.  She recalls talking to their primary care physician, who they both see, during her own exam that year.  She had just retired and has been noticing increasing cognitive concerns for him.  In 2019 he started losing a lot of weight and then was diagnosed with ulcerative colitis.  In the fall of 2019, an infusion was recommended but insurance would not cover it.  He was falling and was brought to the emergency department.  He was making errors such as blocking himself out of his bank account, and when  he tried to write an email to staff he put the full email into the cc field.  He was getting frustrated and angry.  In many ways he seemed better after he started the infusion in November 2019.  He was eating better and since then his nutrition has been better.  The confusion did not go away, however.  In 2020, there was onset of the pandemic and he also retired.  That year she noticed that every 3 months or so something major would happen if he would completely forget that it happened.  Now, this sort of thing is happening daily.    Mr. Grande feels that he has had some difficulty with memory over the last 6 months to a year.  He thinks that perhaps more than memory it is some difficulty with communicating with his wife.  He stated that he does not always have access to the calendar, and sometimes he thinks that there are things that are important to her that are not as important to him and he has a tendency to put it on the back burner.  He has not noticed difficulty with word finding, attention or concentration, or decision-making.    Ms. Grande described a number of additional incidents.  In 2017, a Boy  came to their house to sell a wreath.  An hour later, the boy came back but Mr. Grande did not even remember that he was there and said that he had never seen him before.  More recently, when they were in [place], they stayed at the same park for 31 days.  He was not able to find his way back to the trailer, and there was an incident where he walked into another trailer that was next-door to [facility].  There was a different colored car next to that trailer, and moreover, there were people sitting on the patio. He asked them what they were doing, seemingly thinking they were at his own trailer, before he walked in to their trailer. They had to tell him that his trailer was next-door.  He has had greater difficulty playing card games that he used to know well.  He has had trouble managing his bank account, losing  his JULIET card, forgetting passwords, and locking himself out.  He recently went to see his son, and his JULIET card was not working.  He knew this, but insisted on paying and went to the JULIET machine, and was there for 20 minutes and said the machine was not working.  He went to the grocery store right after that and started putting groceries that had already been paid onto the conveyor belt. On several occasions, he has said he was the wrong age, a different age each time.  He has always been calm and never got upset with her, but now he gets panicked, agitated, and frustrated.  He swears, which is not like him.  He is more critical to his wife about other people, but not directly to the other people.     Mr. Grande lives with his wife and he stated that he manages their finances without difficulty.  His wife stated that she began managing most of the finances when he was sick a few years ago, so that she would know the passwords.  She manages all of the finances now except for one credit card of his.  Her name is on the card but she is not clear whether it is being paid off.  He stated that his wife helps him with his medications.  She indicated that he puts them into pill container but does not necessarily know what his medications are for, and she is not sure if he is taking them regularly.  He denied difficulty with driving although there was an incident where he became lost in December.  He took a friend somewhere and got turned around coming home.  He continues to cook without difficulty.  He stated that he handles his personal cares independently.  His wife has not noticed any changes in hygiene.  He wears clothes that are appropriate for the season.    Mr. Grande denied any history of psychiatric illness.  He stated that his mood was down when he was going through intermediate and the pandemic.  He has not worked with a psychologist or a psychiatrist.  He stated that now his mood is fine for the most part although  he understands that his wife has concerns about his mood and he is concerned about that.  He noted that his father had Alzheimer's disease and that is in the back of his mind because it is in his family.  He does not think that he is depressed.  He gets frustrated with some other outings because he does not feel like he knows everything that is going on.  He stated that he has been sleeping well, 8 to 9 hours a night.  He can sometimes doze off during the day for an hour or so.  His appetite is good.  His energy level and interest level are good.  He denied suicidal ideation or any history of suicide attempts.  He has not had visual or auditory hallucinations.    Mr. Grande stated that he will occasionally have a beer or a glass of wine with dinner.  He has not been a heavy drinker.  He denied illicit drug use or tobacco use.    Mr. Grande completed his Bachelor's degree at [employer], and enrolled in a 2-year program at the [employer] in [place], stating that this was the equivalent of a Master's degree.  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    Mr. Grande denied any history of seizure, stroke, or head injury resulting loss of consciousness.  His balance has been good.  He works out regularly.  He goes to the gym 4 or 5 days a week and does balance exercises and running.  He noted that he lost 80 pounds on purpose 20 years ago with diet and exercise, and that his weight has been stable since then.  He and his wife enjoy going to the gym together.  He denied unilateral weakness or numbness.  He has not been bothered by tremor.  He experiences occasional headaches.    Past Medical History  Past Medical History:   Diagnosis Date     Allergic rhinitis, cause unspecified      Fatty liver 2004    elevated LFT, saw GI for a consultation     Mild persistent asthma 9/12/2008     Reflux esophagitis      Unspecified disease of respiratory system      RAD     Viremia, unspecified     acute     An MRI of the brain on 12/9/2019, which was requested due to memory changes, was notable for a few minimal foci of prolonged T2 relaxation in the white matter of the frontal and parietal lobes, consistent with sequelae of small vessel ischemic disease.  There was also mild generalized atrophy of the brain.  A CT of the head on 11/3/2020, undertaken due to headache and dizziness, was also notable for mild cerebral atrophy and minimal nonspecific white matter changes.    Current Medications    He has a current medication list which includes the following prescription(s): albuterol, aspirin, atorvastatin, azelastine, benzonatate, budesonide-formoterol, esomeprazole, ferrous gluconate, fish oil-omega-3 fatty acids, fluticasone, melatonin, multiple vitamin, clear eyes maximum itchy eye, ursodiol, and vitamin d3.    Family History  Family History   Problem Relation Age of Onset     Cancer Mother         bladder cancer     Neurologic Disorder Father         alzheimers dementia     Gastrointestinal Disease Brother         divertculitis     BEHAVIORAL OBSERVATIONS    During the evaluation, Mr. Grande was pleasant, cooperative, and seemed to understand the instructions.  At times he required prompts to continue with tasks. He was alert and oriented to person, place, month, and year, but not to date.  Although he gave his correct date of birth, he stated that his age was 70.  No abnormal movements were observed clinically.  Mood was euthymic.  Speech was fluent, with normal articulation, volume, and rate.  He presented his thoughts in a clear, logical manner although he was a vague historian.  Internal performance validity measures fell within normal limits.  The results are believed to accurately reflect his current level of functioning.    MEASURES ADMINISTERED    The following measures were administered by a trained psychometrist, under the direct supervision of a licensed  psychologist.    Wechsler Abbreviated Scale of Intelligence - 2 (WASI-2); Subtests of the Wechsler Adult Intelligence Scale - 4; Reading subtest of the Wide Range Achievement Test - 4; subtests of the Wechsler Memory Scale - 4; Chau Auditory Verbal Learning Test; Chau-Osterrieth Complex Figure; [place] Naming Test; Controlled Oral Word Association Test; Semantic Fluency; Clock Drawing; Trail Making Test; Stroop; Merchant Judgment of Line Orientation; [place] Card Sorting Test; Finger Tapping; Grooved Pegboard; Dementia Rating Scale - 2 (DRS-2); {Mood measures:382084}    RESULTS AND INTERPRETATION    Overall intellectual functioning fell in the low average range, below premorbid estimates of high average based on single word reading abilities.  Performance on a screening measure of dementia was below average (DRS-2 Total Score = 126/144).  On this measure, he had particular difficulty on tasks of memory.    Confrontation naming was average for his age and level of education.  Expressive vocabulary was average.  Verbal abstract reasoning was average.  Letter fluency and generative naming to category were exceptionally low, and were notable for a paucity of responses rather than errors.    Attention span was average for his age.  Divided attention was low average. He had difficulty establishing set on a visuomotor sequencing task.  Performance on a measure of cognitive flexibility and speed was below average.  Psychomotor processing speed was low average.  Finger tapping speed was exceptionally low bilaterally.    Basic visual perception, including matching lines and angles, was high average for his age and level of education.  Construction of a clock fell within normal limits, although he did have some difficulty accurately orienting the numbers.  Construction of a complex design was low average. Qualitative review of his performance suggests a disorganized approach.  Assembly of visual material was average.  Nonverbal  deductive reasoning was low average.    Immediate recall of verbal narrative material was average, with exceptionally low recall following a 30-minute delay.  Recognition memory on this task was average.  On a multiple trial list learning task, immediate recall was low average, with below average recall following a 30-minute delay.  Recognition memory on this task was exceptionally low, and was notable for some false positive identifications of words.  Immediate and 30-minute delayed recall of visual material was exceptionally low, and recognition memory on this task was not better than free recall.    On the BDI-2, a self-report questionnaire, he endorsed a minimal level of depressive symptomatology.    IMPRESSIONS AND RECOMMENDATIONS    Mainor Grande is a 67 year old man with a history of memory decline over the last few years, as well as ulcerative pancolitis and primary sclerosing cholangitis.    Results indicate impairments in memory.  Although this is somewhat variable, in most cases he does not benefit from cues, suggesting a rapid forgetting rate.  Information and motor processing speed are prominently slowed.  Motor functioning is impaired bilaterally. There is subtle executive dysfunction, including difficulty with establishing set and organization. Basic language and visual processing fall within normal limits.  He does not endorse significant depressive symptomatology.    This pattern of performance is suggestive of mesial temporal lobe involvement, although there is also a suggestion of frontal and subcortical system involvement.  He has had increasing difficulty managing his instrumental activities of daily living. As such, these findings appear to suggest mild dementia. Taken together with his history, including reported progressive decline over the last few years, a neurodegenerative etiology such as Alzheimer's disease should be considered, although there are some inconsistencies in memory.  Neurodegenerative etiologies involving frontal and subcortical systems cannot entirely be ruled out.     RECOMMENDATIONS:    1) If not already considered, Mr. Grande may benefit from referral to neurology for further evaluation and treatment recommendations.    2) In terms of daily functioning, Mr. Grande may require increasing assistance in the management of his instrumental activities of daily living.  He has had some confusion over his medications.  It may be reasonable for his family to set up his medications in a pillbox and to check them on a daily basis to ensure that he has taken them.    3) Since he was sick a few years ago, his wife has begun managing most of their finances.  There is one credit card in both of their names which he has been managing.  His wife is unsure whether it is being paid regularly and what is being charged on it.  It may be reasonable for her to assist in the management of this credit card as well.    4) Mr. Grande may benefit from the use of written reminders or checklists.  It may be helpful to use a smart phone or similar device for alarms and reminders.    5) We discussed the benefits of remaining active physically and mentally.  He and his wife enjoy going to the gym together and he works out regularly.  He is also encouraged to maintain social connections and engage in hobbies or activities that he enjoys.    6) Information regarding caregiving and local resources can be found through the Alzheimer's Association at www.alz.org.    7) It may be helpful to follow him over time.  Results may serve as a baseline of his neurocognitive functioning.  Repeated neuropsychological evaluation in 12 months may help to determine whether his cognitive difficulties are progressive.    These results have been reviewed in detail with Mr. Grande and his wife in a video visit on 3/13/2023.  The recommendations were reviewed and their questions were answered.    Dixie Choi, Ph.D., ABPP  Licensed  Psychologist, [id number]  Board Certified in Clinical Neuropsychology      Time spent: One unit of professional time, including interview (CPT 95309). 60 minutes (1 unit) neuropsychological testing evaluation by licensed and board-certified neuropsychologist, including integration of patient data, interpretation of standardized test results and clinical data, clinical decision-making, treatment planning, report, and interactive feedback to the patient, first hour (CPT 39527). 142 minutes (2 units) of neuropsychological testing evaluation by licensed and board-certified neuropsychologist, including integration of patient data, interpretation of standardized test results and clinical data, clinical decision-making, treatment planning, report, and interactive feedback to the patient, subsequent hours (CPT 56604). 30 minutes of neuropsychological test administration and scoring by technician, first 30 minutes on 3/6/2023 (CPT 49923). 226 additional minutes on 3/6/2023 (8 units) neuropsychological test administration and scoring by technician, subsequent 30 minutes (CPT 65800). ICD-10 Diagnoses: F03.90.            Dixie Choi, PhD

## 2023-03-08 NOTE — PROGRESS NOTES
Mr. Grande completed the interview and testing portions of the neuropsychological evaluation today in the clinic. A feedback was scheduled for 3/8/23. Full report to follow, pending completion of feedback.

## 2023-03-08 NOTE — LETTER
3/8/2023      RE: Mainor Grande  [id number] [facility] [address]       NEUROPSYCHOLOGICAL EVALUATION    RELEVANT HISTORY AND REASON FOR REFERRAL    Mainor Grande is a 67 year old, right handed,  retired  with 18 years of formal education. Information was obtained via interview with the patient and his wife, and review of his medical records. Records indicate a history of asthma, ulcerative pancolitis, and primary sclerosing cholangitis, with concerns regarding memory decline over the last few years. He was referred for neuropsychological evaluation by Karen Rodriguez M.D., for further characterization of any cognitive difficulties, and to evaluate mood.      CLINICAL INTERVIEW FINDINGS    A portion of the interview took place with Mr. Grande and his wife together. With his permission, and at the request of his wife, the remainder of the interview was conducted separately. After his portion of the interview was complete, he began testing with the psychometrist, and his wife was interviewed.    When they were together during the interview, Ms. Grande provided a timeline of the changes she had noticed.  She began noticing some changes in 2016. Specifically, she noticed that he was getting uptight and anxious over things, and was forgetting events that they were going to go to.  In 2017 she noticed increasing memory difficulties and he would sometimes forget events that had happened.  She recalls talking to their primary care physician, who they both see, during her own exam that year.  She had just retired and has been noticing increasing cognitive concerns for him.  In 2019 he started losing a lot of weight and then was diagnosed with ulcerative colitis.  In the fall of 2019, an infusion was recommended but insurance would not cover it.  He was falling and was brought to the emergency department.  He was making errors such as blocking himself out of his bank account, and when  he tried to write an email to staff he put the full email into the cc field.  He was getting frustrated and angry.  In many ways he seemed better after he started the infusion in November 2019.  He was eating better and since then his nutrition has been better.  The confusion did not go away, however.  In 2020, there was onset of the pandemic and he also retired.  That year she noticed that every 3 months or so something major would happen if he would completely forget that it happened.  Now, this sort of thing is happening daily.    Mr. Grande feels that he has had some difficulty with memory over the last 6 months to a year.  He thinks that perhaps more than memory it is some difficulty with communicating with his wife.  He stated that he does not always have access to the calendar, and sometimes he thinks that there are things that are important to her that are not as important to him and he has a tendency to put it on the back burner.  He has not noticed difficulty with word finding, attention or concentration, or decision-making.    Ms. Grande described a number of additional incidents.  In 2017, a Boy  came to their house to sell a wreath.  An hour later, the boy came back but Mr. Grande did not even remember that he was there and said that he had never seen him before.  More recently, when they were in [place], they stayed at the same park for 31 days.  He was not able to find his way back to the trailer, and there was an incident where he walked into another trailer that was next-door to [facility].  There was a different colored car next to that trailer, and moreover, there were people sitting on the patio. He asked them what they were doing, seemingly thinking they were at his own trailer, before he walked in to their trailer. They had to tell him that his trailer was next-door.  He has had greater difficulty playing card games that he used to know well.  He has had trouble managing his bank account, losing  his JULIET card, forgetting passwords, and locking himself out.  He recently went to see his son, and his JULIET card was not working.  He knew this, but insisted on paying and went to the JULIET machine, and was there for 20 minutes and said the machine was not working.  He went to the grocery store right after that and started putting groceries that had already been paid onto the conveyor belt. On several occasions, he has said he was the wrong age, a different age each time.  He has always been calm and never got upset with her, but now he gets panicked, agitated, and frustrated.  He swears, which is not like him.  He is more critical to his wife about other people, but not directly to the other people.     Mr. Grande lives with his wife and he stated that he manages their finances without difficulty.  His wife stated that she began managing most of the finances when he was sick a few years ago, so that she would know the passwords.  She manages all of the finances now except for one credit card of his.  Her name is on the card but she is not clear whether it is being paid off.  He stated that his wife helps him with his medications.  She indicated that he puts them into pill container but does not necessarily know what his medications are for, and she is not sure if he is taking them regularly.  He denied difficulty with driving although there was an incident where he became lost in December.  He took a friend somewhere and got turned around coming home.  He continues to cook without difficulty.  He stated that he handles his personal cares independently.  His wife has not noticed any changes in hygiene.  He wears clothes that are appropriate for the season.    Mr. Grande denied any history of psychiatric illness.  He stated that his mood was down when he was going through nursing home and the pandemic.  He has not worked with a psychologist or a psychiatrist.  He stated that now his mood is fine for the most part although  he understands that his wife has concerns about his mood and he is concerned about that.  He noted that his father had Alzheimer's disease and that is in the back of his mind because it is in his family.  He does not think that he is depressed.  He gets frustrated with some other outings because he does not feel like he knows everything that is going on.  He stated that he has been sleeping well, 8 to 9 hours a night.  He can sometimes doze off during the day for an hour or so.  His appetite is good.  His energy level and interest level are good.  He denied suicidal ideation or any history of suicide attempts.  He has not had visual or auditory hallucinations.    Mr. Grande stated that he will occasionally have a beer or a glass of wine with dinner.  He has not been a heavy drinker.  He denied illicit drug use or tobacco use.    Mr. Grande completed his Bachelor's degree at [employer], and enrolled in a 2-year program at the [employer] in [place], stating that this was the equivalent of a Master's degree.  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    Mr. Grande denied any history of seizure, stroke, or head injury resulting loss of consciousness.  His balance has been good.  He works out regularly.  He goes to the gym 4 or 5 days a week and does balance exercises and running.  He noted that he lost 80 pounds on purpose 20 years ago with diet and exercise, and that his weight has been stable since then.  He and his wife enjoy going to the gym together.  He denied unilateral weakness or numbness.  He has not been bothered by tremor.  He experiences occasional headaches.    Past Medical History  Past Medical History:   Diagnosis Date     Allergic rhinitis, cause unspecified      Fatty liver 2004    elevated LFT, saw GI for a consultation     Mild persistent asthma 9/12/2008     Reflux esophagitis      Unspecified disease of respiratory system      RAD     Viremia, unspecified     acute     An MRI of the brain on 12/9/2019, which was requested due to memory changes, was notable for a few minimal foci of prolonged T2 relaxation in the white matter of the frontal and parietal lobes, consistent with sequelae of small vessel ischemic disease.  There was also mild generalized atrophy of the brain.  A CT of the head on 11/3/2020, undertaken due to headache and dizziness, was also notable for mild cerebral atrophy and minimal nonspecific white matter changes.    Current Medications    He has a current medication list which includes the following prescription(s): albuterol, aspirin, atorvastatin, azelastine, benzonatate, budesonide-formoterol, esomeprazole, ferrous gluconate, fish oil-omega-3 fatty acids, fluticasone, melatonin, multiple vitamin, clear eyes maximum itchy eye, ursodiol, and vitamin d3.    Family History  Family History   Problem Relation Age of Onset     Cancer Mother         bladder cancer     Neurologic Disorder Father         alzheimers dementia     Gastrointestinal Disease Brother         divertculitis     BEHAVIORAL OBSERVATIONS    During the evaluation, Mr. Grande was pleasant, cooperative, and seemed to understand the instructions.  At times he required prompts to continue with tasks. He was alert and oriented to person, place, month, and year, but not to date.  Although he gave his correct date of birth, he stated that his age was 70.  No abnormal movements were observed clinically.  Mood was euthymic.  Speech was fluent, with normal articulation, volume, and rate.  He presented his thoughts in a clear, logical manner although he was a vague historian.  Internal performance validity measures fell within normal limits.  The results are believed to accurately reflect his current level of functioning.    MEASURES ADMINISTERED    The following measures were administered by a trained psychometrist, under the direct supervision of a licensed  psychologist.    Wechsler Abbreviated Scale of Intelligence - 2 (WASI-2); Subtests of the Wechsler Adult Intelligence Scale - 4; Reading subtest of the Wide Range Achievement Test - 4; subtests of the Wechsler Memory Scale - 4; Chau Auditory Verbal Learning Test; Chau-Osterrieth Complex Figure; [place] Naming Test; Controlled Oral Word Association Test; Semantic Fluency; Clock Drawing; Trail Making Test; Stroop; Merchant Judgment of Line Orientation; [place] Card Sorting Test; Finger Tapping; Grooved Pegboard; Dementia Rating Scale - 2 (DRS-2); Peres Depression Inventory - 2 (BDI-2).    RESULTS AND INTERPRETATION    Overall intellectual functioning fell in the low average range, below premorbid estimates of high average based on single word reading abilities.  Performance on a screening measure of dementia was below average (DRS-2 Total Score = 126/144).  On this measure, he had particular difficulty on tasks of memory.    Confrontation naming was average for his age and level of education.  Expressive vocabulary was average.  Verbal abstract reasoning was average.  Letter fluency and generative naming to category were exceptionally low, and were notable for a paucity of responses rather than errors.    Attention span was average for his age.  Divided attention was low average. He had difficulty establishing set on a visuomotor sequencing task.  Performance on a measure of cognitive flexibility and speed was below average.  Psychomotor processing speed was low average.  Finger tapping speed was exceptionally low bilaterally.    Basic visual perception, including matching lines and angles, was high average for his age and level of education.  Construction of a clock fell within normal limits, although he did have some difficulty accurately orienting the numbers.  Construction of a complex design was low average. Qualitative review of his performance suggests a disorganized approach.  Assembly of visual material was  average.  Nonverbal deductive reasoning was low average.    Immediate recall of verbal narrative material was average, with exceptionally low recall following a 30-minute delay.  Recognition memory on this task was average.  On a multiple trial list learning task, immediate recall was low average, with below average recall following a 30-minute delay.  Recognition memory on this task was exceptionally low, and was notable for some false positive identifications of words.  Immediate and 30-minute delayed recall of visual material was exceptionally low, and recognition memory on this task was not better than free recall.    On the BDI-2, a self-report questionnaire, he endorsed a minimal level of depressive symptomatology.    IMPRESSIONS AND RECOMMENDATIONS    Mainor Grande is a 67 year old man with a history of memory decline over the last few years, as well as ulcerative pancolitis and primary sclerosing cholangitis.    Results indicate impairments in memory.  Although this is somewhat variable, in most cases he does not benefit from cues, suggesting a rapid forgetting rate.  Information and motor processing speed are prominently slowed.  Motor functioning is impaired bilaterally. There is subtle executive dysfunction, including difficulty with establishing set and organization. Basic language and visual processing fall within normal limits.  He does not endorse significant depressive symptomatology.    This pattern of performance is suggestive of mesial temporal lobe involvement, although there is also a suggestion of frontal and subcortical system involvement.  He has had increasing difficulty managing his instrumental activities of daily living. As such, these findings appear to suggest mild dementia. Taken together with his history, including reported progressive decline over the last few years, a neurodegenerative etiology such as Alzheimer's disease should be considered, although there are some  inconsistencies in memory. Neurodegenerative etiologies involving frontal and subcortical systems cannot entirely be ruled out.     RECOMMENDATIONS:    1) If not already considered, Mr. Grande may benefit from referral to neurology for further evaluation and treatment recommendations.    2) In terms of daily functioning, Mr. Grande may require increasing assistance in the management of his instrumental activities of daily living.  He has had some confusion over his medications.  It may be reasonable for his family to set up his medications in a pillbox and to check them on a daily basis to ensure that he has taken them.    3) Since he was sick a few years ago, his wife has begun managing most of their finances.  There is one credit card in both of their names which he has been managing.  His wife is unsure whether it is being paid regularly and what is being charged on it.  It may be reasonable for her to assist in the management of this credit card as well.    4) Mr. Grande may benefit from the use of written reminders or checklists.  It may be helpful to use a smart phone or similar device for alarms and reminders.    5) We discussed the benefits of remaining active physically and mentally.  He and his wife enjoy going to the gym together and he works out regularly.  He is also encouraged to maintain social connections and engage in hobbies or activities that he enjoys.    6) Information regarding caregiving and local resources can be found through the Alzheimer's Association at www.alz.org.    7) It may be helpful to follow him over time.  Results may serve as a baseline of his neurocognitive functioning.  Repeated neuropsychological evaluation in 12 months may help to determine whether his cognitive difficulties are progressive.    These results have been reviewed in detail with Mr. Grande and his wife in a video visit on 3/13/2023.  The recommendations were reviewed and their questions were answered.    Dixie QUEEN  Jimbo, Ph.D., ABPP  Licensed Psychologist, LP [id number]  Board Certified in Clinical Neuropsychology      Time spent: One unit of professional time, including interview (CPT 99421). 60 minutes (1 unit) neuropsychological testing evaluation by licensed and board-certified neuropsychologist, including integration of patient data, interpretation of standardized test results and clinical data, clinical decision-making, treatment planning, report, and interactive feedback to the patient, first hour (CPT 45138). 142 minutes (2 units) of neuropsychological testing evaluation by licensed and board-certified neuropsychologist, including integration of patient data, interpretation of standardized test results and clinical data, clinical decision-making, treatment planning, report, and interactive feedback to the patient, subsequent hours (CPT 49454). 30 minutes of neuropsychological test administration and scoring by technician, first 30 minutes on 3/6/2023 (CPT 08519). 226 additional minutes on 3/6/2023 (8 units) neuropsychological test administration and scoring by technician, subsequent 30 minutes (CPT 90550). ICD-10 Diagnoses: F03.90.            Dixie Choi, PhD LP

## 2023-03-10 ENCOUNTER — INFUSION THERAPY VISIT (OUTPATIENT)
Dept: INFUSION THERAPY | Facility: CLINIC | Age: 68
End: 2023-03-10
Attending: INTERNAL MEDICINE
Payer: COMMERCIAL

## 2023-03-10 VITALS
TEMPERATURE: 97.8 F | RESPIRATION RATE: 18 BRPM | SYSTOLIC BLOOD PRESSURE: 135 MMHG | WEIGHT: 200.6 LBS | HEART RATE: 61 BPM | BODY MASS INDEX: 28.56 KG/M2 | OXYGEN SATURATION: 100 % | DIASTOLIC BLOOD PRESSURE: 81 MMHG

## 2023-03-10 DIAGNOSIS — K51.00 ULCERATIVE PANCOLITIS WITHOUT COMPLICATION (H): Primary | ICD-10-CM

## 2023-03-10 PROCEDURE — 250N000011 HC RX IP 250 OP 636: Performed by: INTERNAL MEDICINE

## 2023-03-10 PROCEDURE — 96365 THER/PROPH/DIAG IV INF INIT: CPT

## 2023-03-10 PROCEDURE — 258N000003 HC RX IP 258 OP 636: Performed by: INTERNAL MEDICINE

## 2023-03-10 RX ORDER — HEPARIN SODIUM (PORCINE) LOCK FLUSH IV SOLN 100 UNIT/ML 100 UNIT/ML
5 SOLUTION INTRAVENOUS
Status: CANCELLED | OUTPATIENT
Start: 2023-04-07

## 2023-03-10 RX ORDER — HEPARIN SODIUM,PORCINE 10 UNIT/ML
5 VIAL (ML) INTRAVENOUS
Status: CANCELLED | OUTPATIENT
Start: 2023-04-07

## 2023-03-10 RX ADMIN — SODIUM CHLORIDE 250 ML: 9 INJECTION, SOLUTION INTRAVENOUS at 08:35

## 2023-03-10 RX ADMIN — VEDOLIZUMAB 300 MG: 300 INJECTION, POWDER, LYOPHILIZED, FOR SOLUTION INTRAVENOUS at 08:25

## 2023-03-10 NOTE — PROGRESS NOTES
Infusion Nursing Note:  Mainor Grande presents today for Entyviio.    Patient seen by provider today: No   present during visit today: Not Applicable.    Note: Leno is feeling well today. No complaints.    Intravenous Access:  Peripheral IV placed.    Treatment Conditions:  Biological Infusion Checklist:  ~~~ NOTE: If the patient answers yes to any of the questions below, hold the infusion and contact ordering provider or on-call provider.    1. Have you recently had an elevated temperature, fever, chills, productive cough, coughing for 3 weeks or longer or hemoptysis, abnormal vital signs, night sweats,  chest pain or have you noticed a decrease in your appetite, unexplained weight loss or fatigue? No  2. Do you have any open wounds or new incisions? No  3. Do you have any recent or upcoming hospitalizations, surgeries or dental procedures? No  4. Do you currently have or recently have had any signs of illness or infection or are you on any antibiotics? No  5. Have you had any new, sudden or worsening abdominal pain? No  6. Have you or anyone in your household received a live vaccination in the past 4 weeks? Please note:  No live vaccines while on biologic/chemotherapy until 6 months after the last treatment.  Patient can receive the flu vaccine (shot only) and the pneumovax.  It is optimal for the patient to get these vaccines mid cycle, but they can be given at any time as long as it is not on the day of the infusion. No  7. Have you recently been diagnosed with any new nervous system diseases (ie. Multiple sclerosis, Guillain [place], seizures, neurological changes) or cancer diagnosis? No  8. Are you on any form of radiation or chemotherapy? No  9. Are you pregnant or breast feeding or do you have plans of pregnancy in the future? No  10. Have you been having any signs of worsening depression or suicidal ideations?  (benlysta only) No  11. Have there been any other new onset medical symptoms?  No      Post Infusion Assessment:  Patient tolerated infusion without incident.  Site patent and intact, free from redness, edema or discomfort.  No evidence of extravasations.  Access discontinued per protocol.     Discharge Plan:   Discharge instructions reviewed with: Patient.  Patient and/or family verbalized understanding of discharge instructions and all questions answered.  Patient discharged in stable condition accompanied by: self.  Departure Mode: Ambulatory.      Shannon Vasquez RN

## 2023-03-13 NOTE — PROGRESS NOTES
NEUROPSYCHOLOGICAL EVALUATION    RELEVANT HISTORY AND REASON FOR REFERRAL    Mainor Grande is a 67 year old, right handed,  retired  with 18 years of formal education. Information was obtained via interview with the patient and his wife, and review of his medical records. Records indicate a history of asthma, ulcerative pancolitis, and primary sclerosing cholangitis, with concerns regarding memory decline over the last few years. He was referred for neuropsychological evaluation by Karen Rodriguez M.D., for further characterization of any cognitive difficulties, and to evaluate mood.      CLINICAL INTERVIEW FINDINGS    A portion of the interview took place with Mr. Grande and his wife together. With his permission, and at the request of his wife, the remainder of the interview was conducted separately. After his portion of the interview was complete, he began testing with the psychometrist, and his wife was interviewed.    When they were together during the interview, Ms. Grande provided a timeline of the changes she had noticed.  She began noticing some changes in 2016. Specifically, she noticed that he was getting uptight and anxious over things, and was forgetting events that they were going to go to.  In 2017 she noticed increasing memory difficulties and he would sometimes forget events that had happened.  She recalls talking to their primary care physician, who they both see, during her own exam that year.  She had just retired and has been noticing increasing cognitive concerns for him.  In 2019 he started losing a lot of weight and then was diagnosed with ulcerative colitis.  In the fall of 2019, an infusion was recommended but insurance would not cover it.  He was falling and was brought to the emergency department.  He was making errors such as blocking himself out of his bank account, and when he tried to write an email to staff he put the full email into the cc field.  He was  getting frustrated and angry.  In many ways he seemed better after he started the infusion in November 2019.  He was eating better and since then his nutrition has been better.  The confusion did not go away, however.  In 2020, there was onset of the pandemic and he also retired.  That year she noticed that every 3 months or so something major would happen if he would completely forget that it happened.  Now, this sort of thing is happening daily.    Mr. Grande feels that he has had some difficulty with memory over the last 6 months to a year.  He thinks that perhaps more than memory it is some difficulty with communicating with his wife.  He stated that he does not always have access to the calendar, and sometimes he thinks that there are things that are important to her that are not as important to him and he has a tendency to put it on the back burner.  He has not noticed difficulty with word finding, attention or concentration, or decision-making.    Ms. Grande described a number of additional incidents.  In 2017, a Boy  came to their house to sell a wreath.  An hour later, the boy came back but Mr. Grande did not even remember that he was there and said that he had never seen him before.  More recently, when they were in [place], they stayed at the same park for 31 days.  He was not able to find his way back to the trailer, and there was an incident where he walked into another trailer that was next-door to [facility].  There was a different colored car next to that trailer, and moreover, there were people sitting on the patio. He asked them what they were doing, seemingly thinking they were at his own trailer, before he walked in to their trailer. They had to tell him that his trailer was next-door.  He has had greater difficulty playing card games that he used to know well.  He has had trouble managing his bank account, losing his JULIET card, forgetting passwords, and locking himself out.  He recently went to see  his son, and his JULIET card was not working.  He knew this, but insisted on paying and went to the JULIET machine, and was there for 20 minutes and said the machine was not working.  He went to the grocery store right after that and started putting groceries that had already been paid onto the conveyor belt. On several occasions, he has said he was the wrong age, a different age each time.  He has always been calm and never got upset with her, but now he gets panicked, agitated, and frustrated.  He swears, which is not like him.  He is more critical to his wife about other people, but not directly to the other people.     Mr. Grande lives with his wife and he stated that he manages their finances without difficulty.  His wife stated that she began managing most of the finances when he was sick a few years ago, so that she would know the passwords.  She manages all of the finances now except for one credit card of his.  Her name is on the card but she is not clear whether it is being paid off.  He stated that his wife helps him with his medications.  She indicated that he puts them into pill container but does not necessarily know what his medications are for, and she is not sure if he is taking them regularly.  He denied difficulty with driving although there was an incident where he became lost in December.  He took a friend somewhere and got turned around coming home.  He continues to cook without difficulty.  He stated that he handles his personal cares independently.  His wife has not noticed any changes in hygiene.  He wears clothes that are appropriate for the season.    Mr. Grande denied any history of psychiatric illness.  He stated that his mood was down when he was going through [facility] and the pandemic.  He has not worked with a psychologist or a psychiatrist.  He stated that now his mood is fine for the most part although he understands that his wife has concerns about his mood and he is concerned about  that.  He noted that his father had Alzheimer's disease and that is in the back of his mind because it is in his family.  He does not think that he is depressed.  He gets frustrated with some other outings because he does not feel like he knows everything that is going on.  He stated that he has been sleeping well, 8 to 9 hours a night.  He can sometimes doze off during the day for an hour or so.  His appetite is good.  His energy level and interest level are good.  He denied suicidal ideation or any history of suicide attempts.  He has not had visual or auditory hallucinations.    Mr. Grande stated that he will occasionally have a beer or a glass of wine with dinner.  He has not been a heavy drinker.  He denied illicit drug use or tobacco use.    Mr. Grande completed his Bachelor's degree at [employer], and enrolled in a 2-year program at the [employer] in [place], stating that this was the equivalent of a Master's degree.  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    Mr. Grande denied any history of seizure, stroke, or head injury resulting loss of consciousness.  His balance has been good.  He works out regularly.  He goes to the gym 4 or 5 days a week and does balance exercises and running.  He noted that he lost 80 pounds on purpose 20 years ago with diet and exercise, and that his weight has been stable since then.  He and his wife enjoy going to the gym together.  He denied unilateral weakness or numbness.  He has not been bothered by tremor.  He experiences occasional headaches.    Past Medical History  Past Medical History:   Diagnosis Date     Allergic rhinitis, cause unspecified      Fatty liver 2004    elevated LFT, saw GI for a consultation     Mild persistent asthma 9/12/2008     Reflux esophagitis      Unspecified disease of respiratory system     RAD     Viremia, unspecified     acute     An MRI of the brain on 12/9/2019,  which was requested due to memory changes, was notable for a few minimal foci of prolonged T2 relaxation in the white matter of the frontal and parietal lobes, consistent with sequelae of small vessel ischemic disease.  There was also mild generalized atrophy of the brain.  A CT of the head on 11/3/2020, undertaken due to headache and dizziness, was also notable for mild cerebral atrophy and minimal nonspecific white matter changes.    Current Medications    He has a current medication list which includes the following prescription(s): albuterol, aspirin, atorvastatin, azelastine, benzonatate, budesonide-formoterol, esomeprazole, ferrous gluconate, fish oil-omega-3 fatty acids, fluticasone, melatonin, multiple vitamin, clear eyes maximum itchy eye, ursodiol, and vitamin d3.    Family History  Family History   Problem Relation Age of Onset     Cancer Mother         bladder cancer     Neurologic Disorder Father         alzheimers dementia     Gastrointestinal Disease Brother         divertculitis     BEHAVIORAL OBSERVATIONS    During the evaluation, Mr. Grande was pleasant, cooperative, and seemed to understand the instructions.  At times he required prompts to continue with tasks. He was alert and oriented to person, place, month, and year, but not to date.  Although he gave his correct date of birth, he stated that his age was 70.  No abnormal movements were observed clinically.  Mood was euthymic.  Speech was fluent, with normal articulation, volume, and rate.  He presented his thoughts in a clear, logical manner although he was a vague historian.  Internal performance validity measures fell within normal limits.  The results are believed to accurately reflect his current level of functioning.    MEASURES ADMINISTERED    The following measures were administered by a trained psychometrist, under the direct supervision of a licensed psychologist.    Wechsler Abbreviated Scale of Intelligence - 2 (WASI-2); Subtests of  the Wechsler Adult Intelligence Scale - 4; Reading subtest of the Wide Range Achievement Test - 4; subtests of the Wechsler Memory Scale - 4; Chau Auditory Verbal Learning Test; Chau-Osterrieth Complex Figure; Concan Naming Test; Controlled Oral Word Association Test; Semantic Fluency; Clock Drawing; Trail Making Test; Stroop; Merchant Judgment of Line Orientation; Wisconsin Card Sorting Test; Finger Tapping; Grooved Pegboard; Dementia Rating Scale - 2 (DRS-2); Peres Depression Inventory - 2 (BDI-2).    RESULTS AND INTERPRETATION    Overall intellectual functioning fell in the low average range, below premorbid estimates of high average based on single word reading abilities.  Performance on a screening measure of dementia was below average (DRS-2 Total Score = 126/144).  On this measure, he had particular difficulty on tasks of memory.    Confrontation naming was average for his age and level of education.  Expressive vocabulary was average.  Verbal abstract reasoning was average.  Letter fluency and generative naming to category were exceptionally low, and were notable for a paucity of responses rather than errors.    Attention span was average for his age.  Divided attention was low average. He had difficulty establishing set on a visuomotor sequencing task.  Performance on a measure of cognitive flexibility and speed was below average.  Psychomotor processing speed was low average.  Finger tapping speed was exceptionally low bilaterally.    Basic visual perception, including matching lines and angles, was high average for his age and level of education.  Construction of a clock fell within normal limits, although he did have some difficulty accurately orienting the numbers.  Construction of a complex design was low average. Qualitative review of his performance suggests a disorganized approach.  Assembly of visual material was average.  Nonverbal deductive reasoning was low average.    Immediate recall of verbal  narrative material was average, with exceptionally low recall following a 30-minute delay.  Recognition memory on this task was average.  On a multiple trial list learning task, immediate recall was low average, with below average recall following a 30-minute delay.  Recognition memory on this task was exceptionally low, and was notable for some false positive identifications of words.  Immediate and 30-minute delayed recall of visual material was exceptionally low, and recognition memory on this task was not better than free recall.    On the BDI-2, a self-report questionnaire, he endorsed a minimal level of depressive symptomatology.    IMPRESSIONS AND RECOMMENDATIONS    Mainor Grande is a 67 year old man with a history of memory decline over the last few years, as well as ulcerative pancolitis and primary sclerosing cholangitis.    Results indicate impairments in memory.  Although this is somewhat variable, in most cases he does not benefit from cues, suggesting a rapid forgetting rate.  Information and motor processing speed are prominently slowed.  Motor functioning is impaired bilaterally. There is subtle executive dysfunction, including difficulty with establishing set and organization. Basic language and visual processing fall within normal limits.  He does not endorse significant depressive symptomatology.    This pattern of performance is suggestive of mesial temporal lobe involvement, although there is also a suggestion of frontal and subcortical system involvement.  He has had increasing difficulty managing his instrumental activities of daily living. As such, these findings appear to suggest mild dementia. Taken together with his history, including reported progressive decline over the last few years, a neurodegenerative etiology such as Alzheimer's disease should be considered, although there are some inconsistencies in memory. Neurodegenerative etiologies involving frontal and subcortical systems  cannot entirely be ruled out.     RECOMMENDATIONS:    1) If not already considered, Mr. Grande may benefit from referral to neurology for further evaluation and treatment recommendations.    2) In terms of daily functioning, Mr. Grande may require increasing assistance in the management of his instrumental activities of daily living.  He has had some confusion over his medications.  It may be reasonable for his family to set up his medications in a pillbox and to check them on a daily basis to ensure that he has taken them.    3) Since he was sick a few years ago, his wife has begun managing most of their finances.  There is one credit card in both of their names which he has been managing.  His wife is unsure whether it is being paid regularly and what is being charged on it.  It may be reasonable for her to assist in the management of this credit card as well.    4) Mr. Grande may benefit from the use of written reminders or checklists.  It may be helpful to use a smart phone or similar device for alarms and reminders.    5) We discussed the benefits of remaining active physically and mentally.  He and his wife enjoy going to the gym together and he works out regularly.  He is also encouraged to maintain social connections and engage in hobbies or activities that he enjoys.    6) Information regarding caregiving and local resources can be found through the Alzheimer's Association at www.alz.org.    7) It may be helpful to follow him over time.  Results may serve as a baseline of his neurocognitive functioning.  Repeated neuropsychological evaluation in 12 months may help to determine whether his cognitive difficulties are progressive.    These results have been reviewed in detail with Mr. Grande and his wife in a video visit on 3/13/2023.  The recommendations were reviewed and their questions were answered.    Dixie Choi, Ph.D., [facility]  Licensed Psychologist, LP [id number]  Board Certified in Clinical  Neuropsychology      Time spent: One unit of professional time, including interview (CPT 24871). 60 minutes (1 unit) neuropsychological testing evaluation by licensed and board-certified neuropsychologist, including integration of patient data, interpretation of standardized test results and clinical data, clinical decision-making, treatment planning, report, and interactive feedback to the patient, first hour (CPT 55925). 142 minutes (2 units) of neuropsychological testing evaluation by licensed and board-certified neuropsychologist, including integration of patient data, interpretation of standardized test results and clinical data, clinical decision-making, treatment planning, report, and interactive feedback to the patient, subsequent hours (CPT 42510). 30 minutes of neuropsychological test administration and scoring by technician, first 30 minutes on 3/6/2023 (CPT 26388). 226 additional minutes on 3/6/2023 (8 units) neuropsychological test administration and scoring by technician, subsequent 30 minutes (CPT 78354). ICD-10 Diagnoses: F03.90.

## 2023-03-14 ENCOUNTER — NURSE TRIAGE (OUTPATIENT)
Dept: FAMILY MEDICINE | Facility: OTHER | Age: 68
End: 2023-03-14

## 2023-03-14 DIAGNOSIS — J45.31 MILD PERSISTENT ASTHMA WITH ACUTE EXACERBATION: ICD-10-CM

## 2023-03-14 RX ORDER — ALBUTEROL SULFATE 90 UG/1
2 AEROSOL, METERED RESPIRATORY (INHALATION) EVERY 6 HOURS
Qty: 18 G | Refills: 0 | Status: SHIPPED | OUTPATIENT
Start: 2023-03-14 | End: 2023-03-16

## 2023-03-14 NOTE — TELEPHONE ENCOUNTER
[facility]ena    Appointments in Next Year    Mar 16, 2023  9:30 AM  (Arrive by 9:10 AM)  Provider Visit with Karen Rodriguez MD  [facility] ([facility] ) [phone]

## 2023-03-14 NOTE — TELEPHONE ENCOUNTER
Call from wife. Patient present during call.   Patient was scheduled to see PCP today for cough. However, provider called in sick today and appointment was cancelled. Patient is rescheduled for Thursday, but requesting medication treatment for cough now.   Patient heard in the background coughing continuously. Patient also reports wheezing. Having a hard time completing sentences due to cough.     Recommended patient be seen in the emergency department for breathing concerns and continuous cough. Provided locations of nearest emergency departments. Wife verbalized understanding and will have patient evaluated.     She is still requesting albuterol inhaler to be refilled. Refill request sent to refill pool.     Stephanie DONALD, RN  [facility]      Reason for Disposition    MODERATE difficulty breathing (e.g., speaks in phrases, SOB even at rest, pulse 100-120) of new-onset or worse than normal    Additional Information    Negative: SEVERE difficulty breathing (e.g., struggling for each breath, speaks in single words, pulse > 120)    Negative: Breathing stopped and hasn't returned    Negative: Choking on something    Negative: Bluish (or gray) lips or face    Negative: Difficult to awaken or acting confused (e.g., disoriented, slurred speech)    Negative: Passed out (i.e., fainted, collapsed and was not responding)    Negative: Wheezing started suddenly after medicine, an allergic food, or bee sting    Negative: Stridor    Negative: Slow, shallow and weak breathing    Negative: Sounds like a life-threatening emergency to the triager    Protocols used: BREATHING DIFFICULTY-A-OH

## 2023-03-15 NOTE — CONFIDENTIAL NOTE
NAME  Mainor Grande    MRN  [id number]      53     AGE  67     SEX  Male     HANDEDNESS Right     EDUCATION 18     TYLER  3/6/23     PROVIDER  [facility]  [place]     STATION  OP            WMS-R       Orientation 10            CLOCK DRAWING      Command  3     Copy  3            SHANELLE-O COMPLEX FIGURE        Raw T %ile   Copy  30  11-16   Short Delay Recall 4 25 1   Long Delay Recall 0 <20 <1   Recognition Total 15 <20 <1   Time to Copy 169  >16           WMS-IV LOGICAL MEMORY OA    Raw SSa/%ile     LM I 26 8     LM II 3 3     LM Rec. 18 26-50            RAVLT       I II III IV V   3 5 3 6 7   B VI      3 5        Raw T    Trial B (short delay) 5 39    30 Min Recall 2 32    30 Min Recognition 9 21    Intrusions  3     Memory Eff. Score 0.98 27            BOSTON NAMING TEST     Raw 57 /60     MAS 10      %ile       # Stimulus Cues 0     # Phonemic Cues 3             COWAT   CFL   Raw 16      MAS 2             SEMANTIC FLUENCY      Raw 21      MAS 2             TRAIL MAKING TEST       Time Errors MAS    A 43 0 7    B 142 0 6           STROOP        Raw MAS     Word 75 5     Color 48 4     C/W 20 4            FINGER TAPPING       Avg z     RH 24.33 -2.79     LH 24.67 -2.45            WRAT    5     SS %ile GE   Word Reading 113 81 >12.9          WAIS-IV         Raw SS    Digit Span  25 10    Coding  36 7           WASI-II         Raw T    Vocabulary  33 45    Similarities  24 42    Block Design 24 45    Matrix Reasoning 12 41           VCI 90      NATASHA 88      FSIQ 4 87      FSIQ 2 88              DEMENTIA RATING SCALE - 2 Standard     Raw MAS    Attention  36 11    Initiation/Persev. 33 7    Construction 6 10    Concept  34 8    Memory  17 3    Total /144  126 5            JO   H   Raw 25      MAS 12             BDI-II       Raw 7      Interp. Minimal

## 2023-03-16 ENCOUNTER — OFFICE VISIT (OUTPATIENT)
Dept: FAMILY MEDICINE | Facility: OTHER | Age: 68
End: 2023-03-16
Payer: COMMERCIAL

## 2023-03-16 VITALS
DIASTOLIC BLOOD PRESSURE: 70 MMHG | OXYGEN SATURATION: 98 % | HEART RATE: 61 BPM | HEIGHT: 70 IN | SYSTOLIC BLOOD PRESSURE: 118 MMHG | RESPIRATION RATE: 18 BRPM | TEMPERATURE: 96.6 F | WEIGHT: 199 LBS | BODY MASS INDEX: 28.49 KG/M2

## 2023-03-16 DIAGNOSIS — K51.00 ULCERATIVE (CHRONIC) PANCOLITIS WITHOUT COMPLICATIONS (H): ICD-10-CM

## 2023-03-16 DIAGNOSIS — F03.A0 MILD DEMENTIA, UNSPECIFIED DEMENTIA TYPE, UNSPECIFIED WHETHER BEHAVIORAL, PSYCHOTIC, OR MOOD DISTURBANCE OR ANXIETY (H): Primary | ICD-10-CM

## 2023-03-16 DIAGNOSIS — J45.41 MODERATE PERSISTENT ASTHMA WITH ACUTE EXACERBATION: ICD-10-CM

## 2023-03-16 DIAGNOSIS — F03.90 DEMENTIA, UNSPECIFIED DEMENTIA SEVERITY, UNSPECIFIED DEMENTIA TYPE, UNSPECIFIED WHETHER BEHAVIORAL, PSYCHOTIC, OR MOOD DISTURBANCE OR ANXIETY (H): ICD-10-CM

## 2023-03-16 PROCEDURE — 99214 OFFICE O/P EST MOD 30 MIN: CPT | Performed by: FAMILY MEDICINE

## 2023-03-16 RX ORDER — ALBUTEROL SULFATE 90 UG/1
2 AEROSOL, METERED RESPIRATORY (INHALATION) EVERY 6 HOURS
Qty: 18 G | Refills: 11 | Status: SHIPPED | OUTPATIENT
Start: 2023-03-16 | End: 2024-09-16

## 2023-03-16 ASSESSMENT — PATIENT HEALTH QUESTIONNAIRE - PHQ9
10. IF YOU CHECKED OFF ANY PROBLEMS, HOW DIFFICULT HAVE THESE PROBLEMS MADE IT FOR YOU TO DO YOUR WORK, TAKE CARE OF THINGS AT HOME, OR GET ALONG WITH OTHER PEOPLE: SOMEWHAT DIFFICULT
SUM OF ALL RESPONSES TO PHQ QUESTIONS 1-9: 5
SUM OF ALL RESPONSES TO PHQ QUESTIONS 1-9: 5

## 2023-03-16 ASSESSMENT — PAIN SCALES - GENERAL: PAINLEVEL: NO PAIN (0)

## 2023-03-16 ASSESSMENT — ASTHMA QUESTIONNAIRES
QUESTION_4 LAST FOUR WEEKS HOW OFTEN HAVE YOU USED YOUR RESCUE INHALER OR NEBULIZER MEDICATION (SUCH AS ALBUTEROL): ONE OR TWO TIMES PER DAY
QUESTION_1 LAST FOUR WEEKS HOW MUCH OF THE TIME DID YOUR ASTHMA KEEP YOU FROM GETTING AS MUCH DONE AT WORK, SCHOOL OR AT HOME: A LITTLE OF THE TIME
ACT_TOTALSCORE: 10
ACT_TOTALSCORE: 10
QUESTION_3 LAST FOUR WEEKS HOW OFTEN DID YOUR ASTHMA SYMPTOMS (WHEEZING, COUGHING, SHORTNESS OF BREATH, CHEST TIGHTNESS OR PAIN) WAKE YOU UP AT NIGHT OR EARLIER THAN USUAL IN THE MORNING: FOUR OR MORE NIGHTS A WEEK
QUESTION_2 LAST FOUR WEEKS HOW OFTEN HAVE YOU HAD SHORTNESS OF BREATH: ONCE A DAY
QUESTION_5 LAST FOUR WEEKS HOW WOULD YOU RATE YOUR ASTHMA CONTROL: NOT CONTROLLED AT ALL

## 2023-03-16 NOTE — PROGRESS NOTES
"  Assessment & Plan     Moderate persistent asthma with acute exacerbation  ACT-10  Symptoms are likely due to poorly controlled asthma.  He has not been using his controller medication like a shared.  Encourage to start using Symbicort daily and discussed that this could also be used as a rescue inhaler instead of the albuterol.  His symptoms do not seem to be cardiac in origin at this time.  I reviewed his last stress test completed in 2020 and his last cardiology visit less than a year ago.  Continue statin and aspirin without changes.    Dementia, unspecified dementia severity, unspecified dementia type, unspecified whether behavioral, psychotic, or mood disturbance or anxiety (H)  I reviewed the last psychology testing showing signs of mild dementia with with relatively progressive pattern.  I think it would be reasonable to establish with a neurologist given symptoms.  Referral placed.    - Adult Neurology  Referral; Future    Ulcerative (chronic) pancolitis without complications (H)/sclerosing cholangitis  -Currently followed by CASSI Miller.         BMI:   Estimated body mass index is 28.33 kg/m  as calculated from the following:    Height as of this encounter: 1.785 m (5' 10.28\").    Weight as of this encounter: 90.3 kg (199 lb).   Weight management plan: Discussed healthy diet and exercise guidelines    See Patient Instructions    Return in about 1 month (around 4/16/2023).    Karen Rodriguez MD  [facility]    Da Hayward is a 67 year old accompanied by his spouse, presenting for the following health issues:  Asthma      History of Present Illness       Reason for visit:  This has been on going    He eats 2-3 servings of fruits and vegetables daily.He consumes 0 sweetened beverage(s) daily.He exercises with enough effort to increase his heart rate 10 to 19 minutes per day.  He exercises with enough effort to increase his heart rate 3 or less days per week.   He is " "taking medications regularly.    Today's PHQ-9         PHQ-9 Total Score: 5    PHQ-9 Q9 Thoughts of better off dead/self-harm past 2 weeks :   Not at all    How difficult have these problems made it for you to do your work, take care of things at home, or get along with other people: Somewhat difficult     Felt significant shortness of breath following an episode exercise. Was at the Gym , did mostly cardio on an elliptical for 20 min , completed the exercise and noticed the episode when he is back in his car.   Also noticed this when he eats ice cream as well. Denies any chest pain or chest tightness. Denies any lightheadedness or dizziness with this  Reports some wheezing with these episodes. Struggles to take a deep breath  Symptoms are very sporadic  Has not been able to pinpoint a trigger. Symptoms worse in the winter.           Review of Systems   Constitutional, HEENT, cardiovascular, pulmonary, gi and gu systems are negative, except as otherwise noted.      Objective    /70 (BP Location: Left arm, Patient Position: Sitting, Cuff Size: Adult Regular)   Pulse 61   Temp (!) 96.6  F (35.9  C) (Temporal)   Resp 18   Ht 1.785 m (5' 10.28\")   Wt 90.3 kg (199 lb)   SpO2 98%   BMI 28.33 kg/m    Body mass index is 28.33 kg/m .  Physical Exam   GENERAL: healthy, alert and no distress  NECK: no adenopathy, no asymmetry, masses, or scars and thyroid normal to palpation  RESP: lungs clear to auscultation - no rales, rhonchi or wheezes  CV: regular rate and rhythm, normal S1 S2, no S3 or S4, no murmur, click or rub, no peripheral edema and peripheral pulses strong  ABDOMEN: soft, nontender, no hepatosplenomegaly, no masses and bowel sounds normal            "

## 2023-03-17 DIAGNOSIS — J45.41 MODERATE PERSISTENT ASTHMA WITH ACUTE EXACERBATION: ICD-10-CM

## 2023-03-17 RX ORDER — BUDESONIDE AND FORMOTEROL FUMARATE DIHYDRATE 80; 4.5 UG/1; UG/1
AEROSOL RESPIRATORY (INHALATION)
Qty: 20.4 G | Refills: 11 | Status: SHIPPED | OUTPATIENT
Start: 2023-03-17 | End: 2023-06-13 | Stop reason: DRUGHIGH

## 2023-03-17 NOTE — TELEPHONE ENCOUNTER
"Pending Prescriptions:                       Disp   Refills    budesonide-formoterol (SYMBICORT) 80-4.5 M*20.4 g 11       Sig: Inhale 2 puffs once daily plus 1-2 puffs as needed.           May use up to 12 puffs per day.    Routing refill request to provider for review/approval because:  ACT and ATAQ Scores  11/30/2018   2/12/2019   8/31/2020   7/2/2021   6/8/2022   9/23/2022   3/16/2023    ACT TOTAL SCORE (Goal Greater than or Equal to 20) 17 20 22 21 16 21 10       Requested Prescriptions   Pending Prescriptions Disp Refills    budesonide-formoterol (SYMBICORT) 80-4.5 MCG/ACT Inhaler 20.4 g 11     Sig: Inhale 2 puffs once daily plus 1-2 puffs as needed. May use up to 12 puffs per day.       Inhaled Steroids Protocol Failed - 3/17/2023 10:33 AM        Failed - Asthma control assessment score within normal limits in last 6 months     Please review ACT score.           Passed - Patient is age 12 or older        Passed - Medication is active on med list        Passed - Recent (6 mo) or future (30 days) visit within the authorizing provider's specialty     Patient had office visit in the last 6 months or has a visit in the next 30 days with authorizing provider or within the authorizing provider's specialty.  See \"Patient Info\" tab in inbasket, or \"Choose Columns\" in Meds & Orders section of the refill encounter.           Long-Acting Beta Agonist Inhalers Protocol  Failed - 3/17/2023 10:33 AM        Failed - Asthma control assessment score within normal limits in last 6 months     Please review ACT score.           Passed - Patient is age 12 or older        Passed - Order for Serevent, Striverdi, or Foradil and pt has steroid inhaler        Passed - Medication is active on med list        Passed - Recent (6 mo) or future (30 days) visit within the authorizing provider's specialty     Patient had office visit in the last 6 months or has a visit in the next 30 days with authorizing provider or within the authorizing " "provider's specialty.  See \"Patient Info\" tab in inbasket, or \"Choose Columns\" in Meds & Orders section of the refill encounter.                 "

## 2023-03-23 ENCOUNTER — PATIENT OUTREACH (OUTPATIENT)
Dept: GASTROENTEROLOGY | Facility: CLINIC | Age: 68
End: 2023-03-23
Payer: COMMERCIAL

## 2023-03-23 DIAGNOSIS — Z12.11 SPECIAL SCREENING FOR MALIGNANT NEOPLASMS, COLON: Primary | ICD-10-CM

## 2023-03-23 NOTE — PROGRESS NOTES
"Ordering/Referring Provider: Karen Rodriguez    BMI: Estimated body mass index is 28.33 kg/m  as calculated from the following:    Height as of 3/16/23: 1.785 m (5' 10.28\").    Weight as of 3/16/23: 90.3 kg (199 lb).     Sedation:  Based on patients medical history patient is appropriate for   moderate sedation.   If patient BMI > 50 do not schedule in ASC.    Location:  Does patient have an LVAD?  No    Does patient have a history of asthma, COPD or any other lung disease?  Yes - mild persistent asthma    Patient has a documented history of asthma.     Review of chart, indicate respiratory disease has no recent hospitalizations and or exacerbations documented (no scheduling restrictions).    Does patient use home oxygen?  No    Does patient have a history of cardiac disease?  No    Is pataient awaiting a heart or lung transplant?   No    Has patient had a stroke or transient ischemic attack in the last 6 months?   No    Is the patient currently on dialysis?   No    Prep:  Previous prep (last colonoscopy):   could not locate    Quality of previous prep:   Good    Is patient currently on dialysis, is ESRD, or CKD stage 4/5?   No (standard prep)    Does patient have a diagnosis of diabetes?  No    Does patient have a diagnosis of cystic fibrosis (CF)?  No    BMI > 40?  No    Final Referral Status:  meets the criteria for placement of colonoscopy screening  referral order.      Referral order placed with the following recommendations:  Sedation: Moderate Sedation  Location Type: ASC  Prep: Standard Golytely  PAC: No      "

## 2023-03-24 DIAGNOSIS — K83.01 PSC (PRIMARY SCLEROSING CHOLANGITIS) (H): Primary | ICD-10-CM

## 2023-04-04 ENCOUNTER — TELEPHONE (OUTPATIENT)
Dept: FAMILY MEDICINE | Facility: OTHER | Age: 68
End: 2023-04-04
Payer: COMMERCIAL

## 2023-04-04 ENCOUNTER — MYC MEDICAL ADVICE (OUTPATIENT)
Dept: FAMILY MEDICINE | Facility: OTHER | Age: 68
End: 2023-04-04
Payer: COMMERCIAL

## 2023-04-04 NOTE — TELEPHONE ENCOUNTER
There has already been a telephone message sent to Dr. Rodriguez per staff asking if can use same day or approval slot for his 2 week follow up appointment.  Alexandrea Webb RN

## 2023-04-04 NOTE — TELEPHONE ENCOUNTER
Reason for Call:  Appointment Request    Patient requesting this type of appt:  In Person Follow Up    Requested provider: Karen Rodriguez    Reason patient unable to be scheduled: Not within requested timeframe    When does patient want to be seen/preferred time: 3-7 days    Comments: Pt was seen on 3/16/23 for asthma; Was told to schedule follow up with PCP 2 weeks from appt and there is not an available appt until May. PT is requesting call back with an attempt at getting an earlier appt with PCP.    Could we [facility] this information to you in [facility] or would you prefer to receive a phone call?:   Patient would prefer a phone call   Okay to leave a detailed message?: Yes at Home number on file [phone] (home)    Call taken on 4/4/2023 at 12:30 PM by Lexy Hinton

## 2023-04-06 ENCOUNTER — LAB (OUTPATIENT)
Dept: LAB | Facility: OTHER | Age: 68
End: 2023-04-06
Payer: COMMERCIAL

## 2023-04-06 DIAGNOSIS — K51.00 ULCERATIVE PANCOLITIS WITHOUT COMPLICATION (H): ICD-10-CM

## 2023-04-06 DIAGNOSIS — K83.01 PSC (PRIMARY SCLEROSING CHOLANGITIS) (H): ICD-10-CM

## 2023-04-06 LAB
ALBUMIN SERPL BCG-MCNC: 4.3 G/DL (ref 3.5–5.2)
ALP SERPL-CCNC: 156 U/L (ref 40–129)
ALT SERPL W P-5'-P-CCNC: 33 U/L (ref 10–50)
ANION GAP SERPL CALCULATED.3IONS-SCNC: 12 MMOL/L (ref 7–15)
AST SERPL W P-5'-P-CCNC: 46 U/L (ref 10–50)
BILIRUB DIRECT SERPL-MCNC: 0.25 MG/DL (ref 0–0.3)
BILIRUB SERPL-MCNC: 0.9 MG/DL
BUN SERPL-MCNC: 10.4 MG/DL (ref 8–23)
CALCIUM SERPL-MCNC: 9.3 MG/DL (ref 8.8–10.2)
CHLORIDE SERPL-SCNC: 89 MMOL/L (ref 98–107)
CREAT SERPL-MCNC: 0.95 MG/DL (ref 0.67–1.17)
CRP SERPL-MCNC: 8.31 MG/L
DEPRECATED HCO3 PLAS-SCNC: 26 MMOL/L (ref 22–29)
ERYTHROCYTE [DISTWIDTH] IN BLOOD BY AUTOMATED COUNT: 12.6 % (ref 10–15)
ERYTHROCYTE [SEDIMENTATION RATE] IN BLOOD BY WESTERGREN METHOD: 8 MM/HR (ref 0–20)
GFR SERPL CREATININE-BSD FRML MDRD: 87 ML/MIN/1.73M2
GLUCOSE SERPL-MCNC: 139 MG/DL (ref 70–99)
HCT VFR BLD AUTO: 44.4 % (ref 40–53)
HGB BLD-MCNC: 15.1 G/DL (ref 13.3–17.7)
INR PPP: 1.1 (ref 0.85–1.15)
MCH RBC QN AUTO: 30.6 PG (ref 26.5–33)
MCHC RBC AUTO-ENTMCNC: 34 G/DL (ref 31.5–36.5)
MCV RBC AUTO: 90 FL (ref 78–100)
PLATELET # BLD AUTO: 200 10E3/UL (ref 150–450)
POTASSIUM SERPL-SCNC: 3.9 MMOL/L (ref 3.4–5.3)
PROT SERPL-MCNC: 7.7 G/DL (ref 6.4–8.3)
RBC # BLD AUTO: 4.93 10E6/UL (ref 4.4–5.9)
SODIUM SERPL-SCNC: 127 MMOL/L (ref 136–145)
WBC # BLD AUTO: 8.3 10E3/UL (ref 4–11)

## 2023-04-06 PROCEDURE — 86140 C-REACTIVE PROTEIN: CPT | Performed by: INTERNAL MEDICINE

## 2023-04-06 PROCEDURE — 85027 COMPLETE CBC AUTOMATED: CPT

## 2023-04-06 PROCEDURE — 85652 RBC SED RATE AUTOMATED: CPT | Performed by: INTERNAL MEDICINE

## 2023-04-06 PROCEDURE — 80053 COMPREHEN METABOLIC PANEL: CPT

## 2023-04-06 PROCEDURE — 36415 COLL VENOUS BLD VENIPUNCTURE: CPT

## 2023-04-06 PROCEDURE — 82248 BILIRUBIN DIRECT: CPT

## 2023-04-06 PROCEDURE — 36415 COLL VENOUS BLD VENIPUNCTURE: CPT | Performed by: INTERNAL MEDICINE

## 2023-04-06 PROCEDURE — 85610 PROTHROMBIN TIME: CPT

## 2023-04-06 RX ORDER — HEPARIN SODIUM (PORCINE) LOCK FLUSH IV SOLN 100 UNIT/ML 100 UNIT/ML
5 SOLUTION INTRAVENOUS
Status: CANCELLED | OUTPATIENT
Start: 2023-04-07

## 2023-04-06 RX ORDER — HEPARIN SODIUM,PORCINE 10 UNIT/ML
5 VIAL (ML) INTRAVENOUS
Status: CANCELLED | OUTPATIENT
Start: 2023-04-07

## 2023-04-07 ENCOUNTER — INFUSION THERAPY VISIT (OUTPATIENT)
Dept: INFUSION THERAPY | Facility: CLINIC | Age: 68
End: 2023-04-07
Attending: INTERNAL MEDICINE
Payer: COMMERCIAL

## 2023-04-07 VITALS
WEIGHT: 194.4 LBS | RESPIRATION RATE: 18 BRPM | BODY MASS INDEX: 27.67 KG/M2 | SYSTOLIC BLOOD PRESSURE: 123 MMHG | OXYGEN SATURATION: 96 % | DIASTOLIC BLOOD PRESSURE: 71 MMHG | HEART RATE: 74 BPM | TEMPERATURE: 98.4 F

## 2023-04-07 DIAGNOSIS — K51.00 ULCERATIVE PANCOLITIS WITHOUT COMPLICATION (H): Primary | ICD-10-CM

## 2023-04-07 PROCEDURE — 258N000003 HC RX IP 258 OP 636: Performed by: INTERNAL MEDICINE

## 2023-04-07 PROCEDURE — 96365 THER/PROPH/DIAG IV INF INIT: CPT

## 2023-04-07 PROCEDURE — 250N000011 HC RX IP 250 OP 636: Performed by: INTERNAL MEDICINE

## 2023-04-07 RX ADMIN — SODIUM CHLORIDE 250 ML: 9 INJECTION, SOLUTION INTRAVENOUS at 08:34

## 2023-04-07 RX ADMIN — VEDOLIZUMAB 300 MG: 300 INJECTION, POWDER, LYOPHILIZED, FOR SOLUTION INTRAVENOUS at 08:55

## 2023-04-07 ASSESSMENT — PAIN SCALES - GENERAL: PAINLEVEL: NO PAIN (0)

## 2023-04-07 NOTE — PROGRESS NOTES
Infusion Nursing Note:  Mainor Grande presents today for Entyv.    Patient seen by provider today: No   present during visit today: Not Applicable.    Note: Patient arrives ambulatory, alert and pleasant. Is struggling with seasonal allergies lately, experiencing mild cough at times, nasal symptoms. Taking allergy medication, nasal spray and inhaler. Denies fevers or chills or any other new/concerning symptoms. No med changes. VSS, afebrile.     Intravenous Access:  Peripheral IV placed.    Treatment Conditions:  Biological Infusion Checklist:  ~~~ NOTE: If the patient answers yes to any of the questions below, hold the infusion and contact ordering provider or on-call provider.    1. Have you recently had an elevated temperature, fever, chills, productive cough, coughing for 3 weeks or longer or hemoptysis, abnormal vital signs, night sweats,  chest pain or have you noticed a decrease in your appetite, unexplained weight loss or fatigue? No  2. Do you have any open wounds or new incisions? No  3. Do you have any recent or upcoming hospitalizations, surgeries or dental procedures? No  4. Do you currently have or recently have had any signs of illness or infection or are you on any antibiotics? No  5. Have you had any new, sudden or worsening abdominal pain? No  6. Have you or anyone in your household received a live vaccination in the past 4 weeks? Please note:  No live vaccines while on biologic/chemotherapy until 6 months after the last treatment.  Patient can receive the flu vaccine (shot only) and the pneumovax.  It is optimal for the patient to get these vaccines mid cycle, but they can be given at any time as long as it is not on the day of the infusion. No  7. Have you recently been diagnosed with any new nervous system diseases (ie. Multiple sclerosis, Guillain [place], seizures, neurological changes) or cancer diagnosis? No  8. Are you on any form of radiation or chemotherapy? No  9. Are you  pregnant or breast feeding or do you have plans of pregnancy in the future? N/A  10. Have you been having any signs of worsening depression or suicidal ideations?  (benlysta only)N/A  11. Have there been any other new onset medical symptoms? No      Post Infusion Assessment:  Patient tolerated infusion without incident.  Blood return noted pre and post infusion.  Site patent and intact, free from redness, edema or discomfort.  No evidence of extravasations.  PIV access discontinued per protocol.    Biologic Infusion Post Education: Call the triage nurse at your clinic or seek medical attention if you have chills and/or temperature greater than or equal to 100.5, uncontrolled nausea/vomiting, diarrhea, constipation, dizziness, shortness of breath, chest pain, heart palpitations, weakness or any other new or concerning symptoms, questions or concerns.  You cannot have any live virus vaccines prior to or during treatment or up to 6 months post infusion.  If you have an upcoming surgery, medical procedure or dental procedure during treatment, this should be discussed with your ordering physician and your surgeon/dentist.  If you are having any concerning symptom, if you are unsure if you should get your next infusion or wish to speak to a provider before your next infusion, please call your care coordinator or triage nurse at your clinic to notify them so we can adequately serve you.     Discharge Plan:   AVS to patient via [employer].  Patient will return 5/12 for next appointment.   Patient discharged in stable condition accompanied by: self.  Departure Mode: Ambulatory.      Kreen Flores RN

## 2023-04-07 NOTE — LETTER
April 24, 2023      Mainor Grande  [id number] [facility] [id number]        Dear ,    We are writing to inform you of your test results.     Your labs are normal but one of your inflammatory markers (crp) is mildly elevated. We will follow this up with your next set of labs (and your upcoming colonoscopy).     If you have any questions or concerns, please call the clinic at [phone].       Sincerely,      Lily Eaton, DO                          Resulted Orders   CRP inflammation   Result Value Ref Range    CRP Inflammation 8.31 (H) <5.00 mg/L   Erythrocyte sedimentation rate auto   Result Value Ref Range    Erythrocyte Sedimentation Rate 8 0 - 20 mm/hr

## 2023-04-09 PROCEDURE — 83993 ASSAY FOR CALPROTECTIN FECAL: CPT

## 2023-04-10 ENCOUNTER — TELEPHONE (OUTPATIENT)
Dept: GASTROENTEROLOGY | Facility: CLINIC | Age: 68
End: 2023-04-10

## 2023-04-10 ENCOUNTER — APPOINTMENT (OUTPATIENT)
Dept: LAB | Facility: OTHER | Age: 68
End: 2023-04-10
Payer: COMMERCIAL

## 2023-04-10 ENCOUNTER — OFFICE VISIT (OUTPATIENT)
Dept: GASTROENTEROLOGY | Facility: CLINIC | Age: 68
End: 2023-04-10
Payer: COMMERCIAL

## 2023-04-10 VITALS
OXYGEN SATURATION: 98 % | WEIGHT: 196 LBS | SYSTOLIC BLOOD PRESSURE: 121 MMHG | DIASTOLIC BLOOD PRESSURE: 77 MMHG | HEART RATE: 73 BPM | BODY MASS INDEX: 28.06 KG/M2 | HEIGHT: 70 IN

## 2023-04-10 DIAGNOSIS — K51.00 ULCERATIVE PANCOLITIS WITHOUT COMPLICATION (H): ICD-10-CM

## 2023-04-10 DIAGNOSIS — K83.01 PSC (PRIMARY SCLEROSING CHOLANGITIS) (H): Primary | ICD-10-CM

## 2023-04-10 PROCEDURE — 99215 OFFICE O/P EST HI 40 MIN: CPT | Performed by: INTERNAL MEDICINE

## 2023-04-10 ASSESSMENT — PAIN SCALES - GENERAL: PAINLEVEL: NO PAIN (0)

## 2023-04-10 NOTE — TELEPHONE ENCOUNTER
Screening Questions  BLUE  KIND OF PREP RED  LOCATION [review exclusion criteria] GREEN  SEDATION TYPE        Y Are you active on mychart?       DARIAN TRIVEDI Ordering/Referring Provider?        [employer] What type of coverage do you have?      N Have you had a positive covid test in the last 14 days?     27.8 1. BMI  [BMI 40+ - review exclusion criteria]    Y  2. Are you able to give consent for your medical care? [IF NO,RN REVIEW]          N  3. Are you taking any prescription pain medications on a routine schedule   (ex narcotics: oxycodone, roxicodone, oxycontin,  and percocet)? [RN Review]          3a. EXTENDED PREP What kind of prescription?     N 4. Do you have any chemical dependencies such as alcohol, street drugs, or methadone?        **If yes 3- 5 , please schedule with MAC sedation.**          IF YES TO ANY 6 - 10 - HOSPITAL SETTING ONLY.     N 6.   Do you need assistance transferring?     N 7.   Have you had a heart or lung transplant?    N 8.   Are you currently on dialysis?   N 9.   Do you use daily home oxygen?   N 10. Do you take nitroglycerin?   10a.  If yes, how often?     11. [FEMALES]   Are you currently pregnant?    11a.  If yes, how many weeks? [ Greater than 12 weeks, OR NEEDED]    N 12. Do you have Pulmonary Hypertension? *NEED PAC APPT AT [facility] w/ MAC*     N 13. [review exclusion criteria]  Do you have any implantable devices in your body (pacemaker, defib, LVAD)?    N 14. In the past 6 months, have you had any heart related issues including cardiomyopathy or heart attack?     14a.  If yes, did it require cardiac stenting if so when?     N 15. Have you had a stroke or Transient ischemic attack (TIA - aka  mini stroke ) within 6 months?      N 16. Do you have mod to severe Obstructive Sleep Apnea?  [Hospital only]    N 17. Do you have SEVERE AND UNCONTROLLED asthma? *NEED PAC APPT AT [facility] w/MAC*     18. Are you currently taking any blood thinners?     18a. No. Continue to 19.   18b. Yes/no  "Blood Thinner: No. Inform patient to \"follow up w/ ordering provider for bridging instructions.\"    N 19. Do you take the medication Phentermine?    19a. If yes, \"Hold for 7 days before procedure.  Please consult your prescribing provider if you have questions about holding this medication.\"     N  20. Do you have chronic kidney disease?      N  21. Do you have a diagnosis of diabetes?     N  22. On a regular basis do you go 3-5 days between bowel movements?      23. Preferred LOCAL Pharmacy for Pre Prescription    [ LIST ONLY ONE PHARMACY]     [id number] - [place], [place] - [address]    - CLOSING REMINDERS -    Informed patient they will need an adult    Cannot take any type of public or medical transportation alone    Conscious Sedation- Needs  for 6 hours after the procedure       MAC/General-Needs  for 24 hours after procedure    Pre-Procedure Covid test to be completed [[facility] PCR Testing Required]    Confirmed Nurse will call to complete assessment       - SCHEDULING DETAILS -  NO Hospital Setting Required? If yes, what is the exclusion?:    PAUL  Surgeon    5/18/2023  Date of Procedure  Lower Endoscopy [Colonoscopy]  Type of Procedure Scheduled  [facility]   MIRALAX GATORADE WITHOUT MAGNEISUM CITRATE Which Colonoscopy Prep was Sent?     MAC Sedation Type     NO PAC / Pre-op Required                 "

## 2023-04-10 NOTE — LETTER
4/10/2023         RE: Mainor Grande  [id number] [address] [place] [address]        Dear Colleague,    Thank you for referring your patient, Mainor Grande, to the [facility] [place]. Please see a copy of my visit note below.    [facility] Hepatology    Follow-up Visit    CONSULTING HEALTHCARE PROVIDER:  Lily Eaton DO     Follow-up visit for PSC    Subjective:  67 year old male.with ulcerative colitis and also recently seen here by as for primary sclerosing cholangitis.  His last MRCP did not show any concerning lesions.  His MELD score has stayed low at 7.    Patient denies jaundice, lower extremity edema, abdominal distension, lethargy or confusion. Patient denies melena, hematemesis or hematochezia.  Does not have any abdominal pain, nausea or vomiting.  He is having normally around 1 bowel movement a day and he does not have blood in his stool.    His main issue today is that he has respiratory symptoms.  He claims to have productive cough that was ongoing for quite a long time.  He has done a chest x-ray which showed no acute cardial pulmonary findings.  He is going to see tomorrow is primary care physician.  Patient denies fevers, sweats or chills.  Weight stable.        Medical hx Surgical hx   Past Medical History:   Diagnosis Date     Allergic rhinitis, cause unspecified      Fatty liver 2004    elevated LFT, saw GI for a consultation     Mild persistent asthma 9/12/2008     Reflux esophagitis      Unspecified disease of respiratory system     RAD     Viremia, unspecified     acute      Past Surgical History:   Procedure Laterality Date     COLONOSCOPY  9/4/2013    Procedure: COLONOSCOPY;  colonoscopy;  Surgeon: Beto Keen MD;  Location: [facility]     COLONOSCOPY N/A 7/31/2020    Procedure: Colonoscopy, With Polypectomy And Biopsy;  Surgeon: Lily Eaton DO;  Location: [facility]     COLONOSCOPY N/A 4/5/2021    Procedure: Colonoscopy, With Polypectomy And  Biopsy;  Surgeon: Lily Eaton DO;  Location: [facility]     COLONOSCOPY N/A 5/3/2022    Procedure: COLONOSCOPY, WITH POLYPECTOMY AND BIOPSY;  Surgeon: Lily Eaton DO;  Location: [facility]     COLONOSCOPY N/A 5/3/2022    Procedure: COLONOSCOPY, FLEXIBLE, WITH LESION REMOVAL USING SNARE;  Surgeon: Lily Eaton DO;  Location: [facility]     COLONOSCOPY WITH CO2 INSUFFLATION N/A 7/31/2020    Procedure: COLONOSCOPY, WITH CO2 INSUFFLATION;  Surgeon: Lily Eaton DO;  [facility]     COLONOSCOPY WITH CO2 INSUFFLATION N/A 4/5/2021    Procedure: COLONOSCOPY, WITH CO2 INSUFFLATION;  Surgeon: Lily Eaton DO;  [facility]     COLONOSCOPY WITH CO2 INSUFFLATION N/A 5/3/2022    Procedure: COLONOSCOPY, WITH CO2 INSUFFLATION;  Surgeon: Lily Eaton DO;  [facility]     COMBINED ESOPHAGOSCOPY, GASTROSCOPY, DUODENOSCOPY (EGD) WITH CO2 INSUFFLATION N/A 7/31/2020    Procedure: ESOPHAGOGASTRODUODENOSCOPY, WITH CO2 INSUFFLATION;  Surgeon: Liyl Eaton DO;  Location: [facility]     ESOPHAGOSCOPY, GASTROSCOPY, DUODENOSCOPY (EGD), COMBINED N/A 7/31/2020    Procedure: Esophagogastroduodenoscopy, With Biopsy;  Surgeon: Lily Eaton DO;  Location: [facility] OR     HC VASECTOMY UNILAT/BILAT W POSTOP SEMEN  1992    Vasectomy     ZZHC COLONOSCOPY THRU STOMA, DIAGNOSTIC  2005    normal          Medications  Prior to Admission medications    Medication Sig Start Date End Date Taking? Authorizing Provider   albuterol (PROAIR HFA/PROVENTIL HFA/VENTOLIN HFA) 108 (90 Base) MCG/ACT inhaler Inhale 2 puffs into the lungs every 6 hours 3/16/23  Yes Karen Rodriguez MD   ASPIRIN PO Take 81 mg by mouth at onset of headache for moderate pain    Yes Reported, Patient   atorvastatin (LIPITOR) 40 MG tablet Take 1 tablet (40 mg) by mouth daily 3/22/22  Yes Fareed Lara MD   azelastine (ASTELIN) 0.1 % nasal spray SPRAY 2 SPRAYS IN EACH NOSTRIL TWO TIMES A DAY 3/9/21  Yes Karen Rodriguez MD   budesonide-formoterol (SYMBICORT)  "80-4.5 MCG/ACT Inhaler Inhale 2 puffs once daily plus 1-2 puffs as needed. May use up to 12 puffs per day. 3/17/23  Yes Karen Rodriguez MD   esomeprazole (NEXIUM) 20 MG DR capsule Take 20 mg by mouth daily Take 30-60 minutes before eating. Takes clear baudilio   Yes Reported, Patient   ferrous gluconate (FERGON) 324 (38 Fe) MG tablet Take 1 tablet (324 mg) by mouth 2 times daily (with meals) 3/24/21  Yes Lily Eaton DO   fish oil-omega-3 fatty acids 1000 MG capsule Take 2 g by mouth daily   Yes Reported, Patient   fluticasone (FLONASE) 50 MCG/ACT nasal spray Spray 1-2 sprays into both nostrils daily 9/16/15  Yes Karen Rodriguez MD   Multiple Vitamins-Minerals (MULTIVITAMIN ADULT PO) Take 1 tablet by mouth daily    Yes Reported, Patient   Naphazoline-Glycerin-Zinc Sulf (CLEAR EYES MAXIMUM ITCHY EYE) 0.012-0.25-0.25 % SOLN Apply to eye as needed (for allergies)   Yes Reported, Patient   ursodiol (ACTIGALL) 300 MG capsule TAKE 1 CAPSULE (300 MG) BY MOUUTH 2 TIMES DAILY FOR 90 DAYS. 1/24/23  Yes Ute Fu MD   vitamin D3 (CHOLECALCIFEROL) 50 mcg (2000 units) tablet Take 1 tablet (50 mcg) by mouth daily 11/19/20  Yes Lily Eaton DO   benzonatate (TESSALON) 100 MG capsule Take 1 capsule (100 mg) by mouth 3 times daily as needed for cough  Patient not taking: Reported on 3/16/2023 1/12/23   Karen Rodriguez MD   melatonin 1 MG TABS Take 1 mg by mouth nightly as needed for sleep  Patient not taking: Reported on 3/16/2023    Reported, Patient       Allergies  Allergies   Allergen Reactions     Seasonal Allergies        Review of systems  A 10-point review of systems was negative    Examination  /77 (BP Location: Left arm, Patient Position: Sitting, Cuff Size: Adult Regular)   Pulse 73   Ht 1.788 m (5' 10.38\")   Wt 88.9 kg (196 lb)   SpO2 98%   BMI 27.82 kg/m    Body mass index is 27.82 kg/m .    Gen- well, NAD, A+Ox3, normal color  Lym- no palpable LAD  CVS- RRR  RS-has crackles " bilaterally.  Abd-not distended not tender.  Extr- hands normal, no MIN  Skin- no rash or jaundice  Neuro- no asterixis  Psych- normal mood    Laboratory  Lab Results   Component Value Date     04/06/2023     06/16/2021    POTASSIUM 3.9 04/06/2023    POTASSIUM 3.9 07/01/2022    POTASSIUM 4.0 06/16/2021    CHLORIDE 89 04/06/2023    CHLORIDE 102 07/01/2022    CHLORIDE 102 06/16/2021    CO2 26 04/06/2023    CO2 27 07/01/2022    CO2 29 06/16/2021    BUN 10.4 04/06/2023    BUN 13 07/01/2022    BUN 12 06/16/2021    CR 0.95 04/06/2023    CR 0.90 06/16/2021       Lab Results   Component Value Date    BILITOTAL 0.9 04/06/2023    BILITOTAL 0.7 06/16/2021    ALT 33 04/06/2023     06/16/2021    AST 46 04/06/2023    AST 88 06/16/2021    ALKPHOS 156 04/06/2023    ALKPHOS 391 06/16/2021       Lab Results   Component Value Date    ALBUMIN 4.3 04/06/2023    ALBUMIN 3.5 12/14/2022    ALBUMIN 3.7 06/16/2021    PROTTOTAL 7.7 04/06/2023    PROTTOTAL 7.8 06/16/2021        Lab Results   Component Value Date    WBC 8.3 04/06/2023    WBC 7.3 06/16/2021    HGB 15.1 04/06/2023    HGB 13.4 06/16/2021    MCV 90 04/06/2023    MCV 88 06/16/2021     04/06/2023     06/16/2021       Lab Results   Component Value Date    INR 1.10 04/06/2023    INR 1.01 06/16/2021       Radiology    Assessment  67 year old male with ulcerative colitis and we saw him for primary sclerosing cholangitis.  Regarding that his last MRI done on July 1, 2022 showed that he had stable findings without evidence of Ménière's disease. Stable mild intra- and extrahepatic biliary ductal prominence with some areas of intrahepatic narrowing. No new liver lesion.  He will need the MRI/MRCP's on a yearly basis and he will have his labs repeated in the every 6 months.    Plan  Follow-up with hepatology in 6 months.  Will order an MRCP for July of this year.  He will follow-up for his ulcerative colitis with his gastroenterologist.  He has an appointment  with his PCP for his pulmonary symptoms.    He will follow-up for his other healthcare maintenance issues with his primary care.    I spent 40 minutes  on this encounter of April 10, 2023 in chart reviewing, history taking, physical examination and documentation.  I spent some of the time in coordination of care and counseling.    Ute Fu MD  Hepatology  [facility]      Again, thank you for allowing me to participate in the care of your patient.        Sincerely,        Ute Fu MD

## 2023-04-10 NOTE — PROGRESS NOTES
[facility] Hepatology    Follow-up Visit    CONSULTING HEALTHCARE PROVIDER:  Lily Eaton DO     Follow-up visit for PSC    Subjective:  67 year old male.with ulcerative colitis and also recently seen here by as for primary sclerosing cholangitis.  His last MRCP did not show any concerning lesions.  His MELD score has stayed low at 7.    Patient denies jaundice, lower extremity edema, abdominal distension, lethargy or confusion. Patient denies melena, hematemesis or hematochezia.  Does not have any abdominal pain, nausea or vomiting.  He is having normally around 1 bowel movement a day and he does not have blood in his stool.    His main issue today is that he has respiratory symptoms.  He claims to have productive cough that was ongoing for quite a long time.  He has done a chest x-ray which showed no acute cardial pulmonary findings.  He is going to see tomorrow is primary care physician.  Patient denies fevers, sweats or chills.  Weight stable.        Medical hx Surgical hx   Past Medical History:   Diagnosis Date     Allergic rhinitis, cause unspecified      Fatty liver 2004    elevated LFT, saw GI for a consultation     Mild persistent asthma 9/12/2008     Reflux esophagitis      Unspecified disease of respiratory system     RAD     Viremia, unspecified     acute      Past Surgical History:   Procedure Laterality Date     COLONOSCOPY  9/4/2013    Procedure: COLONOSCOPY;  colonoscopy;  Surgeon: Beto Keen MD;  Location:  GI     COLONOSCOPY N/A 7/31/2020    Procedure: Colonoscopy, With Polypectomy And Biopsy;  Surgeon: Lily Eatno DO;  Location: [facility]     COLONOSCOPY N/A 4/5/2021    Procedure: Colonoscopy, With Polypectomy And Biopsy;  Surgeon: Lily Eaton DO;  Location: [facility]     COLONOSCOPY N/A 5/3/2022    Procedure: COLONOSCOPY, WITH POLYPECTOMY AND BIOPSY;  Surgeon: Lily Eaton DO;  Location: [facility]     COLONOSCOPY N/A 5/3/2022    Procedure: COLONOSCOPY, FLEXIBLE, WITH  LESION REMOVAL USING SNARE;  Surgeon: Lily Eaton DO;  [facility]: [facility]     COLONOSCOPY WITH CO2 INSUFFLATION N/A 7/31/2020    Procedure: COLONOSCOPY, WITH CO2 INSUFFLATION;  Surgeon: Lily Eaton DO;  [facility]: [facility]     COLONOSCOPY WITH CO2 INSUFFLATION N/A 4/5/2021    Procedure: COLONOSCOPY, WITH CO2 INSUFFLATION;  Surgeon: Lily Eaton DO;  [facility]: [facility]     COLONOSCOPY WITH CO2 INSUFFLATION N/A 5/3/2022    Procedure: COLONOSCOPY, WITH CO2 INSUFFLATION;  Surgeon: Lily Eaton DO;  Location: [facility]     COMBINED ESOPHAGOSCOPY, GASTROSCOPY, DUODENOSCOPY (EGD) WITH CO2 INSUFFLATION N/A 7/31/2020    Procedure: ESOPHAGOGASTRODUODENOSCOPY, WITH CO2 INSUFFLATION;  Surgeon: Lily Eaton DO;  Location: [facility]     ESOPHAGOSCOPY, GASTROSCOPY, DUODENOSCOPY (EGD), COMBINED N/A 7/31/2020    Procedure: Esophagogastroduodenoscopy, With Biopsy;  Surgeon: Lily Eaton DO;  Location: [facility]     HC VASECTOMY UNILAT/BILAT W POSTOP SEMEN  1992    Vasectomy     ZZHC COLONOSCOPY THRU STOMA, DIAGNOSTIC  2005    normal          Medications  Prior to Admission medications    Medication Sig Start Date End Date Taking? Authorizing Provider   albuterol (PROAIR HFA/PROVENTIL HFA/VENTOLIN HFA) 108 (90 Base) MCG/ACT inhaler Inhale 2 puffs into the lungs every 6 hours 3/16/23  Yes Karen Rodriguez MD   ASPIRIN PO Take 81 mg by mouth at onset of headache for moderate pain    Yes Reported, Patient   atorvastatin (LIPITOR) 40 MG tablet Take 1 tablet (40 mg) by mouth daily 3/22/22  Yes Fareed Lara MD   azelastine (ASTELIN) 0.1 % nasal spray SPRAY 2 SPRAYS IN EACH NOSTRIL TWO TIMES A DAY 3/9/21  Yes Karen Rodriguez MD   budesonide-formoterol (SYMBICORT) 80-4.5 MCG/ACT Inhaler Inhale 2 puffs once daily plus 1-2 puffs as needed. May use up to 12 puffs per day. 3/17/23  Yes Karen Rodriguez MD   esomeprazole (NEXIUM) 20 MG DR capsule Take 20 mg by mouth daily Take 30-60 minutes before eating. Takes clear baudilio    "Yes Reported, Patient   ferrous gluconate (FERGON) 324 (38 Fe) MG tablet Take 1 tablet (324 mg) by mouth 2 times daily (with meals) 3/24/21  Yes Lily Eaton DO   fish oil-omega-3 fatty acids 1000 MG capsule Take 2 g by mouth daily   Yes Reported, Patient   fluticasone (FLONASE) 50 MCG/ACT nasal spray Spray 1-2 sprays into both nostrils daily 9/16/15  Yes Karen Rodriguez MD   Multiple Vitamins-Minerals (MULTIVITAMIN ADULT PO) Take 1 tablet by mouth daily    Yes Reported, Patient   Naphazoline-Glycerin-Zinc Sulf (CLEAR EYES MAXIMUM ITCHY EYE) 0.012-0.25-0.25 % SOLN Apply to eye as needed (for allergies)   Yes Reported, Patient   ursodiol (ACTIGALL) 300 MG capsule TAKE 1 CAPSULE (300 MG) BY MOUUTH 2 TIMES DAILY FOR 90 DAYS. 1/24/23  Yes Ute Fu MD   vitamin D3 (CHOLECALCIFEROL) 50 mcg (2000 units) tablet Take 1 tablet (50 mcg) by mouth daily 11/19/20  Yes Lily Eaton DO   benzonatate (TESSALON) 100 MG capsule Take 1 capsule (100 mg) by mouth 3 times daily as needed for cough  Patient not taking: Reported on 3/16/2023 1/12/23   Karen Rodriguez MD   melatonin 1 MG TABS Take 1 mg by mouth nightly as needed for sleep  Patient not taking: Reported on 3/16/2023    Reported, Patient       Allergies  Allergies   Allergen Reactions     Seasonal Allergies        Review of systems  A 10-point review of systems was negative    Examination  /77 (BP Location: Left arm, Patient Position: Sitting, Cuff Size: Adult Regular)   Pulse 73   Ht 1.788 m (5' 10.38\")   Wt 88.9 kg (196 lb)   SpO2 98%   BMI 27.82 kg/m    Body mass index is 27.82 kg/m .    Gen- well, NAD, A+Ox3, normal color  Lym- no palpable LAD  CVS- RRR  RS-has crackles bilaterally.  Abd-not distended not tender.  Extr- hands normal, no MIN  Skin- no rash or jaundice  Neuro- no asterixis  Psych- normal mood    Laboratory  Lab Results   Component Value Date     04/06/2023     06/16/2021    POTASSIUM 3.9 04/06/2023    " POTASSIUM 3.9 07/01/2022    POTASSIUM 4.0 06/16/2021    CHLORIDE 89 04/06/2023    CHLORIDE 102 07/01/2022    CHLORIDE 102 06/16/2021    CO2 26 04/06/2023    CO2 27 07/01/2022    CO2 29 06/16/2021    BUN 10.4 04/06/2023    BUN 13 07/01/2022    BUN 12 06/16/2021    CR 0.95 04/06/2023    CR 0.90 06/16/2021       Lab Results   Component Value Date    BILITOTAL 0.9 04/06/2023    BILITOTAL 0.7 06/16/2021    ALT 33 04/06/2023     06/16/2021    AST 46 04/06/2023    AST 88 06/16/2021    ALKPHOS 156 04/06/2023    ALKPHOS 391 06/16/2021       Lab Results   Component Value Date    ALBUMIN 4.3 04/06/2023    ALBUMIN 3.5 12/14/2022    ALBUMIN 3.7 06/16/2021    PROTTOTAL 7.7 04/06/2023    PROTTOTAL 7.8 06/16/2021        Lab Results   Component Value Date    WBC 8.3 04/06/2023    WBC 7.3 06/16/2021    HGB 15.1 04/06/2023    HGB 13.4 06/16/2021    MCV 90 04/06/2023    MCV 88 06/16/2021     04/06/2023     06/16/2021       Lab Results   Component Value Date    INR 1.10 04/06/2023    INR 1.01 06/16/2021       Radiology    Assessment  67 year old male with ulcerative colitis and we saw him for primary sclerosing cholangitis.  Regarding that his last MRI done on July 1, 2022 showed that he had stable findings without evidence of Ménière's disease. Stable mild intra- and extrahepatic biliary ductal prominence with some areas of intrahepatic narrowing. No new liver lesion.  He will need the MRI/MRCP's on a yearly basis and he will have his labs repeated in the every 6 months.    Plan  Follow-up with hepatology in 6 months.  Will order an MRCP for July of this year.  He will follow-up for his ulcerative colitis with his gastroenterologist.  He has an appointment with his PCP for his pulmonary symptoms.    He will follow-up for his other healthcare maintenance issues with his primary care.    I spent 40 minutes  on this encounter of April 10, 2023 in chart reviewing, history taking, physical examination and documentation.   I spent some of the time in coordination of care and counseling.    Ute Fu MD  Hepatology  [facility]

## 2023-04-10 NOTE — NURSING NOTE
"Chief Complaint   Patient presents with     Follow Up     PSC (primary sclerosing cholangitis) +2 more       Vitals:    04/10/23 0751   BP: 121/77   BP Location: Left arm   Patient Position: Sitting   Cuff Size: Adult Regular   Pulse: 73   SpO2: 98%   Weight: 88.9 kg (196 lb)   Height: 1.788 m (5' 10.38\")       Body mass index is 27.82 kg/m .     Cheyanne Morales, ICVF  "

## 2023-04-11 ENCOUNTER — OFFICE VISIT (OUTPATIENT)
Dept: FAMILY MEDICINE | Facility: OTHER | Age: 68
End: 2023-04-11
Payer: COMMERCIAL

## 2023-04-11 VITALS
SYSTOLIC BLOOD PRESSURE: 110 MMHG | RESPIRATION RATE: 20 BRPM | BODY MASS INDEX: 28.63 KG/M2 | DIASTOLIC BLOOD PRESSURE: 70 MMHG | HEART RATE: 70 BPM | TEMPERATURE: 98 F | OXYGEN SATURATION: 94 % | WEIGHT: 200 LBS | HEIGHT: 70 IN

## 2023-04-11 DIAGNOSIS — J40 BRONCHITIS: ICD-10-CM

## 2023-04-11 DIAGNOSIS — J45.41 MODERATE PERSISTENT ASTHMA WITH ACUTE EXACERBATION: ICD-10-CM

## 2023-04-11 DIAGNOSIS — R05.3 CHRONIC COUGH: Primary | ICD-10-CM

## 2023-04-11 PROCEDURE — 99214 OFFICE O/P EST MOD 30 MIN: CPT | Performed by: FAMILY MEDICINE

## 2023-04-11 RX ORDER — PREDNISONE 20 MG/1
40 TABLET ORAL DAILY
Qty: 10 TABLET | Refills: 0 | Status: SHIPPED | OUTPATIENT
Start: 2023-04-11 | End: 2023-05-11

## 2023-04-11 RX ORDER — AZITHROMYCIN 250 MG/1
TABLET, FILM COATED ORAL
Qty: 6 TABLET | Refills: 0 | Status: SHIPPED | OUTPATIENT
Start: 2023-04-11 | End: 2023-04-16

## 2023-04-11 ASSESSMENT — PAIN SCALES - GENERAL: PAINLEVEL: NO PAIN (0)

## 2023-04-11 NOTE — ASSESSMENT & PLAN NOTE
See last assessment  No significant improvement in the cough since restarting his steroid inhalers and now has concerns with green sputum producution concerning for superimposed bronchitis  Discussed a trial of antibiotics and prednisone as prescribed  Complete PFT's and an evaluation by pulmonology for further evaluation

## 2023-04-11 NOTE — PROGRESS NOTES
Assessment & Plan   Problem List Items Addressed This Visit     Moderate persistent asthma with acute exacerbation     See last assessment  No significant improvement in the cough since restarting his steroid inhalers and now has concerns with green sputum producution concerning for superimposed bronchitis  Discussed a trial of antibiotics and prednisone as prescribed  Complete PFT's and an evaluation by pulmonology for further evaluation        Other Visit Diagnoses     Chronic cough    -  Primary    Relevant Orders    General PFT Lab (Please always keep checked)    Adult Pulmonary Medicine Referral    Bronchitis        Relevant Medications    azithromycin (ZITHROMAX) 250 MG tablet    predniSONE (DELTASONE) 20 MG tablet           Karen Rodriguez MD  [facility] BERTHA Hayward is a 67 year old, presenting for the following health issues:  Asthma         View : No data to display.              History of Present Illness     Asthma:  He presents for follow up of asthma.  He has some cough, some wheezing, and some shortness of breath. He is using a relief medication 2-3 times per day. He does not miss any doses of his controller medication throughout the week.Patient is aware of the following triggers: upper respiratory infections. The patient has not had a visit to the Emergency Room, Urgent Care or Hospital due to asthma since the last clinic visit.     He eats 0-1 servings of fruits and vegetables daily.He consumes 0 sweetened beverage(s) daily.He exercises with enough effort to increase his heart rate 30 to 60 minutes per day.  He exercises with enough effort to increase his heart rate 6 days per week. He is missing 2 dose(s) of medications per week.         Review of Systems   Constitutional, HEENT, cardiovascular, pulmonary, gi and gu systems are negative, except as otherwise noted.      Objective    /70 (BP Location: Left arm, Patient Position: Sitting, Cuff Size: Adult  "Regular)   Pulse 70   Temp 98  F (36.7  C) (Temporal)   Resp 20   Ht 1.788 m (5' 10.38\")   Wt 90.7 kg (200 lb)   SpO2 94%   BMI 28.39 kg/m    Body mass index is 28.39 kg/m .  Physical Exam   GENERAL: healthy, alert and no distress  NECK: no adenopathy, no asymmetry, masses, or scars and thyroid normal to palpation  RESP: lungs clear to auscultation - no rales, rhonchi or wheezes  CV: regular rate and rhythm, normal S1 S2, no S3 or S4, no murmur, click or rub, no peripheral edema and peripheral pulses strong    "

## 2023-04-12 LAB — CALPROTECTIN STL-MCNT: 293 MG/KG (ref 0–49.9)

## 2023-04-19 ENCOUNTER — TELEPHONE (OUTPATIENT)
Dept: FAMILY MEDICINE | Facility: OTHER | Age: 68
End: 2023-04-19
Payer: COMMERCIAL

## 2023-04-19 NOTE — TELEPHONE ENCOUNTER
Medication Question or Refill    What medication are you calling about (include dose and sig)?: azithromycin (ZITHROMAX) 250 MG tablet    [id number] [place], [place] - [address] [address]  [address] [address] [address]  Phone: [phone] Fax: [phone]    Controlled Substance Agreement on file:   CSA -- Patient Level:    CSA: None found at the patient level.       Who prescribed the medication?: Michael    Do you need a refill? Yes    When did you use the medication last? As directed    Patient offered an appointment? No    Do you have any questions or concerns?  Yes: wife calling wondering if they can get another round of the antibiotic. It was working but she thinks one more will be beneficial.     Could we send this information to you in [place] or would you prefer to receive a phone call?:   Patient would prefer a phone call   Okay to leave a detailed message?: Yes at Other phone number: [phone]

## 2023-04-20 NOTE — TELEPHONE ENCOUNTER
I would recommend a telephone visit to discuss. Ok to use my same day or approval required slots from tomorrow

## 2023-04-21 NOTE — TELEPHONE ENCOUNTER
Called rani and informed her of providers note.  Rani will check with patient and call back to schedule.

## 2023-04-25 ENCOUNTER — VIRTUAL VISIT (OUTPATIENT)
Dept: FAMILY MEDICINE | Facility: OTHER | Age: 68
End: 2023-04-25
Payer: COMMERCIAL

## 2023-04-25 DIAGNOSIS — J40 BRONCHITIS: Primary | ICD-10-CM

## 2023-04-25 PROCEDURE — 99213 OFFICE O/P EST LOW 20 MIN: CPT | Mod: 95 | Performed by: FAMILY MEDICINE

## 2023-04-25 ASSESSMENT — ASTHMA QUESTIONNAIRES
QUESTION_4 LAST FOUR WEEKS HOW OFTEN HAVE YOU USED YOUR RESCUE INHALER OR NEBULIZER MEDICATION (SUCH AS ALBUTEROL): TWO OR THREE TIMES PER WEEK
QUESTION_2 LAST FOUR WEEKS HOW OFTEN HAVE YOU HAD SHORTNESS OF BREATH: THREE TO SIX TIMES A WEEK
ACT_TOTALSCORE: 18
QUESTION_5 LAST FOUR WEEKS HOW WOULD YOU RATE YOUR ASTHMA CONTROL: SOMEWHAT CONTROLLED
QUESTION_3 LAST FOUR WEEKS HOW OFTEN DID YOUR ASTHMA SYMPTOMS (WHEEZING, COUGHING, SHORTNESS OF BREATH, CHEST TIGHTNESS OR PAIN) WAKE YOU UP AT NIGHT OR EARLIER THAN USUAL IN THE MORNING: ONCE OR TWICE
ACT_TOTALSCORE: 18
QUESTION_1 LAST FOUR WEEKS HOW MUCH OF THE TIME DID YOUR ASTHMA KEEP YOU FROM GETTING AS MUCH DONE AT WORK, SCHOOL OR AT HOME: NONE OF THE TIME

## 2023-04-25 NOTE — PROGRESS NOTES
Mainor is a 67 year old who is being evaluated via a billable telephone visit.      What phone number would you like to be contacted at? [phone]   How would you like to obtain your AVS? Juan J    Distant Location (provider location):  On-site    Assessment & Plan     1. Bronchitis  Significantly improved symptoms since completing antibitoics and steroids prescribed at the last visit. I do not see the need to extend the course. Continue daily steroid inhalers. Will consider antibiotics if any worsening noted prior to his planned travel out of country later next month.  He will call me if any recurrence. Advised watchful monitoring      Karen Rodriguez MD  [facility]   Mainor is a 67 year old, presenting for the following health issues:  Cough and Asthma        4/25/2023     3:03 PM   Additional Questions   Roomed by Dileep CAMARA   Accompanied by N/A         4/25/2023     3:03 PM   Patient Reported Additional Medications   Patient reports taking the following new medications N/A     History of Present Illness       Reason for visit:  Follow up coughing    He eats 2-3 servings of fruits and vegetables daily.He consumes 0 sweetened beverage(s) daily.He exercises with enough effort to increase his heart rate 10 to 19 minutes per day.  He exercises with enough effort to increase his heart rate 4 days per week. He is missing 1 dose(s) of medications per week.  He is not taking prescribed medications regularly due to remembering to take.       Review of Systems   Constitutional, HEENT, cardiovascular, pulmonary, gi and gu systems are negative, except as otherwise noted.      Objective           Vitals:  No vitals were obtained today due to virtual visit.    Physical Exam   healthy, alert and no distress  PSYCH: Alert and oriented times 3; coherent speech, normal   rate and volume, able to articulate logical thoughts, able   to abstract reason, no tangential thoughts, no  hallucinations   or delusions  His affect is normal  RESP: No cough, no audible wheezing, able to talk in full sentences  Remainder of exam unable to be completed due to telephone visits            Phone call duration: 11 minutes

## 2023-04-26 ENCOUNTER — TELEPHONE (OUTPATIENT)
Dept: FAMILY MEDICINE | Facility: OTHER | Age: 68
End: 2023-04-26

## 2023-04-26 NOTE — TELEPHONE ENCOUNTER
budesonide-formoterol (SYMBICORT) 80-4.5 MCG/ACT Inhaler      Prior Authorization Retail Medication Request    Medication/Dose: budesonide-formoterol (SYMBICORT) 80-4.5 MCG/ACT Inhaler  ICD code (if different than what is on RX):    Previously Tried and Failed:    Rationale:      Insurance Name:    Insurance ID:        Pharmacy Information (if different than what is on RX)  Name:    Phone:

## 2023-05-02 NOTE — TELEPHONE ENCOUNTER
Prior Authorization Not Needed per Insurance    Medication: budesonide-formoterol (SYMBICORT) 80-4.5 MCG/ACT Inhaler  Insurance Company: [employer] - Phone [phone] Fax [phone]  Expected CoPay:      Pharmacy Filling the Rx: [id number] - [place], [place] - [address] [address]  Pharmacy Notified: Yes  Patient Notified: Yes    Received call from [employer] that brand Symbicort is covered under plan.  Generic budesonide-formoterol is non-preferred.     [facility], spoke with tech that they already have a paid claim for brand Symbicort, copay $94.  No further action needed.

## 2023-05-02 NOTE — TELEPHONE ENCOUNTER
Central Prior Authorization Team   Phone: [phone]    PA Initiation    Medication: budesonide-formoterol (SYMBICORT) 80-4.5 MCG/ACT Inhaler  Insurance Company: [employer] - Phone [phone] Fax [phone]  Pharmacy Filling the Rx: [facility] - [place] - [address] [facility]  Filling Pharmacy Phone: [phone]  Filling Pharmacy Fax:    Start Date: 5/2/2023

## 2023-05-03 DIAGNOSIS — R05.3 CHRONIC COUGH: Primary | ICD-10-CM

## 2023-05-09 ENCOUNTER — TELEPHONE (OUTPATIENT)
Dept: GASTROENTEROLOGY | Facility: CLINIC | Age: 68
End: 2023-05-09
Payer: COMMERCIAL

## 2023-05-09 NOTE — TELEPHONE ENCOUNTER
LVM & sent mychart // pt needs to reschedule appt with Dr. Joiner on 10.10.23 // first attempt, AN 5.9.23

## 2023-05-12 ENCOUNTER — INFUSION THERAPY VISIT (OUTPATIENT)
Dept: INFUSION THERAPY | Facility: CLINIC | Age: 68
End: 2023-05-12
Attending: FAMILY MEDICINE
Payer: COMMERCIAL

## 2023-05-12 VITALS
SYSTOLIC BLOOD PRESSURE: 125 MMHG | BODY MASS INDEX: 29.42 KG/M2 | OXYGEN SATURATION: 99 % | RESPIRATION RATE: 16 BRPM | HEART RATE: 57 BPM | WEIGHT: 207.3 LBS | TEMPERATURE: 97.7 F | DIASTOLIC BLOOD PRESSURE: 73 MMHG

## 2023-05-12 DIAGNOSIS — K51.00 ULCERATIVE PANCOLITIS WITHOUT COMPLICATION (H): Primary | ICD-10-CM

## 2023-05-12 PROCEDURE — 96365 THER/PROPH/DIAG IV INF INIT: CPT

## 2023-05-12 PROCEDURE — 258N000003 HC RX IP 258 OP 636: Performed by: INTERNAL MEDICINE

## 2023-05-12 PROCEDURE — 250N000011 HC RX IP 250 OP 636: Performed by: INTERNAL MEDICINE

## 2023-05-12 RX ORDER — HEPARIN SODIUM (PORCINE) LOCK FLUSH IV SOLN 100 UNIT/ML 100 UNIT/ML
5 SOLUTION INTRAVENOUS
Status: CANCELLED | OUTPATIENT
Start: 2023-06-02

## 2023-05-12 RX ORDER — HEPARIN SODIUM,PORCINE 10 UNIT/ML
5 VIAL (ML) INTRAVENOUS
Status: CANCELLED | OUTPATIENT
Start: 2023-06-02

## 2023-05-12 RX ADMIN — VEDOLIZUMAB 300 MG: 300 INJECTION, POWDER, LYOPHILIZED, FOR SOLUTION INTRAVENOUS at 08:49

## 2023-05-12 RX ADMIN — SODIUM CHLORIDE 250 ML: 9 INJECTION, SOLUTION INTRAVENOUS at 08:29

## 2023-05-12 ASSESSMENT — PAIN SCALES - GENERAL: PAINLEVEL: NO PAIN (0)

## 2023-05-12 NOTE — PROGRESS NOTES
Infusion Nursing Note:  Mainor Grande presents today for Entyvio.    Patient seen by provider today: No   present during visit today: Not Applicable.    Note: N/A.      Intravenous Access:  Peripheral IV placed.    Treatment Conditions:  Biological Infusion Checklist:  ~~~ NOTE: If the patient answers yes to any of the questions below, hold the infusion and contact ordering provider or on-call provider.    1. Have you recently had an elevated temperature, fever, chills, productive cough, coughing for 3 weeks or longer or hemoptysis, abnormal vital signs, night sweats,  chest pain or have you noticed a decrease in your appetite, unexplained weight loss or fatigue? No  2. Do you have any open wounds or new incisions? No  3. Do you have any recent or upcoming hospitalizations, surgeries or dental procedures? No  4. Do you currently have or recently have had any signs of illness or infection or are you on any antibiotics? No  5. Have you had any new, sudden or worsening abdominal pain? No  6. Have you or anyone in your household received a live vaccination in the past 4 weeks? Please note:  No live vaccines while on biologic/chemotherapy until 6 months after the last treatment.  Patient can receive the flu vaccine (shot only) and the pneumovax.  It is optimal for the patient to get these vaccines mid cycle, but they can be given at any time as long as it is not on the day of the infusion. No  7. Have you recently been diagnosed with any new nervous system diseases (ie. Multiple sclerosis, Guillain [place], seizures, neurological changes) or cancer diagnosis? No  8. Are you on any form of radiation or chemotherapy? No  9. Are you pregnant or breast feeding or do you have plans of pregnancy in the future? No  10. Have you been having any signs of worsening depression or suicidal ideations?  (benlysta only) No  11. Have there been any other new onset medical symptoms? No        Post Infusion Assessment:  Patient  tolerated infusion without incident.  Patient observed for 10 mins post Entyvio.  Blood return noted pre and post infusion.  Site patent and intact, free from redness, edema or discomfort.  No evidence of extravasations.  Access discontinued per protocol.       Discharge Plan:   Discharge instructions reviewed with: Patient.  Patient discharged in stable condition accompanied by: self.  Departure Mode: Ambulatory.      Marianela Fischer RN

## 2023-05-15 ENCOUNTER — TELEPHONE (OUTPATIENT)
Dept: GASTROENTEROLOGY | Facility: CLINIC | Age: 68
End: 2023-05-15
Payer: COMMERCIAL

## 2023-05-15 NOTE — TELEPHONE ENCOUNTER
The patient called to request that the prescription prep be sent in for his 5/18 colonoscopy. The order stated golytely.   placed the patient on hold & spoke with an RN at [place], who said that miralax prep had been deemed appropriate, and instructions had been sent to him via [facility].  Returning to the patient, he disconnected the call.   called him back and left a voicemail regarding this, and re-sent the prep instructions.

## 2023-05-18 ENCOUNTER — HOSPITAL ENCOUNTER (OUTPATIENT)
Facility: CLINIC | Age: 68
Discharge: HOME | End: 2023-05-18
Attending: INTERNAL MEDICINE | Admitting: INTERNAL MEDICINE
Payer: COMMERCIAL

## 2023-05-18 ENCOUNTER — ANESTHESIA (OUTPATIENT)
Dept: GASTROENTEROLOGY | Facility: CLINIC | Age: 68
End: 2023-05-18
Payer: COMMERCIAL

## 2023-05-18 ENCOUNTER — ANESTHESIA EVENT (OUTPATIENT)
Dept: GASTROENTEROLOGY | Facility: CLINIC | Age: 68
End: 2023-05-18
Payer: COMMERCIAL

## 2023-05-18 VITALS
OXYGEN SATURATION: 97 % | TEMPERATURE: 98.4 F | RESPIRATION RATE: 18 BRPM | DIASTOLIC BLOOD PRESSURE: 81 MMHG | SYSTOLIC BLOOD PRESSURE: 111 MMHG | HEART RATE: 71 BPM

## 2023-05-18 DIAGNOSIS — K51.00 ULCERATIVE PANCOLITIS WITHOUT COMPLICATION (H): Primary | ICD-10-CM

## 2023-05-18 LAB — COLONOSCOPY: NORMAL

## 2023-05-18 PROCEDURE — 250N000009 HC RX 250: Performed by: NURSE ANESTHETIST, CERTIFIED REGISTERED

## 2023-05-18 PROCEDURE — 250N000011 HC RX IP 250 OP 636: Performed by: NURSE ANESTHETIST, CERTIFIED REGISTERED

## 2023-05-18 PROCEDURE — 250N000009 HC RX 250: Performed by: INTERNAL MEDICINE

## 2023-05-18 PROCEDURE — 45380 COLONOSCOPY AND BIOPSY: CPT | Performed by: INTERNAL MEDICINE

## 2023-05-18 PROCEDURE — 88305 TISSUE EXAM BY PATHOLOGIST: CPT | Mod: 26 | Performed by: PATHOLOGY

## 2023-05-18 PROCEDURE — 88305 TISSUE EXAM BY PATHOLOGIST: CPT | Mod: TC | Performed by: INTERNAL MEDICINE

## 2023-05-18 PROCEDURE — 258N000003 HC RX IP 258 OP 636: Performed by: NURSE ANESTHETIST, CERTIFIED REGISTERED

## 2023-05-18 PROCEDURE — 370N000017 HC ANESTHESIA TECHNICAL FEE, PER MIN: Performed by: INTERNAL MEDICINE

## 2023-05-18 RX ORDER — LIDOCAINE HYDROCHLORIDE 20 MG/ML
INJECTION, SOLUTION INFILTRATION; PERINEURAL PRN
Status: DISCONTINUED | OUTPATIENT
Start: 2023-05-18 | End: 2023-05-18

## 2023-05-18 RX ORDER — ONDANSETRON 2 MG/ML
4 INJECTION INTRAMUSCULAR; INTRAVENOUS
Status: DISCONTINUED | OUTPATIENT
Start: 2023-05-18 | End: 2023-05-18 | Stop reason: HOSPADM

## 2023-05-18 RX ORDER — ONDANSETRON 2 MG/ML
4 INJECTION INTRAMUSCULAR; INTRAVENOUS EVERY 6 HOURS PRN
Status: DISCONTINUED | OUTPATIENT
Start: 2023-05-18 | End: 2023-05-18 | Stop reason: HOSPADM

## 2023-05-18 RX ORDER — ONDANSETRON 4 MG/1
4 TABLET, ORALLY DISINTEGRATING ORAL EVERY 6 HOURS PRN
Status: DISCONTINUED | OUTPATIENT
Start: 2023-05-18 | End: 2023-05-18 | Stop reason: HOSPADM

## 2023-05-18 RX ORDER — PROPOFOL 10 MG/ML
INJECTION, EMULSION INTRAVENOUS PRN
Status: DISCONTINUED | OUTPATIENT
Start: 2023-05-18 | End: 2023-05-18

## 2023-05-18 RX ORDER — NALOXONE HYDROCHLORIDE 0.4 MG/ML
0.2 INJECTION, SOLUTION INTRAMUSCULAR; INTRAVENOUS; SUBCUTANEOUS
Status: DISCONTINUED | OUTPATIENT
Start: 2023-05-18 | End: 2023-05-18 | Stop reason: HOSPADM

## 2023-05-18 RX ORDER — FLUMAZENIL 0.1 MG/ML
0.2 INJECTION, SOLUTION INTRAVENOUS
Status: DISCONTINUED | OUTPATIENT
Start: 2023-05-18 | End: 2023-05-18 | Stop reason: HOSPADM

## 2023-05-18 RX ORDER — NALOXONE HYDROCHLORIDE 0.4 MG/ML
0.4 INJECTION, SOLUTION INTRAMUSCULAR; INTRAVENOUS; SUBCUTANEOUS
Status: DISCONTINUED | OUTPATIENT
Start: 2023-05-18 | End: 2023-05-18 | Stop reason: HOSPADM

## 2023-05-18 RX ORDER — PROCHLORPERAZINE MALEATE 5 MG
5 TABLET ORAL EVERY 6 HOURS PRN
Status: DISCONTINUED | OUTPATIENT
Start: 2023-05-18 | End: 2023-05-18 | Stop reason: HOSPADM

## 2023-05-18 RX ORDER — LIDOCAINE 40 MG/G
CREAM TOPICAL
Status: DISCONTINUED | OUTPATIENT
Start: 2023-05-18 | End: 2023-05-18 | Stop reason: HOSPADM

## 2023-05-18 RX ORDER — SODIUM CHLORIDE, SODIUM LACTATE, POTASSIUM CHLORIDE, CALCIUM CHLORIDE 600; 310; 30; 20 MG/100ML; MG/100ML; MG/100ML; MG/100ML
INJECTION, SOLUTION INTRAVENOUS CONTINUOUS PRN
Status: DISCONTINUED | OUTPATIENT
Start: 2023-05-18 | End: 2023-05-18

## 2023-05-18 RX ORDER — SODIUM CHLORIDE, SODIUM LACTATE, POTASSIUM CHLORIDE, CALCIUM CHLORIDE 600; 310; 30; 20 MG/100ML; MG/100ML; MG/100ML; MG/100ML
INJECTION, SOLUTION INTRAVENOUS CONTINUOUS
Status: DISCONTINUED | OUTPATIENT
Start: 2023-05-18 | End: 2023-05-18 | Stop reason: HOSPADM

## 2023-05-18 RX ORDER — ONDANSETRON 4 MG/1
4 TABLET, ORALLY DISINTEGRATING ORAL EVERY 30 MIN PRN
Status: DISCONTINUED | OUTPATIENT
Start: 2023-05-18 | End: 2023-05-18 | Stop reason: HOSPADM

## 2023-05-18 RX ORDER — ONDANSETRON 2 MG/ML
4 INJECTION INTRAMUSCULAR; INTRAVENOUS EVERY 30 MIN PRN
Status: DISCONTINUED | OUTPATIENT
Start: 2023-05-18 | End: 2023-05-18 | Stop reason: HOSPADM

## 2023-05-18 RX ORDER — PROPOFOL 10 MG/ML
INJECTION, EMULSION INTRAVENOUS CONTINUOUS PRN
Status: DISCONTINUED | OUTPATIENT
Start: 2023-05-18 | End: 2023-05-18

## 2023-05-18 RX ADMIN — PROPOFOL 30 MG: 10 INJECTION, EMULSION INTRAVENOUS at 13:36

## 2023-05-18 RX ADMIN — LIDOCAINE HYDROCHLORIDE 1 ML: 10 INJECTION, SOLUTION EPIDURAL; INFILTRATION; INTRACAUDAL; PERINEURAL at 12:39

## 2023-05-18 RX ADMIN — SODIUM CHLORIDE, POTASSIUM CHLORIDE, SODIUM LACTATE AND CALCIUM CHLORIDE: 600; 310; 30; 20 INJECTION, SOLUTION INTRAVENOUS at 12:39

## 2023-05-18 RX ADMIN — PROPOFOL 200 MCG/KG/MIN: 10 INJECTION, EMULSION INTRAVENOUS at 13:29

## 2023-05-18 RX ADMIN — LIDOCAINE HYDROCHLORIDE 50 MG: 20 INJECTION, SOLUTION INFILTRATION; PERINEURAL at 13:28

## 2023-05-18 RX ADMIN — PROPOFOL 30 MG: 10 INJECTION, EMULSION INTRAVENOUS at 13:33

## 2023-05-18 RX ADMIN — PROPOFOL 70 MG: 10 INJECTION, EMULSION INTRAVENOUS at 13:29

## 2023-05-18 ASSESSMENT — ACTIVITIES OF DAILY LIVING (ADL): ADLS_ACUITY_SCORE: 35

## 2023-05-18 NOTE — PROGRESS NOTES
Social work referral placed per providers request due to patients declining cognitive status and support to patients spouse.    Mallory Kessler RN

## 2023-05-18 NOTE — ANESTHESIA PREPROCEDURE EVALUATION
Anesthesia Pre-Procedure Evaluation    Patient: Mainor Grande   MRN: [id number] : 1955        Procedure : Procedure(s):  Colonoscopy          Past Medical History:   Diagnosis Date     Allergic rhinitis, cause unspecified      Fatty liver 2004    elevated LFT, saw GI for a consultation     Mild persistent asthma 2008     Reflux esophagitis      Unspecified disease of respiratory system     RAD     Viremia, unspecified     acute      Past Surgical History:   Procedure Laterality Date     COLONOSCOPY  2013    Procedure: COLONOSCOPY;  colonoscopy;  Surgeon: Beto Keen MD;  Location: [facility]     COLONOSCOPY N/A 2020    Procedure: Colonoscopy, With Polypectomy And Biopsy;  Surgeon: Lily Eaton DO;  Location: [facility]     COLONOSCOPY N/A 2021    Procedure: Colonoscopy, With Polypectomy And Biopsy;  Surgeon: Lily Eaton DO;  Location: [facility]     COLONOSCOPY N/A 5/3/2022    Procedure: COLONOSCOPY, WITH POLYPECTOMY AND BIOPSY;  Surgeon: Lily Eaton DO;  Location: [facility]     COLONOSCOPY N/A 5/3/2022    Procedure: COLONOSCOPY, FLEXIBLE, WITH LESION REMOVAL USING SNARE;  Surgeon: Lily Eaton DO;  Location: [facility]     COLONOSCOPY WITH CO2 INSUFFLATION N/A 2020    Procedure: COLONOSCOPY, WITH CO2 INSUFFLATION;  Surgeon: Lily Eaton DO;  Location: [facility]     COLONOSCOPY WITH CO2 INSUFFLATION N/A 2021    Procedure: COLONOSCOPY, WITH CO2 INSUFFLATION;  Surgeon: Lily Eaton DO;  Location[facility]     COLONOSCOPY WITH CO2 INSUFFLATION N/A 5/3/2022    Procedure: COLONOSCOPY, WITH CO2 INSUFFLATION;  Surgeon: Lily Eaton DO;  Location[facility]     COMBINED ESOPHAGOSCOPY, GASTROSCOPY, DUODENOSCOPY (EGD) WITH CO2 INSUFFLATION N/A 2020    Procedure: ESOPHAGOGASTRODUODENOSCOPY, WITH CO2 INSUFFLATION;  Surgeon: Lily Eaton DO;  Location: [facility]     ESOPHAGOSCOPY, GASTROSCOPY, DUODENOSCOPY (EGD), COMBINED N/A 2020    Procedure: Esophagogastroduodenoscopy,  With Biopsy;  Surgeon: Lily Eaton DO;  Location: [facility]     HC VASECTOMY UNILAT/BILAT W POSTOP SEMEN  1992    Vasectomy     ZZHC COLONOSCOPY THRU STOMA, DIAGNOSTIC  2005    normal      Allergies   Allergen Reactions     Seasonal Allergies       Social History     Tobacco Use     Smoking status: Never     Smokeless tobacco: Never   Vaping Use     Vaping status: Never Used   Substance Use Topics     Alcohol use: Yes     Alcohol/week: 1.0 - 2.0 standard drink of alcohol     Types: 1 - 2 Standard drinks or equivalent per week     Comment: occasional      Wt Readings from Last 1 Encounters:   05/12/23 94 kg (207 lb 4.8 oz)        Anesthesia Evaluation   Pt has had prior anesthetic. Type: MAC and General.    No history of anesthetic complications       ROS/MED HX  ENT/Pulmonary:     (+) allergic rhinitis, asthma (seasonal and is bad right now) Treatment: Inhaler prn and Inhaler daily,      Neurologic:     (+) dementia,     Cardiovascular:     (+) Dyslipidemia -----Previous cardiac testing   Echo: Date: Results:    Stress Test: Date: 9/30/20 Results:    The nuclear stress test is abnormal.    There is a small area of mild ischemia in the apical segment(s) of the left ventricle.    Left ventricular function is normal.    The left ventricular ejection fraction at stress is 55%.     There is no prior study for comparison.     ECG Reviewed: Date: Results:    Cath:  Date: Results:      METS/Exercise Tolerance: >4 METS    Hematologic:  - neg hematologic  ROS     Musculoskeletal:  - neg musculoskeletal ROS     GI/Hepatic:     (+) bowel prep, liver disease (fatty liver),  (-) GERD   Renal/Genitourinary:  - neg Renal ROS     Endo:  - neg endo ROS     Psychiatric/Substance Use:     (+) psychiatric history anxiety     Infectious Disease:  - neg infectious disease ROS     Malignancy:  - neg malignancy ROS     Other:  - neg other ROS          Physical Exam    Airway  airway exam normal      Mallampati: II   TM distance: > 3 FB    Neck ROM: full   Mouth opening: > 3 cm    Respiratory Devices and Support         Dental     Comment: caps        Cardiovascular   cardiovascular exam normal       Rhythm and rate: regular and normal     Pulmonary   pulmonary exam normal        breath sounds clear to auscultation           OUTSIDE LABS:  CBC:   Lab Results   Component Value Date    WBC 8.3 04/06/2023    WBC 6.0 02/10/2023    HGB 15.1 04/06/2023    HGB 14.1 02/10/2023    HCT 44.4 04/06/2023    HCT 42.6 02/10/2023     04/06/2023     02/10/2023     BMP:   Lab Results   Component Value Date     (L) 04/06/2023     07/01/2022    POTASSIUM 3.9 04/06/2023    POTASSIUM 3.9 07/01/2022    CHLORIDE 89 (L) 04/06/2023    CHLORIDE 102 07/01/2022    CO2 26 04/06/2023    CO2 27 07/01/2022    BUN 10.4 04/06/2023    BUN 13 07/01/2022    CR 0.95 04/06/2023    CR 0.90 07/01/2022     (H) 04/06/2023     (H) 07/01/2022     COAGS:   Lab Results   Component Value Date    INR 1.10 04/06/2023     POC: No results found for: BGM, HCG, HCGS  HEPATIC:   Lab Results   Component Value Date    ALBUMIN 4.3 04/06/2023    PROTTOTAL 7.7 04/06/2023    ALT 33 04/06/2023    AST 46 04/06/2023     (H) 09/09/2020    ALKPHOS 156 (H) 04/06/2023    BILITOTAL 0.9 04/06/2023     OTHER:   Lab Results   Component Value Date    A1C 5.4 07/19/2004    BREANNA 9.3 04/06/2023    LIPASE 110 05/14/2020    TSH 0.93 07/01/2022    CRP <2.9 12/14/2022    SED 8 04/06/2023       Anesthesia Plan    ASA Status:  3   NPO Status:  NPO Appropriate    Anesthesia Type: MAC.     - Reason for MAC: Deep or markedly invasive procedure (G8)   Induction: Intravenous.   Maintenance: TIVA.        Consents    Anesthesia Plan(s) and associated risks, benefits, and realistic alternatives discussed. Questions answered and patient/representative(s) expressed understanding.     - Discussed: Risks, Benefits and Alternatives for BOTH SEDATION and the PROCEDURE were discussed     - Discussed  with:  Patient      - Extended Intubation/Ventilatory Support Discussed: No.      - Patient is DNR/DNI Status: No    Use of blood products discussed: No .     Postoperative Care    Pain management: Multi-modal analgesia.   PONV prophylaxis: Background Propofol Infusion     Comments:    Other Comments: The risks and benefits of anesthesia, and the alternatives where applicable, have been discussed with the patient, and they wish to proceed.    Pt used his home inhaler in pre-op just before heading back for the procedure            GILLIAN Kamara CRNA

## 2023-05-18 NOTE — ANESTHESIA CARE TRANSFER NOTE
Patient: Mainor Grande    Procedure: Procedure(s):  COLONOSCOPY, WITH BIOPSY       Diagnosis: Special screening for malignant neoplasms, colon [Z12.11]  Diagnosis Additional Information: No value filed.    Anesthesia Type:   MAC     Note:    Oropharynx: oropharynx clear of all foreign objects and spontaneously breathing  Level of Consciousness: drowsy  Oxygen Supplementation: face mask    Independent Airway: airway patency satisfactory and stable  Dentition: dentition unchanged  Vital Signs Stable: post-procedure vital signs reviewed and stable  Report to RN Given: handoff report given  Patient transferred to: Phase II    Handoff Report: Identifed the Patient, Identified the Reponsible Provider, Reviewed the pertinent medical history, Discussed the surgical course, Reviewed Intra-OP anesthesia mangement and issues during anesthesia, Set expectations for post-procedure period and Allowed opportunity for questions and acknowledgement of understanding      Vitals:  Vitals Value Taken Time   /81 05/18/23 1430   Temp     Pulse 71 05/18/23 1430   Resp 18 05/18/23 1405   SpO2 97 % 05/18/23 1438   Vitals shown include unvalidated device data.    Electronically Signed By: GILLIAN Kamara CRNA  May 18, 2023  2:58 PM

## 2023-05-18 NOTE — DISCHARGE INSTRUCTIONS
[facility]    Home Care Following Endoscopy          Activity:  You have just undergone an endoscopic procedure usually performed with conscious sedation.  Do not work or operate machinery (including a car) for at least 12 hours.    I encourage you to walk and attempt to pass this air as soon as possible.    Diet:  Return to the diet you were on before your procedure but eat lightly for the first 12-24 hours.  Drink plenty of water.  Resume any regular medications unless otherwise advised by your physician.  Please begin any new medication prescribed as a result of your procedure as directed by your physician.   If you had any biopsy or polyp removed please refrain from aspirin or aspirin products for 2 days.  If on Coumadin please restart as instructed by your physician.   Pain:  You may take Tylenol as needed for pain.  Expected Recovery:  You can expect some mild abdominal fullness and/or discomfort due to the air used to inflate your intestinal tract. It is also normal to have a mild sore throat after upper endoscopy.    Call Your Physician if You Have:  After Colonoscopy:  Worsening persisting abdominal pain which is worse with activity.  Fevers (>101 degrees F), chills or shakes.  Passage of continued blood with bowel movements.     Any questions or concerns about your recovery, please call [phone] or after hours [phone] ([phone]) Nurse Advice Line.    Follow-up Care:  You did have polyps/biopsy tissue sample(s) removed.  The polyps/biopsy tissue sample(s) will be sent to pathology.    You should receive letter in your My Chart from Dr. Eaton with your results within 1-2 weeks. If you do not participate in My Chart a physical letter will come in the mail in 2-3 weeks.  Please call if you have not received a notification of your results.

## 2023-05-18 NOTE — H&P
[facility] Anesthesia Pre-op History and Physical    Manior Grande MRN# [id number]   Age: 67 year old YOB: 1955            Date of Exam 5/18/2023           Primary care provider: Karen Rodriguez         Chief Complaint and/or Reason for Procedure:   No chief complaint on file.           Active problem list:     Patient Active Problem List    Diagnosis Date Noted     Dementia, unspecified dementia severity, unspecified dementia type, unspecified whether behavioral, psychotic, or mood disturbance or anxiety (H) 03/16/2023     Priority: Medium     PSC (primary sclerosing cholangitis) 07/02/2021     Priority: Medium     Ulcerative pancolitis without complication (H) 08/05/2020     Priority: Medium     Memory changes 12/06/2019     Priority: Medium     Nummular eczema 02/12/2019     Priority: Medium     Deviated nasal septum 08/01/2013     Priority: Medium     Chronic nasal congestion 08/01/2013     Priority: Medium     Seasonal allergic rhinitis 07/18/2011     Priority: Medium     Primarily fall and spring allergies       HYPERLIPIDEMIA LDL GOAL <160 10/31/2010     Priority: Medium     Moderate persistent asthma with acute exacerbation 09/12/2008     Priority: Medium     Erectile dysfunction 06/03/2008     Priority: Medium     Abnormal levels of other serum enzymes 04/15/2004     Priority: Medium     S/p liver biopsy -6/2016 . 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 No     Current Facility-Administered Medications   Medication     lactated ringers infusion     lidocaine (LMX4) kit     lidocaine (LMX4) kit     lidocaine 1 % 0.1-1 mL     lidocaine 1 % 0.1-1 mL     ondansetron (ZOFRAN) injection 4 mg     sodium chloride (PF) 0.9% PF flush 3 mL     sodium chloride (PF) 0.9% PF flush 3 mL     sodium chloride (PF) 0.9% PF flush 3  mL     sodium chloride (PF) 0.9% PF flush 3 mL     Facility-Administered Medications Ordered in Other Encounters   Medication     lidocaine 2% injection (MDV)     propofol (DIPRIVAN) infusion     propofol (DIPRIVAN) injection 10 mg/mL vial             Allergies:      Allergies   Allergen Reactions     Seasonal Allergies      Allergy to Latex? No  Allergy to tape?   No  Intolerances:             Physical Exam:   All vitals have been reviewed  Patient Vitals for the past 8 hrs:   BP Temp Temp src Resp SpO2   05/18/23 1228 (!) 138/91 98.4  F (36.9  C) Temporal 16 98 %     No intake/output data recorded.  Cardiovascular:   normal S1 and S2   Lungs CTA          Lab / Radiology Results:            Anesthetic risk and/or ASA classification:     2  Lily Eaton DO

## 2023-05-18 NOTE — ANESTHESIA POSTPROCEDURE EVALUATION
Patient: Mainor Grande    Procedure: Procedure(s):  COLONOSCOPY, WITH BIOPSY       Anesthesia Type:  MAC    Note:  Disposition: Outpatient   Postop Pain Control: Uneventful            Sign Out: Well controlled pain   PONV: No   Neuro/Psych: Uneventful            Sign Out: Acceptable/Baseline neuro status   Airway/Respiratory: Uneventful            Sign Out: Acceptable/Baseline resp. status   CV/Hemodynamics: Uneventful            Sign Out: Acceptable CV status   Other NRE: NONE   DID A NON-ROUTINE EVENT OCCUR? No    Event details/Postop Comments:  Pt was happy with anesthesia care.  No complications.  I will follow up with the pt if needed.           Last vitals:  Vitals Value Taken Time   /81 05/18/23 1430   Temp     Pulse 71 05/18/23 1430   Resp 18 05/18/23 1405   SpO2 97 % 05/18/23 1438   Vitals shown include unvalidated device data.    Electronically Signed By: GILLIAN Kamara CRNA  May 18, 2023  2:57 PM

## 2023-05-19 ENCOUNTER — PATIENT OUTREACH (OUTPATIENT)
Dept: CARE COORDINATION | Facility: CLINIC | Age: 68
End: 2023-05-19
Payer: COMMERCIAL

## 2023-05-19 NOTE — PROGRESS NOTES
Social Work Telephone Message Note  [facility]     Patient Name:  Mainor Grande  /Age:  1955 (67 year old)    Referral Source: PATEL Eaton  Reason for Referral:  Resources and support     attempted to contact Patient via telephone on 23. Sw attempted to contact family regarding resources and support regarding patients dementia. Left message providing writer contact information requesting a return call.  will await Patient's return phone call and will provide assistance at that time.          DENISE Nunes, [facility]    [facility]  [phone]  [email or web address]

## 2023-05-19 NOTE — RESULT ENCOUNTER NOTE
Mr. Grande    Your recent test results are attached. Colonoscopy revealed inflammation along the entire colon which seem to better compared to last test. No other concerning lesions noted. Still waiting on pathology results before further recommendation    If you have any questions or concerns please contact me via My Chart or call the clinic at [phone]     Thank You  Karen Rodriguez MD.

## 2023-05-22 ENCOUNTER — PATIENT OUTREACH (OUTPATIENT)
Dept: CARE COORDINATION | Facility: CLINIC | Age: 68
End: 2023-05-22
Payer: COMMERCIAL

## 2023-05-22 LAB
PATH REPORT.COMMENTS IMP SPEC: NORMAL
PATH REPORT.COMMENTS IMP SPEC: NORMAL
PATH REPORT.FINAL DX SPEC: NORMAL
PATH REPORT.GROSS SPEC: NORMAL
PATH REPORT.MICROSCOPIC SPEC OTHER STN: NORMAL
PATH REPORT.RELEVANT HX SPEC: NORMAL
PHOTO IMAGE: NORMAL

## 2023-05-22 NOTE — PROGRESS NOTES
Social Work Telephone Message Note  [facility]     Patient Name:  Mainor Grande  /Age:  1955 (67 year old)    Referral Source: GI  Reason for Referral:  Resources and support     contacted Patient's wife, Rani via telephone on 23. Rani was in a class and unable to speak at this time. Provided writer contact information and encouraged her to call at a more convenient time. Sw will continue to assist as needed.           DENISE Nunes, [facility]    [facility]  [phone]  [email or web address]

## 2023-05-22 NOTE — PROGRESS NOTES
Social Work Telephone Message Note  [facility]     Patient Name:  Mainor Grande  /Age:  1955 (67 year old)      Reason for Referral:  Resources and support     attempted to return call from Patient's wife, Rani via telephone on 23. Returned call and left a message providing writer contact information encouraging a return call.  will await return phone call and will provide assistance at that time.          DENISE Nunes, [facility]    [facility]  [phone]  [email or web address]

## 2023-06-13 ENCOUNTER — ANCILLARY PROCEDURE (OUTPATIENT)
Dept: GENERAL RADIOLOGY | Facility: CLINIC | Age: 68
End: 2023-06-13
Payer: COMMERCIAL

## 2023-06-13 ENCOUNTER — OFFICE VISIT (OUTPATIENT)
Dept: NURSING | Facility: CLINIC | Age: 68
End: 2023-06-13
Payer: COMMERCIAL

## 2023-06-13 ENCOUNTER — OFFICE VISIT (OUTPATIENT)
Dept: PULMONOLOGY | Facility: CLINIC | Age: 68
End: 2023-06-13
Payer: COMMERCIAL

## 2023-06-13 VITALS
SYSTOLIC BLOOD PRESSURE: 121 MMHG | WEIGHT: 199 LBS | DIASTOLIC BLOOD PRESSURE: 75 MMHG | OXYGEN SATURATION: 99 % | HEART RATE: 55 BPM | BODY MASS INDEX: 28.25 KG/M2

## 2023-06-13 VITALS — BODY MASS INDEX: 28.25 KG/M2 | OXYGEN SATURATION: 98 % | HEART RATE: 67 BPM | WEIGHT: 199 LBS

## 2023-06-13 DIAGNOSIS — R05.3 CHRONIC COUGH: ICD-10-CM

## 2023-06-13 DIAGNOSIS — J45.40 MODERATE PERSISTENT ASTHMA, UNSPECIFIED WHETHER COMPLICATED: Primary | ICD-10-CM

## 2023-06-13 PROCEDURE — 99204 OFFICE O/P NEW MOD 45 MIN: CPT | Mod: 25 | Performed by: INTERNAL MEDICINE

## 2023-06-13 PROCEDURE — 94726 PLETHYSMOGRAPHY LUNG VOLUMES: CPT | Performed by: INTERNAL MEDICINE

## 2023-06-13 PROCEDURE — 71046 X-RAY EXAM CHEST 2 VIEWS: CPT | Mod: GC | Performed by: RADIOLOGY

## 2023-06-13 PROCEDURE — 94375 RESPIRATORY FLOW VOLUME LOOP: CPT | Performed by: INTERNAL MEDICINE

## 2023-06-13 PROCEDURE — 94729 DIFFUSING CAPACITY: CPT | Performed by: INTERNAL MEDICINE

## 2023-06-13 RX ORDER — MONTELUKAST SODIUM 10 MG/1
10 TABLET ORAL AT BEDTIME
Qty: 30 TABLET | Refills: 11 | Status: SHIPPED | OUTPATIENT
Start: 2023-06-13 | End: 2023-09-26

## 2023-06-13 RX ORDER — BUDESONIDE AND FORMOTEROL FUMARATE DIHYDRATE 160; 4.5 UG/1; UG/1
2 AEROSOL RESPIRATORY (INHALATION) 2 TIMES DAILY
Qty: 10.2 G | Refills: 11 | Status: SHIPPED | OUTPATIENT
Start: 2023-06-13 | End: 2023-09-26

## 2023-06-13 ASSESSMENT — ASTHMA QUESTIONNAIRES
QUESTION_2 LAST FOUR WEEKS HOW OFTEN HAVE YOU HAD SHORTNESS OF BREATH: ONCE OR TWICE A WEEK
QUESTION_1 LAST FOUR WEEKS HOW MUCH OF THE TIME DID YOUR ASTHMA KEEP YOU FROM GETTING AS MUCH DONE AT WORK, SCHOOL OR AT HOME: A LITTLE OF THE TIME
QUESTION_3 LAST FOUR WEEKS HOW OFTEN DID YOUR ASTHMA SYMPTOMS (WHEEZING, COUGHING, SHORTNESS OF BREATH, CHEST TIGHTNESS OR PAIN) WAKE YOU UP AT NIGHT OR EARLIER THAN USUAL IN THE MORNING: TWO OR THREE NIGHTS A WEEK
ACT_TOTALSCORE: 16
ACUTE_EXACERBATION_TODAY: NO
QUESTION_5 LAST FOUR WEEKS HOW WOULD YOU RATE YOUR ASTHMA CONTROL: WELL CONTROLLED
QUESTION_4 LAST FOUR WEEKS HOW OFTEN HAVE YOU USED YOUR RESCUE INHALER OR NEBULIZER MEDICATION (SUCH AS ALBUTEROL): ONE OR TWO TIMES PER DAY
ACT_TOTALSCORE: 16

## 2023-06-13 ASSESSMENT — PAIN SCALES - GENERAL: PAINLEVEL: NO PAIN (0)

## 2023-06-13 NOTE — NURSING NOTE
"Chief Complaint   Patient presents with     Consult     New Patient       Initial /75 (BP Location: Left arm, Patient Position: Sitting, Cuff Size: Adult Regular)   Pulse 55   Wt 90.3 kg (199 lb)   SpO2 99%   BMI 28.25 kg/m   Estimated body mass index is 28.25 kg/m  as calculated from the following:    Height as of 4/11/23: 1.788 m (5' 10.38\").    Weight as of this encounter: 90.3 kg (199 lb)..  He requests these members of his care team be copied on today's visit information:     PCP: Karen Rodriguez    Do you need any medication refills at today's visit? NO     MICHAEL Khan   Email: [email or web address]  [facility] - Rheumatology  Phone: [phone]  Fax: [phone]      "

## 2023-06-13 NOTE — PROGRESS NOTES
[facility]- Pulmonary Clinic  New Consult    Name: Mainor Grande MRN: [id number]     Age: 67 year old   YOB: 1955       Reason for Visit:   Chief Complaint   Patient presents with     Consult     New Patient             Assessment and Plan:     Mainor Grande is a 67 year old male with history of asthma, seasonal allergic rhinitis, unspecified dementia, PCS and ulcerative colitis, reflux esophagitis, nummular eczema and memory changes who presents for evaluation of asthma and cough. Here with his wife.    # Eosinophilic asthma  # Seasonal allergies  Patient reports history of asthma and allergies dating back to childhood, s/p allergy shots in early adulthood. Endorses mild dyspnea on exertion with 2-3 episodes of frequent cough per year around allergy season which responded well to prednisone in April 2023. ACT score 16 today. PFTs are suggestive a mild hyperinflation (), but no obstruction. Lung volumes and diffusion capacity are normal. CXR 6/13/2023 also looks slightly hyperinflated. Labs notable for history of peripheral eosinophilia (800 on 2/10/2023). It is my impression that he has moderate persistent asthma with peripheral eosinophilia, triggered by allergies. He would benefit from ongoing use of ICS-LABA, but rather than Symbicort 80 2 puffs TID I recommend he take Symbicort 160 2 puffs BID. Continue albuterol inhaler PRN. Would also recommend adding Montelukast at bedtime. He can continue taking OTC antihistamine.   - Stop low dose Symbicort TID, start Symbicort 160-4.5 2 puffs BID. Rinse mouth out after usage. OK to substitute with formulary equivalent per insurance.   - Continue albuterol inhaler PRN  - Start montelukast 10mg at bedtime. Reviewed possible side effects.   - Continue OTC anthistamine  - Reviewed asthma action plan with patient today. Home action plan: prednisone 40mg daily x 5days    # History of esophagitis  Continue Nexium daily     # History of sub-cm  pulmonary nodule  Incidentally noted a 3mm indeterminate left apical lung nodule on CT CAP 7/2020. He has never been a smoker, is considered low risk. No need to follow up.      Return to clinic in 3 months or sooner if needed.     45 minutes spent reviewing chart, reviewing test results, talking with and examining patient, formulating plan, and documentation on the day of the encounter.    Roge Millan MD  [facility],  of Medicine  Pulmonary/Critical Care Medicine  Pager: [phone]  June 13, 2023              HPI:   Mainor Grande is a 67 year old male with history of asthma, seasonal allergic rhinitis, unspecified dementia, PCS and ulcerative colitis, reflux esophagitis, nummular eczema and memory changes who presents for evaluation of asthma and cough. Here with his wife.   History asthma since childhood.   Referred by his PCP after presenting 4/11/2023 with persistent cough despite ICS-LABA, noted green sputum and was given z-pack and prednisone burst (prednisone 40mg daily x 5 days). Responded well to steroids. Feels much better and is back to baseline now but continues to have episodes of frequent productive cough about 2-3x per year lasting months at a time, primarily during allergy season (spring and fall). Sometimes coughs so hard he can't catch his breath or feels like he cough vomit. Sometimes coughs at night and can have difficulty bringing up sputum. The inhalers have helped considerably with the cough. Endorses mild dyspnea and fatigue if exerting himself like mowing the lawn or snow blowing (wears a mask when mowing the lawn). Goes to the gym 3-5 days per week and gets fatigued on the elliptical but doesn't necessarily become short of breath. Endorses occasional chest pressure and wheeze.  Endorses bad seasonal allergies spring/fall and likely to dust as well. Diagnosed with allergies as a kid, had allergy shots in early adulthood. Started using aller-tech (OTC  antihistamine) which helps a lot. He makes sure to change his HVAC filters regularly.   Reports trace LE edema, wears compression socks. Has a chronic rash on his right ankle.    About once per month he has difficulty swallowing solids per his wife, per patient this is not frequent or bothersome and he usually swallows just fine.   Sleeps with mouth open with HOB elevated. Minimal snoring at night. Sleeps well.   GERD is well controlled with Nexium 20mg daily   ACT score-16.  Currently using symbicort 80-4.5 2 puffs TID, albuterol inhaler PRN (about once daily), aller-tech (OTC antihistamine).     10 point ROS performed and negative except for as noted in HPI.    Tobacco or vaping: lifelong nosmoker  Occupation: Retired, managed dining service at [employer]  Exposures: no  Pets: no  Exposure to birds or down feathers: no  Hobbies: cooking, goes to the gym 3-5 days per week and does lawn work.   Travel: recently returned from [place].   Family history: son has allergies/asthma  Vaccines: UTD COVID series and booster (9/2022), annual flu, Prevnar 20 (9/2022)           Past Medical History:     Past Medical History:   Diagnosis Date     Allergic rhinitis, cause unspecified      Fatty liver 2004    elevated LFT, saw GI for a consultation     Mild persistent asthma 9/12/2008     Reflux esophagitis      Unspecified disease of respiratory system     RAD     Viremia, unspecified     acute             Past Surgical History:      Past Surgical History:   Procedure Laterality Date     COLONOSCOPY  9/4/2013    Procedure: COLONOSCOPY;  colonoscopy;  Surgeon: Beto Keen MD;  Location:  GI     COLONOSCOPY N/A 7/31/2020    Procedure: Colonoscopy, With Polypectomy And Biopsy;  Surgeon: Lily Eaton DO;  Location: [facility] OR     COLONOSCOPY N/A 4/5/2021    Procedure: Colonoscopy, With Polypectomy And Biopsy;  Surgeon: Lily Eaton DO;  Location: [facility] OR     COLONOSCOPY N/A 5/3/2022    Procedure: COLONOSCOPY, WITH  POLYPECTOMY AND BIOPSY;  Surgeon: Lily Eaton DO;  Location: [facility] OR     COLONOSCOPY N/A 5/3/2022    Procedure: COLONOSCOPY, FLEXIBLE, WITH LESION REMOVAL USING SNARE;  Surgeon: Lily Eaton DO;  Location: [facility] OR     COLONOSCOPY N/A 5/18/2023    Procedure: COLONOSCOPY, WITH BIOPSY;  Surgeon: Lily Eaton DO;  Location: [facility] GI     COLONOSCOPY WITH CO2 INSUFFLATION N/A 7/31/2020    Procedure: COLONOSCOPY, WITH CO2 INSUFFLATION;  Surgeon: Lily Eaton DO;  Location: [facility] OR     COLONOSCOPY WITH CO2 INSUFFLATION N/A 4/5/2021    Procedure: COLONOSCOPY, WITH CO2 INSUFFLATION;  Surgeon: Lily Eaton DO;  Location: [facility] OR     COLONOSCOPY WITH CO2 INSUFFLATION N/A 5/3/2022    Procedure: COLONOSCOPY, WITH CO2 INSUFFLATION;  Surgeon: Lily Eaton DO;  Location: [facility] OR     COMBINED ESOPHAGOSCOPY, GASTROSCOPY, DUODENOSCOPY (EGD) WITH CO2 INSUFFLATION N/A 7/31/2020    Procedure: ESOPHAGOGASTRODUODENOSCOPY, WITH CO2 INSUFFLATION;  Surgeon: Lily Eaton DO;  Location: [facility] OR     ESOPHAGOSCOPY, GASTROSCOPY, DUODENOSCOPY (EGD), COMBINED N/A 7/31/2020    Procedure: Esophagogastroduodenoscopy, With Biopsy;  Surgeon: Lily Eaton DO;  Location: [facility] OR     HC VASECTOMY UNILAT/BILAT W POSTOP SEMEN  1992    Vasectomy     ZZHC COLONOSCOPY THRU STOMA, DIAGNOSTIC  2005    normal             Social History:     Social History     Socioeconomic History     Marital status:      Spouse name: Not on file     Number of children: 2     Years of education: Not on file     Highest education level: Not on file   Occupational History     Occupation:    Tobacco Use     Smoking status: Never     Smokeless tobacco: Never   Vaping Use     Vaping status: Never Used   Substance and Sexual Activity     Alcohol use: Yes     Alcohol/week: 1.0 - 2.0 standard drink of alcohol     Types: 1 - 2 Standard drinks or equivalent per week     Comment: occasional     Drug use: No     Sexual activity: Yes      Partners: Female     Birth control/protection: Surgical   Other Topics Concern     Parent/sibling w/ CABG, MI or angioplasty before 65F 55M? Not Asked   Social History Narrative     Not on file     Social Determinants of Health     Financial Resource Strain: Not on file   Food Insecurity: Not on file   Transportation Needs: Not on file   Physical Activity: Not on file   Stress: Not on file   Social Connections: Not on file   Intimate Partner Violence: Not on file   Housing Stability: Not on file            Family History:     Family History   Problem Relation Age of Onset     Cancer Mother         bladder cancer     Neurologic Disorder Father         alzheimers dementia     Gastrointestinal Disease Brother         divertculitis             Allergies:     Allergies   Allergen Reactions     Seasonal Allergies              Medications:   albuterol (PROAIR HFA/PROVENTIL HFA/VENTOLIN HFA) 108 (90 Base) MCG/ACT inhaler, Inhale 2 puffs into the lungs every 6 hours  ASPIRIN PO, Take 81 mg by mouth at onset of headache for moderate pain   atorvastatin (LIPITOR) 40 MG tablet, Take 1 tablet (40 mg) by mouth daily  azelastine (ASTELIN) 0.1 % nasal spray, SPRAY 2 SPRAYS IN EACH NOSTRIL TWO TIMES A DAY  esomeprazole (NEXIUM) 20 MG DR capsule, Take 20 mg by mouth daily Take 30-60 minutes before eating. Takes clear baudilio  fish oil-omega-3 fatty acids 1000 MG capsule, Take 2 g by mouth daily  fluticasone (FLONASE) 50 MCG/ACT nasal spray, Spray 1-2 sprays into both nostrils daily  melatonin 1 MG TABS, Take 1 mg by mouth nightly as needed for sleep  Multiple Vitamins-Minerals (MULTIVITAMIN ADULT PO), Take 1 tablet by mouth daily   Naphazoline-Glycerin-Zinc Sulf (CLEAR EYES MAXIMUM ITCHY EYE) 0.012-0.25-0.25 % SOLN, Apply to eye as needed (for allergies)  ursodiol (ACTIGALL) 300 MG capsule, TAKE 1 CAPSULE (300 MG) BY [facility] 2 TIMES DAILY FOR 90 DAYS.  vitamin D3 (CHOLECALCIFEROL) 50 mcg (2000 units) tablet, Take 1 tablet (50 mcg) by  mouth daily    No current facility-administered medications on file prior to visit.             Exam:   /75 (BP Location: Left arm, Patient Position: Sitting, Cuff Size: Adult Regular)   Pulse 55   Wt 90.3 kg (199 lb)   SpO2 99%   BMI 28.25 kg/m      Gen: well-appearing, NAD  HEENT: No LAD, no oral lesions   CV: RRR, no murmurs  Resp: Normal respiratory effort on room air. Clear to auscultation bilaterally without wheezes, rales or ronchi.   Abd: non-distended  Skin: erythematous rash on ankles bilateraly  Extremities: No edema  Neuro: alert and appropriate, no focal abnormalities         Data:     I have personally reviewed all pertinent labs, imaging studies and PFT results unless otherwise noted.    Labs:  2/10/2023- absolute eos 800/ WBC 6.0  10/7/2022- absolute eos 500/ WBC 7.4    Imaging:  CXR 6/13/2023- formal read pending, but on my personal review there are no acute findings, mild hyperinflation    PFT:  6/13/2023- no obstruction, minimal air trapping. Normal lung volumes and diffusion capacity

## 2023-06-13 NOTE — PATIENT INSTRUCTIONS
- Start Symbicort 2 puffs twice daily, rinse your mouth out after usage  - Start Montelukast 1 pill once daily at night  My Asthma Home Management Plan of Care  Name: Mainor Grande   YOB: 1955  Date: 6/13/2023   My doctor: Karen Rodriguez   My clinic: [address] [address] [phone]   Control Medicine (dose,route freq.): Symbicort 2 puffs twice daily  Reliever/Rescue Medicine: Albuterol (Proair/Ventolin/Proventil HFA) 2-4 puffs EVERY 4 HOURS as needed. Use a spacer if recommended by your provider.   My Asthma Severity: Moderate Persistent  Avoid your asthma triggers: dust mites and pollens        GREEN   ZONE   Good Control  I feel good  No cough or wheeze  Can work, sleep and play without asthma symptoms       Take your asthma control medicine every day.    If exercise triggers your asthma, take Albuterol (Proair/Ventolin/Proventil HFA) 2-4 puffs EVERY 4 HOURS as needed. Use a spacer if recommended by your provider.  15 minutes before exercise or sports, and  During exercise if you have asthma symptoms  Spacer to use with inhaler: No             YELLOW   ZONE Getting Worse  I have ANY of these:  I do not feel good  Cough or wheeze  Chest feels tight  Wake up at night       Keep taking your Green Zone medications  Start taking your rescue medicine:  every 20 minutes for up to 1 hour. Then every 4 hours for 24-48 hours.  If you do not return to the Green Zone in 12-24 hours or you get worse, start taking your oral steroid medicine as prescribed by your provider.  If you stay in the Yellow Zone for more than 12-24 hours, contact your doctor.           RED ZONE Medical Alert - Get Help  I have ANY of these:  I feel awful  Medicine is not helping  Breathing getting harder  Trouble walking or talking  Nose opens wide to breathe       Take your rescue medicine NOW  Call your doctor NOW, we will likely prescribe a prednisone burst.   If  you are still in the Red Zone after 20 minutes and you have not reached your doctor:  Take your rescue medicine again and  Call 911 or go to the emergency room right away    See your regular doctor within 7-10 days of an Emergency Room or Urgent Care visit for follow-up treatment.        Electronically signed by: Yenifer Naranjo PA-C, June 13, 2023  Annual Reminders:  Flu Shot in the Fall  Pharmacy[id number] [place] [place] [facility] [address] [address] [address]      Asthma Triggers  How To Control Things That Make Your Asthma Worse    Triggers are things that make your asthma worse.  Look at the list below to help you find your triggers and  what you can do about them.  You can help prevent asthma flare-ups by staying away from your triggers.      Trigger                                                          What you can do   Cigarette Smoke  Tobacco smoke can make asthma worse. Do not allow smoking in your home, car or around you.  Be sure no one smokes at a child s day care or school.  If you smoke, ask your health care provider for ways to help you quick.  Ask family members to quit too.  Ask your health care provider for a referral to Quit plan to help you quit smoking, or call 7-879-839-PLAN.     Colds, Flu, Bronchitis  These are common triggers of asthma. Wash your hands often.  Don t touch your eyes, nose or mouth.  Get a flu shot every year.     Dust Mites  These are tiny bugs that live in cloth or carpet. They are too small to see. Wash sheets and blankets in hot water every week.   Encase pillows and mattress in dust mite proof covers.  Avoid having carpet if you can. If you have carpet, vacuum weekly.   Use a dust mask and HEPA vacuum.   Pollen and Outdoor Mold  Some people are allergic to trees, grass, or weed pollen, or molds. Try to keep your windows closed.  Limit time out doors when pollen count is high.   Ask you health care provider about taking medicine during allergy season.     Animal Dander  Some  people are allergic to skin flakes, urine or saliva from pets with fur or feathers. Keep pets with fur or feathers out of your home.    If you can t keep the pet outdoors, then keep the pet out of your bedroom.  Keep the bedroom door closed.  Keep pets off cloth furniture and away from stuffed toys.     Mice, Rats, and Cockroaches  Some people are allergic to the waste from these pets.   Cover food and garbage.  Clean up spills and food crumbs.  Store grease in the refrigerator.   Keep food out of the bedroom.   Indoor Mold  This can be a trigger if your home has high moisture Fix leaking faucets, pipes, or other sources of water.   Clean moldy surfaces.  Dehumidify basement if it is damp and smelly.   Smoke, Strong Odors, and Sprays  These can reduce air quality. Stay away from strong odors and sprays, such as perfume, powder, hair spray, paints, smoke incense, paints, cleaning products, candles and new carpet.   Exercise or Sports  Some people with asthma have this trigger. Be active!  Ask you doctor about taking medicine before sports or exercise to prevent symptoms.    Warm up for 5-10 minutes before and after sports or exercise.     Other Triggers of Asthma  Cold air:  Cover your nose and mouth with a scarf.  Sometimes laughing or crying can be a trigger.  Some medicines and food can trigger asthma.

## 2023-06-14 LAB
DLCOUNC-%PRED-PRE: 92 %
DLCOUNC-PRE: 24.43 ML/MIN/MMHG
DLCOUNC-PRED: 26.43 ML/MIN/MMHG
ERV-%PRED-PRE: 50 %
ERV-PRE: 0.64 L
ERV-PRED: 1.29 L
EXPTIME-PRE: 7.41 SEC
FEF2575-%PRED-PRE: 88 %
FEF2575-PRE: 2.12 L/SEC
FEF2575-PRED: 2.4 L/SEC
FEFMAX-%PRED-PRE: 105 %
FEFMAX-PRE: 9.06 L/SEC
FEFMAX-PRED: 8.57 L/SEC
FEV1-%PRED-PRE: 95 %
FEV1-PRE: 2.93 L
FEV1FEV6-PRE: 75 %
FEV1FEV6-PRED: 78 %
FEV1FVC-PRE: 74 %
FEV1FVC-PRED: 77 %
FEV1SVC-PRE: 74 %
FEV1SVC-PRED: 65 %
FIFMAX-PRE: 7.46 L/SEC
FRCPLETH-%PRED-PRE: 100 %
FRCPLETH-PRE: 3.92 L
FRCPLETH-PRED: 3.91 L
FVC-%PRED-PRE: 98 %
FVC-PRE: 3.96 L
FVC-PRED: 4.02 L
IC-%PRED-PRE: 96 %
IC-PRE: 3.3 L
IC-PRED: 3.41 L
RVPLETH-%PRED-PRE: 129 %
RVPLETH-PRE: 3.27 L
RVPLETH-PRED: 2.54 L
TLCPLETH-%PRED-PRE: 99 %
TLCPLETH-PRE: 7.22 L
TLCPLETH-PRED: 7.28 L
VA-%PRED-PRE: 95 %
VA-PRE: 6.28 L
VC-%PRED-PRE: 83 %
VC-PRE: 3.94 L
VC-PRED: 4.73 L

## 2023-06-15 NOTE — RESULT ENCOUNTER NOTE
Mr. Grande    Your recent test results are attached.  Your lung function tests are within normal limits.    If you have any questions or concerns please contact me via My Chart or call the clinic at [phone]     Thank You  Karen Rodriguez MD.

## 2023-06-16 ENCOUNTER — APPOINTMENT (OUTPATIENT)
Dept: LAB | Facility: CLINIC | Age: 68
End: 2023-06-16
Payer: COMMERCIAL

## 2023-06-16 ENCOUNTER — INFUSION THERAPY VISIT (OUTPATIENT)
Dept: INFUSION THERAPY | Facility: CLINIC | Age: 68
End: 2023-06-16
Attending: INTERNAL MEDICINE
Payer: COMMERCIAL

## 2023-06-16 VITALS
RESPIRATION RATE: 16 BRPM | TEMPERATURE: 97.9 F | DIASTOLIC BLOOD PRESSURE: 78 MMHG | WEIGHT: 197.3 LBS | OXYGEN SATURATION: 100 % | SYSTOLIC BLOOD PRESSURE: 128 MMHG | HEART RATE: 63 BPM | BODY MASS INDEX: 28 KG/M2

## 2023-06-16 DIAGNOSIS — L30.9 ECZEMA, UNSPECIFIED TYPE: ICD-10-CM

## 2023-06-16 DIAGNOSIS — K51.00 ULCERATIVE PANCOLITIS WITHOUT COMPLICATION (H): Primary | ICD-10-CM

## 2023-06-16 LAB
ALBUMIN SERPL BCG-MCNC: 4 G/DL (ref 3.5–5.2)
ALP SERPL-CCNC: 153 U/L (ref 40–129)
ALT SERPL W P-5'-P-CCNC: 34 U/L (ref 0–70)
AST SERPL W P-5'-P-CCNC: 46 U/L (ref 0–45)
BASOPHILS # BLD AUTO: 0.1 10E3/UL (ref 0–0.2)
BASOPHILS NFR BLD AUTO: 2 %
BILIRUB DIRECT SERPL-MCNC: 0.31 MG/DL (ref 0–0.3)
BILIRUB SERPL-MCNC: 1.2 MG/DL
CRP SERPL-MCNC: <3 MG/L
EOSINOPHIL # BLD AUTO: 0.6 10E3/UL (ref 0–0.7)
EOSINOPHIL NFR BLD AUTO: 8 %
ERYTHROCYTE [DISTWIDTH] IN BLOOD BY AUTOMATED COUNT: 12.4 % (ref 10–15)
ERYTHROCYTE [SEDIMENTATION RATE] IN BLOOD BY WESTERGREN METHOD: 8 MM/HR (ref 0–20)
HCT VFR BLD AUTO: 45.9 % (ref 40–53)
HGB BLD-MCNC: 15.2 G/DL (ref 13.3–17.7)
IMM GRANULOCYTES # BLD: 0 10E3/UL
IMM GRANULOCYTES NFR BLD: 0 %
LYMPHOCYTES # BLD AUTO: 2.5 10E3/UL (ref 0.8–5.3)
LYMPHOCYTES NFR BLD AUTO: 33 %
MCH RBC QN AUTO: 30.5 PG (ref 26.5–33)
MCHC RBC AUTO-ENTMCNC: 33.1 G/DL (ref 31.5–36.5)
MCV RBC AUTO: 92 FL (ref 78–100)
MONOCYTES # BLD AUTO: 1 10E3/UL (ref 0–1.3)
MONOCYTES NFR BLD AUTO: 13 %
NEUTROPHILS # BLD AUTO: 3.3 10E3/UL (ref 1.6–8.3)
NEUTROPHILS NFR BLD AUTO: 44 %
NRBC # BLD AUTO: 0 10E3/UL
NRBC BLD AUTO-RTO: 0 /100
PLATELET # BLD AUTO: 246 10E3/UL (ref 150–450)
PROT SERPL-MCNC: 7.4 G/DL (ref 6.4–8.3)
RBC # BLD AUTO: 4.99 10E6/UL (ref 4.4–5.9)
WBC # BLD AUTO: 7.5 10E3/UL (ref 4–11)

## 2023-06-16 PROCEDURE — 258N000003 HC RX IP 258 OP 636: Performed by: INTERNAL MEDICINE

## 2023-06-16 PROCEDURE — 86140 C-REACTIVE PROTEIN: CPT | Performed by: INTERNAL MEDICINE

## 2023-06-16 PROCEDURE — 96365 THER/PROPH/DIAG IV INF INIT: CPT

## 2023-06-16 PROCEDURE — 250N000011 HC RX IP 250 OP 636: Performed by: INTERNAL MEDICINE

## 2023-06-16 PROCEDURE — 85652 RBC SED RATE AUTOMATED: CPT | Performed by: INTERNAL MEDICINE

## 2023-06-16 PROCEDURE — 80076 HEPATIC FUNCTION PANEL: CPT | Performed by: INTERNAL MEDICINE

## 2023-06-16 PROCEDURE — 36415 COLL VENOUS BLD VENIPUNCTURE: CPT | Performed by: INTERNAL MEDICINE

## 2023-06-16 PROCEDURE — 85025 COMPLETE CBC W/AUTO DIFF WBC: CPT | Performed by: INTERNAL MEDICINE

## 2023-06-16 RX ORDER — HEPARIN SODIUM (PORCINE) LOCK FLUSH IV SOLN 100 UNIT/ML 100 UNIT/ML
5 SOLUTION INTRAVENOUS
Status: CANCELLED | OUTPATIENT
Start: 2023-07-07

## 2023-06-16 RX ORDER — CLOBETASOL PROPIONATE 0.5 MG/G
OINTMENT TOPICAL 2 TIMES DAILY
Qty: 30 G | Refills: 0 | Status: SHIPPED | OUTPATIENT
Start: 2023-06-16 | End: 2023-11-01

## 2023-06-16 RX ORDER — HEPARIN SODIUM,PORCINE 10 UNIT/ML
5 VIAL (ML) INTRAVENOUS
Status: CANCELLED | OUTPATIENT
Start: 2023-07-07

## 2023-06-16 RX ADMIN — SODIUM CHLORIDE 250 ML: 9 INJECTION, SOLUTION INTRAVENOUS at 08:26

## 2023-06-16 RX ADMIN — VEDOLIZUMAB 300 MG: 300 INJECTION, POWDER, LYOPHILIZED, FOR SOLUTION INTRAVENOUS at 08:46

## 2023-06-16 ASSESSMENT — PAIN SCALES - GENERAL: PAINLEVEL: NO PAIN (0)

## 2023-06-16 NOTE — TELEPHONE ENCOUNTER
"Call from patient's wife. Caller reports patient was seen for a rash on his ankles last year and prescribed \"a suave.\" The rash went away with the prescription Miller Hicks  prescribed (see visit 6/8/2023- Clobetasol) but then the rash returned later and the medication was re-prescibed.   Patient's wife is requesting a refill on behalf of the patient as the rash has returned. Rash is again on ankles &lower legs bilaterally.     Patient endorses itching  No pain  No fever  No SOB  No chest pain  No swelling     RN advised medication is not on active medication list an an appointment may be needed for reassessment but RN will route refill request to provider.     Routing refill request to last prescribing provider:     Patient and his [place] and request that if and appointment is needed please advise patient as soon as possible so he can schedule an appointment.     If an appointment is needed okay to use same day/ approval required/ or virtual video or E-visit?    Caller reports patient does not answer his phone so please call wife. Please let wife know either way if prescription is able to be sent or if appointment needed.     RINKU LrN, RN  [facility] ~ Registered Nurse  Clinic Triage ~ [place] [facility] [place]  June 16, 2023            "

## 2023-06-16 NOTE — PROGRESS NOTES
Infusion Nursing Note:  Mainor Grande presents today for his monthly Entyvio.    Patient seen by provider today: No   present during visit today: Not Applicable.    Note: Feeling well. Just got back from vacation to [place] and [place]. Seasonal allergies to pollen have been tough lately. Manages with the use of inhalers/anti-allergy meds.      Intravenous Access:  Peripheral IV placed.    Treatment Conditions:  Lab Results   Component Value Date    HGB 15.2 06/16/2023    WBC 7.5 06/16/2023    ANEU 5.3 12/01/2020    ANEUTAUTO 3.3 06/16/2023     06/16/2023      ~~~ NOTE: If the patient answers yes to any of the questions below, hold the infusion and contact ordering provider or on-call provider.    1. Have you recently had an elevated temperature, fever, chills, productive cough, coughing for 3 weeks or longer or hemoptysis,  abnormal vital signs, night sweats,  chest pain or have you noticed a decrease in your appetite, unexplained weight loss or fatigue? No  2. Do you have any open wounds or new incisions? No  3. Do you have any upcoming hospitalizations or surgeries? Does not include esophagogastroduodenoscopy, colonoscopy, endoscopic retrograde cholangiopancreatography (ERCP), endoscopic ultrasound (EUS), dental procedures or joint aspiration/steroid injections No  4. Do you currently have any signs of illness or infection or are you on any antibiotics? No  5. Have you had any new, sudden or worsening abdominal pain? No  6. Have you or anyone in your household received a live vaccination in the past 4 weeks? Please note: No live vaccines while on biologic/chemotherapy until 6 months after the last treatment. Patient can receive the flu vaccine (shot only), pneumovax and the Covid vaccine. It is optimal for the patient to get these vaccines mid cycle, but they can be given at any time as long as it is not on the day of the infusion. No  7. Have you recently been diagnosed with any new nervous  system diseases (ie. Multiple sclerosis, Guillain [place]essen, seizures, neurological changes) or cancer diagnosis? Are you on any form of radiation or chemotherapy? No  8. Are you pregnant or breast feeding or do you have plans of pregnancy in the future? No  9. Have you been having any signs of worsening depression or suicidal ideations?  (benlysta only) No  10. Have there been any other new onset medical symptoms? No  11. Have you had any new blood clots? (IVIG only) No      Post Infusion Assessment:  Patient tolerated infusion without incident.  Patient observed for 10 minutes post infusion per protocol.       Discharge Plan:   Patient discharged in stable condition accompanied by: self.  Departure Mode: Ambulatory.      Lydia Diallo RN

## 2023-07-07 DIAGNOSIS — I25.10 CORONARY ARTERY CALCIFICATION: ICD-10-CM

## 2023-07-12 ENCOUNTER — OFFICE VISIT (OUTPATIENT)
Dept: GASTROENTEROLOGY | Facility: CLINIC | Age: 68
End: 2023-07-12
Payer: COMMERCIAL

## 2023-07-12 VITALS
HEIGHT: 71 IN | OXYGEN SATURATION: 97 % | DIASTOLIC BLOOD PRESSURE: 80 MMHG | BODY MASS INDEX: 27.77 KG/M2 | WEIGHT: 198.4 LBS | HEART RATE: 57 BPM | SYSTOLIC BLOOD PRESSURE: 131 MMHG

## 2023-07-12 DIAGNOSIS — K83.01 PSC (PRIMARY SCLEROSING CHOLANGITIS) (H): Primary | ICD-10-CM

## 2023-07-12 DIAGNOSIS — K51.00 ULCERATIVE PANCOLITIS WITHOUT COMPLICATION (H): ICD-10-CM

## 2023-07-12 DIAGNOSIS — E55.9 VITAMIN D DEFICIENCY: ICD-10-CM

## 2023-07-12 PROCEDURE — 99214 OFFICE O/P EST MOD 30 MIN: CPT | Performed by: INTERNAL MEDICINE

## 2023-07-12 RX ORDER — CHOLECALCIFEROL (VITAMIN D3) 50 MCG
1 TABLET ORAL DAILY
Qty: 30 TABLET | Refills: 11 | Status: SHIPPED | OUTPATIENT
Start: 2023-07-12

## 2023-07-12 RX ORDER — ESCITALOPRAM OXALATE 10 MG/1
1 TABLET ORAL DAILY
COMMUNITY
Start: 2023-06-22

## 2023-07-12 RX ORDER — DONEPEZIL HYDROCHLORIDE 5 MG/1
10 TABLET, FILM COATED ORAL AT BEDTIME
COMMUNITY
Start: 2023-06-22

## 2023-07-12 RX ORDER — ATORVASTATIN CALCIUM 40 MG/1
40 TABLET, FILM COATED ORAL DAILY
Qty: 90 TABLET | Refills: 0 | Status: SHIPPED | OUTPATIENT
Start: 2023-07-12 | End: 2023-10-12

## 2023-07-12 ASSESSMENT — PAIN SCALES - GENERAL: PAINLEVEL: NO PAIN (0)

## 2023-07-12 NOTE — NURSING NOTE
"Chief Complaint   Patient presents with     Follow Up     Follow up for ulcerative pancolitis. Last seen on 11/09/2022.  Colonoscopy done on 05/18/2023.     He requests these members of his care team be copied on today's visit information:  PCP: Francisco Hicks    Vitals:    07/12/23 0925   BP: 131/80   BP Location: Left arm   Patient Position: Sitting   Cuff Size: Adult Regular   Pulse: 57   SpO2: 97%   Weight: 90 kg (198 lb 6.4 oz)   Height: 1.791 m (5' 10.5\")     Body mass index is 28.07 kg/m .    Medications were reconciled.    Does patient need any medication refills at today's visit? No        Margot Gerard CMA    "

## 2023-07-12 NOTE — TELEPHONE ENCOUNTER
Last Clinic Visit: 3/22/2022 [facility]    Appointment past due: Rena refill of Atorvastatin was sent for 90 day supply and staff message sent to [place] clinic scheduling.

## 2023-07-12 NOTE — PROGRESS NOTES
GI CLINIC VISIT    CC/REFERRING MD:  Referred Self  REASON FOR CONSULTATION:   Referred Self for   Chief Complaint   Patient presents with     Follow Up     Follow up for ulcerative pancolitis. Last seen on 11/09/2022.  Colonoscopy done on 05/18/2023.         JAMAL Burr presents today with his wife for follow-up.  Doing well from a colitis standpoint. Tolerating entyvio without any issue.   Recently returned from a trip to [place] and [place]. Is planning to establish care for PSC with Dr. Joiner now that Dr. Fu has retired - was supposed to get MRCP sometime around July - hoping to help get this scheduled.    Colonoscopy with me without evidence of active disease.  For a few days after his procedure noticed worsening mental status/confusion - was also planning a trip at the time so wondering if that may have been contributing as well.  This was understandably very difficult for his wife and him to deal with - wondering if he still needs colonoscopies annually.    Is following with neurology and has seen neuropsych regarding dementia.  Planning to see OT and PT.       IBD history:  Age at diagnosis: 64  Extent of disease: pancolitis  EIM: PSC  Current/previous medications: entyvio q4 weeks    ROS:    No fevers or chills  No weight loss  No blurry vision, double vision or change in vision  No sore throat  No lymphadenopathy  No headache, paraesthesias, or weakness in a limb  No shortness of breath or wheezing  No chest pain or pressure  No arthralgias or myalgias  No rashes or skin changes  No odynophagia or dysphagia  No BRBPR, hematochezia, melena  No dysuria, frequency or urgency  No hot/cold intolerance or polyria  No anxiety or depression    PROBLEM LIST  Patient Active Problem List    Diagnosis Date Noted     Dementia, unspecified dementia severity, unspecified dementia type, unspecified whether behavioral, psychotic, or mood disturbance or anxiety (H) 03/16/2023     Priority: Medium     PSC (primary  sclerosing cholangitis) 07/02/2021     Priority: Medium     Ulcerative pancolitis without complication (H) 08/05/2020     Priority: Medium     Memory changes 12/06/2019     Priority: Medium     Nummular eczema 02/12/2019     Priority: Medium     Deviated nasal septum 08/01/2013     Priority: Medium     Chronic nasal congestion 08/01/2013     Priority: Medium     Seasonal allergic rhinitis 07/18/2011     Priority: Medium     Primarily fall and spring allergies       HYPERLIPIDEMIA LDL GOAL <160 10/31/2010     Priority: Medium     Moderate persistent asthma with acute exacerbation 09/12/2008     Priority: Medium     Erectile dysfunction 06/03/2008     Priority: Medium     Abnormal levels of other serum enzymes 04/15/2004     Priority: Medium     S/p liver biopsy -6/2016 . 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       Reflux esophagitis 03/20/2003     Priority: Medium       PERTINENT PAST MEDICAL HISTORY:  Past Medical History:   Diagnosis Date     Allergic rhinitis, cause unspecified      Fatty liver 2004    elevated LFT, saw GI for a consultation     Mild persistent asthma 9/12/2008     Reflux esophagitis      Unspecified disease of respiratory system     RAD     Viremia, unspecified     acute       PREVIOUS SURGERIES:  Past Surgical History:   Procedure Laterality Date     COLONOSCOPY  9/4/2013    Procedure: COLONOSCOPY;  colonoscopy;  Surgeon: Beto Keen MD;  Location:  GI     COLONOSCOPY N/A 7/31/2020    Procedure: Colonoscopy, With Polypectomy And Biopsy;  Surgeon: Lily Eaton DO;  Location: [facility]     COLONOSCOPY N/A 4/5/2021    Procedure: Colonoscopy, With Polypectomy And Biopsy;  Surgeon: Lily Eaton DO;  [facility]     COLONOSCOPY N/A 5/3/2022    Procedure: COLONOSCOPY, WITH POLYPECTOMY AND BIOPSY;  Surgeon: Lily Eaton DO;  [facility]     COLONOSCOPY N/A 5/3/2022    Procedure: COLONOSCOPY, FLEXIBLE, WITH LESION REMOVAL USING SNARE;  Surgeon:  Lily Eaton DO;  Location: [facility]     COLONOSCOPY N/A 5/18/2023    Procedure: COLONOSCOPY, WITH BIOPSY;  Surgeon: Lily Eaton DO;  Location: [facility]     COLONOSCOPY WITH CO2 INSUFFLATION N/A 7/31/2020    Procedure: COLONOSCOPY, WITH CO2 INSUFFLATION;  Surgeon: Lily Eaton DO;  [facility]     COLONOSCOPY WITH CO2 INSUFFLATION N/A 4/5/2021    Procedure: COLONOSCOPY, WITH CO2 INSUFFLATION;  Surgeon: Lily Eaton DO;  [facility]     COLONOSCOPY WITH CO2 INSUFFLATION N/A 5/3/2022    Procedure: COLONOSCOPY, WITH CO2 INSUFFLATION;  Surgeon: Lily Eaton DO;  [facility]     COMBINED ESOPHAGOSCOPY, GASTROSCOPY, DUODENOSCOPY (EGD) WITH CO2 INSUFFLATION N/A 7/31/2020    Procedure: ESOPHAGOGASTRODUODENOSCOPY, WITH CO2 INSUFFLATION;  Surgeon: Lily Eaton DO;  [facility]     ESOPHAGOSCOPY, GASTROSCOPY, DUODENOSCOPY (EGD), COMBINED N/A 7/31/2020    Procedure: Esophagogastroduodenoscopy, With Biopsy;  Surgeon: Lily Eaton DO;  Location: [facility]     HC VASECTOMY UNILAT/BILAT W POSTOP SEMEN  1992    Vasectomy     ZZHC COLONOSCOPY THRU STOMA, DIAGNOSTIC  2005    normal       PREVIOUS ENDOSCOPY:      ALLERGIES:     Allergies   Allergen Reactions     Seasonal Allergies        PERTINENT MEDICATIONS:    Current Outpatient Medications:      albuterol (PROAIR HFA/PROVENTIL HFA/VENTOLIN HFA) 108 (90 Base) MCG/ACT inhaler, Inhale 2 puffs into the lungs every 6 hours, Disp: 18 g, Rfl: 11     ASPIRIN PO, Take 81 mg by mouth at onset of headache for moderate pain , Disp: , Rfl:      atorvastatin (LIPITOR) 40 MG tablet, Take 1 tablet (40 mg) by mouth daily, Disp: 90 tablet, Rfl: 3     azelastine (ASTELIN) 0.1 % nasal spray, SPRAY 2 SPRAYS IN EACH NOSTRIL TWO TIMES A DAY, Disp: 30 mL, Rfl: 3     budesonide-formoterol (SYMBICORT) 160-4.5 MCG/ACT Inhaler, Inhale 2 puffs into the lungs 2 times daily, Disp: 10.2 g, Rfl: 11     clobetasol (TEMOVATE) 0.05 % external ointment, Apply topically 2 times  daily to affected area(s), Disp: 30 g, Rfl: 0     donepezil (ARICEPT) 5 MG tablet, Take 1 tablet by mouth daily at 2 pm, Disp: , Rfl:      escitalopram (LEXAPRO) 10 MG tablet, Take 1 tablet by mouth daily at 2 pm, Disp: , Rfl:      esomeprazole (NEXIUM) 20 MG DR capsule, Take 20 mg by mouth daily Take 30-60 minutes before eating. Takes clear baudilio, Disp: , Rfl:      fluticasone (FLONASE) 50 MCG/ACT nasal spray, Spray 1-2 sprays into both nostrils daily, Disp: 16 g, Rfl: 11     melatonin 1 MG TABS, Take 1 mg by mouth nightly as needed for sleep, Disp: , Rfl:      montelukast (SINGULAIR) 10 MG tablet, Take 1 tablet (10 mg) by mouth At Bedtime, Disp: 30 tablet, Rfl: 11     Multiple Vitamins-Minerals (MULTIVITAMIN ADULT PO), Take 1 tablet by mouth daily , Disp: , Rfl:      Naphazoline-Glycerin-Zinc Sulf (CLEAR EYES MAXIMUM ITCHY EYE) 0.012-0.25-0.25 % SOLN, Apply to eye as needed (for allergies), Disp: , Rfl:      ursodiol (ACTIGALL) 300 MG capsule, TAKE 1 CAPSULE (300 MG) BY MOUUTH 2 TIMES DAILY FOR 90 DAYS., Disp: 180 capsule, Rfl: 1     vitamin D3 (CHOLECALCIFEROL) 50 mcg (2000 units) tablet, Take 1 tablet (50 mcg) by mouth daily, Disp: 30 tablet, Rfl: 11    SOCIAL HISTORY:  Social History     Socioeconomic History     Marital status:      Spouse name: Not on file     Number of children: 2     Years of education: Not on file     Highest education level: Not on file   Occupational History     Occupation:    Tobacco Use     Smoking status: Never     Smokeless tobacco: Never   Vaping Use     Vaping Use: Never used   Substance and Sexual Activity     Alcohol use: Yes     Alcohol/week: 1.0 - 2.0 standard drink of alcohol     Types: 1 - 2 Standard drinks or equivalent per week     Comment: occasional     Drug use: No     Sexual activity: Yes     Partners: Female     Birth control/protection: Surgical   Other Topics Concern     Parent/sibling w/ CABG, MI or angioplasty before 65F 55M? Not Asked  "  Social History Narrative     Not on file     Social Determinants of Health     Financial Resource Strain: Not on file   Food Insecurity: Not on file   Transportation Needs: Not on file   Physical Activity: Not on file   Stress: Not on file   Social Connections: Not on file   Intimate Partner Violence: Not on file   Housing Stability: Not on file       FAMILY HISTORY:  Family History   Problem Relation Age of Onset     Cancer Mother         bladder cancer     Neurologic Disorder Father         alzheimers dementia     Gastrointestinal Disease Brother         divertculitis       Past/family/social history reviewed and no changes    PHYSICAL EXAMINATION:  Constitutional: aaox3, cooperative, pleasant, not dyspneic/diaphoretic, no acute distress  Vitals reviewed: /80 (BP Location: Left arm, Patient Position: Sitting, Cuff Size: Adult Regular)   Pulse 57   Ht 1.791 m (5' 10.5\")   Wt 90 kg (198 lb 6.4 oz)   SpO2 97%   BMI 28.07 kg/m    Wt:   Wt Readings from Last 2 Encounters:   07/12/23 90 kg (198 lb 6.4 oz)   06/16/23 89.5 kg (197 lb 4.8 oz)      Eyes: Sclera anicteric/injected  Ears/nose/mouth/throat: Normal oropharynx without ulcers or exudate, mucus membranes moist, hearing intact  Neck: supple, thyroid normal size  CV: No edema  Respiratory: Unlabored breathing  Skin: warm, perfused, no jaundice  Psych: Normal affect  MSK: Normal gait      PERTINENT STUDIES:  Most recent CBC:  Recent Labs   Lab Test 06/16/23  0734 04/06/23  0809   WBC 7.5 8.3   HGB 15.2 15.1   HCT 45.9 44.4    200     Most recent hepatic panel:  Recent Labs   Lab Test 06/16/23  0734 04/06/23  0809   ALT 34 33   AST 46* 46     Most recent creatinine:  Recent Labs   Lab Test 04/06/23  0809 07/01/22  0725   CR 0.95 0.90       ASSESSMENT/PLAN:    # UC pancolitis - in clinical and histologic remission on entyvio - will continue at [id number]. Did note difficulty with cognition for a few days after most recent colonoscopy - this is not " unexpected given dementia as well as dietary changes/sleep issues regarding prep as well as the anesthetics.  Given UC pancolitis and PSC - should be done annually given CRC risk but given difficulties, would also be reasonable to defer/space out procedures - will further discuss in 6 months    # PSC - previously followed with Dr. Fu - planning to follow with Dr. Joiner now as Dr. Fu has retired.  Will help arrange for MRCP sometime within the next 1-2 months    # dementia    RTC 6 months

## 2023-07-12 NOTE — PATIENT INSTRUCTIONS
Please start taking vitamin D daily.  I have put in the MRI that Dr. Fu wanted sometime around July - we can see if we can get this a day you go in for your infusion.

## 2023-07-12 NOTE — LETTER
7/12/2023         RE: Mainor Grande  [id number] [address] [facility] [address]        Dear Colleague,    Thank you for referring your patient, Mainor Grande, to the [facility]. Please see a copy of my visit note below.    GI CLINIC VISIT    CC/REFERRING MD:  Referred Self  REASON FOR CONSULTATION:   Referred Self for   Chief Complaint   Patient presents with     Follow Up     Follow up for ulcerative pancolitis. Last seen on 11/09/2022.  Colonoscopy done on 05/18/2023.         JAMAL Burr presents today with his wife for follow-up.  Doing well from a colitis standpoint. Tolerating entyvio without any issue.   Recently returned from a trip to [place] and [place]. Is planning to establish care for PSC with Dr. Joiner now that Dr. Fu has retired - was supposed to get MRCP sometime around July - hoping to help get this scheduled.    Colonoscopy with me without evidence of active disease.  For a few days after his procedure noticed worsening mental status/confusion - was also planning a trip at the time so wondering if that may have been contributing as well.  This was understandably very difficult for his wife and him to deal with - wondering if he still needs colonoscopies annually.    Is following with neurology and has seen neuropsych regarding dementia.  Planning to see OT and PT.       IBD history:  Age at diagnosis: 64  Extent of disease: pancolitis  EIM: PSC  Current/previous medications: entyvio q4 weeks    ROS:    No fevers or chills  No weight loss  No blurry vision, double vision or change in vision  No sore throat  No lymphadenopathy  No headache, paraesthesias, or weakness in a limb  No shortness of breath or wheezing  No chest pain or pressure  No arthralgias or myalgias  No rashes or skin changes  No odynophagia or dysphagia  No BRBPR, hematochezia, melena  No dysuria, frequency or urgency  No hot/cold intolerance or polyria  No anxiety or depression    PROBLEM  LIST  Patient Active Problem List    Diagnosis Date Noted     Dementia, unspecified dementia severity, unspecified dementia type, unspecified whether behavioral, psychotic, or mood disturbance or anxiety (H) 03/16/2023     Priority: Medium     PSC (primary sclerosing cholangitis) 07/02/2021     Priority: Medium     Ulcerative pancolitis without complication (H) 08/05/2020     Priority: Medium     Memory changes 12/06/2019     Priority: Medium     Nummular eczema 02/12/2019     Priority: Medium     Deviated nasal septum 08/01/2013     Priority: Medium     Chronic nasal congestion 08/01/2013     Priority: Medium     Seasonal allergic rhinitis 07/18/2011     Priority: Medium     Primarily fall and spring allergies       HYPERLIPIDEMIA LDL GOAL <160 10/31/2010     Priority: Medium     Moderate persistent asthma with acute exacerbation 09/12/2008     Priority: Medium     Erectile dysfunction 06/03/2008     Priority: Medium     Abnormal levels of other serum enzymes 04/15/2004     Priority: Medium     S/p liver biopsy -6/2016 . 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       Reflux esophagitis 03/20/2003     Priority: Medium       PERTINENT PAST MEDICAL HISTORY:  Past Medical History:   Diagnosis Date     Allergic rhinitis, cause unspecified      Fatty liver 2004    elevated LFT, saw GI for a consultation     Mild persistent asthma 9/12/2008     Reflux esophagitis      Unspecified disease of respiratory system     RAD     Viremia, unspecified     acute       PREVIOUS SURGERIES:  Past Surgical History:   Procedure Laterality Date     COLONOSCOPY  9/4/2013    Procedure: COLONOSCOPY;  colonoscopy;  Surgeon: Beto Keen MD;  Location:  GI     COLONOSCOPY N/A 7/31/2020    Procedure: Colonoscopy, With Polypectomy And Biopsy;  Surgeon: Lily Eaton DO;  Location[facility]     COLONOSCOPY N/A 4/5/2021    Procedure: Colonoscopy, With Polypectomy And Biopsy;  Surgeon: Lily Eaton DO;   [facility]     COLONOSCOPY N/A 5/3/2022    Procedure: COLONOSCOPY, WITH POLYPECTOMY AND BIOPSY;  Surgeon: Lily Eaton DO;  [facility]     COLONOSCOPY N/A 5/3/2022    Procedure: COLONOSCOPY, FLEXIBLE, WITH LESION REMOVAL USING SNARE;  Surgeon: Lily Eaton DO;  [facility]     COLONOSCOPY N/A 5/18/2023    Procedure: COLONOSCOPY, WITH BIOPSY;  Surgeon: Lily Eaton DO;  [facility]     COLONOSCOPY WITH CO2 INSUFFLATION N/A 7/31/2020    Procedure: COLONOSCOPY, WITH CO2 INSUFFLATION;  Surgeon: Lily Eaton DO;  [facility]     COLONOSCOPY WITH CO2 INSUFFLATION N/A 4/5/2021    Procedure: COLONOSCOPY, WITH CO2 INSUFFLATION;  Surgeon: Lily Eaton DO;  [facility]     COLONOSCOPY WITH CO2 INSUFFLATION N/A 5/3/2022    Procedure: COLONOSCOPY, WITH CO2 INSUFFLATION;  Surgeon: Lily Eaton DO;  [facility]     COMBINED ESOPHAGOSCOPY, GASTROSCOPY, DUODENOSCOPY (EGD) WITH CO2 INSUFFLATION N/A 7/31/2020    Procedure: ESOPHAGOGASTRODUODENOSCOPY, WITH CO2 INSUFFLATION;  Surgeon: Lily Eaton DO;  [facility]     ESOPHAGOSCOPY, GASTROSCOPY, DUODENOSCOPY (EGD), COMBINED N/A 7/31/2020    Procedure: Esophagogastroduodenoscopy, With Biopsy;  Surgeon: Lily Eaton DO;  Location: [facility]     HC VASECTOMY UNILAT/BILAT W POSTOP SEMEN  1992    Vasectomy     ZZHC COLONOSCOPY THRU STOMA, DIAGNOSTIC  2005    normal       PREVIOUS ENDOSCOPY:      ALLERGIES:     Allergies   Allergen Reactions     Seasonal Allergies        PERTINENT MEDICATIONS:    Current Outpatient Medications:      albuterol (PROAIR HFA/PROVENTIL HFA/VENTOLIN HFA) 108 (90 Base) MCG/ACT inhaler, Inhale 2 puffs into the lungs every 6 hours, Disp: 18 g, Rfl: 11     ASPIRIN PO, Take 81 mg by mouth at onset of headache for moderate pain , Disp: , Rfl:      atorvastatin (LIPITOR) 40 MG tablet, Take 1 tablet (40 mg) by mouth daily, Disp: 90 tablet, Rfl: 3     azelastine (ASTELIN) 0.1 % nasal spray, SPRAY 2 SPRAYS  IN EACH NOSTRIL TWO TIMES A DAY, Disp: 30 mL, Rfl: 3     budesonide-formoterol (SYMBICORT) 160-4.5 MCG/ACT Inhaler, Inhale 2 puffs into the lungs 2 times daily, Disp: 10.2 g, Rfl: 11     clobetasol (TEMOVATE) 0.05 % external ointment, Apply topically 2 times daily to affected area(s), Disp: 30 g, Rfl: 0     donepezil (ARICEPT) 5 MG tablet, Take 1 tablet by mouth daily at 2 pm, Disp: , Rfl:      escitalopram (LEXAPRO) 10 MG tablet, Take 1 tablet by mouth daily at 2 pm, Disp: , Rfl:      esomeprazole (NEXIUM) 20 MG DR capsule, Take 20 mg by mouth daily Take 30-60 minutes before eating. Takes clear baudilio, Disp: , Rfl:      fluticasone (FLONASE) 50 MCG/ACT nasal spray, Spray 1-2 sprays into both nostrils daily, Disp: 16 g, Rfl: 11     melatonin 1 MG TABS, Take 1 mg by mouth nightly as needed for sleep, Disp: , Rfl:      montelukast (SINGULAIR) 10 MG tablet, Take 1 tablet (10 mg) by mouth At Bedtime, Disp: 30 tablet, Rfl: 11     Multiple Vitamins-Minerals (MULTIVITAMIN ADULT PO), Take 1 tablet by mouth daily , Disp: , Rfl:      Naphazoline-Glycerin-Zinc Sulf (CLEAR EYES MAXIMUM ITCHY EYE) 0.012-0.25-0.25 % SOLN, Apply to eye as needed (for allergies), Disp: , Rfl:      ursodiol (ACTIGALL) 300 MG capsule, TAKE 1 CAPSULE (300 MG) BY MOUUTH 2 TIMES DAILY FOR 90 DAYS., Disp: 180 capsule, Rfl: 1     vitamin D3 (CHOLECALCIFEROL) 50 mcg (2000 units) tablet, Take 1 tablet (50 mcg) by mouth daily, Disp: 30 tablet, Rfl: 11    SOCIAL HISTORY:  Social History     Socioeconomic History     Marital status:      Spouse name: Not on file     Number of children: 2     Years of education: Not on file     Highest education level: Not on file   Occupational History     Occupation:    Tobacco Use     Smoking status: Never     Smokeless tobacco: Never   Vaping Use     Vaping Use: Never used   Substance and Sexual Activity     Alcohol use: Yes     Alcohol/week: 1.0 - 2.0 standard drink of alcohol     Types: 1 - 2  "Standard drinks or equivalent per week     Comment: occasional     Drug use: No     Sexual activity: Yes     Partners: Female     Birth control/protection: Surgical   Other Topics Concern     Parent/sibling w/ CABG, MI or angioplasty before 65F 55M? Not Asked   Social History Narrative     Not on file     Social Determinants of Health     Financial Resource Strain: Not on file   Food Insecurity: Not on file   Transportation Needs: Not on file   Physical Activity: Not on file   Stress: Not on file   Social Connections: Not on file   Intimate Partner Violence: Not on file   Housing Stability: Not on file       FAMILY HISTORY:  Family History   Problem Relation Age of Onset     Cancer Mother         bladder cancer     Neurologic Disorder Father         alzheimers dementia     Gastrointestinal Disease Brother         divertculitis       Past/family/social history reviewed and no changes    PHYSICAL EXAMINATION:  Constitutional: aaox3, cooperative, pleasant, not dyspneic/diaphoretic, no acute distress  Vitals reviewed: /80 (BP Location: Left arm, Patient Position: Sitting, Cuff Size: Adult Regular)   Pulse 57   Ht 1.791 m (5' 10.5\")   Wt 90 kg (198 lb 6.4 oz)   SpO2 97%   BMI 28.07 kg/m    Wt:   Wt Readings from Last 2 Encounters:   07/12/23 90 kg (198 lb 6.4 oz)   06/16/23 89.5 kg (197 lb 4.8 oz)      Eyes: Sclera anicteric/injected  Ears/nose/mouth/throat: Normal oropharynx without ulcers or exudate, mucus membranes moist, hearing intact  Neck: supple, thyroid normal size  CV: No edema  Respiratory: Unlabored breathing  Skin: warm, perfused, no jaundice  Psych: Normal affect  MSK: Normal gait      PERTINENT STUDIES:  Most recent CBC:  Recent Labs   Lab Test 06/16/23  0734 04/06/23  0809   WBC 7.5 8.3   HGB 15.2 15.1   HCT 45.9 44.4    200     Most recent hepatic panel:  Recent Labs   Lab Test 06/16/23  0734 04/06/23  0809   ALT 34 33   AST 46* 46     Most recent creatinine:  Recent Labs   Lab Test " 04/06/23  0809 07/01/22  0725   CR 0.95 0.90       ASSESSMENT/PLAN:    # UC pancolitis - in clinical and histologic remission on entyvio - will continue at [id number]. Did note difficulty with cognition for a few days after most recent colonoscopy - this is not unexpected given dementia as well as dietary changes/sleep issues regarding prep as well as the anesthetics.  Given UC pancolitis and PSC - should be done annually given CRC risk but given difficulties, would also be reasonable to defer/space out procedures - will further discuss in 6 months    # PSC - previously followed with Dr. Fu - planning to follow with Dr. Joiner now as Dr. Fu has retired.  Will help arrange for MRCP sometime within the next 1-2 months    # dementia    RTC 6 months            Again, thank you for allowing me to participate in the care of your patient.        Sincerely,        Lily Eaton, DO

## 2023-07-13 NOTE — TELEPHONE ENCOUNTER
7/13 Called and left voicemail. Provided phone number [phone] to schedule follow up appointment with Dr. Génesis bedolla Procedure   Orthopedics, Podiatry, Sports Medicine, Ent ,Eye , Audiology, Adult Endocrine & Diabetes, Nutrition & Medication Therapy Management Specialties   [facility]

## 2023-07-14 ENCOUNTER — INFUSION THERAPY VISIT (OUTPATIENT)
Dept: INFUSION THERAPY | Facility: CLINIC | Age: 68
End: 2023-07-14
Payer: COMMERCIAL

## 2023-07-14 VITALS
HEART RATE: 61 BPM | BODY MASS INDEX: 28.12 KG/M2 | DIASTOLIC BLOOD PRESSURE: 63 MMHG | OXYGEN SATURATION: 98 % | RESPIRATION RATE: 16 BRPM | WEIGHT: 198.8 LBS | SYSTOLIC BLOOD PRESSURE: 127 MMHG | TEMPERATURE: 98.2 F

## 2023-07-14 DIAGNOSIS — K51.00 ULCERATIVE PANCOLITIS WITHOUT COMPLICATION (H): Primary | ICD-10-CM

## 2023-07-14 LAB
BASOPHILS # BLD AUTO: 0.1 10E3/UL (ref 0–0.2)
BASOPHILS NFR BLD AUTO: 2 %
EOSINOPHIL # BLD AUTO: 0.3 10E3/UL (ref 0–0.7)
EOSINOPHIL NFR BLD AUTO: 4 %
ERYTHROCYTE [DISTWIDTH] IN BLOOD BY AUTOMATED COUNT: 12.5 % (ref 10–15)
HCT VFR BLD AUTO: 44 % (ref 40–53)
HGB BLD-MCNC: 14.6 G/DL (ref 13.3–17.7)
IMM GRANULOCYTES # BLD: 0 10E3/UL
IMM GRANULOCYTES NFR BLD: 0 %
LYMPHOCYTES # BLD AUTO: 2.3 10E3/UL (ref 0.8–5.3)
LYMPHOCYTES NFR BLD AUTO: 31 %
MCH RBC QN AUTO: 30.5 PG (ref 26.5–33)
MCHC RBC AUTO-ENTMCNC: 33.2 G/DL (ref 31.5–36.5)
MCV RBC AUTO: 92 FL (ref 78–100)
MONOCYTES # BLD AUTO: 0.8 10E3/UL (ref 0–1.3)
MONOCYTES NFR BLD AUTO: 11 %
NEUTROPHILS # BLD AUTO: 3.9 10E3/UL (ref 1.6–8.3)
NEUTROPHILS NFR BLD AUTO: 52 %
NRBC # BLD AUTO: 0 10E3/UL
NRBC BLD AUTO-RTO: 0 /100
PLATELET # BLD AUTO: 218 10E3/UL (ref 150–450)
RBC # BLD AUTO: 4.78 10E6/UL (ref 4.4–5.9)
WBC # BLD AUTO: 7.4 10E3/UL (ref 4–11)

## 2023-07-14 PROCEDURE — 85025 COMPLETE CBC W/AUTO DIFF WBC: CPT | Performed by: INTERNAL MEDICINE

## 2023-07-14 PROCEDURE — 96365 THER/PROPH/DIAG IV INF INIT: CPT

## 2023-07-14 PROCEDURE — 36415 COLL VENOUS BLD VENIPUNCTURE: CPT | Performed by: INTERNAL MEDICINE

## 2023-07-14 PROCEDURE — 250N000011 HC RX IP 250 OP 636: Mod: JZ | Performed by: INTERNAL MEDICINE

## 2023-07-14 PROCEDURE — 258N000003 HC RX IP 258 OP 636: Performed by: INTERNAL MEDICINE

## 2023-07-14 RX ORDER — HEPARIN SODIUM,PORCINE 10 UNIT/ML
5 VIAL (ML) INTRAVENOUS
Status: CANCELLED | OUTPATIENT
Start: 2023-08-11

## 2023-07-14 RX ORDER — HEPARIN SODIUM (PORCINE) LOCK FLUSH IV SOLN 100 UNIT/ML 100 UNIT/ML
5 SOLUTION INTRAVENOUS
Status: CANCELLED | OUTPATIENT
Start: 2023-08-11

## 2023-07-14 RX ADMIN — SODIUM CHLORIDE 250 ML: 9 INJECTION, SOLUTION INTRAVENOUS at 08:52

## 2023-07-14 RX ADMIN — VEDOLIZUMAB 300 MG: 300 INJECTION, POWDER, LYOPHILIZED, FOR SOLUTION INTRAVENOUS at 08:53

## 2023-07-14 ASSESSMENT — PAIN SCALES - GENERAL: PAINLEVEL: NO PAIN (0)

## 2023-07-14 NOTE — PROGRESS NOTES
Infusion Nursing Note:  Mainor Grande presents today for his monthly Entyvio infusion.    Patient seen by provider today: No   present during visit today: Not Applicable.    Note: Pt here today for his Entyvio infusion.  Pt has been feeling good. No new concerns. Answered no to Biologic questions.       Intravenous Access:  Peripheral IV placed.    Treatment Conditions:  Not Applicable.      Post Infusion Assessment:  Patient tolerated infusion without incident.  Access discontinued per protocol.  Biologic Infusion Post Education: Call the triage nurse at your clinic or seek medical attention if you have chills and/or temperature greater than or equal to 100.5, uncontrolled nausea/vomiting, diarrhea, constipation, dizziness, shortness of breath, chest pain, heart palpitations, weakness or any other new or concerning symptoms, questions or concerns.  You cannot have any live virus vaccines prior to or during treatment or up to 6 months post infusion.  If you have an upcoming surgery, medical procedure or dental procedure during treatment, this should be discussed with your ordering physician and your surgeon/dentist.  If you are having any concerning symptom, if you are unsure if you should get your next infusion or wish to speak to a provider before your next infusion, please call your care coordinator or triage nurse at your clinic to notify them so we can adequately serve you.       Discharge Plan:   Departure Mode: Ambulatory.  Discharged to home  Pt has appt to return on 08/11      May Mtz RN

## 2023-07-14 NOTE — LETTER
July 19, 2023      Mainor Grande  [id number] [facility] [address]        Dear ,    We are writing to inform you of your test results.  Your test results returned within the normal range.    If you have any questions or concerns, please contact the clinic at [phone] option 3.       Sincerely,    Lily Eaton, DO  Resulted Orders   CBC with platelets and differential   Result Value Ref Range    WBC Count 7.4 4.0 - 11.0 10e3/uL    RBC Count 4.78 4.40 - 5.90 10e6/uL    Hemoglobin 14.6 13.3 - 17.7 g/dL    Hematocrit 44.0 40.0 - 53.0 %    MCV 92 78 - 100 fL    MCH 30.5 26.5 - 33.0 pg    MCHC 33.2 31.5 - 36.5 g/dL    RDW 12.5 10.0 - 15.0 %    Platelet Count 218 150 - 450 10e3/uL    % Neutrophils 52 %    % Lymphocytes 31 %    % Monocytes 11 %    % Eosinophils 4 %    % Basophils 2 %    % Immature Granulocytes 0 %    NRBCs per 100 WBC 0 <1 /100    Absolute Neutrophils 3.9 1.6 - 8.3 10e3/uL    Absolute Lymphocytes 2.3 0.8 - 5.3 10e3/uL    Absolute Monocytes 0.8 0.0 - 1.3 10e3/uL    Absolute Eosinophils 0.3 0.0 - 0.7 10e3/uL    Absolute Basophils 0.1 0.0 - 0.2 10e3/uL    Absolute Immature Granulocytes 0.0 <=0.4 10e3/uL    Absolute NRBCs 0.0 10e3/uL

## 2023-07-19 NOTE — TELEPHONE ENCOUNTER
7/19 2nd attempt.  Called and left voicemail. Provided phone number [phone] to schedule follow up appointment with Dr. Génesis bedolla Procedure   Orthopedics, Podiatry, Sports Medicine, Ent ,Eye , Audiology, Adult Endocrine & Diabetes, Nutrition & Medication Therapy Management Specialties   [facility]

## 2023-07-27 ENCOUNTER — PATIENT OUTREACH (OUTPATIENT)
Dept: CARE COORDINATION | Facility: CLINIC | Age: 68
End: 2023-07-27
Payer: COMMERCIAL

## 2023-07-27 NOTE — PROGRESS NOTES
Social Work Telephone Message Note  [facility]     Patient Name:  Mainor Grande  /Age:  1955 (67 year old)    Referral Source: GI  Reason for Referral:  follow-up     attempted to contact Patient via telephone on 23. Sw had been consulted to connect with patient regarding resources and support, attempted to connect with patient today for follow up. Left a message providing writer contact information encouraging a return call if needed.  will await Patient's return phone call and will provide assistance at that time.          DENISE Nunes, [facility]    [facility]  [phone]  [email or web address]

## 2023-08-11 ENCOUNTER — INFUSION THERAPY VISIT (OUTPATIENT)
Dept: INFUSION THERAPY | Facility: CLINIC | Age: 68
End: 2023-08-11
Payer: COMMERCIAL

## 2023-08-11 VITALS
RESPIRATION RATE: 16 BRPM | HEART RATE: 65 BPM | OXYGEN SATURATION: 100 % | TEMPERATURE: 97 F | WEIGHT: 202.5 LBS | SYSTOLIC BLOOD PRESSURE: 139 MMHG | DIASTOLIC BLOOD PRESSURE: 81 MMHG | BODY MASS INDEX: 28.65 KG/M2

## 2023-08-11 DIAGNOSIS — K51.00 ULCERATIVE PANCOLITIS WITHOUT COMPLICATION (H): Primary | ICD-10-CM

## 2023-08-11 LAB
ALBUMIN SERPL BCG-MCNC: 4.2 G/DL (ref 3.5–5.2)
ALP SERPL-CCNC: 154 U/L (ref 40–129)
ALT SERPL W P-5'-P-CCNC: 30 U/L (ref 0–70)
AST SERPL W P-5'-P-CCNC: 46 U/L (ref 0–45)
BASOPHILS # BLD AUTO: 0.1 10E3/UL (ref 0–0.2)
BASOPHILS NFR BLD AUTO: 2 %
BILIRUB DIRECT SERPL-MCNC: 0.32 MG/DL (ref 0–0.3)
BILIRUB SERPL-MCNC: 1 MG/DL
CRP SERPL-MCNC: <3 MG/L
EOSINOPHIL # BLD AUTO: 0.3 10E3/UL (ref 0–0.7)
EOSINOPHIL NFR BLD AUTO: 5 %
ERYTHROCYTE [DISTWIDTH] IN BLOOD BY AUTOMATED COUNT: 12.7 % (ref 10–15)
ERYTHROCYTE [SEDIMENTATION RATE] IN BLOOD BY WESTERGREN METHOD: 9 MM/HR (ref 0–20)
HCT VFR BLD AUTO: 42.9 % (ref 40–53)
HGB BLD-MCNC: 14.3 G/DL (ref 13.3–17.7)
IMM GRANULOCYTES # BLD: 0 10E3/UL
IMM GRANULOCYTES NFR BLD: 0 %
LYMPHOCYTES # BLD AUTO: 1.8 10E3/UL (ref 0.8–5.3)
LYMPHOCYTES NFR BLD AUTO: 34 %
MCH RBC QN AUTO: 31.1 PG (ref 26.5–33)
MCHC RBC AUTO-ENTMCNC: 33.3 G/DL (ref 31.5–36.5)
MCV RBC AUTO: 93 FL (ref 78–100)
MONOCYTES # BLD AUTO: 0.8 10E3/UL (ref 0–1.3)
MONOCYTES NFR BLD AUTO: 15 %
NEUTROPHILS # BLD AUTO: 2.3 10E3/UL (ref 1.6–8.3)
NEUTROPHILS NFR BLD AUTO: 44 %
NRBC # BLD AUTO: 0 10E3/UL
NRBC BLD AUTO-RTO: 0 /100
PLATELET # BLD AUTO: 229 10E3/UL (ref 150–450)
PROT SERPL-MCNC: 7.2 G/DL (ref 6.4–8.3)
RBC # BLD AUTO: 4.6 10E6/UL (ref 4.4–5.9)
WBC # BLD AUTO: 5.3 10E3/UL (ref 4–11)

## 2023-08-11 PROCEDURE — 36415 COLL VENOUS BLD VENIPUNCTURE: CPT | Performed by: INTERNAL MEDICINE

## 2023-08-11 PROCEDURE — 250N000011 HC RX IP 250 OP 636: Mod: JZ | Performed by: INTERNAL MEDICINE

## 2023-08-11 PROCEDURE — 82040 ASSAY OF SERUM ALBUMIN: CPT | Performed by: INTERNAL MEDICINE

## 2023-08-11 PROCEDURE — 85652 RBC SED RATE AUTOMATED: CPT | Performed by: INTERNAL MEDICINE

## 2023-08-11 PROCEDURE — 96365 THER/PROPH/DIAG IV INF INIT: CPT

## 2023-08-11 PROCEDURE — 86140 C-REACTIVE PROTEIN: CPT | Performed by: INTERNAL MEDICINE

## 2023-08-11 PROCEDURE — 258N000003 HC RX IP 258 OP 636: Performed by: INTERNAL MEDICINE

## 2023-08-11 PROCEDURE — 85025 COMPLETE CBC W/AUTO DIFF WBC: CPT | Performed by: INTERNAL MEDICINE

## 2023-08-11 RX ORDER — HEPARIN SODIUM (PORCINE) LOCK FLUSH IV SOLN 100 UNIT/ML 100 UNIT/ML
5 SOLUTION INTRAVENOUS
Status: CANCELLED | OUTPATIENT
Start: 2023-09-08

## 2023-08-11 RX ORDER — HEPARIN SODIUM,PORCINE 10 UNIT/ML
5 VIAL (ML) INTRAVENOUS
Status: CANCELLED | OUTPATIENT
Start: 2023-09-08

## 2023-08-11 RX ADMIN — VEDOLIZUMAB 300 MG: 300 INJECTION, POWDER, LYOPHILIZED, FOR SOLUTION INTRAVENOUS at 08:54

## 2023-08-11 RX ADMIN — SODIUM CHLORIDE 250 ML: 9 INJECTION, SOLUTION INTRAVENOUS at 08:31

## 2023-08-11 ASSESSMENT — PAIN SCALES - GENERAL: PAINLEVEL: NO PAIN (0)

## 2023-08-11 NOTE — PROGRESS NOTES
Infusion Nursing Note:  Mainor Grande presents today for Entyvio/labs.    Patient seen by provider today: No   present during visit today: Not Applicable.    Note: Patient has no complaints. Denies pain..      Intravenous Access:  Peripheral IV placed.    Treatment Conditions:  Lab Results   Component Value Date    HGB 14.3 08/11/2023    WBC 5.3 08/11/2023    ANEU 5.3 12/01/2020    ANEUTAUTO 2.3 08/11/2023     08/11/2023        Lab Results   Component Value Date     (L) 04/06/2023    POTASSIUM 3.9 04/06/2023    CR 0.95 04/06/2023    BREANNA 9.3 04/06/2023    BILITOTAL 1.0 08/11/2023    ALBUMIN 4.2 08/11/2023    ALT 30 08/11/2023    AST 46 (H) 08/11/2023       Biological Infusion Checklist:  ~~~ NOTE: If the patient answers yes to any of the questions below, hold the infusion and contact ordering provider or on-call provider.    Have you recently had an elevated temperature, fever, chills, productive cough, coughing for 3 weeks or longer or hemoptysis,  abnormal vital signs, night sweats,  chest pain or have you noticed a decrease in your appetite, unexplained weight loss or fatigue? No  Do you have any open wounds or new incisions? No  Do you have any upcoming hospitalizations or surgeries? Does not include esophagogastroduodenoscopy, colonoscopy, endoscopic retrograde cholangiopancreatography (ERCP), endoscopic ultrasound (EUS), dental procedures or joint aspiration/steroid injections No  Do you currently have any signs of illness or infection or are you on any antibiotics? No  Have you had any new, sudden or worsening abdominal pain? No  Have you or anyone in your household received a live vaccination in the past 4 weeks? Please note: No live vaccines while on biologic/chemotherapy until 6 months after the last treatment. Patient can receive the flu vaccine (shot only), pneumovax and the Covid vaccine. It is optimal for the patient to get these vaccines mid cycle, but they can be given at any  time as long as it is not on the day of the infusion. No  Have you recently been diagnosed with any new nervous system diseases (ie. Multiple sclerosis, Guillain [place], seizures, neurological changes) or cancer diagnosis? Are you on any form of radiation or chemotherapy? No  Are you pregnant or breast feeding or do you have plans of pregnancy in the future? No  Have you been having any signs of worsening depression or suicidal ideations?  (benlysta only) No  Have there been any other new onset medical symptoms? No  Have you had any new blood clots? (IVIG only) No      Post Infusion Assessment:  Patient tolerated infusion without incident.  Site patent and intact, free from redness, edema or discomfort.  Access discontinued per protocol.  Biologic Infusion Post Education: Call the triage nurse at your clinic or seek medical attention if you have chills and/or temperature greater than or equal to 100.5, uncontrolled nausea/vomiting, diarrhea, constipation, dizziness, shortness of breath, chest pain, heart palpitations, weakness or any other new or concerning symptoms, questions or concerns.  You cannot have any live virus vaccines prior to or during treatment or up to 6 months post infusion.  If you have an upcoming surgery, medical procedure or dental procedure during treatment, this should be discussed with your ordering physician and your surgeon/dentist.  If you are having any concerning symptom, if you are unsure if you should get your next infusion or wish to speak to a provider before your next infusion, please call your care coordinator or triage nurse at your clinic to notify them so we can adequately serve you.       Discharge Plan:   Discharge instructions reviewed with: Patient.  Patient discharged in stable condition accompanied by: self.  Departure Mode: Ambulatory.      Gilda Mays RN

## 2023-08-24 ENCOUNTER — PATIENT OUTREACH (OUTPATIENT)
Dept: CARE COORDINATION | Facility: CLINIC | Age: 68
End: 2023-08-24
Payer: COMMERCIAL

## 2023-09-08 ENCOUNTER — INFUSION THERAPY VISIT (OUTPATIENT)
Dept: INFUSION THERAPY | Facility: CLINIC | Age: 68
End: 2023-09-08
Payer: COMMERCIAL

## 2023-09-08 VITALS
DIASTOLIC BLOOD PRESSURE: 84 MMHG | SYSTOLIC BLOOD PRESSURE: 142 MMHG | OXYGEN SATURATION: 100 % | RESPIRATION RATE: 16 BRPM | TEMPERATURE: 97.7 F | HEART RATE: 56 BPM | WEIGHT: 199.6 LBS | BODY MASS INDEX: 28.23 KG/M2

## 2023-09-08 DIAGNOSIS — K51.00 ULCERATIVE PANCOLITIS WITHOUT COMPLICATION (H): Primary | ICD-10-CM

## 2023-09-08 LAB
BASOPHILS # BLD AUTO: 0.1 10E3/UL (ref 0–0.2)
BASOPHILS NFR BLD AUTO: 2 %
EOSINOPHIL # BLD AUTO: 0.3 10E3/UL (ref 0–0.7)
EOSINOPHIL NFR BLD AUTO: 5 %
ERYTHROCYTE [DISTWIDTH] IN BLOOD BY AUTOMATED COUNT: 12.4 % (ref 10–15)
HCT VFR BLD AUTO: 44 % (ref 40–53)
HGB BLD-MCNC: 14.8 G/DL (ref 13.3–17.7)
IMM GRANULOCYTES # BLD: 0 10E3/UL
IMM GRANULOCYTES NFR BLD: 0 %
LYMPHOCYTES # BLD AUTO: 1.9 10E3/UL (ref 0.8–5.3)
LYMPHOCYTES NFR BLD AUTO: 32 %
MCH RBC QN AUTO: 31.2 PG (ref 26.5–33)
MCHC RBC AUTO-ENTMCNC: 33.6 G/DL (ref 31.5–36.5)
MCV RBC AUTO: 93 FL (ref 78–100)
MONOCYTES # BLD AUTO: 0.7 10E3/UL (ref 0–1.3)
MONOCYTES NFR BLD AUTO: 11 %
NEUTROPHILS # BLD AUTO: 3.1 10E3/UL (ref 1.6–8.3)
NEUTROPHILS NFR BLD AUTO: 50 %
NRBC # BLD AUTO: 0 10E3/UL
NRBC BLD AUTO-RTO: 0 /100
PLATELET # BLD AUTO: 251 10E3/UL (ref 150–450)
RBC # BLD AUTO: 4.74 10E6/UL (ref 4.4–5.9)
WBC # BLD AUTO: 6 10E3/UL (ref 4–11)

## 2023-09-08 PROCEDURE — 36415 COLL VENOUS BLD VENIPUNCTURE: CPT | Performed by: INTERNAL MEDICINE

## 2023-09-08 PROCEDURE — 85025 COMPLETE CBC W/AUTO DIFF WBC: CPT | Performed by: INTERNAL MEDICINE

## 2023-09-08 PROCEDURE — 96365 THER/PROPH/DIAG IV INF INIT: CPT

## 2023-09-08 PROCEDURE — 250N000011 HC RX IP 250 OP 636: Mod: JZ | Performed by: INTERNAL MEDICINE

## 2023-09-08 PROCEDURE — 258N000003 HC RX IP 258 OP 636: Performed by: INTERNAL MEDICINE

## 2023-09-08 RX ORDER — HEPARIN SODIUM (PORCINE) LOCK FLUSH IV SOLN 100 UNIT/ML 100 UNIT/ML
5 SOLUTION INTRAVENOUS
Status: CANCELLED | OUTPATIENT
Start: 2023-10-06

## 2023-09-08 RX ORDER — HEPARIN SODIUM,PORCINE 10 UNIT/ML
5 VIAL (ML) INTRAVENOUS
Status: CANCELLED | OUTPATIENT
Start: 2023-10-06

## 2023-09-08 RX ADMIN — VEDOLIZUMAB 300 MG: 300 INJECTION, POWDER, LYOPHILIZED, FOR SOLUTION INTRAVENOUS at 08:39

## 2023-09-08 RX ADMIN — SODIUM CHLORIDE 250 ML: 9 INJECTION, SOLUTION INTRAVENOUS at 08:36

## 2023-09-08 ASSESSMENT — PAIN SCALES - GENERAL: PAINLEVEL: MILD PAIN (2)

## 2023-09-08 NOTE — LETTER
September 13, 2023      Mainor Grande  [id number] [facility] [address]        Dear ,    We are writing to inform you of your test results.  Your test results returned within the normal range.    If you have any questions or concerns, please contact our office at [phone] option 3.       Sincerely,      Lily Eaton, DO        Resulted Orders   CBC with platelets and differential   Result Value Ref Range    WBC Count 6.0 4.0 - 11.0 10e3/uL    RBC Count 4.74 4.40 - 5.90 10e6/uL    Hemoglobin 14.8 13.3 - 17.7 g/dL    Hematocrit 44.0 40.0 - 53.0 %    MCV 93 78 - 100 fL    MCH 31.2 26.5 - 33.0 pg    MCHC 33.6 31.5 - 36.5 g/dL    RDW 12.4 10.0 - 15.0 %    Platelet Count 251 150 - 450 10e3/uL    % Neutrophils 50 %    % Lymphocytes 32 %    % Monocytes 11 %    % Eosinophils 5 %    % Basophils 2 %    % Immature Granulocytes 0 %    NRBCs per 100 WBC 0 <1 /100    Absolute Neutrophils 3.1 1.6 - 8.3 10e3/uL    Absolute Lymphocytes 1.9 0.8 - 5.3 10e3/uL    Absolute Monocytes 0.7 0.0 - 1.3 10e3/uL    Absolute Eosinophils 0.3 0.0 - 0.7 10e3/uL    Absolute Basophils 0.1 0.0 - 0.2 10e3/uL    Absolute Immature Granulocytes 0.0 <=0.4 10e3/uL    Absolute NRBCs 0.0 10e3/uL

## 2023-09-08 NOTE — PATIENT INSTRUCTIONS
POST-INFUSION OF BIOLOGICAL MEDICATION:    Reviewed with patient.  Given biologic medication or medication hand-out. Inform patient if any fever, chills or signs of infection, new symptoms, abdominal pain, heart palpitations, shortness of breath, reaction, weakness, neurological changes, seek medical attention immediately and should not receive infusions. No live virus vaccines prior to or during treatment or up to 6 months post infusion. If the patient has an upcoming procedure or surgery, this should be discussed with the rheumatologist and surgeon or provider.

## 2023-09-08 NOTE — PROGRESS NOTES
Infusion Nursing Note:  Mainor Grande presents today for Entyvio.    Patient seen by provider today: No   present during visit today: Not Applicable.    Note: Leno is in good spirits and says he is feeling well overall, no recent illness or infection or surgery. Mild R knee pain otherwise denies pain. Bruise on R forearm from working with branches. No swelling or pain with this. Occasional constipation, no other GI sx.  VSS, afebrile.       Intravenous Access:  Peripheral IV placed.      Lab Results   Component Value Date    HGB 14.8 09/08/2023    WBC 6.0 09/08/2023    ANEU 5.3 12/01/2020    ANEUTAUTO 3.1 09/08/2023     09/08/2023        Biological Infusion Checklist:  ~~~ NOTE: If the patient answers yes to any of the questions below, hold the infusion and contact ordering provider or on-call provider.    Have you recently had an elevated temperature, fever, chills, productive cough, coughing for 3 weeks or longer or hemoptysis,  abnormal vital signs, night sweats,  chest pain or have you noticed a decrease in your appetite, unexplained weight loss or fatigue? No  Do you have any open wounds or new incisions? No  Do you have any upcoming hospitalizations or surgeries? Does not include esophagogastroduodenoscopy, colonoscopy, endoscopic retrograde cholangiopancreatography (ERCP), endoscopic ultrasound (EUS), dental procedures or joint aspiration/steroid injections No  Do you currently have any signs of illness or infection or are you on any antibiotics? No  Have you had any new, sudden or worsening abdominal pain? No  Have you or anyone in your household received a live vaccination in the past 4 weeks? Please note: No live vaccines while on biologic/chemotherapy until 6 months after the last treatment. Patient can receive the flu vaccine (shot only), pneumovax and the Covid vaccine. It is optimal for the patient to get these vaccines mid cycle, but they can be given at any time as long as it is  not on the day of the infusion. No  Have you recently been diagnosed with any new nervous system diseases (ie. Multiple sclerosis, Guillain [place], seizures, neurological changes) or cancer diagnosis? Are you on any form of radiation or chemotherapy? No  Are you pregnant or breast feeding or do you have plans of pregnancy in the future?N/A  Have you been having any signs of worsening depression or suicidal ideations?  (benlysta only) N/A  Have there been any other new onset medical symptoms? No  Have you had any new blood clots? (IVIG only) N/A      Post Infusion Assessment:  Patient tolerated infusion without incident.  Blood return noted pre and post infusion.  Site patent and intact, free from redness, edema or discomfort.  No evidence of extravasations.  PIV access discontinued per protocol.    Biologic Infusion Post Education: Call the triage nurse at your clinic or seek medical attention if you have chills and/or temperature greater than or equal to 100.5, uncontrolled nausea/vomiting, diarrhea, constipation, dizziness, shortness of breath, chest pain, heart palpitations, weakness or any other new or concerning symptoms, questions or concerns.  You cannot have any live virus vaccines prior to or during treatment or up to 6 months post infusion.  If you have an upcoming surgery, medical procedure or dental procedure during treatment, this should be discussed with your ordering physician and your surgeon/dentist.  If you are having any concerning symptom, if you are unsure if you should get your next infusion or wish to speak to a provider before your next infusion, please call your care coordinator or triage nurse at your clinic to notify them so we can adequately serve you.       Discharge Plan:   AVS to patient via [employer].  Patient will return 10/6 for next appointment.   Patient discharged in stable condition accompanied by: self.  Departure Mode: Ambulatory.      Keren Flores RN

## 2023-09-12 DIAGNOSIS — K83.01 PSC (PRIMARY SCLEROSING CHOLANGITIS) (H): Primary | ICD-10-CM

## 2023-09-17 ENCOUNTER — HOSPITAL ENCOUNTER (EMERGENCY)
Facility: CLINIC | Age: 68
End: 2023-09-17
Payer: COMMERCIAL

## 2023-09-18 ENCOUNTER — HOSPITAL ENCOUNTER (OUTPATIENT)
Dept: MRI IMAGING | Facility: CLINIC | Age: 68
Discharge: HOME | End: 2023-09-18
Attending: INTERNAL MEDICINE | Admitting: INTERNAL MEDICINE
Payer: COMMERCIAL

## 2023-09-18 DIAGNOSIS — K51.00 ULCERATIVE PANCOLITIS WITHOUT COMPLICATION (H): ICD-10-CM

## 2023-09-18 DIAGNOSIS — E55.9 VITAMIN D DEFICIENCY: ICD-10-CM

## 2023-09-18 DIAGNOSIS — K83.01 PSC (PRIMARY SCLEROSING CHOLANGITIS) (H): ICD-10-CM

## 2023-09-18 PROCEDURE — A9585 GADOBUTROL INJECTION: HCPCS | Mod: JZ | Performed by: INTERNAL MEDICINE

## 2023-09-18 PROCEDURE — 255N000002 HC RX 255 OP 636: Mod: JZ | Performed by: INTERNAL MEDICINE

## 2023-09-18 PROCEDURE — 74183 MRI ABD W/O CNTR FLWD CNTR: CPT

## 2023-09-18 RX ORDER — GADOBUTROL 604.72 MG/ML
10 INJECTION INTRAVENOUS ONCE
Status: COMPLETED | OUTPATIENT
Start: 2023-09-18 | End: 2023-09-18

## 2023-09-18 RX ADMIN — GADOBUTROL 10 ML: 604.72 INJECTION INTRAVENOUS at 08:33

## 2023-09-26 ENCOUNTER — OFFICE VISIT (OUTPATIENT)
Dept: PULMONOLOGY | Facility: CLINIC | Age: 68
End: 2023-09-26
Payer: COMMERCIAL

## 2023-09-26 VITALS
BODY MASS INDEX: 28.15 KG/M2 | RESPIRATION RATE: 16 BRPM | SYSTOLIC BLOOD PRESSURE: 128 MMHG | WEIGHT: 199 LBS | DIASTOLIC BLOOD PRESSURE: 83 MMHG | HEART RATE: 62 BPM | OXYGEN SATURATION: 99 %

## 2023-09-26 DIAGNOSIS — J45.40 MODERATE PERSISTENT ASTHMA, UNSPECIFIED WHETHER COMPLICATED: ICD-10-CM

## 2023-09-26 PROCEDURE — 99214 OFFICE O/P EST MOD 30 MIN: CPT | Performed by: INTERNAL MEDICINE

## 2023-09-26 RX ORDER — MONTELUKAST SODIUM 10 MG/1
10 TABLET ORAL AT BEDTIME
Qty: 90 TABLET | Refills: 3 | Status: SHIPPED | OUTPATIENT
Start: 2023-09-26 | End: 2024-09-17

## 2023-09-26 RX ORDER — BUDESONIDE AND FORMOTEROL FUMARATE DIHYDRATE 160; 4.5 UG/1; UG/1
2 AEROSOL RESPIRATORY (INHALATION) 2 TIMES DAILY
Qty: 30.6 G | Refills: 3 | Status: SHIPPED | OUTPATIENT
Start: 2023-09-26 | End: 2023-12-27

## 2023-09-26 ASSESSMENT — ASTHMA QUESTIONNAIRES
QUESTION_4 LAST FOUR WEEKS HOW OFTEN HAVE YOU USED YOUR RESCUE INHALER OR NEBULIZER MEDICATION (SUCH AS ALBUTEROL): ONCE A WEEK OR LESS
ACUTE_EXACERBATION_TODAY: NO
QUESTION_3 LAST FOUR WEEKS HOW OFTEN DID YOUR ASTHMA SYMPTOMS (WHEEZING, COUGHING, SHORTNESS OF BREATH, CHEST TIGHTNESS OR PAIN) WAKE YOU UP AT NIGHT OR EARLIER THAN USUAL IN THE MORNING: NOT AT ALL
QUESTION_2 LAST FOUR WEEKS HOW OFTEN HAVE YOU HAD SHORTNESS OF BREATH: ONCE OR TWICE A WEEK
ACT_TOTALSCORE: 22
ACT_TOTALSCORE: 22
QUESTION_1 LAST FOUR WEEKS HOW MUCH OF THE TIME DID YOUR ASTHMA KEEP YOU FROM GETTING AS MUCH DONE AT WORK, SCHOOL OR AT HOME: NONE OF THE TIME
QUESTION_5 LAST FOUR WEEKS HOW WOULD YOU RATE YOUR ASTHMA CONTROL: WELL CONTROLLED

## 2023-09-26 ASSESSMENT — PAIN SCALES - GENERAL: PAINLEVEL: NO PAIN (0)

## 2023-09-26 NOTE — PROGRESS NOTES
Mainor Grande's goals for this visit include: Return  He requests these members of his care team be copied on today's visit information: PCP    PCP: Francisco Hicks    Referring Provider:  No referring provider defined for this encounter.    /83   Pulse 62   Resp 16   Wt 90.3 kg (199 lb)   SpO2 99%   BMI 28.15 kg/m      Do you need any medication refills at today's visit? ROSALBA Zhou LPN  Pulmonary Medicine:  [facility]  Phone: [phone] Fax: [phone]

## 2023-09-26 NOTE — PATIENT INSTRUCTIONS
Symbicort two puffs twice day  Montelukast- at bedtime  Anti-histamine either morning or night  Then albuterol as needed    Asthma plan:  Prednisone 40mg x5d or Symbicort 10-12 puffs if concern for flare    RSV and Influenza vacinations, and COVID-19 booster

## 2023-09-27 NOTE — PROGRESS NOTES
Pulmonary Patient Follow Up Clinic Note   09/26/2023      PCP: Francisco Hicks    Reason for visit: Asthma follow up    Pulmonary HPI:   Mainor Grande is a 68 year old male w/ h/o history of asthma, seasonal allergic rhinitis,  Cognitive impairment/memory loss (ongoing work up), ulcerative colitis, reflux esophagitis, nummular eczema who presents for follow up of his asthma.    Presents with wife today.   Last Pulmonary visit 6/2023.  Due to higher symptom burden, was started on high dose Symbicort (was previously low dose), montelukast, Flonase, and prn albuterol. Reports also being outside cutting lawn and making symptoms worse.   Since starting the regimen above, much better symptom control.  ACT-24.   Does report using Symbicort sometimes TID.   Using albuterol 1-2/x/week.   Wearing mask when mowing lawn.   Having breakthrough allergy symptoms during fall.     Review of systems: a complete 12-point ROS conducted, & found to be negative w/ exceptions as noted in the HPI.    Reviewed PMH, PSH, FH, SH    Medications:  Current Outpatient Medications   Medication     albuterol (PROAIR HFA/PROVENTIL HFA/VENTOLIN HFA) 108 (90 Base) MCG/ACT inhaler     ASPIRIN PO     atorvastatin (LIPITOR) 40 MG tablet     azelastine (ASTELIN) 0.1 % nasal spray     budesonide-formoterol (SYMBICORT) 160-4.5 MCG/ACT Inhaler     clobetasol (TEMOVATE) 0.05 % external ointment     donepezil (ARICEPT) 5 MG tablet     escitalopram (LEXAPRO) 10 MG tablet     esomeprazole (NEXIUM) 20 MG DR capsule     fluticasone (FLONASE) 50 MCG/ACT nasal spray     montelukast (SINGULAIR) 10 MG tablet     Naphazoline-Glycerin-Zinc Sulf (CLEAR EYES MAXIMUM ITCHY EYE) 0.012-0.25-0.25 % SOLN     ursodiol (ACTIGALL) 300 MG capsule     vitamin D3 (CHOLECALCIFEROL) 50 mcg (2000 units) tablet     melatonin 1 MG TABS     Multiple Vitamins-Minerals (MULTIVITAMIN ADULT PO)     No current facility-administered medications for this visit.       Allergies:  Allergies    Allergen Reactions     Seasonal Allergies        Physical examination:  /83   Pulse 62   Resp 16   Wt 90.3 kg (199 lb)   SpO2 99%   BMI 28.15 kg/m      General: NAD  CV: RR, no m/c/r  Lungs: CTAB  Abd: Soft, NT, ND  Ext: WWP, no BLE edema  Neuro: No focal deficits  Psych: Euthymic, normal affect, good eye contact    Labs: reviewed in EPIC & personally interpreted.     No recent labs    Imaging: reviewed in EPIC & personally interpreted. Below are the interpretations from the formal Radiology review.  No recent imaging.     PFT:  Most Recent [facility] Pulmonary Function Testing (FVC/FEV1 only)  FVC-Pre   Date Value Ref Range Status   06/13/2023 3.96 L      FVC-%Pred-Pre   Date Value Ref Range Status   06/13/2023 98 %      FEV1-Pre   Date Value Ref Range Status   06/13/2023 2.93 L      FEV1-%Pred-Pre   Date Value Ref Range Status   06/13/2023 95 %          Impression & recommendations:    Mainor was seen today for asthma.    Diagnoses and all orders for this visit:    Moderate persistent asthma, unspecified whether complicated  Symbicort-160-4.5mg two puffs twice day  Montelukast 10mg at bedtime  Then albuterol as needed  If uncontrolled symptoms in the future, would add LAMA  Monitor air pollution/pollen levels daily  Asthma plan:  Symbicort 10-12 puffs if concern for flare and if continued symptoms, prednisone 40mg x5d in addition to calling into office    Needs to get: RSV and Influenza vacinations, and COVID-19 booster    GERD  Continue Nexium  Lifestyle reflux modifications    Seasonal allergies  Can add OTC anti-histamine from 2nd or 3rd generation during the fall and spring seasons if Flonase, montelukast not full controlling.   Has azelastin nasal spray if Flonase does not control nasal/sinus congestion/PND fully.  Continued protection when mowing lawn    Other orders  -     Adult Pulmonology Clinic Follow-Up Order (Blank); Future       RTC in 1 year, or sooner if needed       30 minutes excluding  the time spent on cigarette cessation was  spent on the date of the encounter doing chart review, history and exam, documentation and further activities as noted above.    These conclusions are made at the best of one's knowledge and belief based on the provided evidence such as patient's history and allergy test results and they can change over time or can be incomplete because of missing information's.    I explained the lab values, imagings and findings to the patient.  Patient expressed understanding I did not recognize any barriers to the understanding of the patient.    The above note was dictated using voice recognition software and may include typographical errors. Please contact the author for any clarifications.    Roge Millan MD  , Division of Pulmonary/Critical Care

## 2023-10-03 ENCOUNTER — OFFICE VISIT (OUTPATIENT)
Dept: GASTROENTEROLOGY | Facility: CLINIC | Age: 68
End: 2023-10-03
Attending: INTERNAL MEDICINE
Payer: COMMERCIAL

## 2023-10-03 VITALS
WEIGHT: 202.7 LBS | OXYGEN SATURATION: 95 % | DIASTOLIC BLOOD PRESSURE: 73 MMHG | SYSTOLIC BLOOD PRESSURE: 121 MMHG | HEART RATE: 62 BPM | BODY MASS INDEX: 28.67 KG/M2

## 2023-10-03 DIAGNOSIS — K83.01 PSC (PRIMARY SCLEROSING CHOLANGITIS) (H): Primary | ICD-10-CM

## 2023-10-03 PROCEDURE — G0463 HOSPITAL OUTPT CLINIC VISIT: HCPCS | Performed by: INTERNAL MEDICINE

## 2023-10-03 PROCEDURE — 99215 OFFICE O/P EST HI 40 MIN: CPT | Performed by: INTERNAL MEDICINE

## 2023-10-03 NOTE — Clinical Note
Issac Bautista,  With stable LFTs, I would monitor clinically.  I don't usually to regular MRCPs on my [facility] patients- I discussed this with the patient and they are ok with this.  Thanks, jose antonio

## 2023-10-03 NOTE — PROGRESS NOTES
[facility]    Follow-up Visit    Follow-up visit for PSC    Subjective:  68 year old male    PSC  - dx July 2020  - MRCP 7/23/2020  - no hx cholangitis  - no prior ERCP    Ulcerative colitis  - dx Aug 2020  - pan-colitis  - extra-intestinal manifestations- PSC  - last colonoscopy May 2023 ([facility] 1)  - current meds- vedolizumab 2020  - prior meds- nil    Patient comes to clinic this morning with his wife.    Last visit was with Dr. Stone in April 2023.  Patient was started on Aricept during the summer.  No ER visits or hospital admissions since last visit.    Patient is well today.  He has no symptoms related to liver disease.    Patient denies itch, jaundice, lower extremity edema, abdominal distention, lethargy or confusion.    Patient denies melena, hematemesis or hematochezia.  Bowel movements are regular and normal consistency at this time.    Patient denies fevers, sweats or chills.      Weight stable.  Appetite is normal.    Patient drinks alcohol regularly.  It is not clear how much alcohol he is drinking as the patient's memory is impaired by Alzheimer's disease.  Patient's wife is concerned about the amount of alcohol he is consuming at this time.    Medical hx Surgical hx   Past Medical History:   Diagnosis Date     Allergic rhinitis, cause unspecified      Fatty liver 2004    elevated LFT, saw GI for a consultation     Mild persistent asthma 9/12/2008     Reflux esophagitis      Unspecified disease of respiratory system     RAD     Viremia, unspecified     acute      Past Surgical History:   Procedure Laterality Date     COLONOSCOPY  9/4/2013    Procedure: COLONOSCOPY;  colonoscopy;  Surgeon: Beto Keen MD;  Location:  GI     COLONOSCOPY N/A 7/31/2020    Procedure: Colonoscopy, With Polypectomy And Biopsy;  Surgeon: Lily Eaton DO;  Location: [facility]     COLONOSCOPY N/A 4/5/2021    Procedure: Colonoscopy, With Polypectomy And Biopsy;  Surgeon: Lily Eaton DO;   Location: [facility]     COLONOSCOPY N/A 5/3/2022    Procedure: COLONOSCOPY, WITH POLYPECTOMY AND BIOPSY;  Surgeon: Lily Eaton DO;  [facility]: [facility]     COLONOSCOPY N/A 5/3/2022    Procedure: COLONOSCOPY, FLEXIBLE, WITH LESION REMOVAL USING SNARE;  Surgeon: Lily Eaton DO;  [facility]: [facility]     COLONOSCOPY N/A 5/18/2023    Procedure: COLONOSCOPY, WITH BIOPSY;  Surgeon: Lily Eaton DO;  Location: [facility]     COLONOSCOPY WITH CO2 INSUFFLATION N/A 7/31/2020    Procedure: COLONOSCOPY, WITH CO2 INSUFFLATION;  Surgeon: Lily Eaton DO;  [facility]     COLONOSCOPY WITH CO2 INSUFFLATION N/A 4/5/2021    Procedure: COLONOSCOPY, WITH CO2 INSUFFLATION;  Surgeon: Lily Eaton DO;  [facility]     COLONOSCOPY WITH CO2 INSUFFLATION N/A 5/3/2022    Procedure: COLONOSCOPY, WITH CO2 INSUFFLATION;  Surgeon: Lily Eaton DO;  [facility]     COMBINED ESOPHAGOSCOPY, GASTROSCOPY, DUODENOSCOPY (EGD) WITH CO2 INSUFFLATION N/A 7/31/2020    Procedure: ESOPHAGOGASTRODUODENOSCOPY, WITH CO2 INSUFFLATION;  Surgeon: Lily Eaton DO;  [facility]     ESOPHAGOSCOPY, GASTROSCOPY, DUODENOSCOPY (EGD), COMBINED N/A 7/31/2020    Procedure: Esophagogastroduodenoscopy, With Biopsy;  Surgeon: Lily Eaton DO;  Location: [facility]     HC VASECTOMY UNILAT/BILAT W POSTOP SEMEN  1992    Vasectomy     ZZ COLONOSCOPY THRU STOMA, DIAGNOSTIC  2005    normal          Medications  Prior to Admission medications    Medication Sig Start Date End Date Taking? Authorizing Provider   albuterol (PROAIR HFA/PROVENTIL HFA/VENTOLIN HFA) 108 (90 Base) MCG/ACT inhaler Inhale 2 puffs into the lungs every 6 hours 3/16/23  Yes Karen Rodriguez MD   ASPIRIN PO Take 81 mg by mouth daily   Yes Reported, Patient   atorvastatin (LIPITOR) 40 MG tablet Take 1 tablet (40 mg) by mouth daily 7/12/23  Yes Fareed Lara MD   azelastine (ASTELIN) 0.1 % nasal spray SPRAY 2 SPRAYS IN EACH NOSTRIL TWO TIMES A DAY 3/9/21  Yes Michael,  MD Karen   budesonide-formoterol (SYMBICORT) 160-4.5 MCG/ACT Inhaler Inhale 2 puffs into the lungs 2 times daily 9/26/23  Yes Roge Millan MD   clobetasol (TEMOVATE) 0.05 % external ointment Apply topically 2 times daily to affected area(s) 6/16/23  Yes Francisco Hicks PA-C   donepezil (ARICEPT) 5 MG tablet Take 10 mg by mouth At Bedtime 6/22/23  Yes Reported, Patient   escitalopram (LEXAPRO) 10 MG tablet Take 1 tablet by mouth daily 6/22/23  Yes Reported, Patient   esomeprazole (NEXIUM) 20 MG DR capsule Take 20 mg by mouth daily Take 30-60 minutes before eating. Takes clear baudilio   Yes Reported, Patient   fluticasone (FLONASE) 50 MCG/ACT nasal spray Spray 1-2 sprays into both nostrils daily 9/16/15  Yes Karen Rodriguez MD   montelukast (SINGULAIR) 10 MG tablet Take 1 tablet (10 mg) by mouth At Bedtime 9/26/23  Yes Roge Millan MD   Naphazoline-Glycerin-Zinc Sulf (CLEAR EYES MAXIMUM ITCHY EYE) 0.012-0.25-0.25 % SOLN Apply to eye as needed (for allergies)   Yes Reported, Patient   ursodiol (ACTIGALL) 300 MG capsule TAKE 1 CAPSULE (300 MG) BY MOUUTH 2 TIMES DAILY FOR 90 DAYS. 1/24/23  Yes Ute Fu MD   vitamin D3 (CHOLECALCIFEROL) 50 mcg (2000 units) tablet Take 1 tablet (50 mcg) by mouth daily 7/12/23  Yes Lily Eaton DO   melatonin 1 MG TABS Take 1 mg by mouth nightly as needed for sleep  Patient not taking: Reported on 9/26/2023    Reported, Patient   Multiple Vitamins-Minerals (MULTIVITAMIN ADULT PO) Take 1 tablet by mouth daily   Patient not taking: Reported on 9/26/2023    Reported, Patient       Allergies  Allergies   Allergen Reactions     Seasonal Allergies        Review of systems  A 10-point review of systems was negative    Examination  /73   Pulse 62   Wt 91.9 kg (202 lb 11.2 oz)   SpO2 95%   BMI 28.67 kg/m    Body mass index is 28.67 kg/m .    Gen- well, NAD, A+Ox3, normal color  CVS- RRR  RS- CTA  Abd- SNT, no ascites or organomegaly on palpation or  percussion, BS+  Extr- hands normal, no MIN  Skin- no rash or jaundice  Neuro- no asterixis  Psych- normal mood    Laboratory  Lab Results   Component Value Date     04/06/2023     06/16/2021    POTASSIUM 3.9 04/06/2023    POTASSIUM 3.9 07/01/2022    POTASSIUM 4.0 06/16/2021    CHLORIDE 89 04/06/2023    CHLORIDE 102 07/01/2022    CHLORIDE 102 06/16/2021    CO2 26 04/06/2023    CO2 27 07/01/2022    CO2 29 06/16/2021    BUN 10.4 04/06/2023    BUN 13 07/01/2022    BUN 12 06/16/2021    CR 0.95 04/06/2023    CR 0.90 06/16/2021       Lab Results   Component Value Date    BILITOTAL 1.0 08/11/2023    BILITOTAL 0.7 06/16/2021    ALT 30 08/11/2023     06/16/2021    AST 46 08/11/2023    AST 88 06/16/2021    ALKPHOS 154 08/11/2023    ALKPHOS 391 06/16/2021       Lab Results   Component Value Date    ALBUMIN 4.2 08/11/2023    ALBUMIN 3.5 12/14/2022    ALBUMIN 3.7 06/16/2021    PROTTOTAL 7.2 08/11/2023    PROTTOTAL 7.8 06/16/2021        Lab Results   Component Value Date    WBC 6.0 09/08/2023    WBC 7.3 06/16/2021    HGB 14.8 09/08/2023    HGB 13.4 06/16/2021    MCV 93 09/08/2023    MCV 88 06/16/2021     09/08/2023     06/16/2021       Lab Results   Component Value Date    INR 1.10 04/06/2023    INR 1.01 06/16/2021       Radiology  MRCP Sep 2023 reviewed    Assessment  68 year old male who presents for follow-up of primary sclerosing cholangitis. No symptoms related to primary sclerosing cholangitis.  Liver function test stable despite changes seen on MRCP.  No additional investigations or interventions required at this time.  Will monitor clinically for now, particularly in the context of patient's Alzheimer's dementia.  If the patient's alcohol use is starting to affect his liver function, could consider anti-craving medications although these medications may have neurological side effects.    We discussed the pathophysiology, complications and management of primary sclerosing cholangitis  including the lack of evidence available to support screening for cholangiocarcinoma.    Plan  1. Check CMP, INR, CBC  2. Moderate alcohol consumption  3. Consider anti-craving medications if alcohol consumption becomes problematic  4. Follow-up in 12 months    Zac Joiner MD  Hepatology  [facility]    I spent 40 minutes on the date of the encounter doing chart review, history and exam, documentation and further activities as noted above.

## 2023-10-03 NOTE — LETTER
10/3/2023         RE: Mainor Grande  [id number] [facility] [address]        Dear Colleague,    Thank you for referring your patient, Mainor Grande, to the [facility] [place]. Please see a copy of my visit note below.    [facility]    Follow-up Visit    Follow-up visit for PSC    Subjective:  68 year old male    PSC  - dx July 2020  - MRCP 7/23/2020  - no hx cholangitis  - no prior ERCP    Ulcerative colitis  - dx Aug 2020  - pan-colitis  - extra-intestinal manifestations- PSC  - last colonoscopy May 2023 ([facility] 1)  - current meds- vedolizumab 2020  - prior meds- nil    Patient comes to clinic this morning with his wife.    Last visit was with Dr. Stone in April 2023.  Patient was started on Aricept during the summer.  No ER visits or hospital admissions since last visit.    Patient is well today.  He has no symptoms related to liver disease.    Patient denies itch, jaundice, lower extremity edema, abdominal distention, lethargy or confusion.    Patient denies melena, hematemesis or hematochezia.  Bowel movements are regular and normal consistency at this time.    Patient denies fevers, sweats or chills.      Weight stable.  Appetite is normal.    Patient drinks alcohol regularly.  It is not clear how much alcohol he is drinking as the patient's memory is impaired by Alzheimer's disease.  Patient's wife is concerned about the amount of alcohol he is consuming at this time.    Medical hx Surgical hx   Past Medical History:   Diagnosis Date    Allergic rhinitis, cause unspecified     Fatty liver 2004    elevated LFT, saw GI for a consultation    Mild persistent asthma 9/12/2008    Reflux esophagitis     Unspecified disease of respiratory system     RAD    Viremia, unspecified     acute      Past Surgical History:   Procedure Laterality Date    COLONOSCOPY  9/4/2013    Procedure: COLONOSCOPY;  colonoscopy;  Surgeon: Beto Keen MD;  Location:  [facility]    COLONOSCOPY N/A 7/31/2020    Procedure: Colonoscopy, With Polypectomy And Biopsy;  Surgeon: Lily Eaton DO;  [facility]: [facility]    COLONOSCOPY N/A 4/5/2021    Procedure: Colonoscopy, With Polypectomy And Biopsy;  Surgeon: Lily Etaon DO;  [facility]: [facility]    COLONOSCOPY N/A 5/3/2022    Procedure: COLONOSCOPY, WITH POLYPECTOMY AND BIOPSY;  Surgeon: Lily Eaton DO;  [facility]    COLONOSCOPY N/A 5/3/2022    Procedure: COLONOSCOPY, FLEXIBLE, WITH LESION REMOVAL USING SNARE;  Surgeon: Lily Eaton DO;  [facility]    COLONOSCOPY N/A 5/18/2023    Procedure: COLONOSCOPY, WITH BIOPSY;  Surgeon: Lily Eaton DO;  Location: [facility]    COLONOSCOPY WITH CO2 INSUFFLATION N/A 7/31/2020    Procedure: COLONOSCOPY, WITH CO2 INSUFFLATION;  Surgeon: Lily Eaton DO;  [facility]    COLONOSCOPY WITH CO2 INSUFFLATION N/A 4/5/2021    Procedure: COLONOSCOPY, WITH CO2 INSUFFLATION;  Surgeon: Lily Eaton DO;  [facility]    COLONOSCOPY WITH CO2 INSUFFLATION N/A 5/3/2022    Procedure: COLONOSCOPY, WITH CO2 INSUFFLATION;  Surgeon: Lily Eaton DO;  [facility]    COMBINED ESOPHAGOSCOPY, GASTROSCOPY, DUODENOSCOPY (EGD) WITH CO2 INSUFFLATION N/A 7/31/2020    Procedure: ESOPHAGOGASTRODUODENOSCOPY, WITH CO2 INSUFFLATION;  Surgeon: Lily Eaton DO;  [facility]    ESOPHAGOSCOPY, GASTROSCOPY, DUODENOSCOPY (EGD), COMBINED N/A 7/31/2020    Procedure: Esophagogastroduodenoscopy, With Biopsy;  Surgeon: Lily Eaton DO;  Location: [facility]    HC VASECTOMY UNILAT/BILAT W POSTOP SEMEN  1992    Vasectomy    ZZHC COLONOSCOPY THRU STOMA, DIAGNOSTIC  2005    normal          Medications  Prior to Admission medications    Medication Sig Start Date End Date Taking? Authorizing Provider   albuterol (PROAIR HFA/PROVENTIL HFA/VENTOLIN HFA) 108 (90 Base) MCG/ACT inhaler Inhale 2 puffs into the lungs every 6 hours 3/16/23  Yes Karen Rodriguez MD   ASPIRIN PO Take 81 mg by mouth daily    Yes Reported, Patient   atorvastatin (LIPITOR) 40 MG tablet Take 1 tablet (40 mg) by mouth daily 7/12/23  Yes Fareed Lara MD   azelastine (ASTELIN) 0.1 % nasal spray SPRAY 2 SPRAYS IN EACH NOSTRIL TWO TIMES A DAY 3/9/21  Yes Karen Rodriguez MD   budesonide-formoterol (SYMBICORT) 160-4.5 MCG/ACT Inhaler Inhale 2 puffs into the lungs 2 times daily 9/26/23  Yes Roge Millan MD   clobetasol (TEMOVATE) 0.05 % external ointment Apply topically 2 times daily to affected area(s) 6/16/23  Yes Francisco Hicks PA-C   donepezil (ARICEPT) 5 MG tablet Take 10 mg by mouth At Bedtime 6/22/23  Yes Reported, Patient   escitalopram (LEXAPRO) 10 MG tablet Take 1 tablet by mouth daily 6/22/23  Yes Reported, Patient   esomeprazole (NEXIUM) 20 MG DR capsule Take 20 mg by mouth daily Take 30-60 minutes before eating. Takes clear baudilio   Yes Reported, Patient   fluticasone (FLONASE) 50 MCG/ACT nasal spray Spray 1-2 sprays into both nostrils daily 9/16/15  Yes Karen Rodriguez MD   montelukast (SINGULAIR) 10 MG tablet Take 1 tablet (10 mg) by mouth At Bedtime 9/26/23  Yes Rgoe Millan MD   Naphazoline-Glycerin-Zinc Sulf (CLEAR EYES MAXIMUM ITCHY EYE) 0.012-0.25-0.25 % SOLN Apply to eye as needed (for allergies)   Yes Reported, Patient   ursodiol (ACTIGALL) 300 MG capsule TAKE 1 CAPSULE (300 MG) BY MOUUTH 2 TIMES DAILY FOR 90 DAYS. 1/24/23  Yes Ute Fu MD   vitamin D3 (CHOLECALCIFEROL) 50 mcg (2000 units) tablet Take 1 tablet (50 mcg) by mouth daily 7/12/23  Yes Lily Eaton,    melatonin 1 MG TABS Take 1 mg by mouth nightly as needed for sleep  Patient not taking: Reported on 9/26/2023    Reported, Patient   Multiple Vitamins-Minerals (MULTIVITAMIN ADULT PO) Take 1 tablet by mouth daily   Patient not taking: Reported on 9/26/2023    Reported, Patient       Allergies  Allergies   Allergen Reactions    Seasonal Allergies        Review of systems  A 10-point review of systems was  negative    Examination  /73   Pulse 62   Wt 91.9 kg (202 lb 11.2 oz)   SpO2 95%   BMI 28.67 kg/m    Body mass index is 28.67 kg/m .    Gen- well, NAD, A+Ox3, normal color  CVS- RRR  RS- CTA  Abd- SNT, no ascites or organomegaly on palpation or percussion, BS+  Extr- hands normal, no MIN  Skin- no rash or jaundice  Neuro- no asterixis  Psych- normal mood    Laboratory  Lab Results   Component Value Date     04/06/2023     06/16/2021    POTASSIUM 3.9 04/06/2023    POTASSIUM 3.9 07/01/2022    POTASSIUM 4.0 06/16/2021    CHLORIDE 89 04/06/2023    CHLORIDE 102 07/01/2022    CHLORIDE 102 06/16/2021    CO2 26 04/06/2023    CO2 27 07/01/2022    CO2 29 06/16/2021    BUN 10.4 04/06/2023    BUN 13 07/01/2022    BUN 12 06/16/2021    CR 0.95 04/06/2023    CR 0.90 06/16/2021       Lab Results   Component Value Date    BILITOTAL 1.0 08/11/2023    BILITOTAL 0.7 06/16/2021    ALT 30 08/11/2023     06/16/2021    AST 46 08/11/2023    AST 88 06/16/2021    ALKPHOS 154 08/11/2023    ALKPHOS 391 06/16/2021       Lab Results   Component Value Date    ALBUMIN 4.2 08/11/2023    ALBUMIN 3.5 12/14/2022    ALBUMIN 3.7 06/16/2021    PROTTOTAL 7.2 08/11/2023    PROTTOTAL 7.8 06/16/2021        Lab Results   Component Value Date    WBC 6.0 09/08/2023    WBC 7.3 06/16/2021    HGB 14.8 09/08/2023    HGB 13.4 06/16/2021    MCV 93 09/08/2023    MCV 88 06/16/2021     09/08/2023     06/16/2021       Lab Results   Component Value Date    INR 1.10 04/06/2023    INR 1.01 06/16/2021       Radiology  MRCP Sep 2023 reviewed    Assessment  68 year old male who presents for follow-up of primary sclerosing cholangitis. No symptoms related to primary sclerosing cholangitis.  Liver function test stable despite changes seen on MRCP.  No additional investigations or interventions required at this time.  Will monitor clinically for now, particularly in the context of patient's Alzheimer's dementia.  If the patient's alcohol  use is starting to affect his liver function, could consider anti-craving medications although these medications may have neurological side effects.    We discussed the pathophysiology, complications and management of primary sclerosing cholangitis including the lack of evidence available to support screening for cholangiocarcinoma.    Plan  Check CMP, INR, CBC  Moderate alcohol consumption  Consider anti-craving medications if alcohol consumption becomes problematic  Follow-up in 12 months      I spent 40 minutes on the date of the encounter doing chart review, history and exam, documentation and further activities as noted above.        Again, thank you for allowing me to participate in the care of your patient.      Sincerely,    Zac Joiner MD

## 2023-10-06 ENCOUNTER — INFUSION THERAPY VISIT (OUTPATIENT)
Dept: INFUSION THERAPY | Facility: CLINIC | Age: 68
End: 2023-10-06
Attending: INTERNAL MEDICINE
Payer: COMMERCIAL

## 2023-10-06 ENCOUNTER — APPOINTMENT (OUTPATIENT)
Dept: LAB | Facility: CLINIC | Age: 68
End: 2023-10-06
Payer: COMMERCIAL

## 2023-10-06 VITALS
SYSTOLIC BLOOD PRESSURE: 127 MMHG | WEIGHT: 203.8 LBS | TEMPERATURE: 97.9 F | DIASTOLIC BLOOD PRESSURE: 73 MMHG | HEART RATE: 68 BPM | OXYGEN SATURATION: 98 % | RESPIRATION RATE: 18 BRPM | BODY MASS INDEX: 28.83 KG/M2

## 2023-10-06 DIAGNOSIS — K51.00 ULCERATIVE PANCOLITIS WITHOUT COMPLICATION (H): Primary | ICD-10-CM

## 2023-10-06 LAB
ALBUMIN SERPL BCG-MCNC: 4.2 G/DL (ref 3.5–5.2)
ALP SERPL-CCNC: 151 U/L (ref 40–129)
ALT SERPL W P-5'-P-CCNC: 33 U/L (ref 0–70)
AST SERPL W P-5'-P-CCNC: 39 U/L (ref 0–45)
BASO+EOS+MONOS # BLD AUTO: ABNORMAL 10*3/UL
BASO+EOS+MONOS NFR BLD AUTO: ABNORMAL %
BASOPHILS # BLD AUTO: 0.1 10E3/UL (ref 0–0.2)
BASOPHILS NFR BLD AUTO: 1 %
BILIRUB DIRECT SERPL-MCNC: 0.24 MG/DL (ref 0–0.3)
BILIRUB SERPL-MCNC: 0.7 MG/DL
CRP SERPL-MCNC: <3 MG/L
EOSINOPHIL # BLD AUTO: 0.3 10E3/UL (ref 0–0.7)
EOSINOPHIL NFR BLD AUTO: 3 %
ERYTHROCYTE [DISTWIDTH] IN BLOOD BY AUTOMATED COUNT: 12.7 % (ref 10–15)
ERYTHROCYTE [SEDIMENTATION RATE] IN BLOOD BY WESTERGREN METHOD: 8 MM/HR (ref 0–20)
HCT VFR BLD AUTO: 41.1 % (ref 40–53)
HGB BLD-MCNC: 13.7 G/DL (ref 13.3–17.7)
IMM GRANULOCYTES # BLD: 0 10E3/UL
IMM GRANULOCYTES NFR BLD: 0 %
LYMPHOCYTES # BLD AUTO: 2.3 10E3/UL (ref 0.8–5.3)
LYMPHOCYTES NFR BLD AUTO: 29 %
MCH RBC QN AUTO: 31.2 PG (ref 26.5–33)
MCHC RBC AUTO-ENTMCNC: 33.3 G/DL (ref 31.5–36.5)
MCV RBC AUTO: 94 FL (ref 78–100)
MONOCYTES # BLD AUTO: 0.9 10E3/UL (ref 0–1.3)
MONOCYTES NFR BLD AUTO: 12 %
NEUTROPHILS # BLD AUTO: 4.3 10E3/UL (ref 1.6–8.3)
NEUTROPHILS NFR BLD AUTO: 55 %
NRBC # BLD AUTO: 0 10E3/UL
NRBC BLD AUTO-RTO: 0 /100
PLATELET # BLD AUTO: 227 10E3/UL (ref 150–450)
PROT SERPL-MCNC: 7.2 G/DL (ref 6.4–8.3)
RBC # BLD AUTO: 4.39 10E6/UL (ref 4.4–5.9)
WBC # BLD AUTO: 8 10E3/UL (ref 4–11)

## 2023-10-06 PROCEDURE — 86140 C-REACTIVE PROTEIN: CPT | Performed by: INTERNAL MEDICINE

## 2023-10-06 PROCEDURE — 85652 RBC SED RATE AUTOMATED: CPT | Performed by: INTERNAL MEDICINE

## 2023-10-06 PROCEDURE — 250N000011 HC RX IP 250 OP 636: Mod: JZ | Performed by: INTERNAL MEDICINE

## 2023-10-06 PROCEDURE — 258N000003 HC RX IP 258 OP 636: Performed by: INTERNAL MEDICINE

## 2023-10-06 PROCEDURE — 82040 ASSAY OF SERUM ALBUMIN: CPT | Performed by: INTERNAL MEDICINE

## 2023-10-06 PROCEDURE — 85025 COMPLETE CBC W/AUTO DIFF WBC: CPT | Performed by: INTERNAL MEDICINE

## 2023-10-06 PROCEDURE — 36415 COLL VENOUS BLD VENIPUNCTURE: CPT | Performed by: INTERNAL MEDICINE

## 2023-10-06 PROCEDURE — 96365 THER/PROPH/DIAG IV INF INIT: CPT

## 2023-10-06 RX ORDER — HEPARIN SODIUM (PORCINE) LOCK FLUSH IV SOLN 100 UNIT/ML 100 UNIT/ML
5 SOLUTION INTRAVENOUS
Status: CANCELLED | OUTPATIENT
Start: 2023-11-03

## 2023-10-06 RX ORDER — HEPARIN SODIUM,PORCINE 10 UNIT/ML
5 VIAL (ML) INTRAVENOUS
Status: CANCELLED | OUTPATIENT
Start: 2023-11-03

## 2023-10-06 RX ADMIN — VEDOLIZUMAB 300 MG: 300 INJECTION, POWDER, LYOPHILIZED, FOR SOLUTION INTRAVENOUS at 08:36

## 2023-10-06 RX ADMIN — SODIUM CHLORIDE 250 ML: 9 INJECTION, SOLUTION INTRAVENOUS at 08:20

## 2023-10-06 ASSESSMENT — PAIN SCALES - GENERAL: PAINLEVEL: NO PAIN (0)

## 2023-10-06 NOTE — PROGRESS NOTES
Infusion Nursing Note:  Mainor Grande presents today for Entyvio.    Patient seen by provider today: No   present during visit today: Not Applicable.    Note: Leno is feeling well today.  No complaints.      Intravenous Access:  Peripheral IV placed.    Treatment Conditions:  Biological Infusion Checklist:  ~~~ NOTE: If the patient answers yes to any of the questions below, hold the infusion and contact ordering provider or on-call provider.    Have you recently had an elevated temperature, fever, chills, productive cough, coughing for 3 weeks or longer or hemoptysis,  abnormal vital signs, night sweats,  chest pain or have you noticed a decrease in your appetite, unexplained weight loss or fatigue? No  Do you have any open wounds or new incisions? No  Do you have any upcoming hospitalizations or surgeries? Does not include esophagogastroduodenoscopy, colonoscopy, endoscopic retrograde cholangiopancreatography (ERCP), endoscopic ultrasound (EUS), dental procedures or joint aspiration/steroid injections No  Do you currently have any signs of illness or infection or are you on any antibiotics? No  Have you had any new, sudden or worsening abdominal pain? No  Have you or anyone in your household received a live vaccination in the past 4 weeks? Please note: No live vaccines while on biologic/chemotherapy until 6 months after the last treatment. Patient can receive the flu vaccine (shot only), pneumovax and the Covid vaccine. It is optimal for the patient to get these vaccines mid cycle, but they can be given at any time as long as it is not on the day of the infusion. No  Have you recently been diagnosed with any new nervous system diseases (ie. Multiple sclerosis, Guillain [place], seizures, neurological changes) or cancer diagnosis? Are you on any form of radiation or chemotherapy? No  Are you pregnant or breast feeding or do you have plans of pregnancy in the future? No  Have you been having any signs of  worsening depression or suicidal ideations?  (benlysta only) No  Have there been any other new onset medical symptoms? No  Have you had any new blood clots? (IVIG only) No      Post Infusion Assessment:  Patient tolerated infusion without incident.  Site patent and intact, free from redness, edema or discomfort.  No evidence of extravasations.  Access discontinued per protocol.       Discharge Plan:   Discharge instructions reviewed with: Patient.  Patient and/or family verbalized understanding of discharge instructions and all questions answered.  Patient discharged in stable condition accompanied by: self.  Departure Mode: Ambulatory.      Shannon Vasquez RN

## 2023-10-12 DIAGNOSIS — I25.10 CORONARY ARTERY CALCIFICATION: Primary | ICD-10-CM

## 2023-10-12 NOTE — TELEPHONE ENCOUNTER
atorvastatin (LIPITOR) 40 MG tablet     Last Written Prescription Date:  7/12/23  Last Fill Quantity: 90,   # refills: 0  Last Office Visit : 3/22/22  Future Office visit:  none    Routing refill request to provider for review/approval because:  > 18 months since last visit. I year follow up requested. LDL past due

## 2023-10-13 NOTE — TELEPHONE ENCOUNTER
"      Requested Prescriptions   Pending Prescriptions Disp Refills    atorvastatin (LIPITOR) 40 MG tablet 90 tablet 0     Sig: Take 1 tablet (40 mg) by mouth daily       Statins Protocol Failed - 10/12/2023  7:13 AM        Failed - LDL on file in past 12 months     Recent Labs   Lab Test 07/01/22  0725   LDL 38             Failed - Recent (12 mo) or future (30 days) visit within the authorizing provider's specialty     Patient has had an office visit with the authorizing provider or a provider within the authorizing providers department within the previous 12 mos or has a future within next 30 days. See \"Patient Info\" tab in inbasket, or \"Choose Columns\" in Meds & Orders section of the refill encounter.              Passed - No abnormal creatine kinase in past 12 months     Recent Labs   Lab Test 11/17/20  0822   CKT 26*                Passed - Medication is active on med list        Passed - Patient is age 18 or older            "

## 2023-10-16 RX ORDER — ATORVASTATIN CALCIUM 40 MG/1
40 TABLET, FILM COATED ORAL DAILY
Qty: 90 TABLET | Refills: 3 | Status: SHIPPED | OUTPATIENT
Start: 2023-10-16

## 2023-10-27 ASSESSMENT — ENCOUNTER SYMPTOMS
ABDOMINAL PAIN: 1
DIZZINESS: 1
HEARTBURN: 1
COUGH: 1
CONSTIPATION: 1
SHORTNESS OF BREATH: 1

## 2023-10-27 ASSESSMENT — ACTIVITIES OF DAILY LIVING (ADL)
CURRENT_FUNCTION: TELEPHONE REQUIRES ASSISTANCE
CURRENT_FUNCTION: MONEY MANAGEMENT REQUIRES ASSISTANCE
CURRENT_FUNCTION: MEDICATION ADMINISTRATION REQUIRES ASSISTANCE

## 2023-10-28 ENCOUNTER — HEALTH MAINTENANCE LETTER (OUTPATIENT)
Age: 68
End: 2023-10-28

## 2023-11-01 ENCOUNTER — OFFICE VISIT (OUTPATIENT)
Dept: FAMILY MEDICINE | Facility: OTHER | Age: 68
End: 2023-11-01
Payer: COMMERCIAL

## 2023-11-01 VITALS
BODY MASS INDEX: 28.99 KG/M2 | HEART RATE: 68 BPM | HEIGHT: 70 IN | WEIGHT: 202.5 LBS | RESPIRATION RATE: 16 BRPM | SYSTOLIC BLOOD PRESSURE: 116 MMHG | OXYGEN SATURATION: 98 % | TEMPERATURE: 97.7 F | DIASTOLIC BLOOD PRESSURE: 64 MMHG

## 2023-11-01 DIAGNOSIS — F03.90 DEMENTIA, UNSPECIFIED DEMENTIA SEVERITY, UNSPECIFIED DEMENTIA TYPE, UNSPECIFIED WHETHER BEHAVIORAL, PSYCHOTIC, OR MOOD DISTURBANCE OR ANXIETY (H): ICD-10-CM

## 2023-11-01 DIAGNOSIS — L30.9 ECZEMA, UNSPECIFIED TYPE: ICD-10-CM

## 2023-11-01 DIAGNOSIS — Z00.00 ENCOUNTER FOR MEDICARE ANNUAL WELLNESS EXAM: Primary | ICD-10-CM

## 2023-11-01 DIAGNOSIS — K83.01 PSC (PRIMARY SCLEROSING CHOLANGITIS) (H): ICD-10-CM

## 2023-11-01 DIAGNOSIS — K21.9 GASTROESOPHAGEAL REFLUX DISEASE WITHOUT ESOPHAGITIS: ICD-10-CM

## 2023-11-01 DIAGNOSIS — E78.5 HYPERLIPIDEMIA LDL GOAL <160: ICD-10-CM

## 2023-11-01 LAB
ALBUMIN SERPL BCG-MCNC: 4.2 G/DL (ref 3.5–5.2)
ALP SERPL-CCNC: 141 U/L (ref 40–129)
ALT SERPL W P-5'-P-CCNC: 35 U/L (ref 0–70)
ANION GAP SERPL CALCULATED.3IONS-SCNC: 11 MMOL/L (ref 7–15)
AST SERPL W P-5'-P-CCNC: 43 U/L (ref 0–45)
BILIRUB DIRECT SERPL-MCNC: 0.22 MG/DL (ref 0–0.3)
BILIRUB SERPL-MCNC: 0.7 MG/DL
BUN SERPL-MCNC: 11.2 MG/DL (ref 8–23)
CALCIUM SERPL-MCNC: 9.1 MG/DL (ref 8.8–10.2)
CHLORIDE SERPL-SCNC: 99 MMOL/L (ref 98–107)
CHOLEST SERPL-MCNC: 151 MG/DL
CREAT SERPL-MCNC: 0.94 MG/DL (ref 0.67–1.17)
DEPRECATED HCO3 PLAS-SCNC: 25 MMOL/L (ref 22–29)
EGFRCR SERPLBLD CKD-EPI 2021: 87 ML/MIN/1.73M2
ERYTHROCYTE [DISTWIDTH] IN BLOOD BY AUTOMATED COUNT: 12.4 % (ref 10–15)
GLUCOSE SERPL-MCNC: 105 MG/DL (ref 70–99)
HCT VFR BLD AUTO: 42.7 % (ref 40–53)
HDLC SERPL-MCNC: 89 MG/DL
HGB BLD-MCNC: 14 G/DL (ref 13.3–17.7)
INR PPP: 1.03 (ref 0.85–1.15)
LDLC SERPL CALC-MCNC: 49 MG/DL
MCH RBC QN AUTO: 31.9 PG (ref 26.5–33)
MCHC RBC AUTO-ENTMCNC: 32.8 G/DL (ref 31.5–36.5)
MCV RBC AUTO: 97 FL (ref 78–100)
NONHDLC SERPL-MCNC: 62 MG/DL
PLATELET # BLD AUTO: 234 10E3/UL (ref 150–450)
POTASSIUM SERPL-SCNC: 4.4 MMOL/L (ref 3.4–5.3)
PROT SERPL-MCNC: 7.1 G/DL (ref 6.4–8.3)
RBC # BLD AUTO: 4.39 10E6/UL (ref 4.4–5.9)
SODIUM SERPL-SCNC: 135 MMOL/L (ref 135–145)
TRIGL SERPL-MCNC: 65 MG/DL
VIT B12 SERPL-MCNC: 1108 PG/ML (ref 232–1245)
VIT D+METAB SERPL-MCNC: 41 NG/ML (ref 20–50)
WBC # BLD AUTO: 6 10E3/UL (ref 4–11)

## 2023-11-01 PROCEDURE — 85027 COMPLETE CBC AUTOMATED: CPT | Performed by: PHYSICIAN ASSISTANT

## 2023-11-01 PROCEDURE — 80053 COMPREHEN METABOLIC PANEL: CPT | Performed by: PHYSICIAN ASSISTANT

## 2023-11-01 PROCEDURE — 82248 BILIRUBIN DIRECT: CPT | Performed by: PHYSICIAN ASSISTANT

## 2023-11-01 PROCEDURE — 82306 VITAMIN D 25 HYDROXY: CPT | Performed by: PHYSICIAN ASSISTANT

## 2023-11-01 PROCEDURE — 36415 COLL VENOUS BLD VENIPUNCTURE: CPT | Performed by: PHYSICIAN ASSISTANT

## 2023-11-01 PROCEDURE — 99214 OFFICE O/P EST MOD 30 MIN: CPT | Mod: 25 | Performed by: PHYSICIAN ASSISTANT

## 2023-11-01 PROCEDURE — 80061 LIPID PANEL: CPT | Performed by: PHYSICIAN ASSISTANT

## 2023-11-01 PROCEDURE — 85610 PROTHROMBIN TIME: CPT | Performed by: PHYSICIAN ASSISTANT

## 2023-11-01 PROCEDURE — 82607 VITAMIN B-12: CPT | Performed by: PHYSICIAN ASSISTANT

## 2023-11-01 PROCEDURE — G0439 PPPS, SUBSEQ VISIT: HCPCS | Performed by: PHYSICIAN ASSISTANT

## 2023-11-01 RX ORDER — FAMOTIDINE 40 MG/1
40 TABLET, FILM COATED ORAL 2 TIMES DAILY PRN
Qty: 180 TABLET | Refills: 1 | Status: SHIPPED | OUTPATIENT
Start: 2023-11-01 | End: 2024-04-23

## 2023-11-01 RX ORDER — CLOBETASOL PROPIONATE 0.5 MG/G
OINTMENT TOPICAL 2 TIMES DAILY
Qty: 30 G | Refills: 1 | Status: SHIPPED | OUTPATIENT
Start: 2023-11-01

## 2023-11-01 RX ORDER — RESPIRATORY SYNCYTIAL VIRUS VACCINE 120MCG/0.5
0.5 KIT INTRAMUSCULAR ONCE
Qty: 1 EACH | Refills: 0 | Status: CANCELLED | OUTPATIENT
Start: 2023-11-01 | End: 2023-11-01

## 2023-11-01 ASSESSMENT — ENCOUNTER SYMPTOMS
ABDOMINAL PAIN: 1
HEARTBURN: 1
CONSTIPATION: 1
DIZZINESS: 1
SHORTNESS OF BREATH: 1
COUGH: 1

## 2023-11-01 ASSESSMENT — PAIN SCALES - GENERAL: PAINLEVEL: NO PAIN (0)

## 2023-11-01 NOTE — PROGRESS NOTES
"SUBJECTIVE:   Mainor is a 68 year old who presents for Preventive Visit.      11/1/2023     8:58 AM   Additional Questions   Roomed by LOBITO   Accompanied by Wife: Rani         11/1/2023     8:58 AM   Patient Reported Additional Medications   Patient reports taking the following new medications None       Are you in the first 12 months of your Medicare coverage?  No    Healthy Habits:     In general, how would you rate your overall health?  Good    Frequency of exercise:  2-3 days/week    Duration of exercise:  45-60 minutes    Do you usually eat at least 4 servings of fruit and vegetables a day, include whole grains    & fiber and avoid regularly eating high fat or \"junk\" foods?  Yes    Taking medications regularly:  Yes    Ability to successfully perform activities of daily living:  Telephone requires assistance, medication administration requires assistance and money management requires assistance    Home Safety:  No safety concerns identified    Hearing Impairment:  No hearing concerns    In the past 6 months, have you been bothered by leaking of urine?  No    In general, how would you rate your overall mental or emotional health?  Fair      Today's PHQ-2 Score:       11/1/2023     8:25 AM   PHQ-2 ( 1999 [employer])   Q1: Little interest or pleasure in doing things 0   Q2: Feeling down, depressed or hopeless 0   PHQ-2 Score 0   Q1: Little interest or pleasure in doing things Not at all   Q2: Feeling down, depressed or hopeless Not at all   PHQ-2 Score 0           Have you ever done Advance Care Planning? (For example, a Health Directive, POLST, or a discussion with a medical provider or your loved ones about your wishes): Yes, advance care planning is on file.       Fall risk  Fallen 2 or more times in the past year?: No  Any fall with injury in the past year?: No    Cognitive Screening   1) Repeat 3 items (Leader, Season, Table)    2) Clock draw: NORMAL  3) 3 item recall: Recalls 1 object   Results: NORMAL clock, " 1-2 items recalled: COGNITIVE IMPAIRMENT LESS LIKELY    Mini-CogTM Copyright RYLIE Yates. Licensed by the author for use in [facility]; reprinted with permission (ramonita@[facility]). All rights reserved.          Reviewed and updated as needed this visit by clinical staff   Tobacco  Allergies  Meds              Reviewed and updated as needed this visit by Provider                 Social History     Tobacco Use    Smoking status: Never     Passive exposure: Never    Smokeless tobacco: Never   Substance Use Topics    Alcohol use: Yes     Alcohol/week: 1.0 - 2.0 standard drink of alcohol     Types: 1 - 2 Standard drinks or equivalent per week     Comment: occasional             10/27/2023     3:04 PM   Alcohol Use   Prescreen: >3 drinks/day or >7 drinks/week? No     Do you have a current opioid prescription? No  Do you use any other controlled substances or medications that are not prescribed by a provider? Alcohol, occasionally              Current providers sharing in care for this patient include:   Patient Care Team:  Francisco Hicks PA-C as PCP - General (Family Medicine)  JUSTINO Freitas MD as Assigned Surgical Provider  Lily Eaton DO as MD (Gastroenterology)  Ute Fu MD as MD (Gastroenterology)  Dixie Choi, PhD LP as Assigned Behavioral Health Provider  Roge Millan MD as Assigned Pulmonology Provider  Francisco Hicks PA-C as Assigned PCP  Lily Eaton DO as Assigned Gastroenterology Provider    The following health maintenance items are reviewed in Epic and correct as of today:  Health Maintenance   Topic Date Due    RSV VACCINE 60+ (1 - 1-dose 60+ series) Never done    ANNUAL REVIEW OF HM ORDERS  06/08/2023    INFLUENZA VACCINE (1) 09/01/2023    COVID-19 Vaccine (5 - 2023-24 season) 09/01/2023    MEDICARE ANNUAL WELLNESS VISIT  09/23/2023    ASTHMA CONTROL TEST  03/26/2024    COLORECTAL CANCER SCREENING  05/18/2024    ASTHMA ACTION PLAN  06/13/2024     FALL RISK ASSESSMENT  11/01/2024    LIPID  07/01/2027    ADVANCE CARE PLANNING  10/05/2027    DTAP/TDAP/TD IMMUNIZATION (2 - Td or Tdap) 04/12/2033    HEPATITIS C SCREENING  Completed    PHQ-2 (once per calendar year)  Completed    Pneumococcal Vaccine: 65+ Years  Completed    ZOSTER IMMUNIZATION  Completed    AORTIC ANEURYSM SCREENING (SYSTEM ASSIGNED)  Completed    IPV IMMUNIZATION  Aged Out    HPV IMMUNIZATION  Aged Out    MENINGITIS IMMUNIZATION  Aged Out    RSV MONOCLONAL ANTIBODY  Aged Out     BP Readings from Last 3 Encounters:   11/01/23 116/64   10/06/23 127/73   10/03/23 121/73    Wt Readings from Last 3 Encounters:   11/01/23 91.9 kg (202 lb 8 oz)   10/06/23 92.4 kg (203 lb 12.8 oz)   10/03/23 91.9 kg (202 lb 11.2 oz)                  Patient Active Problem List   Diagnosis    Erectile dysfunction    Moderate persistent asthma with acute exacerbation    HYPERLIPIDEMIA LDL GOAL <160    Seasonal allergic rhinitis    Deviated nasal septum    Chronic nasal congestion    Nummular eczema    Memory changes    Ulcerative pancolitis without complication (H)    PSC (primary sclerosing cholangitis) (H28)    Dementia, unspecified dementia severity, unspecified dementia type, unspecified whether behavioral, psychotic, or mood disturbance or anxiety (H)     Past Surgical History:   Procedure Laterality Date    COLONOSCOPY  9/4/2013    Procedure: COLONOSCOPY;  colonoscopy;  Surgeon: Beto Keen MD;  Location: [facility]    COLONOSCOPY N/A 7/31/2020    Procedure: Colonoscopy, With Polypectomy And Biopsy;  Surgeon: Lily Eaton DO;  Location: [facility]    COLONOSCOPY N/A 4/5/2021    Procedure: Colonoscopy, With Polypectomy And Biopsy;  Surgeon: Lily Eaton DO;  Location: [facility]    COLONOSCOPY N/A 5/3/2022    Procedure: COLONOSCOPY, WITH POLYPECTOMY AND BIOPSY;  Surgeon: Lily Eaton DO;  [facility]: [facility]    COLONOSCOPY N/A 5/3/2022    Procedure: COLONOSCOPY, FLEXIBLE, WITH LESION REMOVAL USING SNARE;   Surgeon: Lily Eaton DO;  Location: [facility]    COLONOSCOPY N/A 5/18/2023    Procedure: COLONOSCOPY, WITH BIOPSY;  Surgeon: Lily Eaton DO;  Location: [facility]    COLONOSCOPY WITH CO2 INSUFFLATION N/A 7/31/2020    Procedure: COLONOSCOPY, WITH CO2 INSUFFLATION;  Surgeon: Lily Eaton DO;  [facility]    COLONOSCOPY WITH CO2 INSUFFLATION N/A 4/5/2021    Procedure: COLONOSCOPY, WITH CO2 INSUFFLATION;  Surgeon: Lily Eaton DO;  [facility]    COLONOSCOPY WITH CO2 INSUFFLATION N/A 5/3/2022    Procedure: COLONOSCOPY, WITH CO2 INSUFFLATION;  Surgeon: Lily Eaton DO;  [facility]    COMBINED ESOPHAGOSCOPY, GASTROSCOPY, DUODENOSCOPY (EGD) WITH CO2 INSUFFLATION N/A 7/31/2020    Procedure: ESOPHAGOGASTRODUODENOSCOPY, WITH CO2 INSUFFLATION;  Surgeon: Lily Eaton DO;  [facility]    ESOPHAGOSCOPY, GASTROSCOPY, DUODENOSCOPY (EGD), COMBINED N/A 7/31/2020    Procedure: Esophagogastroduodenoscopy, With Biopsy;  Surgeon: Lily Eaton DO;  Location: [facility]    HC VASECTOMY UNILAT/BILAT W POSTOP SEMEN  1992    Vasectomy    ZZHC COLONOSCOPY THRU STOMA, DIAGNOSTIC  2005    normal       Social History     Tobacco Use    Smoking status: Never     Passive exposure: Never    Smokeless tobacco: Never   Substance Use Topics    Alcohol use: Yes     Alcohol/week: 1.0 - 2.0 standard drink of alcohol     Types: 1 - 2 Standard drinks or equivalent per week     Comment: occasional     Family History   Problem Relation Age of Onset    Cancer Mother         bladder cancer    Hyperlipidemia Mother     Other Cancer Mother         Bladder    Neurologic Disorder Father         alzheimers dementia    Hyperlipidemia Father     Gastrointestinal Disease Brother         divertculitis         Current Outpatient Medications   Medication Sig Dispense Refill    albuterol (PROAIR HFA/PROVENTIL HFA/VENTOLIN HFA) 108 (90 Base) MCG/ACT inhaler Inhale 2 puffs into the lungs every 6 hours 18 g 11    ASPIRIN PO Take 81 mg  by mouth daily      atorvastatin (LIPITOR) 40 MG tablet Take 1 tablet (40 mg) by mouth daily 90 tablet 3    azelastine (ASTELIN) 0.1 % nasal spray SPRAY 2 SPRAYS IN EACH NOSTRIL TWO TIMES A DAY 30 mL 3    budesonide-formoterol (SYMBICORT) 160-4.5 MCG/ACT Inhaler Inhale 2 puffs into the lungs 2 times daily 30.6 g 3    clobetasol (TEMOVATE) 0.05 % external ointment Apply topically 2 times daily to affected area(s) 30 g 1    donepezil (ARICEPT) 5 MG tablet Take 10 mg by mouth At Bedtime      escitalopram (LEXAPRO) 10 MG tablet Take 1 tablet by mouth daily      esomeprazole (NEXIUM) 20 MG DR capsule Take 20 mg by mouth daily Take 30-60 minutes before eating. Takes clear baudilio      famotidine (PEPCID) 40 MG tablet Take 1 tablet (40 mg) by mouth 2 times daily as needed for heartburn 180 tablet 1    fluticasone (FLONASE) 50 MCG/ACT nasal spray Spray 1-2 sprays into both nostrils daily 16 g 11    montelukast (SINGULAIR) 10 MG tablet Take 1 tablet (10 mg) by mouth At Bedtime 90 tablet 3    Naphazoline-Glycerin-Zinc Sulf (CLEAR EYES MAXIMUM ITCHY EYE) 0.012-0.25-0.25 % SOLN Apply to eye as needed (for allergies)      ursodiol (ACTIGALL) 300 MG capsule TAKE 1 CAPSULE (300 MG) BY MOUUTH 2 TIMES DAILY FOR 90 DAYS. 180 capsule 1    vitamin D3 (CHOLECALCIFEROL) 50 mcg (2000 units) tablet Take 1 tablet (50 mcg) by mouth daily 30 tablet 11    melatonin 1 MG TABS Take 1 mg by mouth nightly as needed for sleep (Patient not taking: Reported on 9/26/2023)      Multiple Vitamins-Minerals (MULTIVITAMIN ADULT PO) Take 1 tablet by mouth daily  (Patient not taking: Reported on 9/26/2023)       Allergies   Allergen Reactions    Seasonal Allergies              Review of Systems   Respiratory:  Positive for cough and shortness of breath.    Gastrointestinal:  Positive for abdominal pain, constipation and heartburn.   Neurological:  Positive for dizziness.   Psychiatric/Behavioral:  Positive for mood changes.          OBJECTIVE:   /64    "Pulse 68   Temp 97.7  F (36.5  C) (Temporal)   Resp 16   Ht 1.784 m (5' 10.24\")   Wt 91.9 kg (202 lb 8 oz)   SpO2 98%   BMI 28.86 kg/m   Estimated body mass index is 28.86 kg/m  as calculated from the following:    Height as of this encounter: 1.784 m (5' 10.24\").    Weight as of this encounter: 91.9 kg (202 lb 8 oz).  Physical Exam  GENERAL: healthy, alert and no distress  EYES: Eyes grossly normal to inspection, PERRL and conjunctivae and sclerae normal  HENT: ear canals and TM's normal, nose and mouth without ulcers or lesions  NECK: no adenopathy, no asymmetry, masses, or scars and thyroid normal to palpation  RESP: lungs clear to auscultation - no rales, rhonchi or wheezes  BREAST: normal without masses, tenderness or nipple discharge and no palpable axillary masses or adenopathy  CV: regular rate and rhythm, normal S1 S2, no S3 or S4, no murmur, click or rub, no peripheral edema and peripheral pulses strong  ABDOMEN: soft, nontender, no hepatosplenomegaly, no masses and bowel sounds normal  MS: no gross musculoskeletal defects noted, no edema  SKIN: no suspicious lesions or rashes  NEURO: Normal strength and tone, mentation intact and speech normal  PSYCH: mentation appears normal, affect normal/bright    Diagnostic Test Results:  Labs reviewed in Epic  No results found for this or any previous visit (from the past 24 hour(s)).    ASSESSMENT / PLAN:       ICD-10-CM    1. Encounter for Medicare annual wellness exam  Z00.00       2. Eczema, unspecified type  L30.9 clobetasol (TEMOVATE) 0.05 % external ointment      3. Need for vaccination against respiratory syncytial virus  Z29.11       4. Gastroesophageal reflux disease without esophagitis  K21.9 Basic metabolic panel  (Ca, Cl, CO2, Creat, Gluc, K, Na, BUN)     famotidine (PEPCID) 40 MG tablet      5. HYPERLIPIDEMIA LDL GOAL <160  E78.5 Basic metabolic panel  (Ca, Cl, CO2, Creat, Gluc, K, Na, BUN)     Lipid panel reflex to direct LDL Fasting      6. " Dementia, unspecified dementia severity, unspecified dementia type, unspecified whether behavioral, psychotic, or mood disturbance or anxiety (H)  F03.90 Vitamin D Deficiency     Vitamin B12        Dementia:  - For now recommended that they continue to follow-up with neurology because Dementia can/will be progressive and we may want their assistance in medication management.   - We will check labs recommended by Neurology  - Consider MCT oil as a supplement as well to diet.   - Continue with exercise, continue with strength training, recommended more mobility training as well.     Eczema:  - Areas on legs have healed, he will have lesions come and go, responds well to Clobetasol, refilled   - Reminded of skin thinning with prolonged use.   - Can apply thin layer and cover with Vaseline  - Wash hands after applying and do not get on face.     GERD:  - Recommended starting with Famotidine before going to PPI at this point since Tums have been working.   - he knows dietary triggers - will try to avoid things like more acidic/citric foods, he has reduced alcohol and coffee intake.  He stays very well hydrated. No regular NSAID use.     Discussed vaccines he was due for, they are going to do them through the pharmacy.     Continues to follow up with GI for the PSC and ulcerative Pancolitis.     Asthma:  - Ok to take over prescribing these medications, should have enough for a year right now.   - Per Pulmonology: Asthma plan:  Symbicort 10-12 puffs if concern for flare and if continued symptoms, prednisone 40mg x5d in addition to calling into office   - They want him to stay on Flonase and Azalastine as well for PND.    - Does not need to see Pulmonology at this time.   - They recommended to add on LAMA if symptoms escalate.   - Continue on Symbicort, Montelukast and use the Albuterol PRN.     HLD:  - At this time feel that we can monitor symptoms of his sweating, question if this is due to the SSRI he is on.   - We will  "monitor cholesterol and take over prescribing Atorvastatin after this year.       COUNSELING:  Reviewed preventive health counseling, as reflected in patient instructions      BMI:   Estimated body mass index is 28.86 kg/m  as calculated from the following:    Height as of this encounter: 1.784 m (5' 10.24\").    Weight as of this encounter: 91.9 kg (202 lb 8 oz).         He reports that he has never smoked. He has never been exposed to tobacco smoke. He has never used smokeless tobacco.      Appropriate preventive services were discussed with this patient, including applicable screening as appropriate for fall prevention, nutrition, physical activity, Tobacco-use cessation, weight loss and cognition.  Checklist reviewing preventive services available has been given to the patient.    Reviewed patients plan of care and provided an AVS. The Intermediate Care Plan ( asthma action plan, low back pain action plan, and migraine action plan) for Mainor meets the Care Plan requirement. This Care Plan has been established and reviewed with the Patient.          Francisco Hicks PA-C  [facility]    Identified Health Risks:  I have reviewed Opioid Use Disorder and Substance Use Disorder risk factors and made any needed referrals.   "

## 2023-11-01 NOTE — PATIENT INSTRUCTIONS
MCT Oil   Start on Famotidine before the morning.   Let me know when the refills come up next year for the pulmonology and cardiac medications  Continue with neurology, GI.   I'll let you know the lab results.   RSV, Flu and COVID vaccines    Patient Education   Personalized Prevention Plan  You are due for the preventive services outlined below.  Your care team is available to assist you in scheduling these services.  If you have already completed any of these items, please share that information with your care team to update in your medical record.  Health Maintenance Due   Topic Date Due    RSV VACCINE 60+ (1 - 1-dose 60+ series) Never done    ANNUAL REVIEW OF HM ORDERS  06/08/2023    Flu Vaccine (1) 09/01/2023    COVID-19 Vaccine (5 - 2023-24 season) 09/01/2023    Annual Wellness Visit  09/23/2023     Activities of Daily Living    Your Health Risk Assessment indicates you have difficulties with activities of daily living such as housework, bathing, preparing meals, taking medication, etc. Please make a follow up appointment for us to address this issue in more detail.  Your Health Risk Assessment indicates you feel you are not in good emotional health.    Recreation   Recreation is not limited to sports and team events. It includes any activity that provides relaxation, interest, enjoyment, and exercise. Recreation provides an outlet for physical, mental, and social energy. It can give a sense of worth and achievement. It can help you stay healthy.    Mental Exercise and Social Involvement  Mental and emotional health is as important as physical health. Keep in touch with friends and family. Stay as active as possible. Continue to learn and challenge yourself.   Things you can do to stay mentally active are:  Learn something new, like a foreign language or musical instrument.   Play SCRABBLE or do crossword puzzles. If you cannot find people to play these games with you at home, you can play them with others on  your computer through the Internet.   Join a games club--anything from card games to chess or checkers or lawn bowling.   Start a new hobby.   Go back to school.   Volunteer.   Read.   Keep up with world events.

## 2023-11-01 NOTE — PROGRESS NOTES
The patient reports that he has difficulty with activities of daily living. I have asked that the patient make a follow up appointment in 26-52 weeks where this issue will be further evaluated and addressed.  The patient was provided with suggestions to help him develop a healthy emotional lifestyle.

## 2023-11-03 ENCOUNTER — TELEPHONE (OUTPATIENT)
Dept: GASTROENTEROLOGY | Facility: CLINIC | Age: 68
End: 2023-11-03
Payer: COMMERCIAL

## 2023-11-03 NOTE — TELEPHONE ENCOUNTER
"Endoscopy Scheduling Screen    Have you had a positive Covid test in the last 14 days?  No    Are you active on MyChart?   Yes    What insurance is in the chart?  Other:  BC[employer]    Ordering/Referring Provider:   RICO BURKS        (If ordering provider performs procedure, schedule with ordering provider unless otherwise instructed. )    BMI: Estimated body mass index is 28.86 kg/m  as calculated from the following:    Height as of 11/1/23: 1.784 m (5' 10.24\").    Weight as of 11/1/23: 91.9 kg (202 lb 8 oz).     Sedation Ordered  moderate sedation.   If patient BMI > 50 do not schedule in [facility].    If patient BMI > 45 do not schedule at [facility].    Are you taking methadone or Suboxone?  No    Are you taking any prescription medications for pain 3 or more times per week?   No    Do you have a history of malignant hyperthermia or adverse reaction to anesthesia?  No    (Females) Are you currently pregnant?   No     Have you been diagnosed or told you have pulmonary hypertension?   No    Do you have an LVAD?  No    Have you been told you have moderate to severe sleep apnea?  No    Have you been told you have COPD, asthma, or any other lung disease?  Yes     What breathing problems do you have?  Asthma     Do you use home oxygen?  No    Have your breathing problems required an ED visit or hospitalization in the last year?  No    Do you have any heart conditions?  No     Have you ever had an organ transplant?   No    Have you ever had or are you awaiting a heart or lung transplant?   No    Have you had a stroke or transient ischemic attack (TIA aka \"mini stroke\" in the last 6 months?   No    Have you been diagnosed with or been told you have cirrhosis of the liver?   No    Are you currently on dialysis?   No    Do you need assistance transferring?   No    BMI: Estimated body mass index is 28.86 kg/m  as calculated from the following:    Height as of 11/1/23: 1.784 m (5' 10.24\").    Weight as of 11/1/23: 91.9 kg (202 lb 8 oz). "     Is patients BMI > 40 and scheduling location [facility]?  No    Do you take an injectable medication for weight loss or diabetes (excluding insulin)?  No    Do you take the medication Naltrexone?  No    Do you take blood thinners?  No       Prep   Are you currently on dialysis or do you have chronic kidney disease?  No    Do you have a diagnosis of diabetes?  No    Do you have a diagnosis of cystic fibrosis (CF)?  No    On a regular basis do you go 3 -5 days between bowel movements?  No    BMI > 40?  No    Preferred Pharmacy:    [id number] - [place], [place] - [address] [address]  [address] [facility] [address]  Phone: [phone] Fax: [phone]      Final Scheduling Details   Colonoscopy prep sent?  N/A    Procedure scheduled  Upper endoscopy (EGD)    Surgeon:  Jose Roberto     Date of procedure:  3/13/24     Pre-OP / PAC:   No - Not required for this site.    Location  [facility] - Patient preference.    Sedation   MAC/Deep Sedation  site      Patient Reminders:   You will receive a call from a Nurse to review instructions and health history.  This assessment must be completed prior to your procedure.  Failure to complete the Nurse assessment may result in the procedure being cancelled.      On the day of your procedure, please designate an adult(s) who can drive you home stay with you for the next 24 hours. The medicines used in the exam will make you sleepy. You will not be able to drive.      You cannot take public transportation, ride share services, or non-medical taxi service without a responsible caregiver.  Medical transport services are allowed with the requirement that a responsible caregiver will receive you at your destination.  We require that drivers and caregivers are confirmed prior to your procedure.

## 2023-11-09 ENCOUNTER — INFUSION THERAPY VISIT (OUTPATIENT)
Dept: INFUSION THERAPY | Facility: CLINIC | Age: 68
End: 2023-11-09
Attending: INTERNAL MEDICINE
Payer: COMMERCIAL

## 2023-11-09 VITALS
SYSTOLIC BLOOD PRESSURE: 122 MMHG | DIASTOLIC BLOOD PRESSURE: 78 MMHG | TEMPERATURE: 97.4 F | WEIGHT: 203 LBS | OXYGEN SATURATION: 99 % | BODY MASS INDEX: 28.93 KG/M2 | HEART RATE: 62 BPM | RESPIRATION RATE: 18 BRPM

## 2023-11-09 DIAGNOSIS — K51.00 ULCERATIVE PANCOLITIS WITHOUT COMPLICATION (H): Primary | ICD-10-CM

## 2023-11-09 PROCEDURE — 96365 THER/PROPH/DIAG IV INF INIT: CPT

## 2023-11-09 PROCEDURE — 258N000003 HC RX IP 258 OP 636: Performed by: INTERNAL MEDICINE

## 2023-11-09 PROCEDURE — 250N000011 HC RX IP 250 OP 636: Mod: JZ | Performed by: INTERNAL MEDICINE

## 2023-11-09 RX ORDER — HEPARIN SODIUM,PORCINE 10 UNIT/ML
5 VIAL (ML) INTRAVENOUS
Status: CANCELLED | OUTPATIENT
Start: 2023-12-01

## 2023-11-09 RX ORDER — HEPARIN SODIUM (PORCINE) LOCK FLUSH IV SOLN 100 UNIT/ML 100 UNIT/ML
5 SOLUTION INTRAVENOUS
Status: CANCELLED | OUTPATIENT
Start: 2023-12-01

## 2023-11-09 RX ADMIN — VEDOLIZUMAB 300 MG: 300 INJECTION, POWDER, LYOPHILIZED, FOR SOLUTION INTRAVENOUS at 08:59

## 2023-11-09 RX ADMIN — SODIUM CHLORIDE 250 ML: 9 INJECTION, SOLUTION INTRAVENOUS at 08:45

## 2023-11-09 ASSESSMENT — PAIN SCALES - GENERAL: PAINLEVEL: NO PAIN (0)

## 2023-11-09 NOTE — PROGRESS NOTES
Infusion Nursing Note:  Mainor Grande presents today for Entyvio.    Patient seen by provider today: No   present during visit today: Not Applicable.    Note: No complaints except having allergy symptoms which he usually has around this time of year      Intravenous Access:  Peripheral IV placed.    Treatment Conditions:  Lab Results   Component Value Date    HGB 14.0 11/01/2023    WBC 6.0 11/01/2023    ANEU 5.3 12/01/2020    ANEUTAUTO 4.3 10/06/2023     11/01/2023        Lab Results   Component Value Date     11/01/2023    POTASSIUM 4.4 11/01/2023    CR 0.94 11/01/2023    BREANNA 9.1 11/01/2023    BILITOTAL 0.7 11/01/2023    ALBUMIN 4.2 11/01/2023    ALT 35 11/01/2023    AST 43 11/01/2023       Biological Infusion Checklist:  ~~~ NOTE: If the patient answers yes to any of the questions below, hold the infusion and contact ordering provider or on-call provider.    Have you recently had an elevated temperature, fever, chills, productive cough, coughing for 3 weeks or longer or hemoptysis,  abnormal vital signs, night sweats,  chest pain or have you noticed a decrease in your appetite, unexplained weight loss or fatigue? No  Do you have any open wounds or new incisions? No  Do you have any upcoming hospitalizations or surgeries? Does not include esophagogastroduodenoscopy, colonoscopy, endoscopic retrograde cholangiopancreatography (ERCP), endoscopic ultrasound (EUS), dental procedures or joint aspiration/steroid injections No  Do you currently have any signs of illness or infection or are you on any antibiotics? No  Have you had any new, sudden or worsening abdominal pain? No  Have you or anyone in your household received a live vaccination in the past 4 weeks? Please note: No live vaccines while on biologic/chemotherapy until 6 months after the last treatment. Patient can receive the flu vaccine (shot only), pneumovax and the Covid vaccine. It is optimal for the patient to get these vaccines  mid cycle, but they can be given at any time as long as it is not on the day of the infusion. No  Have you recently been diagnosed with any new nervous system diseases (ie. Multiple sclerosis, Guillain [place], seizures, neurological changes) or cancer diagnosis? Are you on any form of radiation or chemotherapy? No  Are you pregnant or breast feeding or do you have plans of pregnancy in the future? No  Have you been having any signs of worsening depression or suicidal ideations?  (benlysta only) No  Have there been any other new onset medical symptoms? No  Have you had any new blood clots? (IVIG only) No      Post Infusion Assessment:  Patient tolerated infusion without incident.  Patient observed for 15 minutes post IV per protocol.  Site patent and intact, free from redness, edema or discomfort.  No evidence of extravasations.  Access discontinued per protocol.       Discharge Plan:   Patient declined prescription refills.  Discharge instructions reviewed with: Patient.  Patient and/or family verbalized understanding of discharge instructions and all questions answered.  AVS to patient via [employer].  Patient will return 12/7/2023 for next appointment.   Patient discharged in stable condition accompanied by: self.  Departure Mode: Ambulatory.      Post Infusion Assessment:  Biologic Infusion Post Education: Call the triage nurse at your clinic or seek medical attention if you have chills and/or temperature greater than or equal to 100.5, uncontrolled nausea/vomiting, diarrhea, constipation, dizziness, shortness of breath, chest pain, heart palpitations, weakness or any other new or concerning symptoms, questions or concerns.  You cannot have any live virus vaccines prior to or during treatment or up to 6 months post infusion.  If you have an upcoming surgery, medical procedure or dental procedure during treatment, this should be discussed with your ordering physician and your surgeon/dentist.  If you are having any  concerning symptom, if you are unsure if you should get your next infusion or wish to speak to a provider before your next infusion, please call your care coordinator or triage nurse at your clinic to notify them so we can adequately serve you.       Brenda Herrera RN

## 2023-11-13 ENCOUNTER — TELEPHONE (OUTPATIENT)
Dept: GASTROENTEROLOGY | Facility: CLINIC | Age: 68
End: 2023-11-13
Payer: COMMERCIAL

## 2023-11-13 NOTE — TELEPHONE ENCOUNTER
Caller: Rani, spouse    Reason for Reschedule/Cancellation (please be detailed, any staff messages or encounters to note?): personal schedule conflict      Prior to reschedule please review:  Ordering Provider: Francisco Hicks PA-C  Sedation per order: MODERATE  Does patient have any ASC Exclusions, please identify?: NO      Notes on Cancelled Procedure:  Procedure: Upper Endoscopy [EGD]   Date: 3/13  Location: [facility]; [address] , [place] [address]  Surgeon: LONG      Rescheduled: Yes  Procedure: Upper Endoscopy [EGD]   Date: 3/11  Location: [facility]; [address] [place] [address]  Surgeon: LAUREN  Sedation Level Scheduled  MAC,  Reason for Sedation Level PER LOCATION  Prep/Instructions updated and sent: [employer]       Send In - basket message to [employer] if EUS  procedure is canceled or rescheduled: [ N/A, YES or NO] N/A

## 2023-11-27 ENCOUNTER — DOCUMENTATION ONLY (OUTPATIENT)
Dept: OTHER | Facility: CLINIC | Age: 68
End: 2023-11-27
Payer: COMMERCIAL

## 2023-11-30 NOTE — PROGRESS NOTES
Infusion Nursing Note:  Mainor Grande presents today for Entyvio.    Patient seen by provider today: No   present during visit today: Not Applicable.    Note: N/A.  Patient declined the covid-19 test.    Intravenous Access:  Peripheral IV placed.    Treatment Conditions:  Biological Infusion Checklist:  ~~~ NOTE: If the patient answers yes to any of the questions below, hold the infusion and contact ordering provider or on-call provider.    1. Have you recently had an elevated temperature, fever, chills, productive cough, coughing for 3 weeks or longer or hemoptysis, abnormal vital signs, night sweats,  chest pain or have you noticed a decrease in your appetite, unexplained weight loss or fatigue? No  2. Do you have any open wounds or new incisions? No  3. Do you have any recent or upcoming hospitalizations, surgeries or dental procedures? No  4. Do you currently have or recently have had any signs of illness or infection or are you on any antibiotics? No  5. Have you had any new, sudden or worsening abdominal pain? No  6. Have you or anyone in your household received a live vaccination in the past 4 weeks? Please note:  No live vaccines while on biologic/chemotherapy until 6 months after the last treatment.  Patient can receive the flu vaccine (shot only) and the pneumovax.  It is optimal for the patient to get these vaccines mid cycle, but they can be given at any time as long as it is not on the day of the infusion. No  7. Have you recently been diagnosed with any new nervous system diseases (ie. Multiple sclerosis, Guillain [place], seizures, neurological changes) or cancer diagnosis? No  8. Are you on any form of radiation or chemotherapy? No  9. Are you pregnant or breast feeding or do you have plans of pregnancy in the future? No  10. Have you been having any signs of worsening depression or suicidal ideations?  (benlysta only) No  11. Have there been any other new onset medical symptoms?  No        Post Infusion Assessment:  Patient tolerated infusion without incident.  Patient observed for 15 minutes post Entyvio.  Site patent and intact, free from redness, edema or discomfort.  No evidence of extravasations.  Access discontinued per protocol.  Biologic Infusion Post Education: Call the triage nurse at your clinic or seek medical attention if you have chills and/or temperature greater than or equal to 100.5, uncontrolled nausea/vomiting, diarrhea, constipation, dizziness, shortness of breath, chest pain, heart palpitations, weakness or any other new or concerning symptoms, questions or concerns.  You cannot have any live virus vaccines prior to or during treatment or up to 6 months post infusion.  If you have an upcoming surgery, medical procedure or dental procedure during treatment, this should be discussed with your ordering physician and your surgeon/dentist.  If you are having any concerning symptom, if you are unsure if you should get your next infusion or wish to speak to a provider before your next infusion, please call your care coordinator or triage nurse at your clinic to notify them so we can adequately serve you.       Discharge Plan:   Discharge instructions reviewed with: Patient.  Patient discharged in stable condition accompanied by: self.  Departure Mode: Ambulatory.    Marianela Fischer RN                       oriented to person, place, time and situation

## 2023-12-06 DIAGNOSIS — K83.01 PSC (PRIMARY SCLEROSING CHOLANGITIS) (H): ICD-10-CM

## 2023-12-06 RX ORDER — URSODIOL 300 MG/1
300 CAPSULE ORAL 2 TIMES DAILY
Qty: 180 CAPSULE | Refills: 1 | Status: SHIPPED | OUTPATIENT
Start: 2023-12-06 | End: 2024-05-31

## 2023-12-07 ENCOUNTER — INFUSION THERAPY VISIT (OUTPATIENT)
Dept: INFUSION THERAPY | Facility: CLINIC | Age: 68
End: 2023-12-07
Attending: INTERNAL MEDICINE
Payer: COMMERCIAL

## 2023-12-07 VITALS
OXYGEN SATURATION: 99 % | BODY MASS INDEX: 30.06 KG/M2 | TEMPERATURE: 98.2 F | HEIGHT: 70 IN | DIASTOLIC BLOOD PRESSURE: 79 MMHG | SYSTOLIC BLOOD PRESSURE: 142 MMHG | HEART RATE: 59 BPM | WEIGHT: 210 LBS | RESPIRATION RATE: 22 BRPM

## 2023-12-07 DIAGNOSIS — K51.00 ULCERATIVE PANCOLITIS WITHOUT COMPLICATION (H): Primary | ICD-10-CM

## 2023-12-07 PROCEDURE — 250N000011 HC RX IP 250 OP 636: Mod: JZ | Performed by: INTERNAL MEDICINE

## 2023-12-07 PROCEDURE — 258N000003 HC RX IP 258 OP 636: Performed by: INTERNAL MEDICINE

## 2023-12-07 PROCEDURE — 96365 THER/PROPH/DIAG IV INF INIT: CPT

## 2023-12-07 RX ORDER — HEPARIN SODIUM,PORCINE 10 UNIT/ML
5 VIAL (ML) INTRAVENOUS
Status: CANCELLED | OUTPATIENT
Start: 2024-01-04

## 2023-12-07 RX ORDER — HEPARIN SODIUM (PORCINE) LOCK FLUSH IV SOLN 100 UNIT/ML 100 UNIT/ML
5 SOLUTION INTRAVENOUS
Status: CANCELLED | OUTPATIENT
Start: 2024-01-04

## 2023-12-07 RX ORDER — HEPARIN SODIUM (PORCINE) LOCK FLUSH IV SOLN 100 UNIT/ML 100 UNIT/ML
5 SOLUTION INTRAVENOUS
Status: DISCONTINUED | OUTPATIENT
Start: 2023-12-07 | End: 2023-12-07 | Stop reason: HOSPADM

## 2023-12-07 RX ORDER — HEPARIN SODIUM,PORCINE 10 UNIT/ML
5 VIAL (ML) INTRAVENOUS
Status: DISCONTINUED | OUTPATIENT
Start: 2023-12-07 | End: 2023-12-07 | Stop reason: HOSPADM

## 2023-12-07 RX ADMIN — SODIUM CHLORIDE 250 ML: 9 INJECTION, SOLUTION INTRAVENOUS at 08:16

## 2023-12-07 RX ADMIN — VEDOLIZUMAB 300 MG: 300 INJECTION, POWDER, LYOPHILIZED, FOR SOLUTION INTRAVENOUS at 08:34

## 2023-12-07 ASSESSMENT — PAIN SCALES - GENERAL: PAINLEVEL: NO PAIN (0)

## 2023-12-07 NOTE — PROGRESS NOTES
Infusion Nursing Note:  Mainor Grande presents today for Entyvio.    Patient seen by provider today: No   present during visit today: Not Applicable.    Note: Pt states he has been feeling well. Denies any new questions or concerns.  Denies any fever, chills, new medications or vaccines.  Vitally stable, afebrile.  Tolerated infusion well without rxn.  Next infusion scheduled for 1/5/24.  Labs to be drawn with next infusion.      Intravenous Access:  Peripheral IV placed.    Treatment Conditions:  Biological Infusion Checklist:  ~~~ NOTE: If the patient answers yes to any of the questions below, hold the infusion and contact ordering provider or on-call provider.    Have you recently had an elevated temperature, fever, chills, productive cough, coughing for 3 weeks or longer or hemoptysis,  abnormal vital signs, night sweats,  chest pain or have you noticed a decrease in your appetite, unexplained weight loss or fatigue? No  Do you have any open wounds or new incisions? No  Do you have any upcoming hospitalizations or surgeries? Does not include esophagogastroduodenoscopy, colonoscopy, endoscopic retrograde cholangiopancreatography (ERCP), endoscopic ultrasound (EUS), dental procedures or joint aspiration/steroid injections No  Do you currently have any signs of illness or infection or are you on any antibiotics? No  Have you had any new, sudden or worsening abdominal pain? No  Have you or anyone in your household received a live vaccination in the past 4 weeks? Please note: No live vaccines while on biologic/chemotherapy until 6 months after the last treatment. Patient can receive the flu vaccine (shot only), pneumovax and the Covid vaccine. It is optimal for the patient to get these vaccines mid cycle, but they can be given at any time as long as it is not on the day of the infusion. No  Have you recently been diagnosed with any new nervous system diseases (ie. Multiple sclerosis, Guillain [place],  seizures, neurological changes) or cancer diagnosis? Are you on any form of radiation or chemotherapy? No  Are you pregnant or breast feeding or do you have plans of pregnancy in the future? No  Have you been having any signs of worsening depression or suicidal ideations?  (benlysta only) No  Have there been any other new onset medical symptoms? No  Have you had any new blood clots? (IVIG only) No      Post Infusion Assessment:  Patient tolerated infusion without incident.  Blood return noted pre and post infusion.  Site patent and intact, free from redness, edema or discomfort.  No evidence of extravasations.  Access discontinued per protocol.  Biologic Infusion Post Education: Call the triage nurse at your clinic or seek medical attention if you have chills and/or temperature greater than or equal to 100.5, uncontrolled nausea/vomiting, diarrhea, constipation, dizziness, shortness of breath, chest pain, heart palpitations, weakness or any other new or concerning symptoms, questions or concerns.  You cannot have any live virus vaccines prior to or during treatment or up to 6 months post infusion.  If you have an upcoming surgery, medical procedure or dental procedure during treatment, this should be discussed with your ordering physician and your surgeon/dentist.  If you are having any concerning symptom, if you are unsure if you should get your next infusion or wish to speak to a provider before your next infusion, please call your care coordinator or triage nurse at your clinic to notify them so we can adequately serve you.       Discharge Plan:   Discharge instructions reviewed with: Patient.  Patient and/or family verbalized understanding of discharge instructions and all questions answered.  AVS to patient via [employer].  Patient will return 1/5/24 for next appointment.   Patient discharged in stable condition accompanied by: self.  Departure Mode: Ambulatory.      Lizzette Cavazos RN

## 2023-12-11 ENCOUNTER — TELEPHONE (OUTPATIENT)
Dept: FAMILY MEDICINE | Facility: OTHER | Age: 68
End: 2023-12-11
Payer: COMMERCIAL

## 2023-12-11 DIAGNOSIS — R05.9 COUGH, UNSPECIFIED TYPE: Primary | ICD-10-CM

## 2023-12-11 RX ORDER — BENZONATATE 100 MG/1
100 CAPSULE ORAL 3 TIMES DAILY PRN
Qty: 30 CAPSULE | Refills: 0 | Status: SHIPPED | OUTPATIENT
Start: 2023-12-11

## 2023-12-11 NOTE — TELEPHONE ENCOUNTER
Rn called and relayed provider message    Patient wife verbalized understanding and in agreement with plan of care.     Wanda Doll RN

## 2023-12-11 NOTE — TELEPHONE ENCOUNTER
Sent prescription, if it is more phlegm then I'd recommend Mucinex because I wouldn't want to suppress a cough if he is trying to get phlegm out of his chest as that increases his risk of pneumonia.     Francisco Hicks PA-C

## 2023-12-11 NOTE — TELEPHONE ENCOUNTER
Reason for Call:  Appointment Request    Patient requesting this type of appt:  patient has chest congestion and a lot of phlegm, has seen provider for this before    Requested provider: Francisco Hicks    Reason patient unable to be scheduled: Not within requested timeframe    When does patient want to be seen/preferred time: 3-7 days    Comments: patient's wife Rani calling, patient has chest congestion and a lot of phlegm. Patient has seen provider for this before and they tried to make an appointment with her but she doesn't have any openings until the end of Jan. They are wondering if she can squeeze them in sometime this week or next week. They prefer to see her since she has seen patient for this before and knows what is going on.     Could we send this information to you in [employer] or would you prefer to receive a phone call?:   Patient would prefer a phone call   Okay to leave a detailed message?: Yes at Cell number on file:    Telephone Information:   Mobile [phone]       Call taken on 12/11/2023 at 7:04 AM by Yolanda Palacios

## 2023-12-11 NOTE — TELEPHONE ENCOUNTER
Wife calling to ask if they can get a prescription sent of the same medication Dr. Rodriguez had prescribed last January for coughing. She could not remember the name of the medication. After looking, found one called benzonatate (TESSALON) that she thinks may have been it. They are going out of town and she if worried that he may need this then. Wondering if this is something Emalie would send for him? Please call them back at mobile# and ok to leave detailed message.

## 2023-12-27 DIAGNOSIS — J45.40 MODERATE PERSISTENT ASTHMA, UNSPECIFIED WHETHER COMPLICATED: ICD-10-CM

## 2023-12-27 RX ORDER — BUDESONIDE AND FORMOTEROL FUMARATE DIHYDRATE 160; 4.5 UG/1; UG/1
2 AEROSOL RESPIRATORY (INHALATION) 2 TIMES DAILY
Qty: 30.6 G | Refills: 3 | Status: SHIPPED | OUTPATIENT
Start: 2023-12-27

## 2023-12-27 NOTE — TELEPHONE ENCOUNTER
budesonide-formoterol (SYMBICORT) 160-4.5 MCG/ACT Inhaler 30.6 g 3 9/26/2023 -- No   Sig - Route: Inhale 2 puffs into the lungs 2 times daily - Inhalation           Last Office Visit: 9/26/23  Future Office visit:   unknown    Routing refill request to provider for review/approval because:  NA- passed protocol

## 2024-01-05 ENCOUNTER — INFUSION THERAPY VISIT (OUTPATIENT)
Dept: INFUSION THERAPY | Facility: CLINIC | Age: 69
End: 2024-01-05
Attending: PHYSICIAN ASSISTANT
Payer: COMMERCIAL

## 2024-01-05 VITALS
HEART RATE: 60 BPM | SYSTOLIC BLOOD PRESSURE: 138 MMHG | BODY MASS INDEX: 29.76 KG/M2 | WEIGHT: 207.4 LBS | RESPIRATION RATE: 16 BRPM | OXYGEN SATURATION: 99 % | DIASTOLIC BLOOD PRESSURE: 87 MMHG | TEMPERATURE: 97.7 F

## 2024-01-05 DIAGNOSIS — K51.00 ULCERATIVE PANCOLITIS WITHOUT COMPLICATION (H): Primary | ICD-10-CM

## 2024-01-05 LAB
ALBUMIN SERPL BCG-MCNC: 4.4 G/DL (ref 3.5–5.2)
ALP SERPL-CCNC: 138 U/L (ref 40–150)
ALT SERPL W P-5'-P-CCNC: 32 U/L (ref 0–70)
AST SERPL W P-5'-P-CCNC: 40 U/L (ref 0–45)
BASOPHILS # BLD AUTO: 0.1 10E3/UL (ref 0–0.2)
BASOPHILS NFR BLD AUTO: 2 %
BILIRUB DIRECT SERPL-MCNC: 0.32 MG/DL (ref 0–0.3)
BILIRUB SERPL-MCNC: 0.9 MG/DL
CRP SERPL-MCNC: <3 MG/L
EOSINOPHIL # BLD AUTO: 0.2 10E3/UL (ref 0–0.7)
EOSINOPHIL NFR BLD AUTO: 3 %
ERYTHROCYTE [DISTWIDTH] IN BLOOD BY AUTOMATED COUNT: 11.8 % (ref 10–15)
ERYTHROCYTE [SEDIMENTATION RATE] IN BLOOD BY WESTERGREN METHOD: 7 MM/HR (ref 0–20)
HCT VFR BLD AUTO: 44.2 % (ref 40–53)
HGB BLD-MCNC: 14.9 G/DL (ref 13.3–17.7)
IMM GRANULOCYTES # BLD: 0 10E3/UL
IMM GRANULOCYTES NFR BLD: 0 %
LYMPHOCYTES # BLD AUTO: 2 10E3/UL (ref 0.8–5.3)
LYMPHOCYTES NFR BLD AUTO: 30 %
MCH RBC QN AUTO: 31.9 PG (ref 26.5–33)
MCHC RBC AUTO-ENTMCNC: 33.7 G/DL (ref 31.5–36.5)
MCV RBC AUTO: 95 FL (ref 78–100)
MONOCYTES # BLD AUTO: 0.8 10E3/UL (ref 0–1.3)
MONOCYTES NFR BLD AUTO: 12 %
NEUTROPHILS # BLD AUTO: 3.6 10E3/UL (ref 1.6–8.3)
NEUTROPHILS NFR BLD AUTO: 53 %
NRBC # BLD AUTO: 0 10E3/UL
NRBC BLD AUTO-RTO: 0 /100
PLATELET # BLD AUTO: 240 10E3/UL (ref 150–450)
PROT SERPL-MCNC: 7.5 G/DL (ref 6.4–8.3)
RBC # BLD AUTO: 4.67 10E6/UL (ref 4.4–5.9)
WBC # BLD AUTO: 6.7 10E3/UL (ref 4–11)

## 2024-01-05 PROCEDURE — 85025 COMPLETE CBC W/AUTO DIFF WBC: CPT | Performed by: INTERNAL MEDICINE

## 2024-01-05 PROCEDURE — 80076 HEPATIC FUNCTION PANEL: CPT | Performed by: INTERNAL MEDICINE

## 2024-01-05 PROCEDURE — 258N000003 HC RX IP 258 OP 636: Performed by: INTERNAL MEDICINE

## 2024-01-05 PROCEDURE — 96365 THER/PROPH/DIAG IV INF INIT: CPT

## 2024-01-05 PROCEDURE — 250N000011 HC RX IP 250 OP 636: Mod: JZ | Performed by: INTERNAL MEDICINE

## 2024-01-05 PROCEDURE — 85652 RBC SED RATE AUTOMATED: CPT | Performed by: INTERNAL MEDICINE

## 2024-01-05 PROCEDURE — 36415 COLL VENOUS BLD VENIPUNCTURE: CPT | Performed by: INTERNAL MEDICINE

## 2024-01-05 PROCEDURE — 86140 C-REACTIVE PROTEIN: CPT | Performed by: INTERNAL MEDICINE

## 2024-01-05 RX ORDER — HEPARIN SODIUM (PORCINE) LOCK FLUSH IV SOLN 100 UNIT/ML 100 UNIT/ML
5 SOLUTION INTRAVENOUS
Status: CANCELLED | OUTPATIENT
Start: 2024-02-01

## 2024-01-05 RX ORDER — HEPARIN SODIUM,PORCINE 10 UNIT/ML
5 VIAL (ML) INTRAVENOUS
Status: CANCELLED | OUTPATIENT
Start: 2024-02-01

## 2024-01-05 RX ADMIN — VEDOLIZUMAB 300 MG: 300 INJECTION, POWDER, LYOPHILIZED, FOR SOLUTION INTRAVENOUS at 08:36

## 2024-01-05 RX ADMIN — SODIUM CHLORIDE 250 ML: 9 INJECTION, SOLUTION INTRAVENOUS at 08:29

## 2024-01-05 ASSESSMENT — PAIN SCALES - GENERAL: PAINLEVEL: NO PAIN (0)

## 2024-01-05 NOTE — PROGRESS NOTES
Infusion Nursing Note:  Mainor Grande presents today for Entyvio.    Patient seen by provider today: No   present during visit today: Not Applicable.    Note: Leno is feeling well today, no complaints.  Mild confusion noted during conversation.  Occasional subtle conflicting statements, but overall oriented.  Very pleasant.       Intravenous Access:  Labs drawn without difficulty.  Peripheral IV placed.    Treatment Conditions:  Biological Infusion Checklist:  ~~~ NOTE: If the patient answers yes to any of the questions below, hold the infusion and contact ordering provider or on-call provider.    Have you recently had an elevated temperature, fever, chills, productive cough, coughing for 3 weeks or longer or hemoptysis,  abnormal vital signs, night sweats,  chest pain or have you noticed a decrease in your appetite, unexplained weight loss or fatigue? No  Do you have any open wounds or new incisions? No  Do you have any upcoming hospitalizations or surgeries? Does not include esophagogastroduodenoscopy, colonoscopy, endoscopic retrograde cholangiopancreatography (ERCP), endoscopic ultrasound (EUS), dental procedures or joint aspiration/steroid injections No  Do you currently have any signs of illness or infection or are you on any antibiotics? No  Have you had any new, sudden or worsening abdominal pain? No  Have you or anyone in your household received a live vaccination in the past 4 weeks? Please note: No live vaccines while on biologic/chemotherapy until 6 months after the last treatment. Patient can receive the flu vaccine (shot only), pneumovax and the Covid vaccine. It is optimal for the patient to get these vaccines mid cycle, but they can be given at any time as long as it is not on the day of the infusion. No  Have you recently been diagnosed with any new nervous system diseases (ie. Multiple sclerosis, Guillain [place], seizures, neurological changes) or cancer diagnosis? Are you on any form  of radiation or chemotherapy? No  Are you pregnant or breast feeding or do you have plans of pregnancy in the future? No  Have you been having any signs of worsening depression or suicidal ideations?  (benlysta only) No  Have there been any other new onset medical symptoms? No  Have you had any new blood clots? (IVIG only) No      Post Infusion Assessment:  Patient tolerated infusion without incident.  Site patent and intact, free from redness, edema or discomfort.  No evidence of extravasations.  Access discontinued per protocol.       Discharge Plan:   Discharge instructions reviewed with: Patient.  Patient and/or family verbalized understanding of discharge instructions and all questions answered.  Patient discharged in stable condition accompanied by: self.  Departure Mode: Ambulatory.      Shannon Vasquez RN

## 2024-01-31 ENCOUNTER — VIRTUAL VISIT (OUTPATIENT)
Dept: GASTROENTEROLOGY | Facility: CLINIC | Age: 69
End: 2024-01-31
Attending: INTERNAL MEDICINE
Payer: COMMERCIAL

## 2024-01-31 VITALS — WEIGHT: 200 LBS | BODY MASS INDEX: 28.7 KG/M2

## 2024-01-31 DIAGNOSIS — K83.01 PSC (PRIMARY SCLEROSING CHOLANGITIS) (H): ICD-10-CM

## 2024-01-31 DIAGNOSIS — K51.00 ULCERATIVE PANCOLITIS WITHOUT COMPLICATION (H): Primary | ICD-10-CM

## 2024-01-31 PROCEDURE — 99214 OFFICE O/P EST MOD 30 MIN: CPT | Mod: 95 | Performed by: INTERNAL MEDICINE

## 2024-01-31 ASSESSMENT — PAIN SCALES - GENERAL: PAINLEVEL: NO PAIN (0)

## 2024-01-31 NOTE — PROGRESS NOTES
"Virtual Visit Details    Type of service:  Video Visit   Video Start Time: {video visit start/end time for provider to select:840104}  Video End Time:{video visit start/end time for provider to select:953145}    Originating Location (pt. Location): {video visit patient location:782541::\"Home\"}  {PROVIDER LOCATION On-site should be selected for visits conducted from your clinic location or adjoining [facility] hospital, academic office, or other nearby [facility] building. Off-site should be selected for all other provider locations, including home:365300}  Distant Location (provider location):  {virtual location provider:738597}  Platform used for Video Visit: {Virtual Visit Platforms:797281::\"[employer]\"}  "

## 2024-01-31 NOTE — PATIENT INSTRUCTIONS
I'm happy you are doing well.  We will follow up the results of your colonoscopy.  Please reach out with any questions or concerns.

## 2024-01-31 NOTE — PROGRESS NOTES
HPI:    Leno  presents today for a video visit for follow-up.  His wife has been noticing him going to the bathroom more. Leno reports its mainly gas.  Sometimes has some urgency and feels like he needs to go to the bathroom.  Unsure how much has to go to the bathroom - maybe 4-5 times a day although Leno and his wife aren't really sure how many times he actually has a bowel movement when he goes.  Stools are generally formed. No blood. No abdominal pain. No weight loss or weight gain.  Wife is in an Alzheimer's support group - has noticed multiple members saying that having a colonoscopy negatively impacted their family members dementia.  Leno had a similar issue when he had his last colonoscopy a year ago.  Wondering if colonoscopies still need to be done.      IBD history:  Age at diagnosis: 64  Extent of disease: pancolitis  EIM: PSC  Current/previous medications: entyvio q4 weeks          Past Medical History:   Diagnosis Date    Allergic rhinitis, cause unspecified     Fatty liver 2004    elevated LFT, saw GI for a consultation    Mild persistent asthma 09/12/2008    Reflux esophagitis     Unspecified disease of respiratory system     RAD    Viremia, unspecified     acute       Past Surgical History:   Procedure Laterality Date    COLONOSCOPY  9/4/2013    Procedure: COLONOSCOPY;  colonoscopy;  Surgeon: Beto Keen MD;  Location:  GI    COLONOSCOPY N/A 7/31/2020    Procedure: Colonoscopy, With Polypectomy And Biopsy;  Surgeon: Lily Eaton DO;  Location: [facility]    COLONOSCOPY N/A 4/5/2021    Procedure: Colonoscopy, With Polypectomy And Biopsy;  Surgeon: Lily Eaton DO;  Location: [facility]    COLONOSCOPY N/A 5/3/2022    Procedure: COLONOSCOPY, WITH POLYPECTOMY AND BIOPSY;  Surgeon: Lily Eaton DO;  Location: [facility]    COLONOSCOPY N/A 5/3/2022    Procedure: COLONOSCOPY, FLEXIBLE, WITH LESION REMOVAL USING SNARE;  Surgeon: Lily Eaton DO;  Location: [facility]    COLONOSCOPY N/A  5/18/2023    Procedure: COLONOSCOPY, WITH BIOPSY;  Surgeon: Lily Eaton DO;  Location: [facility]    COLONOSCOPY WITH CO2 INSUFFLATION N/A 7/31/2020    Procedure: COLONOSCOPY, WITH CO2 INSUFFLATION;  Surgeon: Lily Eaton DO;  Location: [facility]    COLONOSCOPY WITH CO2 INSUFFLATION N/A 4/5/2021    Procedure: COLONOSCOPY, WITH CO2 INSUFFLATION;  Surgeon: Lily Eaton DO;  Location: [facility]    COLONOSCOPY WITH CO2 INSUFFLATION N/A 5/3/2022    Procedure: COLONOSCOPY, WITH CO2 INSUFFLATION;  Surgeon: Lily Eaton DO;  Location: [facility]    COMBINED ESOPHAGOSCOPY, GASTROSCOPY, DUODENOSCOPY (EGD) WITH CO2 INSUFFLATION N/A 7/31/2020    Procedure: ESOPHAGOGASTRODUODENOSCOPY, WITH CO2 INSUFFLATION;  Surgeon: Lily Eaton DO;  Location: [facility]    ESOPHAGOSCOPY, GASTROSCOPY, DUODENOSCOPY (EGD), COMBINED N/A 7/31/2020    Procedure: Esophagogastroduodenoscopy, With Biopsy;  Surgeon: Lily Eaton DO;  Location: [facility]    HC VASECTOMY UNILAT/BILAT W POSTOP SEMEN  1992    Vasectomy    ZZHC COLONOSCOPY THRU STOMA, DIAGNOSTIC  2005    normal       Family History   Problem Relation Age of Onset    Cancer Mother         bladder cancer    Hyperlipidemia Mother     Other Cancer Mother         Bladder    Neurologic Disorder Father         alzheimers dementia    Hyperlipidemia Father     Gastrointestinal Disease Brother         divertculitis       Social History     Tobacco Use    Smoking status: Never     Passive exposure: Never    Smokeless tobacco: Never   Substance Use Topics    Alcohol use: Yes     Alcohol/week: 1.0 - 2.0 standard drink of alcohol     Types: 1 - 2 Standard drinks or equivalent per week     Comment: occasional        O:    Gen: no acute distress  HEENT: NCAT  Neck: normal ROM  Resp: nonlabored breathing  Neuro: no gross deficits  Psych: appropriate mood and affect    Assessment and Plan:    # UC pancolitis, PSC - clinically doing quite well - has had some urgency but no abdominal pain, weight loss,  hematochezia, etc.  Remains on entyvio q4 weeks. Did discuss colonoscopies today as he did have a worsening of his dementia during prep of last procedures.  Discussed that in people with UC and PSC the recommendation would be for annual colonoscopies given risk of CRC.  Discussed that if something were to be found on colonoscopy (like cancer) if Leno and his wife would want further treatment (I.e. surgery) and they said that likely they would.  Given that they would want further treatment I think it is reasonable to consider annual colonoscopies but will continue to discuss before each future procedure.  Is currently scheduled for EGD/colonoscopy in March    # dementia    RTC 6 months    Lily Eaton, DO     Video-Visit Details     Type of service:  Video Visit     Video Start Time: 10:30 AM  Video End Time (time video stopped): 10:43 AM    Originating Location (pt. Location): home     Distant Location (provider location):  remote     Mode of Communication:  Video Conference via [employer]

## 2024-01-31 NOTE — NURSING NOTE
Is the patient currently in the state of [place]? YES    Visit mode:VIDEO    If the visit is dropped, the patient can be reconnected by: VIDEO VISIT: Text to cell phone:   Telephone Information:   Mobile [phone]       Will anyone else be joining the visit? NO  (If patient encounters technical issues they should call [phone]    How would you like to obtain your AVS? MyChart    Are changes needed to the allergy or medication list? No    Reason for visit: Video Visit (Recheck)    Senia DAI

## 2024-02-02 ENCOUNTER — INFUSION THERAPY VISIT (OUTPATIENT)
Dept: INFUSION THERAPY | Facility: CLINIC | Age: 69
End: 2024-02-02
Attending: INTERNAL MEDICINE
Payer: COMMERCIAL

## 2024-02-02 VITALS
TEMPERATURE: 97.6 F | SYSTOLIC BLOOD PRESSURE: 141 MMHG | RESPIRATION RATE: 18 BRPM | DIASTOLIC BLOOD PRESSURE: 76 MMHG | WEIGHT: 205.9 LBS | HEART RATE: 61 BPM | OXYGEN SATURATION: 98 % | BODY MASS INDEX: 29.54 KG/M2

## 2024-02-02 DIAGNOSIS — K51.00 ULCERATIVE PANCOLITIS WITHOUT COMPLICATION (H): Primary | ICD-10-CM

## 2024-02-02 PROCEDURE — 96365 THER/PROPH/DIAG IV INF INIT: CPT

## 2024-02-02 PROCEDURE — 258N000003 HC RX IP 258 OP 636: Performed by: INTERNAL MEDICINE

## 2024-02-02 PROCEDURE — 250N000011 HC RX IP 250 OP 636: Mod: JZ | Performed by: INTERNAL MEDICINE

## 2024-02-02 RX ORDER — HEPARIN SODIUM,PORCINE 10 UNIT/ML
5 VIAL (ML) INTRAVENOUS
Status: CANCELLED | OUTPATIENT
Start: 2024-03-01

## 2024-02-02 RX ORDER — HEPARIN SODIUM (PORCINE) LOCK FLUSH IV SOLN 100 UNIT/ML 100 UNIT/ML
5 SOLUTION INTRAVENOUS
Status: CANCELLED | OUTPATIENT
Start: 2024-03-01

## 2024-02-02 RX ADMIN — SODIUM CHLORIDE 250 ML: 9 INJECTION, SOLUTION INTRAVENOUS at 08:16

## 2024-02-02 RX ADMIN — VEDOLIZUMAB 300 MG: 300 INJECTION, POWDER, LYOPHILIZED, FOR SOLUTION INTRAVENOUS at 08:42

## 2024-02-02 NOTE — PROGRESS NOTES
Infusion Nursing Note:  Mainor Grande presents today for Entyvio.    Patient seen by provider today: No   present during visit today: Not Applicable.    Note: Leno has no new complaints today. He is feeling well, just back home from a trip.       Intravenous Access:  Peripheral IV placed.    Treatment Conditions:  Biological Infusion Checklist:  ~~~ NOTE: If the patient answers yes to any of the questions below, hold the infusion and contact ordering provider or on-call provider.    Have you recently had an elevated temperature, fever, chills, productive cough, coughing for 3 weeks or longer or hemoptysis,  abnormal vital signs, night sweats,  chest pain or have you noticed a decrease in your appetite, unexplained weight loss or fatigue? No  Do you have any open wounds or new incisions? No  Do you have any upcoming hospitalizations or surgeries? Does not include esophagogastroduodenoscopy, colonoscopy, endoscopic retrograde cholangiopancreatography (ERCP), endoscopic ultrasound (EUS), dental procedures or joint aspiration/steroid injections Yes, colonoscopy next week.   Do you currently have any signs of illness or infection or are you on any antibiotics? No  Have you had any new, sudden or worsening abdominal pain? No  Have you or anyone in your household received a live vaccination in the past 4 weeks? Please note: No live vaccines while on biologic/chemotherapy until 6 months after the last treatment. Patient can receive the flu vaccine (shot only), pneumovax and the Covid vaccine. It is optimal for the patient to get these vaccines mid cycle, but they can be given at any time as long as it is not on the day of the infusion. No  Have you recently been diagnosed with any new nervous system diseases (ie. Multiple sclerosis, Guillain [place], seizures, neurological changes) or cancer diagnosis? Are you on any form of radiation or chemotherapy? No  Are you pregnant or breast feeding or do you have plans of  pregnancy in the future? N/A  Have you been having any signs of worsening depression or suicidal ideations?  (benlysta only) N/A  Have there been any other new onset medical symptoms? No  Have you had any new blood clots? (IVIG only) N/A      Post Infusion Assessment:  Patient tolerated infusion without incident.  Site patent and intact, free from redness, edema or discomfort.  No evidence of extravasations.  Access discontinued per protocol.       Discharge Plan:   AVS to patient via MYCHART.  Patient will return 3/1/2024 for next appointment.   Patient discharged in stable condition accompanied by: self.  Departure Mode: Ambulatory.      Jumana Bejarano RN

## 2024-03-01 ENCOUNTER — INFUSION THERAPY VISIT (OUTPATIENT)
Dept: INFUSION THERAPY | Facility: CLINIC | Age: 69
End: 2024-03-01
Attending: INTERNAL MEDICINE
Payer: COMMERCIAL

## 2024-03-01 ENCOUNTER — LAB (OUTPATIENT)
Dept: LAB | Facility: CLINIC | Age: 69
End: 2024-03-01
Payer: COMMERCIAL

## 2024-03-01 VITALS
HEART RATE: 70 BPM | WEIGHT: 206.3 LBS | BODY MASS INDEX: 29.6 KG/M2 | DIASTOLIC BLOOD PRESSURE: 72 MMHG | RESPIRATION RATE: 16 BRPM | OXYGEN SATURATION: 99 % | SYSTOLIC BLOOD PRESSURE: 130 MMHG | TEMPERATURE: 97.9 F

## 2024-03-01 DIAGNOSIS — K51.00 ULCERATIVE PANCOLITIS WITHOUT COMPLICATION (H): Primary | ICD-10-CM

## 2024-03-01 LAB
ALBUMIN SERPL BCG-MCNC: 4.3 G/DL (ref 3.5–5.2)
ALP SERPL-CCNC: 156 U/L (ref 40–150)
ALT SERPL W P-5'-P-CCNC: 31 U/L (ref 0–70)
AST SERPL W P-5'-P-CCNC: 40 U/L (ref 0–45)
BASOPHILS # BLD AUTO: 0.1 10E3/UL (ref 0–0.2)
BASOPHILS NFR BLD AUTO: 2 %
BILIRUB DIRECT SERPL-MCNC: 0.26 MG/DL (ref 0–0.3)
BILIRUB SERPL-MCNC: 0.8 MG/DL
CRP SERPL-MCNC: <3 MG/L
EOSINOPHIL # BLD AUTO: 0.3 10E3/UL (ref 0–0.7)
EOSINOPHIL NFR BLD AUTO: 6 %
ERYTHROCYTE [DISTWIDTH] IN BLOOD BY AUTOMATED COUNT: 12.1 % (ref 10–15)
ERYTHROCYTE [SEDIMENTATION RATE] IN BLOOD BY WESTERGREN METHOD: 12 MM/HR (ref 0–20)
HCT VFR BLD AUTO: 43 % (ref 40–53)
HGB BLD-MCNC: 14.5 G/DL (ref 13.3–17.7)
HOLD SPECIMEN: NORMAL
IMM GRANULOCYTES # BLD: 0 10E3/UL
IMM GRANULOCYTES NFR BLD: 0 %
LYMPHOCYTES # BLD AUTO: 1.9 10E3/UL (ref 0–5.3)
LYMPHOCYTES NFR BLD AUTO: 33 %
MCH RBC QN AUTO: 31.5 PG (ref 26.5–33)
MCHC RBC AUTO-ENTMCNC: 33.7 G/DL (ref 31.5–36.5)
MCV RBC AUTO: 94 FL (ref 78–100)
MONOCYTES # BLD AUTO: 0.8 10E3/UL (ref 0–1.3)
MONOCYTES NFR BLD AUTO: 14 %
NEUTROPHILS # BLD AUTO: 2.7 10E3/UL (ref 1.6–8.3)
NEUTROPHILS NFR BLD AUTO: 45 %
NRBC # BLD AUTO: 0 10E3/UL
NRBC BLD AUTO-RTO: 0 /100
PLATELET # BLD AUTO: 250 10E3/UL (ref 150–450)
PROT SERPL-MCNC: 7.7 G/DL (ref 6.4–8.3)
RBC # BLD AUTO: 4.6 10E6/UL (ref 4.4–5.9)
WBC # BLD AUTO: 5.9 10E3/UL (ref 4–11)

## 2024-03-01 PROCEDURE — 258N000003 HC RX IP 258 OP 636: Performed by: INTERNAL MEDICINE

## 2024-03-01 PROCEDURE — 85025 COMPLETE CBC W/AUTO DIFF WBC: CPT | Performed by: INTERNAL MEDICINE

## 2024-03-01 PROCEDURE — 36415 COLL VENOUS BLD VENIPUNCTURE: CPT | Performed by: INTERNAL MEDICINE

## 2024-03-01 PROCEDURE — 85652 RBC SED RATE AUTOMATED: CPT | Performed by: INTERNAL MEDICINE

## 2024-03-01 PROCEDURE — 86140 C-REACTIVE PROTEIN: CPT | Performed by: INTERNAL MEDICINE

## 2024-03-01 PROCEDURE — 82040 ASSAY OF SERUM ALBUMIN: CPT | Performed by: INTERNAL MEDICINE

## 2024-03-01 PROCEDURE — 250N000011 HC RX IP 250 OP 636: Mod: JZ | Performed by: INTERNAL MEDICINE

## 2024-03-01 PROCEDURE — 96365 THER/PROPH/DIAG IV INF INIT: CPT

## 2024-03-01 RX ORDER — HEPARIN SODIUM,PORCINE 10 UNIT/ML
5 VIAL (ML) INTRAVENOUS
Status: CANCELLED | OUTPATIENT
Start: 2024-03-29

## 2024-03-01 RX ORDER — HEPARIN SODIUM (PORCINE) LOCK FLUSH IV SOLN 100 UNIT/ML 100 UNIT/ML
5 SOLUTION INTRAVENOUS
Status: CANCELLED | OUTPATIENT
Start: 2024-03-29

## 2024-03-01 RX ADMIN — SODIUM CHLORIDE 250 ML: 9 INJECTION, SOLUTION INTRAVENOUS at 08:25

## 2024-03-01 RX ADMIN — VEDOLIZUMAB 300 MG: 300 INJECTION, POWDER, LYOPHILIZED, FOR SOLUTION INTRAVENOUS at 08:40

## 2024-03-01 ASSESSMENT — PAIN SCALES - GENERAL: PAINLEVEL: NO PAIN (0)

## 2024-03-01 NOTE — PROGRESS NOTES
Infusion Nursing Note:  Mainor Grande presents today for Entyv.    Patient seen by provider today: No   present during visit today: Not Applicable.    Note: Feeling well today. About 8 weeks ago while visiting [place], Leno had some visual changes - floaters. Went to eye doctor down there. Prescribed eye drops; resolved within a week. No issues today.      Intravenous Access:  Peripheral IV placed.    Treatment Conditions:  Biological Infusion Checklist:  ~~~ NOTE: If the patient answers yes to any of the questions below, hold the infusion and contact ordering provider or on-call provider.    Have you recently had an elevated temperature, fever, chills, productive cough, coughing for 3 weeks or longer or hemoptysis,  abnormal vital signs, night sweats,  chest pain or have you noticed a decrease in your appetite, unexplained weight loss or fatigue? No  Do you have any open wounds or new incisions? No  Do you have any upcoming hospitalizations or surgeries? Does not include esophagogastroduodenoscopy, colonoscopy, endoscopic retrograde cholangiopancreatography (ERCP), endoscopic ultrasound (EUS), dental procedures or joint aspiration/steroid injections No  Do you currently have any signs of illness or infection or are you on any antibiotics? No  Have you had any new, sudden or worsening abdominal pain? No  Have you or anyone in your household received a live vaccination in the past 4 weeks? Please note: No live vaccines while on biologic/chemotherapy until 6 months after the last treatment. Patient can receive the flu vaccine (shot only), pneumovax and the Covid vaccine. It is optimal for the patient to get these vaccines mid cycle, but they can be given at any time as long as it is not on the day of the infusion. No  Have you recently been diagnosed with any new nervous system diseases (ie. Multiple sclerosis, Guillain [place], seizures, neurological changes) or cancer diagnosis? Are you on any form of  radiation or chemotherapy? No  Are you pregnant or breast feeding or do you have plans of pregnancy in the future? No  Have you been having any signs of worsening depression or suicidal ideations?  (benlysta only) No  Have there been any other new onset medical symptoms? No  Have you had any new blood clots? (IVIG only) No      Post Infusion Assessment:  Patient tolerated infusion without incident.       Discharge Plan:   Patient discharged in stable condition accompanied by: self.  Departure Mode: Ambulatory.      Lydia Diallo RN

## 2024-03-05 NOTE — PROGRESS NOTES
"Pt's spouse called earlier today expressing concern re: how pt may react to upcoming colon prep for scheduled colonoscopy on 3/11 and his intolerance when he is unable to eat like normal; \"he gets sweaty, shaky and dizzy.\" Pt's spouse said she will plan to go ahead with pt's prep and only stop the process if pt is not tolerating it. SDS phone no.given to spouse so she can notify us if there is a need to cancel pt's procedure.   "

## 2024-03-11 ENCOUNTER — HOSPITAL ENCOUNTER (OUTPATIENT)
Facility: CLINIC | Age: 69
Discharge: HOME | End: 2024-03-11
Attending: SPECIALIST | Admitting: SPECIALIST
Payer: COMMERCIAL

## 2024-03-11 ENCOUNTER — ANESTHESIA (OUTPATIENT)
Dept: GASTROENTEROLOGY | Facility: CLINIC | Age: 69
End: 2024-03-11
Payer: COMMERCIAL

## 2024-03-11 ENCOUNTER — ANESTHESIA EVENT (OUTPATIENT)
Dept: GASTROENTEROLOGY | Facility: CLINIC | Age: 69
End: 2024-03-11
Payer: COMMERCIAL

## 2024-03-11 VITALS
WEIGHT: 206.3 LBS | RESPIRATION RATE: 14 BRPM | DIASTOLIC BLOOD PRESSURE: 70 MMHG | SYSTOLIC BLOOD PRESSURE: 96 MMHG | OXYGEN SATURATION: 98 % | BODY MASS INDEX: 29.6 KG/M2 | TEMPERATURE: 98 F

## 2024-03-11 LAB
COLONOSCOPY: NORMAL
UPPER GI ENDOSCOPY: NORMAL

## 2024-03-11 PROCEDURE — 258N000003 HC RX IP 258 OP 636: Performed by: NURSE ANESTHETIST, CERTIFIED REGISTERED

## 2024-03-11 PROCEDURE — 250N000011 HC RX IP 250 OP 636: Performed by: NURSE ANESTHETIST, CERTIFIED REGISTERED

## 2024-03-11 PROCEDURE — 88305 TISSUE EXAM BY PATHOLOGIST: CPT | Mod: TC | Performed by: SPECIALIST

## 2024-03-11 PROCEDURE — 45331 SIGMOIDOSCOPY AND BIOPSY: CPT | Performed by: SPECIALIST

## 2024-03-11 PROCEDURE — 370N000017 HC ANESTHESIA TECHNICAL FEE, PER MIN: Performed by: SPECIALIST

## 2024-03-11 PROCEDURE — 43239 EGD BIOPSY SINGLE/MULTIPLE: CPT | Performed by: SPECIALIST

## 2024-03-11 PROCEDURE — 45380 COLONOSCOPY AND BIOPSY: CPT | Performed by: SPECIALIST

## 2024-03-11 PROCEDURE — 250N000009 HC RX 250: Performed by: NURSE ANESTHETIST, CERTIFIED REGISTERED

## 2024-03-11 RX ORDER — SODIUM CHLORIDE, SODIUM LACTATE, POTASSIUM CHLORIDE, CALCIUM CHLORIDE 600; 310; 30; 20 MG/100ML; MG/100ML; MG/100ML; MG/100ML
INJECTION, SOLUTION INTRAVENOUS CONTINUOUS PRN
Status: DISCONTINUED | OUTPATIENT
Start: 2024-03-11 | End: 2024-03-11

## 2024-03-11 RX ORDER — LIDOCAINE 40 MG/G
CREAM TOPICAL
Status: DISCONTINUED | OUTPATIENT
Start: 2024-03-11 | End: 2024-03-11 | Stop reason: HOSPADM

## 2024-03-11 RX ORDER — ONDANSETRON 2 MG/ML
INJECTION INTRAMUSCULAR; INTRAVENOUS PRN
Status: DISCONTINUED | OUTPATIENT
Start: 2024-03-11 | End: 2024-03-11

## 2024-03-11 RX ORDER — ONDANSETRON 2 MG/ML
4 INJECTION INTRAMUSCULAR; INTRAVENOUS EVERY 30 MIN PRN
Status: CANCELLED | OUTPATIENT
Start: 2024-03-11

## 2024-03-11 RX ORDER — PROPOFOL 10 MG/ML
INJECTION, EMULSION INTRAVENOUS CONTINUOUS PRN
Status: DISCONTINUED | OUTPATIENT
Start: 2024-03-11 | End: 2024-03-11

## 2024-03-11 RX ORDER — NALOXONE HYDROCHLORIDE 0.4 MG/ML
0.1 INJECTION, SOLUTION INTRAMUSCULAR; INTRAVENOUS; SUBCUTANEOUS
Status: CANCELLED | OUTPATIENT
Start: 2024-03-11

## 2024-03-11 RX ORDER — LIDOCAINE HYDROCHLORIDE 20 MG/ML
INJECTION, SOLUTION INFILTRATION; PERINEURAL PRN
Status: DISCONTINUED | OUTPATIENT
Start: 2024-03-11 | End: 2024-03-11

## 2024-03-11 RX ORDER — GLYCOPYRROLATE 0.2 MG/ML
INJECTION, SOLUTION INTRAMUSCULAR; INTRAVENOUS PRN
Status: DISCONTINUED | OUTPATIENT
Start: 2024-03-11 | End: 2024-03-11

## 2024-03-11 RX ORDER — ONDANSETRON 4 MG/1
4 TABLET, ORALLY DISINTEGRATING ORAL EVERY 30 MIN PRN
Status: CANCELLED | OUTPATIENT
Start: 2024-03-11

## 2024-03-11 RX ORDER — PROPOFOL 10 MG/ML
INJECTION, EMULSION INTRAVENOUS PRN
Status: DISCONTINUED | OUTPATIENT
Start: 2024-03-11 | End: 2024-03-11

## 2024-03-11 RX ADMIN — PROPOFOL 80 MG: 10 INJECTION, EMULSION INTRAVENOUS at 08:17

## 2024-03-11 RX ADMIN — PROPOFOL 20 MG: 10 INJECTION, EMULSION INTRAVENOUS at 08:18

## 2024-03-11 RX ADMIN — ONDANSETRON 4 MG: 2 INJECTION INTRAMUSCULAR; INTRAVENOUS at 08:17

## 2024-03-11 RX ADMIN — FAMOTIDINE 20 MG: 10 INJECTION, SOLUTION INTRAVENOUS at 08:17

## 2024-03-11 RX ADMIN — SODIUM CHLORIDE, POTASSIUM CHLORIDE, SODIUM LACTATE AND CALCIUM CHLORIDE: 600; 310; 30; 20 INJECTION, SOLUTION INTRAVENOUS at 08:10

## 2024-03-11 RX ADMIN — GLYCOPYRROLATE 0.2 MG: 0.2 INJECTION, SOLUTION INTRAMUSCULAR; INTRAVENOUS at 08:52

## 2024-03-11 RX ADMIN — PROPOFOL 40 MG: 10 INJECTION, EMULSION INTRAVENOUS at 08:20

## 2024-03-11 RX ADMIN — LIDOCAINE HYDROCHLORIDE 50 MG: 20 INJECTION, SOLUTION INFILTRATION; PERINEURAL at 08:17

## 2024-03-11 RX ADMIN — PROPOFOL 150 MCG/KG/MIN: 10 INJECTION, EMULSION INTRAVENOUS at 08:17

## 2024-03-11 ASSESSMENT — ACTIVITIES OF DAILY LIVING (ADL)
ADLS_ACUITY_SCORE: 35

## 2024-03-11 NOTE — ANESTHESIA PREPROCEDURE EVALUATION
Anesthesia Pre-Procedure Evaluation    Patient: Mainor Grande   MRN: [id number] : 1955        Procedure : Procedure(s):  Endoscopy upper, colonoscopy, combined          Past Medical History:   Diagnosis Date    Allergic rhinitis, cause unspecified     Fatty liver 2004    elevated LFT, saw GI for a consultation    Mild persistent asthma 2008    Reflux esophagitis     Unspecified disease of respiratory system     RAD    Viremia, unspecified     acute      Past Surgical History:   Procedure Laterality Date    COLONOSCOPY  2013    Procedure: COLONOSCOPY;  colonoscopy;  Surgeon: Beto Keen MD;  Location: [facility]    COLONOSCOPY N/A 2020    Procedure: Colonoscopy, With Polypectomy And Biopsy;  Surgeon: Lily Eaton DO;  Location: [facility]    COLONOSCOPY N/A 2021    Procedure: Colonoscopy, With Polypectomy And Biopsy;  Surgeon: Lily Eaton DO;  [facility]    COLONOSCOPY N/A 5/3/2022    Procedure: COLONOSCOPY, WITH POLYPECTOMY AND BIOPSY;  Surgeon: Lily Eaton DO;  [facility]    COLONOSCOPY N/A 5/3/2022    Procedure: COLONOSCOPY, FLEXIBLE, WITH LESION REMOVAL USING SNARE;  Surgeon: Lily Eaton DO;  [facility]    COLONOSCOPY N/A 2023    Procedure: COLONOSCOPY, WITH BIOPSY;  Surgeon: Lily Eaton DO;  [facility]    COLONOSCOPY WITH CO2 INSUFFLATION N/A 2020    Procedure: COLONOSCOPY, WITH CO2 INSUFFLATION;  Surgeon: Lily Eaton DO;  [facility]    COLONOSCOPY WITH CO2 INSUFFLATION N/A 2021    Procedure: COLONOSCOPY, WITH CO2 INSUFFLATION;  Surgeon: Lily Eaton DO;  [facility]    COLONOSCOPY WITH CO2 INSUFFLATION N/A 5/3/2022    Procedure: COLONOSCOPY, WITH CO2 INSUFFLATION;  Surgeon: Lily Eaton DO;  [facility]    COMBINED ESOPHAGOSCOPY, GASTROSCOPY, DUODENOSCOPY (EGD) WITH CO2 INSUFFLATION N/A 2020    Procedure: ESOPHAGOGASTRODUODENOSCOPY, WITH CO2 INSUFFLATION;  Surgeon: Lily Eaton DO;   [facility]    ESOPHAGOSCOPY, GASTROSCOPY, DUODENOSCOPY (EGD), COMBINED N/A 7/31/2020    Procedure: Esophagogastroduodenoscopy, With Biopsy;  Surgeon: Lily Eaton DO;  Location: [facility]    HC VASECTOMY UNILAT/BILAT W POSTOP SEMEN  1992    Vasectomy    ZZHC COLONOSCOPY THRU STOMA, DIAGNOSTIC  2005    normal      Allergies   Allergen Reactions    Seasonal Allergies       Social History     Tobacco Use    Smoking status: Never     Passive exposure: Never    Smokeless tobacco: Never   Substance Use Topics    Alcohol use: Yes     Alcohol/week: 1.0 - 2.0 standard drink of alcohol     Types: 1 - 2 Standard drinks or equivalent per week     Comment: occasional      Wt Readings from Last 1 Encounters:   03/01/24 93.6 kg (206 lb 4.8 oz)        Anesthesia Evaluation            ROS/MED HX  ENT/Pulmonary:     (+)                     Mild Persistent, asthma                  Neurologic:     (+)   dementia,                             Cardiovascular:     (+) Dyslipidemia hypertension- -   -  - -                                      METS/Exercise Tolerance:     Hematologic:  - neg hematologic  ROS     Musculoskeletal:  - neg musculoskeletal ROS     GI/Hepatic:     (+) GERD,       bowel prep,            Renal/Genitourinary:  - neg Renal ROS     Endo:  - neg endo ROS     Psychiatric/Substance Use:  - neg psychiatric ROS     Infectious Disease:  - neg infectious disease ROS     Malignancy:  - neg malignancy ROS     Other:            Physical Exam    Airway  airway exam normal      Mallampati: II   TM distance: > 3 FB   Neck ROM: full   Mouth opening: > 3 cm    Respiratory Devices and Support         Dental           Cardiovascular   cardiovascular exam normal       Rhythm and rate: regular and normal     Pulmonary   pulmonary exam normal        breath sounds clear to auscultation           OUTSIDE LABS:  CBC:   Lab Results   Component Value Date    WBC 5.9 03/01/2024    WBC 6.7 01/05/2024    HGB 14.5 03/01/2024    HGB 14.9  "01/05/2024    HCT 43.0 03/01/2024    HCT 44.2 01/05/2024     03/01/2024     01/05/2024     BMP:   Lab Results   Component Value Date     11/01/2023     (L) 04/06/2023    POTASSIUM 4.4 11/01/2023    POTASSIUM 3.9 04/06/2023    CHLORIDE 99 11/01/2023    CHLORIDE 89 (L) 04/06/2023    CO2 25 11/01/2023    CO2 26 04/06/2023    BUN 11.2 11/01/2023    BUN 10.4 04/06/2023    CR 0.94 11/01/2023    CR 0.95 04/06/2023     (H) 11/01/2023     (H) 04/06/2023     COAGS:   Lab Results   Component Value Date    INR 1.03 11/01/2023     POC: No results found for: \"BGM\", \"HCG\", \"HCGS\"  HEPATIC:   Lab Results   Component Value Date    ALBUMIN 4.3 03/01/2024    PROTTOTAL 7.7 03/01/2024    ALT 31 03/01/2024    AST 40 03/01/2024     (H) 09/09/2020    ALKPHOS 156 (H) 03/01/2024    BILITOTAL 0.8 03/01/2024     OTHER:   Lab Results   Component Value Date    A1C 5.4 07/19/2004    BREANNA 9.1 11/01/2023    LIPASE 110 05/14/2020    TSH 0.93 07/01/2022    CRP <2.9 12/14/2022    SED 12 03/01/2024       Anesthesia Plan    ASA Status:  2    NPO Status:  NPO Appropriate    Anesthesia Type: MAC.     - Reason for MAC: straight local not clinically adequate   Induction: Intravenous, Propofol.   Maintenance: TIVA.        Consents    Anesthesia Plan(s) and associated risks, benefits, and realistic alternatives discussed. Questions answered and patient/representative(s) expressed understanding.     - Discussed:     - Discussed with:  Patient       Use of blood products discussed: No .     Postoperative Care            Comments:    Other Comments: The risks and benefits of anesthesia, and the alternatives where applicable, have been discussed with the patient, and they wish to proceed.              Willian Ceballos, APRN CRNA    I have reviewed the pertinent notes and labs in the chart from the past 30 days and (re)examined the patient.  Any updates or changes from those notes are reflected in this note.            "

## 2024-03-11 NOTE — ANESTHESIA CARE TRANSFER NOTE
Patient: Mainor Grande    Procedure: Procedure(s):  ESOPHAGOGASTRODUODENOSCOPY, WITH BIOPSY  limited COLONOSCOPY, WITH POLYPECTOMY AND BIOPSY       Diagnosis: Gastroesophageal reflux disease without esophagitis [K21.9]  Diagnosis Additional Information: No value filed.    Anesthesia Type:   MAC     Note:    Oropharynx: oropharynx clear of all foreign objects and spontaneously breathing  Level of Consciousness: drowsy  Oxygen Supplementation: nasal cannula    Independent Airway: airway patency satisfactory and stable  Dentition: dentition unchanged  Vital Signs Stable: post-procedure vital signs reviewed and stable  Report to RN Given: handoff report given  Patient transferred to: Phase II    Handoff Report: Identifed the Patient, Identified the Reponsible Provider, Reviewed the pertinent medical history, Discussed the surgical course, Reviewed Intra-OP anesthesia mangement and issues during anesthesia, Set expectations for post-procedure period and Allowed opportunity for questions and acknowledgement of understanding      Vitals:  Vitals Value Taken Time   BP     Temp     Pulse     Resp     SpO2         Electronically Signed By: GILLIAN Palma CRNA  March 11, 2024  8:59 AM

## 2024-03-11 NOTE — H&P
[facility] History and Physical    Mainor Grande MRN# [id number]   Age: 68 year old YOB: 1955     Date of Admission:  (Not on file)    Home clinic: [facility]  Primary care provider: Francisco Hicks          Impression and Plan:   Impression:   Gastroesophageal reflux disease without esophagitis [K21.9]  Last EGD 2020 - ? Barretts ? dysphagia  Last colonoscopy - 2023 - Known UC      Plan:   Proceed to EGD as planned.  The procedure, risks(bleeding, perforation), benefits and alternatives were discussed and the patient agrees to proceed. Cleared for Anesthesia             Chief Complaint:   Gastroesophageal reflux disease without esophagitis [K21.9]    History is obtained from the patient          History of Present Illness:   This 68 year old male is being seen at this time for evaluation for EGD and colonscopy.  Yearly colonoscopy for UC.  C/o dysphagia           Past Medical History:     Past Medical History:   Diagnosis Date    Allergic rhinitis, cause unspecified     Fatty liver 2004    elevated LFT, saw GI for a consultation    Mild persistent asthma 09/12/2008    Reflux esophagitis     Unspecified disease of respiratory system     RAD    Viremia, unspecified     acute            Past Surgical History:     Past Surgical History:   Procedure Laterality Date    COLONOSCOPY  9/4/2013    Procedure: COLONOSCOPY;  colonoscopy;  Surgeon: Beto Keen MD;  [facility]    COLONOSCOPY N/A 7/31/2020    Procedure: Colonoscopy, With Polypectomy And Biopsy;  Surgeon: Lily Eaton DO;  [facility]    COLONOSCOPY N/A 4/5/2021    Procedure: Colonoscopy, With Polypectomy And Biopsy;  Surgeon: Lily Eaton DO;  [facility]    COLONOSCOPY N/A 5/3/2022    Procedure: COLONOSCOPY, WITH POLYPECTOMY AND BIOPSY;  Surgeon: Lily Eaton DO;  [facility]    COLONOSCOPY N/A 5/3/2022    Procedure: COLONOSCOPY, FLEXIBLE, WITH LESION REMOVAL USING SNARE;   Surgeon: Lily Eaton DO;  [facility]    COLONOSCOPY N/A 5/18/2023    Procedure: COLONOSCOPY, WITH BIOPSY;  Surgeon: Lily Eaton DO;  [facility]    COLONOSCOPY WITH CO2 INSUFFLATION N/A 7/31/2020    Procedure: COLONOSCOPY, WITH CO2 INSUFFLATION;  Surgeon: Lily Eaton DO;  [facility]    COLONOSCOPY WITH CO2 INSUFFLATION N/A 4/5/2021    Procedure: COLONOSCOPY, WITH CO2 INSUFFLATION;  Surgeon: Lily Eaton DO;  [facility]    COLONOSCOPY WITH CO2 INSUFFLATION N/A 5/3/2022    Procedure: COLONOSCOPY, WITH CO2 INSUFFLATION;  Surgeon: Lily Eaton DO;  [facility]    COMBINED ESOPHAGOSCOPY, GASTROSCOPY, DUODENOSCOPY (EGD) WITH CO2 INSUFFLATION N/A 7/31/2020    Procedure: ESOPHAGOGASTRODUODENOSCOPY, WITH CO2 INSUFFLATION;  Surgeon: Lily Eaton DO;  [facility]    ESOPHAGOSCOPY, GASTROSCOPY, DUODENOSCOPY (EGD), COMBINED N/A 7/31/2020    Procedure: Esophagogastroduodenoscopy, With Biopsy;  Surgeon: Lily Eaton DO;  Location: [facility]    HC VASECTOMY UNILAT/BILAT W POSTOP SEMEN  1992    Vasectomy    ZZHC COLONOSCOPY THRU STOMA, DIAGNOSTIC  2005    normal            Social History:     Social History     Tobacco Use    Smoking status: Never     Passive exposure: Never    Smokeless tobacco: Never   Substance Use Topics    Alcohol use: Yes     Alcohol/week: 1.0 - 2.0 standard drink of alcohol     Types: 1 - 2 Standard drinks or equivalent per week     Comment: occasional            Family History:     Family History   Problem Relation Age of Onset    Cancer Mother         bladder cancer    Hyperlipidemia Mother     Other Cancer Mother         Bladder    Neurologic Disorder Father         alzheimers dementia    Hyperlipidemia Father     Gastrointestinal Disease Brother         divertculitis            Immunizations:     VACCINE/DOSE   Diptheria   DPT   DTAP   HBIG   Hepatitis A   Hepatitis B   HIB   Influenza   Measles   Meningococcal   MMR   Mumps   Pneumococcal    Polio   Rubella   Small Pox   TDAP   Varicella   Zoster            Allergies:     Allergies   Allergen Reactions    Seasonal Allergies             Medications:     No current facility-administered medications for this encounter.     Current Outpatient Medications   Medication Sig    albuterol (PROAIR HFA/PROVENTIL HFA/VENTOLIN HFA) 108 (90 Base) MCG/ACT inhaler Inhale 2 puffs into the lungs every 6 hours    ASPIRIN PO Take 81 mg by mouth daily    atorvastatin (LIPITOR) 40 MG tablet Take 1 tablet (40 mg) by mouth daily    azelastine (ASTELIN) 0.1 % nasal spray SPRAY 2 SPRAYS IN EACH NOSTRIL TWO TIMES A DAY    benzonatate (TESSALON) 100 MG capsule Take 1 capsule (100 mg) by mouth 3 times daily as needed for cough    budesonide-formoterol (SYMBICORT) 160-4.5 MCG/ACT Inhaler Inhale 2 puffs into the lungs 2 times daily    clobetasol (TEMOVATE) 0.05 % external ointment Apply topically 2 times daily to affected area(s)    donepezil (ARICEPT) 5 MG tablet Take 10 mg by mouth At Bedtime    escitalopram (LEXAPRO) 10 MG tablet Take 1 tablet by mouth daily    esomeprazole (NEXIUM) 20 MG DR capsule Take 20 mg by mouth daily Take 30-60 minutes before eating. Takes clear baudilio    famotidine (PEPCID) 40 MG tablet Take 1 tablet (40 mg) by mouth 2 times daily as needed for heartburn    fluticasone (FLONASE) 50 MCG/ACT nasal spray Spray 1-2 sprays into both nostrils daily    melatonin 1 MG TABS Take 1 mg by mouth nightly as needed for sleep (Patient not taking: Reported on 9/26/2023)    montelukast (SINGULAIR) 10 MG tablet Take 1 tablet (10 mg) by mouth At Bedtime    Multiple Vitamins-Minerals (MULTIVITAMIN ADULT PO) Take 1 tablet by mouth daily  (Patient not taking: Reported on 9/26/2023)    Naphazoline-Glycerin-Zinc Sulf (CLEAR EYES MAXIMUM ITCHY EYE) 0.012-0.25-0.25 % SOLN Apply to eye as needed (for allergies)    ursodiol (ACTIGALL) 300 MG capsule TAKE ONE CAPSULE BY MOUTH TWICE A DAY    vitamin D3 (CHOLECALCIFEROL) 50 mcg (2000  units) tablet Take 1 tablet (50 mcg) by mouth daily             Review of Systems:   The review of systems was positive for the following findings.  None.  The remainder of the review of systems was unremarkable.          Physical Exam:   All vitals have been reviewed  There were no vitals taken for this visit.  No intake or output data in the 24 hours ending 03/10/24 1914  SHEENT examination revealed NC/AT< EOMI.  Examination of the chest revealed CTA.  Examination of the heart revealed RRR.  Examination of the abdomen revealed Soft, non tender.  The neuromuscular examination was NL.          Data:   All laboratory data reviewed  No results found for any visits on 03/11/24.  -     Tripp Herman MD, FACS

## 2024-03-11 NOTE — DISCHARGE INSTRUCTIONS
[facility]    Home Care Following Endoscopy          Activity:  You have just undergone an endoscopic procedure usually performed with conscious sedation.  Do not work or operate machinery (including a car) for at least 12 hours.      Diet:  Return to the diet you were on before your procedure but eat lightly for the first 12-24 hours.  Drink plenty of water.  Resume any regular medications unless otherwise advised by your physician.  Please begin any new medication prescribed as a result of your procedure as directed by your physician.   If you had any biopsy or polyp removed please refrain from aspirin or aspirin products for 2 days.  If on Coumadin please restart as instructed by your physician.   Pain:  You may take Tylenol as needed for pain.  Expected Recovery:  You can expect some mild abdominal fullness and/or discomfort due to the air used to inflate your intestinal tract. I encourage you to walk and attempt to pass this air as soon as possible. It is also normal to have a mild sore throat after upper endoscopy.    Call Your Physician if You Have:  After Upper Endoscopy:  Shoulder, back or chest pain.  Difficulty breathing or swallowing.  Vomiting blood.  After Colonoscopy:  Worsening persisting abdominal pain which is worse with activity.  Fevers (>101 degrees F), chills or shakes.  Passage of continued blood with bowel movements.     Any questions or concerns about your recovery, please call [phone] or after hours [phone] ([phone]) Nurse Advice Line.    Follow-up Care:  You did have polyps/biopsy tissue sample(s) removed.  The polyps/biopsy tissue sample(s) will be sent to pathology.    You should receive letter in your My Chart from Dr. Herman with your results within 1-2 weeks.  Please call if you have not received a notification of your results.  If asked to return to clinic please make an appointment 1 week after your procedure.  Call [phone].

## 2024-03-11 NOTE — ANESTHESIA POSTPROCEDURE EVALUATION
Patient: Mainor Grande    Procedure: Procedure(s):  ESOPHAGOGASTRODUODENOSCOPY, WITH BIOPSY  limited COLONOSCOPY, WITH POLYPECTOMY AND BIOPSY       Anesthesia Type:  MAC    Note:  Disposition: Outpatient   Postop Pain Control: Uneventful            Sign Out: Well controlled pain   PONV: No   Neuro/Psych: Uneventful            Sign Out: Acceptable/Baseline neuro status   Airway/Respiratory: Uneventful            Sign Out: Acceptable/Baseline resp. status   CV/Hemodynamics: Uneventful            Sign Out: Acceptable CV status   Other NRE: NONE   DID A NON-ROUTINE EVENT OCCUR? No    Event details/Postop Comments:  Pt was happy with anesthesia care.  No complications.  I will follow up with the pt if needed.           Last vitals:  Vitals Value Taken Time   /70 03/11/24 0944   Temp     Pulse 123 03/11/24 0944   Resp     SpO2 99 % 03/11/24 0943   Vitals shown include unfiled device data.    Electronically Signed By: GILLIAN Palma CRNA  March 11, 2024  10:03 AM

## 2024-03-12 PROCEDURE — 88342 IMHCHEM/IMCYTCHM 1ST ANTB: CPT | Mod: 26 | Performed by: PATHOLOGY

## 2024-03-12 PROCEDURE — 88305 TISSUE EXAM BY PATHOLOGIST: CPT | Mod: 26 | Performed by: PATHOLOGY

## 2024-03-13 LAB
PATH REPORT.ADDENDUM SPEC: NORMAL
PATH REPORT.COMMENTS IMP SPEC: NORMAL
PATH REPORT.COMMENTS IMP SPEC: NORMAL
PATH REPORT.FINAL DX SPEC: NORMAL
PATH REPORT.GROSS SPEC: NORMAL
PATH REPORT.MICROSCOPIC SPEC OTHER STN: NORMAL
PATH REPORT.RELEVANT HX SPEC: NORMAL
PHOTO IMAGE: NORMAL

## 2024-03-26 ENCOUNTER — TELEPHONE (OUTPATIENT)
Dept: GASTROENTEROLOGY | Facility: CLINIC | Age: 69
End: 2024-03-26
Payer: COMMERCIAL

## 2024-03-29 ENCOUNTER — INFUSION THERAPY VISIT (OUTPATIENT)
Dept: INFUSION THERAPY | Facility: CLINIC | Age: 69
End: 2024-03-29
Attending: PHYSICIAN ASSISTANT
Payer: COMMERCIAL

## 2024-03-29 VITALS
OXYGEN SATURATION: 99 % | DIASTOLIC BLOOD PRESSURE: 73 MMHG | WEIGHT: 208.9 LBS | RESPIRATION RATE: 16 BRPM | TEMPERATURE: 98.1 F | HEART RATE: 63 BPM | BODY MASS INDEX: 29.97 KG/M2 | SYSTOLIC BLOOD PRESSURE: 142 MMHG

## 2024-03-29 DIAGNOSIS — K51.00 ULCERATIVE PANCOLITIS WITHOUT COMPLICATION (H): Primary | ICD-10-CM

## 2024-03-29 PROCEDURE — 250N000011 HC RX IP 250 OP 636: Mod: JZ | Performed by: INTERNAL MEDICINE

## 2024-03-29 PROCEDURE — 258N000003 HC RX IP 258 OP 636: Performed by: INTERNAL MEDICINE

## 2024-03-29 PROCEDURE — 96365 THER/PROPH/DIAG IV INF INIT: CPT

## 2024-03-29 RX ORDER — HEPARIN SODIUM,PORCINE 10 UNIT/ML
5 VIAL (ML) INTRAVENOUS
Status: CANCELLED | OUTPATIENT
Start: 2024-04-26

## 2024-03-29 RX ORDER — HEPARIN SODIUM (PORCINE) LOCK FLUSH IV SOLN 100 UNIT/ML 100 UNIT/ML
5 SOLUTION INTRAVENOUS
Status: CANCELLED | OUTPATIENT
Start: 2024-04-26

## 2024-03-29 RX ADMIN — SODIUM CHLORIDE 250 ML: 9 INJECTION, SOLUTION INTRAVENOUS at 08:20

## 2024-03-29 RX ADMIN — VEDOLIZUMAB 300 MG: 300 INJECTION, POWDER, LYOPHILIZED, FOR SOLUTION INTRAVENOUS at 08:28

## 2024-03-29 ASSESSMENT — PAIN SCALES - GENERAL: PAINLEVEL: NO PAIN (0)

## 2024-03-29 NOTE — PROGRESS NOTES
Infusion Nursing Note:  Mainor Grande presents today for Entyvio.    Patient seen by provider today: No   present during visit today: Not Applicable.    Note: Feeling well.      Intravenous Access:  Peripheral IV placed.    Treatment Conditions:  Biological Infusion Checklist:  ~~~ NOTE: If the patient answers yes to any of the questions below, hold the infusion and contact ordering provider or on-call provider.    Have you recently had an elevated temperature, fever, chills, productive cough, coughing for 3 weeks or longer or hemoptysis,  abnormal vital signs, night sweats,  chest pain or have you noticed a decrease in your appetite, unexplained weight loss or fatigue? No  Do you have any open wounds or new incisions? No  Do you have any upcoming hospitalizations or surgeries? Does not include esophagogastroduodenoscopy, colonoscopy, endoscopic retrograde cholangiopancreatography (ERCP), endoscopic ultrasound (EUS), dental procedures or joint aspiration/steroid injections No  Do you currently have any signs of illness or infection or are you on any antibiotics? No  Have you had any new, sudden or worsening abdominal pain? No  Have you or anyone in your household received a live vaccination in the past 4 weeks? Please note: No live vaccines while on biologic/chemotherapy until 6 months after the last treatment. Patient can receive the flu vaccine (shot only), pneumovax and the Covid vaccine. It is optimal for the patient to get these vaccines mid cycle, but they can be given at any time as long as it is not on the day of the infusion. No  Have you recently been diagnosed with any new nervous system diseases (ie. Multiple sclerosis, Guillain Davenport, seizures, neurological changes) or cancer diagnosis? Are you on any form of radiation or chemotherapy? No  Are you pregnant or breast feeding or do you have plans of pregnancy in the future? No  Have you been having any signs of worsening depression or  suicidal ideations?  (benlysta only) No  Have there been any other new onset medical symptoms? No  Have you had any new blood clots? (IVIG only) No      Post Infusion Assessment:  Patient tolerated infusion without incident.       Discharge Plan:   Patient discharged in stable condition accompanied by: self.  Departure Mode: Ambulatory.      Lydia Diallo RN

## 2024-04-23 DIAGNOSIS — K21.9 GASTROESOPHAGEAL REFLUX DISEASE WITHOUT ESOPHAGITIS: ICD-10-CM

## 2024-04-23 RX ORDER — FAMOTIDINE 40 MG/1
TABLET, FILM COATED ORAL
Qty: 180 TABLET | Refills: 1 | Status: SHIPPED | OUTPATIENT
Start: 2024-04-23

## 2024-04-26 ENCOUNTER — INFUSION THERAPY VISIT (OUTPATIENT)
Dept: INFUSION THERAPY | Facility: CLINIC | Age: 69
End: 2024-04-26
Attending: INTERNAL MEDICINE
Payer: COMMERCIAL

## 2024-04-26 ENCOUNTER — APPOINTMENT (OUTPATIENT)
Dept: LAB | Facility: CLINIC | Age: 69
End: 2024-04-26
Payer: COMMERCIAL

## 2024-04-26 VITALS
WEIGHT: 211.7 LBS | DIASTOLIC BLOOD PRESSURE: 65 MMHG | SYSTOLIC BLOOD PRESSURE: 123 MMHG | HEART RATE: 78 BPM | RESPIRATION RATE: 16 BRPM | BODY MASS INDEX: 30.38 KG/M2 | OXYGEN SATURATION: 98 % | TEMPERATURE: 97.9 F

## 2024-04-26 DIAGNOSIS — K51.00 ULCERATIVE PANCOLITIS WITHOUT COMPLICATION (H): Primary | ICD-10-CM

## 2024-04-26 LAB
ALBUMIN SERPL BCG-MCNC: 4.2 G/DL (ref 3.5–5.2)
ALP SERPL-CCNC: 155 U/L (ref 40–150)
ALT SERPL W P-5'-P-CCNC: 32 U/L (ref 0–70)
AST SERPL W P-5'-P-CCNC: 41 U/L (ref 0–45)
BASOPHILS # BLD AUTO: 0.1 10E3/UL (ref 0–0.2)
BASOPHILS NFR BLD AUTO: 2 %
BILIRUB DIRECT SERPL-MCNC: 0.32 MG/DL (ref 0–0.3)
BILIRUB SERPL-MCNC: 0.9 MG/DL
CRP SERPL-MCNC: <3 MG/L
EOSINOPHIL # BLD AUTO: 0.4 10E3/UL (ref 0–0.7)
EOSINOPHIL NFR BLD AUTO: 5 %
ERYTHROCYTE [DISTWIDTH] IN BLOOD BY AUTOMATED COUNT: 12.4 % (ref 10–15)
ERYTHROCYTE [SEDIMENTATION RATE] IN BLOOD BY WESTERGREN METHOD: 9 MM/HR (ref 0–20)
HCT VFR BLD AUTO: 42.2 % (ref 40–53)
HGB BLD-MCNC: 14 G/DL (ref 13.3–17.7)
IMM GRANULOCYTES # BLD: 0 10E3/UL
IMM GRANULOCYTES NFR BLD: 0 %
LYMPHOCYTES # BLD AUTO: 2.4 10E3/UL (ref 0.8–5.3)
LYMPHOCYTES NFR BLD AUTO: 33 %
MCH RBC QN AUTO: 31 PG (ref 26.5–33)
MCHC RBC AUTO-ENTMCNC: 33.2 G/DL (ref 31.5–36.5)
MCV RBC AUTO: 93 FL (ref 78–100)
MONOCYTES # BLD AUTO: 0.9 10E3/UL (ref 0–1.3)
MONOCYTES NFR BLD AUTO: 13 %
NEUTROPHILS # BLD AUTO: 3.3 10E3/UL (ref 1.6–8.3)
NEUTROPHILS NFR BLD AUTO: 47 %
NRBC # BLD AUTO: 0 10E3/UL
NRBC BLD AUTO-RTO: 0 /100
PLATELET # BLD AUTO: 236 10E3/UL (ref 150–450)
PROT SERPL-MCNC: 7.9 G/DL (ref 6.4–8.3)
RBC # BLD AUTO: 4.52 10E6/UL (ref 4.4–5.9)
WBC # BLD AUTO: 7.1 10E3/UL (ref 4–11)

## 2024-04-26 PROCEDURE — 258N000003 HC RX IP 258 OP 636: Performed by: INTERNAL MEDICINE

## 2024-04-26 PROCEDURE — 86140 C-REACTIVE PROTEIN: CPT | Performed by: INTERNAL MEDICINE

## 2024-04-26 PROCEDURE — 85652 RBC SED RATE AUTOMATED: CPT | Performed by: INTERNAL MEDICINE

## 2024-04-26 PROCEDURE — 85025 COMPLETE CBC W/AUTO DIFF WBC: CPT | Performed by: INTERNAL MEDICINE

## 2024-04-26 PROCEDURE — 36415 COLL VENOUS BLD VENIPUNCTURE: CPT | Performed by: INTERNAL MEDICINE

## 2024-04-26 PROCEDURE — 250N000011 HC RX IP 250 OP 636: Mod: JZ | Performed by: INTERNAL MEDICINE

## 2024-04-26 PROCEDURE — 96365 THER/PROPH/DIAG IV INF INIT: CPT

## 2024-04-26 PROCEDURE — 80076 HEPATIC FUNCTION PANEL: CPT | Performed by: INTERNAL MEDICINE

## 2024-04-26 RX ORDER — HEPARIN SODIUM,PORCINE 10 UNIT/ML
5 VIAL (ML) INTRAVENOUS
Status: CANCELLED | OUTPATIENT
Start: 2024-05-24

## 2024-04-26 RX ORDER — HEPARIN SODIUM (PORCINE) LOCK FLUSH IV SOLN 100 UNIT/ML 100 UNIT/ML
5 SOLUTION INTRAVENOUS
Status: CANCELLED | OUTPATIENT
Start: 2024-05-24

## 2024-04-26 RX ADMIN — SODIUM CHLORIDE 250 ML: 9 INJECTION, SOLUTION INTRAVENOUS at 08:32

## 2024-04-26 RX ADMIN — VEDOLIZUMAB 300 MG: 300 INJECTION, POWDER, LYOPHILIZED, FOR SOLUTION INTRAVENOUS at 08:44

## 2024-04-26 ASSESSMENT — PAIN SCALES - GENERAL: PAINLEVEL: NO PAIN (0)

## 2024-04-26 NOTE — PROGRESS NOTES
Infusion Nursing Note:  Mainor Grande presents today for Entyvio.    Patient seen by provider today: No   present during visit today: Not Applicable.    Note: Leno arrived at unit alone, was directed to wait in infusion waiting room.  Shortly after Leno's wife arrived to unit looking for Leno. He had left her downstairs after getting lab work done.  Wife stated that he forgot he was with her.  Leno was no where to be found. He had not gone to the waiting room.  Wife was upset, worried.  After aprox 10 min Leno returned to unit.  Leno is able to carry on appropriate conversation but subtle confusion noted.  After infusion Leno left in care of wife.      Intravenous Access:  Peripheral IV placed.    Treatment Conditions:  Lab Results   Component Value Date     11/01/2023    POTASSIUM 4.4 11/01/2023    CR 0.94 11/01/2023    BREANNA 9.1 11/01/2023    BILITOTAL 0.9 04/26/2024    ALBUMIN 4.2 04/26/2024    ALT 32 04/26/2024    AST 41 04/26/2024       Results reviewed, labs MET treatment parameters, ok to proceed with treatment.  All biologic questions answered as no.      Post Infusion Assessment:  Patient tolerated infusion without incident.  Site patent and intact, free from redness, edema or discomfort.  No evidence of extravasations.  Access discontinued per protocol.       Discharge Plan:   Discharge instructions reviewed with: Patient.  Patient and/or family verbalized understanding of discharge instructions and all questions answered.  Patient discharged in stable condition accompanied by: wife.  Departure Mode: Ambulatory.      Shannon Vasquez RN

## 2024-04-29 ENCOUNTER — TELEPHONE (OUTPATIENT)
Dept: FAMILY MEDICINE | Facility: OTHER | Age: 69
End: 2024-04-29
Payer: COMMERCIAL

## 2024-04-29 ASSESSMENT — ASTHMA QUESTIONNAIRES
ACT_TOTALSCORE: 20
QUESTION_3 LAST FOUR WEEKS HOW OFTEN DID YOUR ASTHMA SYMPTOMS (WHEEZING, COUGHING, SHORTNESS OF BREATH, CHEST TIGHTNESS OR PAIN) WAKE YOU UP AT NIGHT OR EARLIER THAN USUAL IN THE MORNING: ONCE OR TWICE
QUESTION_5 LAST FOUR WEEKS HOW WOULD YOU RATE YOUR ASTHMA CONTROL: WELL CONTROLLED
QUESTION_4 LAST FOUR WEEKS HOW OFTEN HAVE YOU USED YOUR RESCUE INHALER OR NEBULIZER MEDICATION (SUCH AS ALBUTEROL): TWO OR THREE TIMES PER WEEK
QUESTION_2 LAST FOUR WEEKS HOW OFTEN HAVE YOU HAD SHORTNESS OF BREATH: ONCE OR TWICE A WEEK
ACT_TOTALSCORE: 20
QUESTION_1 LAST FOUR WEEKS HOW MUCH OF THE TIME DID YOUR ASTHMA KEEP YOU FROM GETTING AS MUCH DONE AT WORK, SCHOOL OR AT HOME: NONE OF THE TIME

## 2024-04-29 NOTE — TELEPHONE ENCOUNTER
Patient's wife, Rani, calling with some concerns. Patient is scheduled for appointment with PCP on 5/1 and Rani would like these concerns to be addressed.     Rani states she feels like patient is in denial about his dementia diagnosis.  Rani is concerned about patient's driving and doesn't think he should be driving anymore as his confusion is worsening. For example of confusion, please see infusion note from 4/26. Rani also states that patient is sneaking a beer or wine here and there and never knows when he has had a drink or not which increases her concern for him driving.   Now that Rani is doing a lot of the driving, she is wondering if PCP would take on more of patient's care and eliminate some of the specialty doctors so that she isn't driving patient all over.     Rani doesn't want to take patient's independence away and doesn't want these concerns to come from her and is hoping PCP can help discuss these concerns.

## 2024-05-01 ENCOUNTER — OFFICE VISIT (OUTPATIENT)
Dept: FAMILY MEDICINE | Facility: OTHER | Age: 69
End: 2024-05-01
Payer: COMMERCIAL

## 2024-05-01 VITALS
BODY MASS INDEX: 29.63 KG/M2 | SYSTOLIC BLOOD PRESSURE: 100 MMHG | TEMPERATURE: 97.8 F | WEIGHT: 207 LBS | HEIGHT: 70 IN | HEART RATE: 71 BPM | OXYGEN SATURATION: 96 % | RESPIRATION RATE: 18 BRPM | DIASTOLIC BLOOD PRESSURE: 58 MMHG

## 2024-05-01 DIAGNOSIS — F03.90 DEMENTIA, UNSPECIFIED DEMENTIA SEVERITY, UNSPECIFIED DEMENTIA TYPE, UNSPECIFIED WHETHER BEHAVIORAL, PSYCHOTIC, OR MOOD DISTURBANCE OR ANXIETY (H): ICD-10-CM

## 2024-05-01 DIAGNOSIS — Z91.89 DRIVING SAFETY ISSUE: Primary | ICD-10-CM

## 2024-05-01 PROCEDURE — 99215 OFFICE O/P EST HI 40 MIN: CPT | Performed by: PHYSICIAN ASSISTANT

## 2024-05-01 PROCEDURE — G2211 COMPLEX E/M VISIT ADD ON: HCPCS | Performed by: PHYSICIAN ASSISTANT

## 2024-05-01 RX ORDER — RESPIRATORY SYNCYTIAL VIRUS VACCINE 120MCG/0.5
0.5 KIT INTRAMUSCULAR ONCE
Qty: 1 EACH | Refills: 0 | Status: CANCELLED | OUTPATIENT
Start: 2024-05-01 | End: 2024-05-01

## 2024-05-01 ASSESSMENT — PAIN SCALES - GENERAL: PAINLEVEL: NO PAIN (0)

## 2024-05-01 NOTE — Clinical Note
Issac Eaton.  I just wanted to reach out.  Mainor is at a place where I want him to have symptom management and items done that are life threatening/limiting or if his quality of life will be compromised but other items if we could hold off. Wife is having a hard time with burden of appointments, etc. They still want to continue treatment I think limiting procedures is most ideal if you are ok with this.   Thanks  ARLET FreemanC

## 2024-05-01 NOTE — Clinical Note
Hello.  I am reaching out to see if there is anything specifically that Mainor needs to continue to see you for. He is having progression in dementia and if there are things he needs for serious intervention or symptom management I think that is reasonable but wife is overwhelmed with appointments.  Thanks.    ARLET FreemanC

## 2024-05-01 NOTE — PROGRESS NOTES
Mainor was seen today for memory loss.    Diagnoses and all orders for this visit:    Driving safety issue  -     Occupational Therapy  Referral; Future    Dementia, unspecified dementia severity, unspecified dementia type, unspecified whether behavioral, psychotic, or mood disturbance or anxiety (H)  -     Occupational Therapy  Referral; Future       - Had a discussion with Mainor and his wife Rani.  There are concerns from Rani about Mainor and his memory and driving.   - Did ask Mainor about his alcohol intake.  He did admit to occasionally drinking beer.  Wife is concerned because she finds them around the house and then she isn't sure how many he has had and with his memory and him still driving he has concerns.    - Discussed how alcohol can affect his memory and his cognitive function. It sounds like alcohol has been a part of his life as part of enjoyment.  We discussed that he just needs to be transparent with Rani about his drinking and that when they do drink alcohol it would be better together instead of hidden so that he can actually get the enjoyment of this.  Now limiting alcohol intake is important but as his disease progresses he will have less ability to do things he can enjoy so feel that finding a balance with this is important.   - We had a discussion about his driving.  Discussed at this time until he can have OT evaluation for driving that he needs to stop driving unless someone is in the car supervising him.  We discussed the consequences if anything were to happen and how that could have major consequences. He is in agreement.  He watched his dad and now his brother go through Dementia.    - We did have a discussion as well that in the future memory care will be where he progresses to and that this is something he should have some active decision making in now that would help for the future  - I am ok with taking over pulmonology concerns. Will reach out to  "Hepatologist to see if they even need to see him and will reach out to GI to make sure we are limiting the number of evaluations that aren't necessary at this point.      Follow-up in November for Wellness visit, sooner if needed.     Options for treatment and follow-up care were reviewed with the patient and/or guardian. Patient and/or guardian engaged in the decision making process and verbalized understanding of the options discussed and agreed with the final plan.    Greater than 40 minutes were spent today with more than 50% dedicated to counseling and coordination of care of the above mentioned problems.      Subjective   Mainor is a 68 year old, presenting for the following health issues:  Memory Loss        5/1/2024     7:24 AM   Additional Questions   Roomed by LOBITO   Accompanied by KORY         5/1/2024     7:24 AM   Patient Reported Additional Medications   Patient reports taking the following new medications None     History of Present Illness       Reason for visit:  Follow memory concerns    He eats 0-1 servings of fruits and vegetables daily.He consumes 0 sweetened beverage(s) daily.He exercises with enough effort to increase his heart rate 10 to 19 minutes per day.  He exercises with enough effort to increase his heart rate 3 or less days per week. He is missing 2 dose(s) of medications per week.  He is not taking prescribed medications regularly due to remembering to take.       Mainor continues to meet with Neurologist, Occupational therapy, pulmonologist, gastroenterologist and Hepatologist.       Review of Systems  Constitutional, HEENT, cardiovascular, pulmonary, gi and gu systems are negative, except as otherwise noted.      Objective    /58   Pulse 71   Temp 97.8  F (36.6  C) (Temporal)   Resp 18   Ht 1.778 m (5' 10\")   SpO2 96%   BMI 30.38 kg/m    Body mass index is 30.38 kg/m .  Physical Exam   GENERAL: alert and no distress  MS: no gross musculoskeletal defects noted, no " edema  PSYCH: mentation appears normal, affect normal/bright            Signed Electronically by: Francsico Hicks PA-C

## 2024-05-02 ENCOUNTER — TELEPHONE (OUTPATIENT)
Dept: GASTROENTEROLOGY | Facility: CLINIC | Age: 69
End: 2024-05-02
Payer: COMMERCIAL

## 2024-05-02 NOTE — TELEPHONE ENCOUNTER
Call to patients wife Rani to discuss possibly switching to the Entyvio injection.  Rani is interested to see if their insurance will cover this. Writer will look in to this and update Rani.    Mallory Kessler RN

## 2024-05-02 NOTE — TELEPHONE ENCOUNTER
Prior Authorization Specialty Medication Request    Medication/Dose: Entyvio pen  Diagnosis and ICD code (if different than what is on RX):    New/renewal/insurance change PA/secondary ins. PA:  Previously Tried and Failed:      Important Lab Values:   Rationale:     Insurance   Primary:   Insurance ID:      Secondary (if applicable):  Insurance ID:      Pharmacy Information (if different than what is on RX)  Name:    Phone:    Fax:

## 2024-05-03 ENCOUNTER — PATIENT OUTREACH (OUTPATIENT)
Dept: CARE COORDINATION | Facility: CLINIC | Age: 69
End: 2024-05-03
Payer: COMMERCIAL

## 2024-05-03 NOTE — PROGRESS NOTES
Clinic Care Coordination Contact  Community Health Worker Initial Outreach    CHW Initial Information Gathering:  Referral Source: PCP  Preferred Urgent Care: [facility], [phone]  Current living arrangement:: I live in a private home with spouse  Informal Support system:: Spouse  No PCP office visit in Past Year: No  Transportation means:: Regular car, Family       Patient accepts CC: Yes. Patient scheduled for assessment with the SW on 5/6/24 at 2:00 pm. Patient noted desire to discuss transportation resources. The patient does have long term care set up. The CHW was informed that the patient was not fully happy about the direction of the patient's care due to not being able to drive. The CHW was not able to fully get through the questionnaire due to the patient's wife having to leave for an appointment for herself.     MICHAEL Mg  [phone]  [facility] Care Resource [facility]

## 2024-05-03 NOTE — TELEPHONE ENCOUNTER
PA Initiation    Medication: ENTYVIO 108 MG/0.68ML SC SOPN  Insurance Company: [employer] [place] - Phone [phone] Fax [phone]  Pharmacy Filling the Rx:    Filling Pharmacy Phone:    Filling Pharmacy Fax:    Start Date: 5/3/2024  [id number]

## 2024-05-06 ENCOUNTER — PATIENT OUTREACH (OUTPATIENT)
Dept: FAMILY MEDICINE | Facility: CLINIC | Age: 69
End: 2024-05-06
Payer: COMMERCIAL

## 2024-05-06 NOTE — PROGRESS NOTES
Clinic Care Coordination Contact  Clinic Care Coordination Contact  OUTREACH    Referral Information:  Referral Source: PCP    Primary Diagnosis: Cognitive Impairment    Chief Complaint   Patient presents with    Clinic Care Coordination - Initial     SW CC        [place] Utilization: no concerns  Clinic Utilization  Difficulty keeping appointments:: No  No-Show Concerns: No  No PCP office visit in Past Year: No  Utilization      No Show Count (past year)  1             ED Visits  0             Hospital Admissions  2                    Current as of: 5/6/2024  8:13 AM                Clinical Concerns:  Current Medical Concerns:  no medical concerns discussed    Current Behavioral Concerns: pt has dementia and does not believe the wife that the PCPnoted this.  He continues to report to her that she diagnosed it and is wrong.  He is also getting very upset if directed to not drink and about the driving as he also feels the spouse is saying this vs the provider.       Education Provided to patient: discussed that the pt may not remember how much he is drinking and this is causing him to drink more then usual.  Sw will review additional supports to help with Alz and senior activities.      Pain  Pain (GOAL):: No  Health Maintenance Reviewed: Due/Overdue   Health Maintenance Due   Topic Date Due    RSV VACCINE (Pregnancy & 60+) (1 - 1-dose 60+ series) Never done    COVID-19 Vaccine (5 - 2023-24 season) 09/01/2023       Clinical Pathway: None    Medication Management:  Medication review status: Medications reviewed and no changes reported per patient.        Spouse does feel that the anti anxiety med that the neurologist had been prescribing is not working.  She noted that the neurologist said for PCP to manage. Message sent to PCP about this as well.    Functional Status:  Dependent ADLs:: Independent  Dependent IADLs:: Medication Management, Money Management, Incontinence  Bed or wheelchair confined:: No  Mobility  Status: Independent  Fallen 2 or more times in the past year?: No  Any fall with injury in the past year?: No    Living Situation:  Current living arrangement:: I live in a private home with family  Type of residence:: Private home - stairs    Lifestyle & Psychosocial Needs:    Social Determinants of Health     Food Insecurity: Low Risk  (5/6/2024)    Food Insecurity     Within the past 12 months, did you worry that your food would run out before you got money to buy more?: No     Within the past 12 months, did the food you bought just not last and you didn t have money to get more?: No   Depression: Not at risk (5/1/2024)    PHQ-2     PHQ-2 Score: 1   Housing Stability: Low Risk  (5/6/2024)    Housing Stability     Do you have housing? : Yes     Are you worried about losing your housing?: No   Tobacco Use: Low Risk  (5/1/2024)    Patient History     Smoking Tobacco Use: Never     Smokeless Tobacco Use: Never     Passive Exposure: Never   Financial Resource Strain: Low Risk  (5/6/2024)    Financial Resource Strain     Within the past 12 months, have you or your family members you live with been unable to get utilities (heat, electricity) when it was really needed?: No   Alcohol Use: Alcohol Misuse (5/6/2024)    AUDIT-C     Frequency of Alcohol Consumption: 4 or more times a week     Average Number of Drinks: 3 or 4     Frequency of Binge Drinking: Monthly   Transportation Needs: Low Risk  (5/6/2024)    Transportation Needs     Within the past 12 months, has lack of transportation kept you from medical appointments, getting your medicines, non-medical meetings or appointments, work, or from getting things that you need?: No   Physical Activity: Insufficiently Active (5/6/2024)    Exercise Vital Sign     Days of Exercise per Week: 3 days     Minutes of Exercise per Session: 40 min   Interpersonal Safety: Not on file   Stress: Stress Concern Present (5/6/2024)    [employer] [place] [employer] - Occupational  Stress Questionnaire     Feeling of Stress : To some extent   Social Connections: Moderately Integrated (5/6/2024)    Social Connection and Isolation Panel [NHANES]     Frequency of Communication with Friends and Family: Once a week     Frequency of Social Gatherings with Friends and Family: Once a week     Attends Evangelical Services: More than 4 times per year     Active Member of Clubs or Organizations: Yes     Attends Club or Organization Meetings: More than 4 times per year     Marital Status:    Health Literacy: Not on file     Diet:: Regular  Inadequate nutrition (GOAL):: No  Tube Feeding: No  Inadequate activity/exercise (GOAL):: No  Significant changes in sleep pattern (GOAL): No  Transportation means:: Family     Evangelical or spiritual beliefs that impact treatment:: No  Mental health DX:: No  Chemical Dependency Status: Current Concern  Chemical Dependency Management: Other (see comment)  Informal Support system:: Family, Spouse        Per provider pt is in need of driving eval and spouse was looking for the number to call and asked if clinic does this.  Reviewed that it is the pt/spouse who do that.  Provided the number for her to call and schedule.  She asked if the provider sent a letter as she thought the PCP would send a letter and noted that there was not a letter.  Reviewed that the referral did note dementia and need for driving eval.  Also reviewed that it will be a full driving eval for cognition/skill to drive as well as behind the wheel as appropriate.  Reviewed that often a letter is not sent as they assess complete ability at the evaluation.      Pt came out to the garage where spouse was talking with Sw and this made it difficult for spouse to talk.  Will send message to PCP and will do some research to send to pt/spouse on activity options.  Spouse noted that she would like to know community options for activities and potential alz groups.      Resources and Interventions:  Current  Resources:      Community Resources: None  Supplies Currently Used at Home: None  Equipment Currently Used at Home: none  Employment Status: retired         Advance Care Plan/Directive  Advanced Care Plans/Directives on file:: Yes  Status of record:: On File and Validated  Type Advanced Care Plans/Directives: Advanced Directive - On File          Care Plan:  TBD - did not have a chance to discuss     Patient/Caregiver understanding: TBD       Future Appointments                In 3 weeks PH LAB [facility]    In 3 weeks PH INFUSION CHAIR 7; PH INFUSION NURSE [facility]    In 1 month PH LAB [facility]    In 1 month PH INFUSION CHAIR 3; PH INFUSION NURSE [facility]    In 2 months PH LAB [facility]    In 2 months PH INFUSION CHAIR 3; PH INFUSION NURSE [facility]    In 6 months Francisco Hicks PA-C [facility]    In 6 months Lily Eaton DO [facility]            Plan: will route message to PCP for request of letter sent to pt.  Will call in two weeks. Will search for resources and send via My[employer].     STEPHANY Guzman   [facility] - Care Coordination  [facility]   [phone]

## 2024-05-07 NOTE — TELEPHONE ENCOUNTER
Prior Authorization Approval    Medication: ENTYVIO 108 MG/0.68ML SC SOPN  Authorization Effective Date: 2/3/2024  Authorization Expiration Date: 5/3/2025  Approved Dose/Quantity: 1.36/28  Reference #: [id number]   Insurance Company: [employer] [place] - Phone [phone] Fax [phone]  Expected CoPay: $ 2,218.3  CoPay Card Available:      Financial Assistance Needed:    Which Pharmacy is filling the prescription:    Pharmacy Notified:    Patient Notified:

## 2024-05-08 ENCOUNTER — TELEPHONE (OUTPATIENT)
Dept: FAMILY MEDICINE | Facility: OTHER | Age: 69
End: 2024-05-08
Payer: COMMERCIAL

## 2024-05-08 NOTE — LETTER
May 8, 2024      Mainor Grande  [id number] [facility] [address]        Dear Mainor,       It was nice to see you the other day for your visit.   Mainor neurology has diagnosed you with Dementia and given your family history and younger brother starting to have symptoms as well this is an accurate diagnosis.   Right now until you can be seen by Lazaro Lama for a drivers assessment you cannot drive without someone in the car riding in the passenger seat with you.    Right now I would like you to stop drinking alcohol, this will worsen your dementia, it can increase your confusion at the time of drinking, it increases your risk for injuries, and if you forgot that you drank and were to drive you could cause an accident and potentially hurt yourself or someone else.  Alcohol can also escalate your dementia/progress it faster and we don't want that either, we want you to remain as independent as possible.   I still think it is good for you to get out and socialize with others it just means that someone may need to take you to those activities or come with you to activities.     Please reach out if there are any questions or concerns. I understand that this can be an extremely difficult, confusing and frustrating time but we are here to help you as is your wife Rani.         Sincerely,        Francisco Hicks PA-C

## 2024-05-17 ENCOUNTER — PATIENT OUTREACH (OUTPATIENT)
Dept: CARE COORDINATION | Facility: CLINIC | Age: 69
End: 2024-05-17
Payer: COMMERCIAL

## 2024-05-17 NOTE — PROGRESS NOTES
Clinic Care Coordination Contact  Follow Up Progress Note      Assessment: spouse had left a message in regards to not finding the letter the provider put together.  Assisted with directing her and she found it.  She should also get the letter today in the mail per her parker of what is coming in the mail.     She noted continued struggles with pt driving and keeping the keys away from him.  Her kids and brothers also support her.  She is looking into getting supports in the home through the [facility] insurance plan pt has.     She did not want to goal set at this time yet would like to discuss again in about three weeks.     Care Gaps:    Health Maintenance Due   Topic Date Due    RSV VACCINE (Pregnancy & 60+) (1 - 1-dose 60+ series) Never done    COVID-19 Vaccine (5 - 2023-24 season) 09/01/2023    ASTHMA ACTION PLAN  06/13/2024       Postponed to future appt's.     Care Plans  TBD    Intervention/Education provided during outreach: educated on resources and sent mychart with websites.  Encouraged spouse finding a support group.          Plan:   Spouse to reach out if resources/supports are needed before next call.  Spouse to check out resources for self and pt.   Care Coordinator will follow up in about three weeks.     STEPHANY Guzman  [facility] - Care Coordinator   5/17/2024   2:57 PM  [phone]

## 2024-05-30 DIAGNOSIS — K51.00 ULCERATIVE PANCOLITIS WITHOUT COMPLICATION (H): Primary | ICD-10-CM

## 2024-05-31 ENCOUNTER — INFUSION THERAPY VISIT (OUTPATIENT)
Dept: INFUSION THERAPY | Facility: CLINIC | Age: 69
End: 2024-05-31
Attending: INTERNAL MEDICINE
Payer: COMMERCIAL

## 2024-05-31 ENCOUNTER — APPOINTMENT (OUTPATIENT)
Dept: LAB | Facility: CLINIC | Age: 69
End: 2024-05-31
Payer: COMMERCIAL

## 2024-05-31 VITALS
TEMPERATURE: 97.9 F | DIASTOLIC BLOOD PRESSURE: 75 MMHG | RESPIRATION RATE: 18 BRPM | SYSTOLIC BLOOD PRESSURE: 125 MMHG | OXYGEN SATURATION: 98 % | BODY MASS INDEX: 29.92 KG/M2 | WEIGHT: 208.5 LBS | HEART RATE: 54 BPM

## 2024-05-31 DIAGNOSIS — K83.01 PSC (PRIMARY SCLEROSING CHOLANGITIS) (H): ICD-10-CM

## 2024-05-31 DIAGNOSIS — K51.00 ULCERATIVE PANCOLITIS WITHOUT COMPLICATION (H): Primary | ICD-10-CM

## 2024-05-31 LAB
ALBUMIN SERPL BCG-MCNC: 4.1 G/DL (ref 3.5–5.2)
ALP SERPL-CCNC: 134 U/L (ref 40–150)
ALT SERPL W P-5'-P-CCNC: 26 U/L (ref 0–70)
AST SERPL W P-5'-P-CCNC: 39 U/L (ref 0–45)
BASOPHILS # BLD AUTO: 0.1 10E3/UL (ref 0–0.2)
BASOPHILS NFR BLD AUTO: 2 %
BILIRUB DIRECT SERPL-MCNC: 0.23 MG/DL (ref 0–0.3)
BILIRUB SERPL-MCNC: 0.7 MG/DL
CRP SERPL-MCNC: <3 MG/L
EOSINOPHIL # BLD AUTO: 0.3 10E3/UL (ref 0–0.7)
EOSINOPHIL NFR BLD AUTO: 4 %
ERYTHROCYTE [DISTWIDTH] IN BLOOD BY AUTOMATED COUNT: 12.1 % (ref 10–15)
ERYTHROCYTE [SEDIMENTATION RATE] IN BLOOD BY WESTERGREN METHOD: 8 MM/HR (ref 0–20)
HCT VFR BLD AUTO: 40.9 % (ref 40–53)
HGB BLD-MCNC: 13.7 G/DL (ref 13.3–17.7)
IMM GRANULOCYTES # BLD: 0 10E3/UL
IMM GRANULOCYTES NFR BLD: 0 %
LYMPHOCYTES # BLD AUTO: 2 10E3/UL (ref 0.8–5.3)
LYMPHOCYTES NFR BLD AUTO: 34 %
MCH RBC QN AUTO: 31.8 PG (ref 26.5–33)
MCHC RBC AUTO-ENTMCNC: 33.5 G/DL (ref 31.5–36.5)
MCV RBC AUTO: 95 FL (ref 78–100)
MONOCYTES # BLD AUTO: 0.7 10E3/UL (ref 0–1.3)
MONOCYTES NFR BLD AUTO: 13 %
NEUTROPHILS # BLD AUTO: 2.7 10E3/UL (ref 1.6–8.3)
NEUTROPHILS NFR BLD AUTO: 47 %
NRBC # BLD AUTO: 0 10E3/UL
NRBC BLD AUTO-RTO: 0 /100
PLATELET # BLD AUTO: 204 10E3/UL (ref 150–450)
PROT SERPL-MCNC: 7.2 G/DL (ref 6.4–8.3)
RBC # BLD AUTO: 4.31 10E6/UL (ref 4.4–5.9)
WBC # BLD AUTO: 5.7 10E3/UL (ref 4–11)

## 2024-05-31 PROCEDURE — 36415 COLL VENOUS BLD VENIPUNCTURE: CPT | Performed by: INTERNAL MEDICINE

## 2024-05-31 PROCEDURE — 86140 C-REACTIVE PROTEIN: CPT | Performed by: INTERNAL MEDICINE

## 2024-05-31 PROCEDURE — 85652 RBC SED RATE AUTOMATED: CPT | Performed by: INTERNAL MEDICINE

## 2024-05-31 PROCEDURE — 250N000011 HC RX IP 250 OP 636: Mod: JZ | Performed by: INTERNAL MEDICINE

## 2024-05-31 PROCEDURE — 85004 AUTOMATED DIFF WBC COUNT: CPT | Performed by: INTERNAL MEDICINE

## 2024-05-31 PROCEDURE — 96365 THER/PROPH/DIAG IV INF INIT: CPT

## 2024-05-31 PROCEDURE — 258N000003 HC RX IP 258 OP 636: Performed by: INTERNAL MEDICINE

## 2024-05-31 PROCEDURE — 80076 HEPATIC FUNCTION PANEL: CPT | Performed by: INTERNAL MEDICINE

## 2024-05-31 RX ORDER — URSODIOL 300 MG/1
300 CAPSULE ORAL 2 TIMES DAILY
Qty: 180 CAPSULE | Refills: 1 | Status: SHIPPED | OUTPATIENT
Start: 2024-05-31

## 2024-05-31 RX ORDER — HEPARIN SODIUM,PORCINE 10 UNIT/ML
5 VIAL (ML) INTRAVENOUS
Status: CANCELLED | OUTPATIENT
Start: 2024-06-21

## 2024-05-31 RX ORDER — HEPARIN SODIUM (PORCINE) LOCK FLUSH IV SOLN 100 UNIT/ML 100 UNIT/ML
5 SOLUTION INTRAVENOUS
Status: CANCELLED | OUTPATIENT
Start: 2024-06-21

## 2024-05-31 RX ADMIN — SODIUM CHLORIDE 250 ML: 9 INJECTION, SOLUTION INTRAVENOUS at 08:24

## 2024-05-31 RX ADMIN — VEDOLIZUMAB 300 MG: 300 INJECTION, POWDER, LYOPHILIZED, FOR SOLUTION INTRAVENOUS at 08:42

## 2024-05-31 ASSESSMENT — PAIN SCALES - GENERAL: PAINLEVEL: NO PAIN (0)

## 2024-05-31 NOTE — PROGRESS NOTES
Infusion Nursing Note:  Mainor Grande presents today for Entyvio.    Patient seen by provider today: No   present during visit today: Not Applicable.    Note: Leno arrives ambulatory with his wife. Alert, pleasant. Denies new health concerns or active symptoms other than dry eyes and occasional cough with allergies. VSS, afebrile.       Intravenous Access:  Peripheral IV placed.    Treatment Conditions:  Biological Infusion Checklist:  ~~~ NOTE: If the patient answers yes to any of the questions below, hold the infusion and contact ordering provider or on-call provider.    Have you recently had an elevated temperature, fever, chills, productive cough, coughing for 3 weeks or longer or hemoptysis,  abnormal vital signs, night sweats,  chest pain or have you noticed a decrease in your appetite, unexplained weight loss or fatigue? No  Do you have any open wounds or new incisions? No  Do you have any upcoming hospitalizations or surgeries? Does not include esophagogastroduodenoscopy, colonoscopy, endoscopic retrograde cholangiopancreatography (ERCP), endoscopic ultrasound (EUS), dental procedures or joint aspiration/steroid injections No  Do you currently have any signs of illness or infection or are you on any antibiotics? No  Have you had any new, sudden or worsening abdominal pain? No  Have you or anyone in your household received a live vaccination in the past 4 weeks? Please note: No live vaccines while on biologic/chemotherapy until 6 months after the last treatment. Patient can receive the flu vaccine (shot only), pneumovax and the Covid vaccine. It is optimal for the patient to get these vaccines mid cycle, but they can be given at any time as long as it is not on the day of the infusion. No  Have you recently been diagnosed with any new nervous system diseases (ie. Multiple sclerosis, Guillain [place], seizures, neurological changes) or cancer diagnosis? Are you on any form of radiation or  chemotherapy? No  Are you pregnant or breast feeding or do you have plans of pregnancy in the future? N/A  Have you been having any signs of worsening depression or suicidal ideations?  (benlysta only) N/A  Have there been any other new onset medical symptoms? No  Have you had any new blood clots? (IVIG only) N/A      Post Infusion Assessment:  Patient tolerated infusion without incident.  Blood return noted pre and post infusion.  Site patent and intact, free from redness, edema or discomfort.  No evidence of extravasations.  PIV access discontinued per protocol.    Biologic Infusion Post Education: Call the triage nurse at your clinic or seek medical attention if you have chills and/or temperature greater than or equal to 100.5, uncontrolled nausea/vomiting, diarrhea, constipation, dizziness, shortness of breath, chest pain, heart palpitations, weakness or any other new or concerning symptoms, questions or concerns.  You cannot have any live virus vaccines prior to or during treatment or up to 6 months post infusion.  If you have an upcoming surgery, medical procedure or dental procedure during treatment, this should be discussed with your ordering physician and your surgeon/dentist.  If you are having any concerning symptom, if you are unsure if you should get your next infusion or wish to speak to a provider before your next infusion, please call your care coordinator or triage nurse at your clinic to notify them so we can adequately serve you.       Discharge Plan:   AVS to patient via MYCHART.  Patient will return 6/28 for next appointment.   Patient discharged in stable condition accompanied by: wife.  Departure Mode: Ambulatory.      Keren Flores RN

## 2024-06-05 ENCOUNTER — PATIENT OUTREACH (OUTPATIENT)
Dept: CARE COORDINATION | Facility: CLINIC | Age: 69
End: 2024-06-05
Payer: COMMERCIAL

## 2024-06-05 NOTE — PROGRESS NOTES
Clinic Care Coordination Contact  Care Team Conversations    Call placed to spouse.  She was playing cards with friends and asked if she could call back at another time.  If no call back will call within 10 business days.     STEPHANY Guzman  [facility] - Care Coordinator   6/5/2024   10:23 AM  [phone]

## 2024-06-18 NOTE — PROGRESS NOTES
Clinic Care Coordination Contact  Care Team Conversations    Only talked briefly as spouse and pt were in the car waiting to meet someone.  She noted that she has checked into memory care facility options and the cost is more then they can afford at this time.  He does have a long term care policy and this will cover home services.  Her plan is to have services at home and self pay for 90 days and then utilize the long term care policy for coverage and increase services.     She had questions about his medication and potential adjustments.  Discussed reaching out to his PCP to see if she is willing to adjust or if an appt is needed.     Spouse declined ongoing needs or goal setting when enquired. She said she would call if she has questions down the road. Will close to care coordination and make no further outreaches at this time.     STEPHANY Guzman  [facility] - Care Coordinator   6/18/2024   12:49 PM  [phone]

## 2024-06-24 ENCOUNTER — VIRTUAL VISIT (OUTPATIENT)
Dept: FAMILY MEDICINE | Facility: OTHER | Age: 69
End: 2024-06-24
Payer: COMMERCIAL

## 2024-06-24 ENCOUNTER — NURSE TRIAGE (OUTPATIENT)
Dept: FAMILY MEDICINE | Facility: OTHER | Age: 69
End: 2024-06-24

## 2024-06-24 DIAGNOSIS — U07.1 INFECTION DUE TO 2019 NOVEL CORONAVIRUS: Primary | ICD-10-CM

## 2024-06-24 PROCEDURE — 99441 PR PHYSICIAN TELEPHONE EVALUATION 5-10 MIN: CPT | Mod: 93 | Performed by: PHYSICIAN ASSISTANT

## 2024-06-24 NOTE — TELEPHONE ENCOUNTER
RN COVID TREATMENT VISIT  06/24/24      The patient has been triaged and does not require a higher level of care.    Mainor Grande  68 year oldd  Current weight? 208 lb    Has the patient been seen by a primary care provider at an [facility] or [facility] within the past two years? Yes.   Have you been in close proximity to/do you have a known exposure to a person with a confirmed case of influenza? No.     General treatment eligibility:  Date of positive COVID test (PCR or at home)?  6/24    Are you or have you been hospitalized for this COVID-19 infection? No.   Have you received monoclonal antibodies or antiviral treatment for COVID-19 since this positive test? No.   Do you have any of the following conditions that place you at risk of being very sick from COVID-19?   - Age 50 years or older  - Chronic lung diseases such as asthma, bronchiectasis, COPD, interstitial lung disease, pulmonary embolism, pulmonary hypertension   - Dementia  Yes, patient has at least one high risk condition as noted above.     Current COVID symptoms:   - cough  - fatigue  Yes. Patient has at least one symptom as selected.     How many days since symptoms started? 5 days or less. Established patient, 12 years or older weighing at least 88.2 lbs, who has symptoms that started in the past 5 days, has not been hospitalized nor received treatment already, and is at risk for being very sick from COVID-19.     Treatment eligibility by RN:  Are you currently pregnant or nursing? No  Do you have a clinically significant hypersensitivity to nirmatrelvir or ritonavir, or toxic epidermal necrolysis (TEN) or Ewing-Davon Syndrome? No  Do you have a history of hepatitis, any hepatic impairment on the Problem List (such as Child-Og Class C, cirrhosis, fatty liver disease, alcoholic liver disease), or was the last liver lab (hepatic panel, ALT, AST, ALK Phos, bilirubin) elevated in the past 6 months? YES  Do you  have any history of severe renal impairment (eGFR < 30mL/min)? No    Is patient eligible to continue? No, patient does not meet all eligibility requirements for the RN COVID treatment (as denoted by yes response(s) above). Patient informed they will need a virtual provider visit to assess treatment options.  Patient will be transferred to a  at the end of this call.     Rebecca Abdi RN        Reason for Disposition   MILD difficulty breathing (e.g., minimal/no SOB at rest, SOB with walking, pulse <100)   HIGH RISK patient (e.g., weak immune system, age > 64 years, obesity with BMI of 30 or higher, pregnant, chronic lung disease or other chronic medical condition) and COVID symptoms (e.g., cough, fever)  (Exceptions: Already seen by doctor or NP/PA and no new or worsening symptoms.)    Additional Information   Negative: SEVERE difficulty breathing (e.g., struggling for each breath, speaks in single words)   Negative: Difficult to awaken or acting confused (e.g., disoriented, slurred speech)   Negative: Bluish (or gray) lips or face now   Negative: Shock suspected (e.g., cold/pale/clammy skin, too weak to stand, low BP, rapid pulse)   Negative: Sounds like a life-threatening emergency to the triager   Negative: Diagnosed or suspected COVID-19 and symptoms lasting 3 or more weeks   Negative: COVID-19 exposure and no symptoms   Negative: COVID-19 vaccine reaction suspected (e.g., fever, headache, muscle aches) occurring 1 to 3 days after getting vaccine   Negative: COVID-19 vaccine, questions about   Negative: Lives with someone known to have influenza (flu test positive) and flu-like symptoms (e.g., cough, runny nose, sore throat, SOB; with or without fever)   Negative: Possible COVID-19 symptoms and triager concerned about severity of symptoms or other causes   Negative: COVID-19 and breastfeeding, questions about   Negative: SEVERE or constant chest pain or pressure  (Exception: Mild central chest  pain, present only when coughing.)   Negative: MODERATE difficulty breathing (e.g., speaks in phrases, SOB even at rest, pulse 100-120)   Negative: Headache and stiff neck (can't touch chin to chest)   Negative: Oxygen level (e.g., pulse oximetry) 90% or lower   Negative: Chest pain or pressure  (Exception: MILD central chest pain, present only when coughing.)   Negative: Drinking very little and dehydration suspected (e.g., no urine > 12 hours, very dry mouth, very lightheaded)   Negative: Patient sounds very sick or weak to the triager    Protocols used: Coronavirus (COVID-19) Diagnosed or [id number]

## 2024-06-24 NOTE — PROGRESS NOTES
Mainor is a 68 year old who is being evaluated via a billable telephone visit.    What phone number would you like to be contacted at? [phone]  How would you like to obtain your AVS? Juan J  Originating Location (pt. Location): Home    Distant Location (provider location):  Off-site    Assessment & Plan     Infection due to 2019 novel coronavirus   had COVID. He is on day 3.  He is feeling pretty down and out right now.  He has dementia and other chronic conditions like Asthma that make him higher risk.  We will have him STOP his Atorvastatin for a week. He can continue on his inhaler medications and we will monitor for any concerns. Discussed potential side effects with his wife to monitor for. We discussed OTC management of symptoms. We discussed quarantine and masking recommendations.  He has no kidney disease and his last liver labs were normal.    - nirmatrelvir and ritonavir (PAXLOVID) 300 mg/100 mg therapy pack; Take 3 tablets by mouth 2 times daily for 5 days (Take 2 Nirmatrelvir tablets and 1 Ritonavir tablet twice daily for 5 days)        Follow-up in 3-5 days if not improving, sooner if worse or new concerns.     Options for treatment and follow-up care were reviewed with the patient and/or guardian. Patient and/or guardian engaged in the decision making process and verbalized understanding of the options discussed and agreed with the final plan.     Subjective   Mainor is a 68 year old, presenting for the following health issues:  No chief complaint on file.        6/24/2024     8:57 AM   Additional Questions   Roomed by olayinka   Accompanied by wife     History of Present Illness       Reason for visit:  Covid          Additional information in nurse triage note from phone call this morning     COVID-19 Symptom Review  How many days ago did these symptoms start? 6/22    Are any of the following symptoms significant for you?  New or worsening difficulty breathing? Yes  Please describe what  "kind of difficulty you are having breathing:Mild dyspnea (able to do ADLs without difficulty, mild shortness of breath with activities such as climbing one or two flights of stairs or walking briskly)  Worsening cough? Yes, it's a dry cough.   Fever or chills? No  Headache: YES  Sore throat: no - but coughing is causing irritation  Chest pain: No - mild when coughing  Diarrhea: No  Body aches? No   Very fatigued, pt has asthma wife is concerned    What treatments has patient tried? aspirin   Does patient live in a nursing home, group home, or shelter? No  Does patient have a way to get food/medications during quarantined? Yes, I have a friend or family member who can help me.    - He has been in bed all weekend.   - He feels \"Blah\"  - He is coughing.   - He has had a fever but better today.   - Wife started with symptoms on Thursday/Friday but he started it really on Saturday.         Review of Systems  Constitutional, HEENT, cardiovascular, pulmonary, gi and gu systems are negative, except as otherwise noted.      Objective           Vitals:  No vitals were obtained today due to virtual visit.    Physical Exam   General: Alert and no distress //Respiratory: No audible wheeze, cough, or shortness of breath // Psychiatric:  Appropriate affect, tone, and pace of words            Phone call duration: 8:48 minutes  Signed Electronically by: Francisco Hicks PA-C    "

## 2024-06-28 ENCOUNTER — INFUSION THERAPY VISIT (OUTPATIENT)
Dept: INFUSION THERAPY | Facility: CLINIC | Age: 69
End: 2024-06-28
Attending: INTERNAL MEDICINE
Payer: COMMERCIAL

## 2024-06-28 VITALS
DIASTOLIC BLOOD PRESSURE: 78 MMHG | RESPIRATION RATE: 18 BRPM | TEMPERATURE: 98 F | SYSTOLIC BLOOD PRESSURE: 131 MMHG | OXYGEN SATURATION: 97 % | HEART RATE: 60 BPM

## 2024-06-28 DIAGNOSIS — K51.00 ULCERATIVE PANCOLITIS WITHOUT COMPLICATION (H): Primary | ICD-10-CM

## 2024-06-28 PROCEDURE — 250N000011 HC RX IP 250 OP 636: Performed by: INTERNAL MEDICINE

## 2024-06-28 PROCEDURE — 258N000003 HC RX IP 258 OP 636: Performed by: INTERNAL MEDICINE

## 2024-06-28 PROCEDURE — 99207 PR NO CHARGE LOS: CPT

## 2024-06-28 PROCEDURE — 96365 THER/PROPH/DIAG IV INF INIT: CPT

## 2024-06-28 RX ORDER — HEPARIN SODIUM,PORCINE 10 UNIT/ML
5 VIAL (ML) INTRAVENOUS
Status: CANCELLED | OUTPATIENT
Start: 2024-07-26

## 2024-06-28 RX ORDER — HEPARIN SODIUM (PORCINE) LOCK FLUSH IV SOLN 100 UNIT/ML 100 UNIT/ML
5 SOLUTION INTRAVENOUS
Status: CANCELLED | OUTPATIENT
Start: 2024-07-26

## 2024-06-28 RX ADMIN — SODIUM CHLORIDE 250 ML: 9 INJECTION, SOLUTION INTRAVENOUS at 14:51

## 2024-06-28 RX ADMIN — VEDOLIZUMAB 300 MG: 300 INJECTION, POWDER, LYOPHILIZED, FOR SOLUTION INTRAVENOUS at 14:56

## 2024-06-28 NOTE — PROGRESS NOTES
Infusion Nursing Note:  Mainor Grande presents today for Entyvio infusion.    Patient seen by provider today: No   present during visit today: Not Applicable.    Note:   Diagnosed with Covid on 6/24/24. See virtual visit notes from 6/24/24 for details of symptoms. Has one more day of Paxlovid to take.  Also has a chronic cough due to asthma and seasonal allergies.  Paged Dr Eaton whom states to proceed with Entyvio infusion with recent Covid diagnosis and Paxlovid use.  Patient, and wife, verbalized understanding and agreement with this plan.    Patient has dementia and takes Aricept for it. Patient may repeat questions, forget what is said and take longer to make decisions. Patient has also wandered in previous clinics.  Wife Rani present with patient today. Today patient was able to carry on conversation with minimal difficulty.    Intravenous Access:  Peripheral IV placed.    Treatment Conditions:  Biological Infusion Checklist:  ~~~ NOTE: If the patient answers yes to any of the questions below, hold the infusion and contact ordering provider or on-call provider.    Have you recently had an elevated temperature, fever, chills, productive cough, coughing for 3 weeks or longer or hemoptysis,  abnormal vital signs, night sweats,  chest pain or have you noticed a decrease in your appetite, unexplained weight loss or fatigue? Yes, Covid on Paxlovid  Do you have any open wounds or new incisions? No  Do you have any upcoming hospitalizations or surgeries? Does not include esophagogastroduodenoscopy, colonoscopy, endoscopic retrograde cholangiopancreatography (ERCP), endoscopic ultrasound (EUS), dental procedures or joint aspiration/steroid injections No  Do you currently have any signs of illness or infection or are you on any antibiotics? Yes, Covid - taking Paxlovid  Have you had any new, sudden or worsening abdominal pain? No  Have you or anyone in your household received a live vaccination in the  past 4 weeks? Please note: No live vaccines while on biologic/chemotherapy until 6 months after the last treatment. Patient can receive the flu vaccine (shot only), pneumovax and the Covid vaccine. It is optimal for the patient to get these vaccines mid cycle, but they can be given at any time as long as it is not on the day of the infusion. No  Have you recently been diagnosed with any new nervous system diseases (ie. Multiple sclerosis, Guillain [place], seizures, neurological changes) or cancer diagnosis? Are you on any form of radiation or chemotherapy? No  Have there been any other new onset medical symptoms? No    Post Infusion Assessment:  Patient tolerated infusion without incident.  Blood return noted pre and post infusion.  Site patent and intact, free from redness, edema or discomfort.  No evidence of extravasations.  Access discontinued per protocol.       Discharge Plan:   AVS to patient via MYCHART.  Patient will return 7/26/24 for next appointment.   Patient discharged in stable condition accompanied by: wife.  Departure Mode: Ambulatory.      Brenda Colmenares RN

## 2024-07-24 ENCOUNTER — TELEPHONE (OUTPATIENT)
Dept: GASTROENTEROLOGY | Facility: CLINIC | Age: 69
End: 2024-07-24
Payer: COMMERCIAL

## 2024-07-24 PROCEDURE — 99207 PR NO CHARGE LOS: CPT | Performed by: INTERNAL MEDICINE

## 2024-07-24 NOTE — TELEPHONE ENCOUNTER
Patient come for Entyvio infusion on 7/26, noted to be COVID positive on 6/21. Per discontinuation of precautions policy, pt is severely immunocompromised. Thus he requires 2 negative at home tests or 1 PCR test plus a duration of >20 days have passed since symptoms/fevers. Called wife (Rani), she states she will try to buy 2 home test kits rather than the PCR and notify us of the results. She also states she is unsure if she will be able to get the tests in time. Discussed that in the event they are unable to take 2 at home tests, Leno will be in special precautions as he was last infusion. Wife verbalizes agreement and agrees to wear masks if unable to take tests.

## 2024-07-26 ENCOUNTER — INFUSION THERAPY VISIT (OUTPATIENT)
Dept: INFUSION THERAPY | Facility: CLINIC | Age: 69
End: 2024-07-26
Attending: EMERGENCY MEDICINE
Payer: COMMERCIAL

## 2024-07-26 ENCOUNTER — LAB (OUTPATIENT)
Dept: INFUSION THERAPY | Facility: CLINIC | Age: 69
End: 2024-07-26
Attending: INTERNAL MEDICINE
Payer: COMMERCIAL

## 2024-07-26 VITALS
OXYGEN SATURATION: 98 % | SYSTOLIC BLOOD PRESSURE: 127 MMHG | WEIGHT: 206.8 LBS | RESPIRATION RATE: 16 BRPM | DIASTOLIC BLOOD PRESSURE: 78 MMHG | TEMPERATURE: 97.7 F | BODY MASS INDEX: 29.67 KG/M2 | HEART RATE: 57 BPM

## 2024-07-26 DIAGNOSIS — K51.00 ULCERATIVE PANCOLITIS WITHOUT COMPLICATION (H): Primary | ICD-10-CM

## 2024-07-26 LAB
ALBUMIN SERPL BCG-MCNC: 3.9 G/DL (ref 3.5–5.2)
ALP SERPL-CCNC: 121 U/L (ref 40–150)
ALT SERPL W P-5'-P-CCNC: 18 U/L (ref 0–70)
AST SERPL W P-5'-P-CCNC: 32 U/L (ref 0–45)
BASOPHILS # BLD AUTO: 0.1 10E3/UL (ref 0–0.2)
BASOPHILS NFR BLD AUTO: 1 %
BILIRUB DIRECT SERPL-MCNC: 0.22 MG/DL (ref 0–0.3)
BILIRUB SERPL-MCNC: 0.5 MG/DL
CRP SERPL-MCNC: <3 MG/L
EOSINOPHIL # BLD AUTO: 0.4 10E3/UL (ref 0–0.7)
EOSINOPHIL NFR BLD AUTO: 6 %
ERYTHROCYTE [DISTWIDTH] IN BLOOD BY AUTOMATED COUNT: 12.3 % (ref 10–15)
ERYTHROCYTE [SEDIMENTATION RATE] IN BLOOD BY WESTERGREN METHOD: 2 MM/HR (ref 0–20)
HCT VFR BLD AUTO: 41.7 % (ref 40–53)
HGB BLD-MCNC: 13.6 G/DL (ref 13.3–17.7)
HOLD SPECIMEN: NORMAL
IMM GRANULOCYTES # BLD: 0 10E3/UL
IMM GRANULOCYTES NFR BLD: 0 %
LYMPHOCYTES # BLD AUTO: 1.9 10E3/UL (ref 0.8–5.3)
LYMPHOCYTES NFR BLD AUTO: 31 %
MCH RBC QN AUTO: 30.8 PG (ref 26.5–33)
MCHC RBC AUTO-ENTMCNC: 32.6 G/DL (ref 31.5–36.5)
MCV RBC AUTO: 94 FL (ref 78–100)
MONOCYTES # BLD AUTO: 0.8 10E3/UL (ref 0–1.3)
MONOCYTES NFR BLD AUTO: 13 %
NEUTROPHILS # BLD AUTO: 3.1 10E3/UL (ref 1.6–8.3)
NEUTROPHILS NFR BLD AUTO: 49 %
NRBC # BLD AUTO: 0 10E3/UL
NRBC BLD AUTO-RTO: 0 /100
PLATELET # BLD AUTO: 203 10E3/UL (ref 150–450)
PROT SERPL-MCNC: 6.7 G/DL (ref 6.4–8.3)
RBC # BLD AUTO: 4.42 10E6/UL (ref 4.4–5.9)
WBC # BLD AUTO: 6.3 10E3/UL (ref 4–11)

## 2024-07-26 PROCEDURE — 99207 PR NO CHARGE LOS: CPT

## 2024-07-26 PROCEDURE — 86140 C-REACTIVE PROTEIN: CPT | Performed by: INTERNAL MEDICINE

## 2024-07-26 PROCEDURE — 85652 RBC SED RATE AUTOMATED: CPT | Performed by: INTERNAL MEDICINE

## 2024-07-26 PROCEDURE — 85025 COMPLETE CBC W/AUTO DIFF WBC: CPT | Performed by: INTERNAL MEDICINE

## 2024-07-26 PROCEDURE — 80076 HEPATIC FUNCTION PANEL: CPT | Performed by: INTERNAL MEDICINE

## 2024-07-26 PROCEDURE — 258N000003 HC RX IP 258 OP 636: Performed by: INTERNAL MEDICINE

## 2024-07-26 PROCEDURE — 96365 THER/PROPH/DIAG IV INF INIT: CPT

## 2024-07-26 PROCEDURE — 250N000011 HC RX IP 250 OP 636: Performed by: INTERNAL MEDICINE

## 2024-07-26 PROCEDURE — 36415 COLL VENOUS BLD VENIPUNCTURE: CPT | Performed by: INTERNAL MEDICINE

## 2024-07-26 RX ORDER — HEPARIN SODIUM (PORCINE) LOCK FLUSH IV SOLN 100 UNIT/ML 100 UNIT/ML
5 SOLUTION INTRAVENOUS
Status: CANCELLED | OUTPATIENT
Start: 2024-08-23

## 2024-07-26 RX ORDER — HEPARIN SODIUM,PORCINE 10 UNIT/ML
5 VIAL (ML) INTRAVENOUS
Status: CANCELLED | OUTPATIENT
Start: 2024-08-23

## 2024-07-26 RX ADMIN — SODIUM CHLORIDE 250 ML: 9 INJECTION, SOLUTION INTRAVENOUS at 09:43

## 2024-07-26 RX ADMIN — VEDOLIZUMAB 300 MG: 300 INJECTION, POWDER, LYOPHILIZED, FOR SOLUTION INTRAVENOUS at 09:59

## 2024-07-26 NOTE — PROGRESS NOTES
Infusion Nursing Note:  Mainor Grande presents today for Enytvio infusion.    Patient seen by provider today: No   present during visit today: Not Applicable.    Note:  Having increased loose stools the past few days. Will see if infusion helps.    Wife also express patient is having some orthostatic hypotension symptoms.  Patient will see PCP for these symptoms.    Intravenous Access:  Peripheral IV placed.    Treatment Conditions:  Biological Infusion Checklist:  ~~~ NOTE: If the patient answers yes to any of the questions below, hold the infusion and contact ordering provider or on-call provider.    Have you recently had an elevated temperature, fever, chills, productive cough, coughing for 3 weeks or longer or hemoptysis,  abnormal vital signs, night sweats,  chest pain or have you noticed a decrease in your appetite, unexplained weight loss or fatigue? No  Do you have any open wounds or new incisions? No  Do you have any upcoming hospitalizations or surgeries? Does not include esophagogastroduodenoscopy, colonoscopy, endoscopic retrograde cholangiopancreatography (ERCP), endoscopic ultrasound (EUS), dental procedures or joint aspiration/steroid injections No  Do you currently have any signs of illness or infection or are you on any antibiotics? No  Have you had any new, sudden or worsening abdominal pain? No  Have you or anyone in your household received a live vaccination in the past 4 weeks? Please note: No live vaccines while on biologic/chemotherapy until 6 months after the last treatment. Patient can receive the flu vaccine (shot only), pneumovax and the Covid vaccine. It is optimal for the patient to get these vaccines mid cycle, but they can be given at any time as long as it is not on the day of the infusion. No  Have you recently been diagnosed with any new nervous system diseases (ie. Multiple sclerosis, Guillain [place], seizures, neurological changes) or cancer diagnosis? Are you on any  form of radiation or chemotherapy? No  Have there been any other new onset medical symptoms? No    Post Infusion Assessment:  Patient tolerated infusion without incident.  Blood return noted pre and post infusion.  Site patent and intact, free from redness, edema or discomfort.  No evidence of extravasations.  Access discontinued per protocol.       Discharge Plan:   AVS to patient via MYCHART.  Patient will return 8/23/24 for next appointment.   Patient discharged in stable condition accompanied by: wife.  Departure Mode: Ambulatory.      Brenda Colmenares RN

## 2024-08-01 DIAGNOSIS — K51.00 ULCERATIVE PANCOLITIS WITHOUT COMPLICATION (H): Primary | ICD-10-CM

## 2024-08-27 NOTE — TELEPHONE ENCOUNTER
Please mail letter to patient.     Francisco Hicks PA-C     [FreeTextEntry1] : 58 y/o M with PMHx HFrEF, CAD s/p PCI, HTN, DMT2 and CVA s/p TPA who is now s/p OHT 12/25/23, clinically progressing well and here today for routine follow up care. Doing well post-operatively but has hyperglycemia despite adhering to insulin regimen.

## 2024-09-09 ENCOUNTER — INFUSION THERAPY VISIT (OUTPATIENT)
Dept: INFUSION THERAPY | Facility: CLINIC | Age: 69
End: 2024-09-09
Attending: INTERNAL MEDICINE
Payer: COMMERCIAL

## 2024-09-09 VITALS
OXYGEN SATURATION: 97 % | HEART RATE: 55 BPM | SYSTOLIC BLOOD PRESSURE: 124 MMHG | DIASTOLIC BLOOD PRESSURE: 73 MMHG | WEIGHT: 205.8 LBS | TEMPERATURE: 98.1 F | BODY MASS INDEX: 29.53 KG/M2 | RESPIRATION RATE: 16 BRPM

## 2024-09-09 DIAGNOSIS — K51.00 ULCERATIVE PANCOLITIS WITHOUT COMPLICATION (H): Primary | ICD-10-CM

## 2024-09-09 PROCEDURE — 258N000003 HC RX IP 258 OP 636: Performed by: INTERNAL MEDICINE

## 2024-09-09 PROCEDURE — 99207 PR NO CHARGE LOS: CPT

## 2024-09-09 PROCEDURE — 250N000011 HC RX IP 250 OP 636: Performed by: INTERNAL MEDICINE

## 2024-09-09 PROCEDURE — 96365 THER/PROPH/DIAG IV INF INIT: CPT

## 2024-09-09 RX ORDER — HEPARIN SODIUM,PORCINE 10 UNIT/ML
5 VIAL (ML) INTRAVENOUS
OUTPATIENT
Start: 2024-09-20

## 2024-09-09 RX ORDER — HEPARIN SODIUM (PORCINE) LOCK FLUSH IV SOLN 100 UNIT/ML 100 UNIT/ML
5 SOLUTION INTRAVENOUS
OUTPATIENT
Start: 2024-09-20

## 2024-09-09 RX ADMIN — SODIUM CHLORIDE 250 ML: 9 INJECTION, SOLUTION INTRAVENOUS at 13:15

## 2024-09-09 RX ADMIN — VEDOLIZUMAB 300 MG: 300 INJECTION, POWDER, LYOPHILIZED, FOR SOLUTION INTRAVENOUS at 13:28

## 2024-09-09 NOTE — PROGRESS NOTES
Infusion Nursing Note:  Mainor Grande presents today for Entyvio infusion.    Patient seen by provider today: No   present during visit today: Not Applicable. Wife helps verbalized some of what patient is not able to communicate due to dementia. (Ex. Medication reconciliation.)    Note:   Not applicable.    Intravenous Access:  Peripheral IV placed.    Treatment Conditions:  Biological Infusion Checklist:  ~~~ NOTE: If the patient answers yes to any of the questions below, hold the infusion and contact ordering provider or on-call provider.    Have you recently had an elevated temperature, fever, chills, productive cough, coughing for 3 weeks or longer or hemoptysis,  abnormal vital signs, night sweats,  chest pain or have you noticed a decrease in your appetite, unexplained weight loss or fatigue? No  Do you have any open wounds or new incisions? No  Do you have any upcoming hospitalizations or surgeries? Does not include esophagogastroduodenoscopy, colonoscopy, endoscopic retrograde cholangiopancreatography (ERCP), endoscopic ultrasound (EUS), dental procedures or joint aspiration/steroid injections No  Do you currently have any signs of illness or infection or are you on any antibiotics? No  Have you had any new, sudden or worsening abdominal pain? No  Have you or anyone in your household received a live vaccination in the past 4 weeks? Please note: No live vaccines while on biologic/chemotherapy until 6 months after the last treatment. Patient can receive the flu vaccine (shot only), pneumovax and the Covid vaccine. It is optimal for the patient to get these vaccines mid cycle, but they can be given at any time as long as it is not on the day of the infusion. No  Have you recently been diagnosed with any new nervous system diseases (ie. Multiple sclerosis, Guillain [place], seizures, neurological changes) or cancer diagnosis? Are you on any form of radiation or chemotherapy? No  Have there been any  other new onset medical symptoms? No    Post Infusion Assessment:  Patient tolerated infusion without incident.  Blood return noted pre and post infusion.  Site patent and intact, free from redness, edema or discomfort.  No evidence of extravasations.  Access discontinued per protocol.       Discharge Plan:   AVS to patient via MYCHART.  Patient will return  10/11/24 for next appointment.   Patient discharged in stable condition accompanied by: wife.  Departure Mode: Ambulatory.      Brenda Colmenares RN

## 2024-09-16 ENCOUNTER — TELEPHONE (OUTPATIENT)
Dept: GASTROENTEROLOGY | Facility: CLINIC | Age: 69
End: 2024-09-16
Payer: COMMERCIAL

## 2024-09-16 DIAGNOSIS — J45.41 MODERATE PERSISTENT ASTHMA WITH ACUTE EXACERBATION: Primary | ICD-10-CM

## 2024-09-16 NOTE — TELEPHONE ENCOUNTER
[facility] Call Center    Phone Message    May a detailed message be left on voicemail: yes     Reason for Call: Other: Rani calling to discuss infusion and patient needing to be using bathroom a lot and has an odor. Please call patient to discuss.      Action Taken: Message routed to:  [facility] GI    Travel Screening: Not Applicable     Date of Service:

## 2024-09-16 NOTE — TELEPHONE ENCOUNTER
Call back to patients spouse Rani- who is reporting that these symptoms have been going on since     Rani reports that pt is going to the bathroom frequently and she is not sure he is always having bowel movements but does think he may be.Bowel movements do have a strong foul odor, spouse states like when pt was first diagnosed.  Rani reports that patient told her is not having diarrhea.  Patient is not a valid historian due to dementia. Patient is also able to manage his physical cares independently so spouse would not always know bowel status.  Patient and spouse are leaving on a bus trip this next Sunday for 5-6 days and they are worried about these symptoms.  Writer will route to provider for recommendations.    Mallory Kessler RN

## 2024-09-17 ENCOUNTER — LAB (OUTPATIENT)
Dept: LAB | Facility: OTHER | Age: 69
End: 2024-09-17
Payer: COMMERCIAL

## 2024-09-17 DIAGNOSIS — K51.00 ULCERATIVE PANCOLITIS WITHOUT COMPLICATION (H): Primary | ICD-10-CM

## 2024-09-17 DIAGNOSIS — J45.40 MODERATE PERSISTENT ASTHMA, UNSPECIFIED WHETHER COMPLICATED: ICD-10-CM

## 2024-09-17 DIAGNOSIS — K51.00 ULCERATIVE PANCOLITIS WITHOUT COMPLICATION (H): ICD-10-CM

## 2024-09-17 LAB
ALBUMIN SERPL BCG-MCNC: 4.1 G/DL (ref 3.5–5.2)
ALP SERPL-CCNC: 167 U/L (ref 40–150)
ALT SERPL W P-5'-P-CCNC: 29 U/L (ref 0–70)
ANION GAP SERPL CALCULATED.3IONS-SCNC: 10 MMOL/L (ref 7–15)
AST SERPL W P-5'-P-CCNC: 40 U/L (ref 0–45)
BILIRUB SERPL-MCNC: 0.6 MG/DL
BUN SERPL-MCNC: 11.9 MG/DL (ref 8–23)
C DIFF TOX B STL QL: NEGATIVE
CALCIUM SERPL-MCNC: 9.6 MG/DL (ref 8.8–10.4)
CHLORIDE SERPL-SCNC: 101 MMOL/L (ref 98–107)
CREAT SERPL-MCNC: 0.95 MG/DL (ref 0.67–1.17)
CRP SERPL-MCNC: <3 MG/L
EGFRCR SERPLBLD CKD-EPI 2021: 86 ML/MIN/1.73M2
ERYTHROCYTE [DISTWIDTH] IN BLOOD BY AUTOMATED COUNT: 12.3 % (ref 10–15)
GLUCOSE SERPL-MCNC: 104 MG/DL (ref 70–99)
HCO3 SERPL-SCNC: 25 MMOL/L (ref 22–29)
HCT VFR BLD AUTO: 40.3 % (ref 40–53)
HGB BLD-MCNC: 13.3 G/DL (ref 13.3–17.7)
MCH RBC QN AUTO: 31 PG (ref 26.5–33)
MCHC RBC AUTO-ENTMCNC: 33 G/DL (ref 31.5–36.5)
MCV RBC AUTO: 94 FL (ref 78–100)
PLATELET # BLD AUTO: 231 10E3/UL (ref 150–450)
POTASSIUM SERPL-SCNC: 4.2 MMOL/L (ref 3.4–5.3)
PROT SERPL-MCNC: 7.2 G/DL (ref 6.4–8.3)
RBC # BLD AUTO: 4.29 10E6/UL (ref 4.4–5.9)
SODIUM SERPL-SCNC: 136 MMOL/L (ref 135–145)
WBC # BLD AUTO: 5.6 10E3/UL (ref 4–11)

## 2024-09-17 PROCEDURE — 80053 COMPREHEN METABOLIC PANEL: CPT

## 2024-09-17 PROCEDURE — 87507 IADNA-DNA/RNA PROBE TQ 12-25: CPT | Mod: GZ

## 2024-09-17 PROCEDURE — 86140 C-REACTIVE PROTEIN: CPT

## 2024-09-17 PROCEDURE — 36415 COLL VENOUS BLD VENIPUNCTURE: CPT

## 2024-09-17 PROCEDURE — 83993 ASSAY FOR CALPROTECTIN FECAL: CPT

## 2024-09-17 PROCEDURE — 85027 COMPLETE CBC AUTOMATED: CPT

## 2024-09-17 PROCEDURE — 87493 C DIFF AMPLIFIED PROBE: CPT | Mod: 59

## 2024-09-17 RX ORDER — ALBUTEROL SULFATE 90 UG/1
2 AEROSOL, METERED RESPIRATORY (INHALATION) EVERY 6 HOURS
Qty: 18 G | Refills: 11 | Status: SHIPPED | OUTPATIENT
Start: 2024-09-17

## 2024-09-17 RX ORDER — MONTELUKAST SODIUM 10 MG/1
10 TABLET ORAL AT BEDTIME
Qty: 90 TABLET | Refills: 0 | Status: SHIPPED | OUTPATIENT
Start: 2024-09-17

## 2024-09-17 NOTE — TELEPHONE ENCOUNTER
Lily Eaton DO Heckt, Angie, RN  Caller: Unspecified (Yesterday, 11:53 AM)  Not ideal timing but I'd like to get some stool studies and labs before he leaves. I put the orders in  Thanks!      Call to Rani to update on the above from provider. She will call to get a lab appt for pt today.    Mallory Kessler RN

## 2024-09-17 NOTE — TELEPHONE ENCOUNTER
montelukast (SINGULAIR) 10 MG tablet Take 1 tablet (10 mg) by mouth At Bedtime 90 tablet 3 ordered 09/26/2023     Last Office Visit: 09/26/2023  Future Office visit:  Needs to schedule - message sent to schedulers to assist.      Paul Zhou LPN  Pulmonary Medicine:  [facility]  Phone: [phone] Fax: [phone]

## 2024-09-18 LAB
ADV 40+41 DNA STL QL NAA+NON-PROBE: NEGATIVE
ASTRO TYP 1-8 RNA STL QL NAA+NON-PROBE: NEGATIVE
C CAYETANENSIS DNA STL QL NAA+NON-PROBE: NEGATIVE
CAMPYLOBACTER DNA SPEC NAA+PROBE: NEGATIVE
CRYPTOSP DNA STL QL NAA+NON-PROBE: NEGATIVE
E COLI O157 DNA STL QL NAA+NON-PROBE: NORMAL
E HISTOLYT DNA STL QL NAA+NON-PROBE: NEGATIVE
EAEC ASTA GENE ISLT QL NAA+PROBE: NEGATIVE
EC STX1+STX2 GENES STL QL NAA+NON-PROBE: NEGATIVE
EPEC EAE GENE STL QL NAA+NON-PROBE: NEGATIVE
ETEC LTA+ST1A+ST1B TOX ST NAA+NON-PROBE: NEGATIVE
G LAMBLIA DNA STL QL NAA+NON-PROBE: NEGATIVE
NOROVIRUS GI+II RNA STL QL NAA+NON-PROBE: NEGATIVE
P SHIGELLOIDES DNA STL QL NAA+NON-PROBE: NEGATIVE
RVA RNA STL QL NAA+NON-PROBE: NEGATIVE
SALMONELLA SP RPOD STL QL NAA+PROBE: NEGATIVE
SAPO I+II+IV+V RNA STL QL NAA+NON-PROBE: NEGATIVE
SHIGELLA SP+EIEC IPAH ST NAA+NON-PROBE: NEGATIVE
V CHOLERAE DNA SPEC QL NAA+PROBE: NEGATIVE
VIBRIO DNA SPEC NAA+PROBE: NEGATIVE
Y ENTEROCOL DNA STL QL NAA+PROBE: NEGATIVE

## 2024-09-20 DIAGNOSIS — K51.00 ULCERATIVE PANCOLITIS WITHOUT COMPLICATION (H): Primary | ICD-10-CM

## 2024-09-20 LAB — CALPROTECTIN STL-MCNT: 136 MG/KG (ref 0–49.9)

## 2024-09-30 ENCOUNTER — TELEPHONE (OUTPATIENT)
Dept: GASTROENTEROLOGY | Facility: CLINIC | Age: 69
End: 2024-09-30
Payer: COMMERCIAL

## 2024-09-30 NOTE — TELEPHONE ENCOUNTER
MTM referral from: [place] clinic visit (referral by provider)    MTM referral outreach attempt #2 on September 30, 2024 at 12:35 PM      Outcome: Patient not reachable after several attempts, routed to Pharmacist Team/Provider as an [employer] Message Sent    [facility]

## 2024-10-04 NOTE — TELEPHONE ENCOUNTER
MTM referral has been completed. He is scheduled to see Abiodun Campbell RPH on 10/10/2024 at 2:00 P.M.

## 2024-10-09 ENCOUNTER — TELEPHONE (OUTPATIENT)
Dept: FAMILY MEDICINE | Facility: OTHER | Age: 69
End: 2024-10-09
Payer: COMMERCIAL

## 2024-10-10 ENCOUNTER — VIRTUAL VISIT (OUTPATIENT)
Dept: PHARMACY | Facility: CLINIC | Age: 69
End: 2024-10-10
Attending: INTERNAL MEDICINE
Payer: COMMERCIAL

## 2024-10-10 VITALS — HEIGHT: 70 IN | WEIGHT: 200 LBS | BODY MASS INDEX: 28.63 KG/M2

## 2024-10-10 DIAGNOSIS — K83.01 PSC (PRIMARY SCLEROSING CHOLANGITIS) (H): ICD-10-CM

## 2024-10-10 DIAGNOSIS — K51.00 ULCERATIVE PANCOLITIS WITHOUT COMPLICATION (H): Primary | ICD-10-CM

## 2024-10-10 RX ORDER — VEDOLIZUMAB 300 MG/5ML
300 INJECTION, POWDER, LYOPHILIZED, FOR SOLUTION INTRAVENOUS
Status: SHIPPED
Start: 2024-10-10

## 2024-10-10 ASSESSMENT — PAIN SCALES - GENERAL: PAINLEVEL: NO PAIN (0)

## 2024-10-10 NOTE — PROGRESS NOTES
"Virtual Visit Details    Type of service:  Telephone Visit   Phone call duration: *** minutes   Originating Location (pt. Location): {patient location:514119::\"Home\"}  {PROVIDER LOCATION On-site should be selected for visits conducted from your clinic location or adjoining [facility] hospital, academic office, or other nearby [facility] building. Off-site should be selected for all other provider locations, including home:[id number]}  Distant Location (provider location):  {virtual location provider:[id number]}  "

## 2024-10-10 NOTE — PATIENT INSTRUCTIONS
"Recommendations from today's MTM visit:                                                      Continue to try and get in for next infusion appointment as soon as possible.  Bryson Chow will reach out to you in the near future to discuss applying for Entyvio patient assistance program (Free drug).   If free drug is approved, we can start that right away. If it is denied, we can wait until 2025.   You can review the following video on Entyvio injections  [email or web address]  We will coordinate injection training with one of our nurses, if you are interested, once we are ready to transition.   Mainor will consider the following vaccines:  Annual flu shot  Updated COVID-19 vaccine  RSV Vaccine  Discuss routine skin cancer screenings with Francisco Hicks PA-C.     Follow-up: 11/18/2024 1:00 PM (telephone)     It was great speaking with you today.  I value your experience and would be very thankful for your time in providing feedback in our clinic survey. In the next few days, you may receive an email or text message from [employer] with a link to a survey related to your  clinical pharmacist.\"     To schedule another MTM appointment, please call the clinic directly or you may call the MTM scheduling line at [phone] or toll-free at [phone].     My Clinical Pharmacist's contact information:                                                      Please feel free to contact me with any questions or concerns you have.      Abiodun Campbell, PharmD, BCPS  MTM Pharmacist   [facility]  Phone: [phone]   "

## 2024-10-10 NOTE — PROGRESS NOTES
Medication Therapy Management (MTM) Encounter    ASSESSMENT:                            Medication Adherence/Access: See below for considerations    Ulcerative Colitis/PSC:  Mainor would benefit from continued treatment with Entyvio 300 mg every 4 weeks. They are up to date on routine maintenance labs. They are due for annual tuberculosis screening, ok to get with next routine labs. No access issues for their advanced therapy are present. They are interested in possibly switching to the subcutaneous injection. The recommended dose would be Entyvio 108 mg every week to achieve similar drug exposure, however there is not adequate clinical data to guide this, and it is appropriate to consider trying standard dosing and escalating if loss of response. There is currently a high copay on the Entyvio pens and would likely need to get free drug for this to be a reasonable option, otherwise may consider this option in 2025 with changes to Medicare maximum out of pocket costs.      They are indicated for a few vaccinations which were recommended to them. Continue vitamin D supplementation. Reminders for routine cancer screening were provided. Due to time constraints, I was only able to assess the above with the patient today.    Regarding his PSC, ursodiol dose is appropriate and LFTs are within expected range.     Due to time constraints, I was only able to assess the above with the patient today.    PLAN:                            Continue to try and get in for next infusion appointment as soon as possible.  Bryson Chow will reach out to you in the near future to discuss applying for Entyvio patient assistance program (Free drug).   If free drug is approved, we can start that right away. If it is denied, we can wait until 2025.   You can review the following video on Entyvio injections  [email or web address]  We will coordinate injection training with one of our nurses, if you are interested, once  we are ready to transition.   Mainor will consider the following vaccines:  Annual flu shot  Updated COVID-19 vaccine  RSV Vaccine  Discuss routine skin cancer screenings with Francisco Hicks PA-C.     Follow-up: 11/18/2024 1:00 PM (telephone)     SUBJECTIVE/OBJECTIVE:                          Mainor Grande is a 69 year old male seen for an initial visit. He was joined by his wife Rani.      Reason for visit: Entyvio + IBD health maintenance.    Allergies/ADRs: Reviewed in chart  Past Medical History: Reviewed in chart  Tobacco: He reports that he has never smoked. He has never been exposed to tobacco smoke. He has never used smokeless tobacco.  Alcohol: 1-3 beverages / week      Medication Adherence/Access: no issues reported.    Ulcerative Colitis/PSC:   Entyvio 300 mg every 4 weeks  Ursodiol 300 mg twice daily    He would like to transition to Entyvio pens if insurance approves    Interested in injection training.     PRO-2 for Ulcerative Colitis    Please select the one best answer for the patient's ability at this time     How would you rate your stool frequency over the last 3 days?    Normal (+0)    How would you rate the severity of your rectal bleeding over the last 3 days?    None: 0 points    3. Over the last week, how would you rate your general well-being?        Score: 0 (do not include question 3 in scoring)  0: Inactive Disease  1-1.5: Remission  6: Active disease and spontaneous bleeding    Specialty medication department: [facility]  Prior authorization status: approved (MG); Entyvio subcutaneous pen approved through 5/2025 however need to start PA for 4 pens/month  Original start date:  Last dose: 9/9/2024  Next dose: 10/7/2024 (rescheduled completed 10/15/2024)    Last provider visit: 1/31/2024 DO Caro  Next provider visit: 11/13/2024 DO Caro  Last labs completed: 9/17/2024  Lab frequency: every other infusion   - standing labs   Next labs due: around November 2024      IBD Health  Maintenance    Vaccinations:  All patients on immunosuppression should avoid live vaccines unless specifically indicated.    -- Influenza (every year)- last 11/2023  -- TdaP (every 10 years)- last 4/2023  -- Pneumococcal Pneumonia    Prevnar-13: 10/2019   Pneumovax-23: 9/2017   Prevnar-20: 9/23/2022  -- COVID-19- 3/2021, 4/2021, 12/2021, 9/2022  -- RSV- not on file    One time confirmation of immunity or serologies:  -- Hepatitis A (serologies or immunizations)- not on file  -- Hepatitis B (serologies or immunizations)- not immune by serology 2020  -- Varicella/Zoster    Varicella   Zoster- Zostavax 3/2017, Shingrix 10/2019, 12/2019    Due to the immunosuppression in this patient, I would not advise administration of live vaccines such as varicella/VZV, intranasal influenza, MMR, or yellow fever vaccine (if traveling).      Immunosuppressive Screening:  -- Hep B Surface Antibody not immune by serology 7/31/2020  -- Hep B Surface Antigen non-reactive 7/31/2020  -- Hep B Core Antibody non-reactive 7/31/2020  -- Hep C Antibody non-reactive  3/21/2016  -- Yearly assessment of TB Negative 7/31/2020    Lab Results   Component Value Date    AUSAB 0.74 07/31/2020    HEPBANG Nonreactive 07/31/2020    HBCAB Nonreactive 07/31/2020    HCVAB  03/21/2016     Nonreactive   Assay performance characteristics have not been established for newborns,   infants, and children         Bone mineral density screening   -- Recommend all patients supplement with calcium and vitamin D    Cancer Screening:  Colon cancer screening: Per GI, consideration for annual due to UC/PSC, however risk vs. Benefit discussion given memory issues    Skin cancer screening: Annual visual exam of skin by dermatologist since patient is immunocompromised    Depression Screening:    PHQ-2 Score:         5/1/2024     7:19 AM 11/1/2023     8:25 AM   PHQ-2 ( 1999 Pfizer)   Q1: Little interest or pleasure in doing things 1  0    Q2: Feeling down, depressed or hopeless  "0  0    PHQ-2 Score 1 0   Q1: Little interest or pleasure in doing things Several days Not at all   Q2: Feeling down, depressed or hopeless Not at all Not at all   PHQ-2 Score 1 0       Patient-reported       Research:  Are you interested in being contacted about enrollment in clinical research studies?     Would you like to receive a quarterly newsletter on research via email.     Misc:  -- Avoid tobacco use  -- Avoid NSAIDs as there is potentially a 25% chance of causing an IBD flare      Today's Vitals: Ht 5' 10\" (1.778 m)   Wt 200 lb (90.7 kg)   BMI 28.70 kg/m    ----------------      I spent 60 minutes with this patient today. All changes were made via collaborative practice agreement with Lily Eaton DO. A copy of the visit note was provided to the patient's provider(s).    A summary of these recommendations was sent via [employer].    Asuncion PowersD, BCPS  MTM Pharmacist   [facility]  Phone: [phone]    Telemedicine Visit Details  The patient's medications can be safely assessed via a telemedicine encounter.  Type of service:  Telephone visit  Originating Location (pt. Location): Home    Distant Location (provider location):  Off-site  Start Time:  2:00 PM  End Time:  3:00 PM     Medication Therapy Recommendations  Ulcerative pancolitis without complication (H)   1 Rationale: Preventive therapy - Needs additional medication therapy - Indication   Recommendation: Start Medication   Status: Patient Agreed - Adherence/Education   Identified Date: 10/10/2024 Completed Date: 10/23/2024   Note: Callum Mainor will consider the following vaccines:   a. Annual flu shot   b. Updated COVID-19 vaccine   c. RSV Vaccine                "

## 2024-10-10 NOTE — NURSING NOTE
Current patient location: [id number] [facility] [address]    Is the patient currently in the state of [place]? YES    Visit mode:TELEPHONE    If the visit is dropped, the patient can be reconnected by: TELEPHONE VISIT: Phone number: [phone]    Will anyone else be joining the visit? NO  (If patient encounters technical issues they should call [phone])    Are changes needed to the allergy or medication list? Pt stated no changes to allergies and Pt stated no med changes    Are refills needed on medications prescribed by this physician? NO    Rooming Documentation:  Not applicable    Reason for visit: Consult    Rebecca Ludwig LPN

## 2024-10-11 DIAGNOSIS — I25.10 CORONARY ARTERY CALCIFICATION: ICD-10-CM

## 2024-10-12 DIAGNOSIS — K21.9 GASTROESOPHAGEAL REFLUX DISEASE WITHOUT ESOPHAGITIS: ICD-10-CM

## 2024-10-14 RX ORDER — FAMOTIDINE 40 MG/1
TABLET, FILM COATED ORAL
Qty: 180 TABLET | Refills: 1 | Status: SHIPPED | OUTPATIENT
Start: 2024-10-14

## 2024-10-15 ENCOUNTER — INFUSION THERAPY VISIT (OUTPATIENT)
Dept: INFUSION THERAPY | Facility: CLINIC | Age: 69
End: 2024-10-15
Attending: INTERNAL MEDICINE
Payer: COMMERCIAL

## 2024-10-15 DIAGNOSIS — K51.00 ULCERATIVE PANCOLITIS WITHOUT COMPLICATION (H): Primary | ICD-10-CM

## 2024-10-15 LAB
ALBUMIN SERPL BCG-MCNC: 4 G/DL (ref 3.5–5.2)
ALP SERPL-CCNC: 154 U/L (ref 40–150)
ALT SERPL W P-5'-P-CCNC: 24 U/L (ref 0–70)
AST SERPL W P-5'-P-CCNC: 36 U/L (ref 0–45)
BASOPHILS # BLD AUTO: 0.1 10E3/UL (ref 0–0.2)
BASOPHILS NFR BLD AUTO: 1 %
BILIRUB DIRECT SERPL-MCNC: 0.26 MG/DL (ref 0–0.3)
BILIRUB SERPL-MCNC: 0.6 MG/DL
CRP SERPL-MCNC: <3 MG/L
EOSINOPHIL # BLD AUTO: 0.3 10E3/UL (ref 0–0.7)
EOSINOPHIL NFR BLD AUTO: 4 %
ERYTHROCYTE [DISTWIDTH] IN BLOOD BY AUTOMATED COUNT: 12 % (ref 10–15)
ERYTHROCYTE [SEDIMENTATION RATE] IN BLOOD BY WESTERGREN METHOD: 2 MM/HR (ref 0–20)
HCT VFR BLD AUTO: 40.3 % (ref 40–53)
HGB BLD-MCNC: 13.2 G/DL (ref 13.3–17.7)
HOLD SPECIMEN: NORMAL
IMM GRANULOCYTES # BLD: 0 10E3/UL
IMM GRANULOCYTES NFR BLD: 0 %
LYMPHOCYTES # BLD AUTO: 1.4 10E3/UL (ref 0.8–5.3)
LYMPHOCYTES NFR BLD AUTO: 19 %
MCH RBC QN AUTO: 30.8 PG (ref 26.5–33)
MCHC RBC AUTO-ENTMCNC: 32.8 G/DL (ref 31.5–36.5)
MCV RBC AUTO: 94 FL (ref 78–100)
MONOCYTES # BLD AUTO: 0.6 10E3/UL (ref 0–1.3)
MONOCYTES NFR BLD AUTO: 8 %
NEUTROPHILS # BLD AUTO: 4.9 10E3/UL (ref 1.6–8.3)
NEUTROPHILS NFR BLD AUTO: 67 %
NRBC # BLD AUTO: 0 10E3/UL
NRBC BLD AUTO-RTO: 0 /100
PLATELET # BLD AUTO: 236 10E3/UL (ref 150–450)
PROT SERPL-MCNC: 7 G/DL (ref 6.4–8.3)
RBC # BLD AUTO: 4.29 10E6/UL (ref 4.4–5.9)
WBC # BLD AUTO: 7.4 10E3/UL (ref 4–11)

## 2024-10-15 PROCEDURE — 250N000011 HC RX IP 250 OP 636: Mod: JZ | Performed by: INTERNAL MEDICINE

## 2024-10-15 PROCEDURE — 85652 RBC SED RATE AUTOMATED: CPT | Performed by: INTERNAL MEDICINE

## 2024-10-15 PROCEDURE — 86140 C-REACTIVE PROTEIN: CPT | Performed by: INTERNAL MEDICINE

## 2024-10-15 PROCEDURE — 80076 HEPATIC FUNCTION PANEL: CPT | Performed by: INTERNAL MEDICINE

## 2024-10-15 PROCEDURE — 96365 THER/PROPH/DIAG IV INF INIT: CPT

## 2024-10-15 PROCEDURE — 258N000003 HC RX IP 258 OP 636: Performed by: INTERNAL MEDICINE

## 2024-10-15 PROCEDURE — 99207 PR NO CHARGE LOS: CPT

## 2024-10-15 PROCEDURE — 85025 COMPLETE CBC W/AUTO DIFF WBC: CPT | Performed by: INTERNAL MEDICINE

## 2024-10-15 PROCEDURE — 36415 COLL VENOUS BLD VENIPUNCTURE: CPT | Performed by: INTERNAL MEDICINE

## 2024-10-15 RX ORDER — HEPARIN SODIUM,PORCINE 10 UNIT/ML
5 VIAL (ML) INTRAVENOUS
Status: CANCELLED | OUTPATIENT
Start: 2024-11-04

## 2024-10-15 RX ORDER — HEPARIN SODIUM (PORCINE) LOCK FLUSH IV SOLN 100 UNIT/ML 100 UNIT/ML
5 SOLUTION INTRAVENOUS
Status: CANCELLED | OUTPATIENT
Start: 2024-11-04

## 2024-10-15 RX ADMIN — VEDOLIZUMAB 300 MG: 300 INJECTION, POWDER, LYOPHILIZED, FOR SOLUTION INTRAVENOUS at 13:22

## 2024-10-15 NOTE — PROGRESS NOTES
Infusion Nursing Note:  Mainor Grande presents today for entyvio.    Patient seen by provider today: No   present during visit today:  Mainor has dementia so his wife helped with review of symptoms and other detailed questions    Note: No new medical issues or concerns. Colitis symptoms have been stable.      Intravenous Access:  Peripheral IV placed.    Treatment Conditions:  Biological Infusion Checklist:  ~~~ NOTE: If the patient answers yes to any of the questions below, hold the infusion and contact ordering provider or on-call provider.    Have you recently had an elevated temperature, fever, chills, productive cough, coughing for 3 weeks or longer or hemoptysis,  abnormal vital signs, night sweats,  chest pain or have you noticed a decrease in your appetite, unexplained weight loss or fatigue? No  Do you have any open wounds or new incisions? No  Do you have any upcoming hospitalizations or surgeries? Does not include esophagogastroduodenoscopy, colonoscopy, endoscopic retrograde cholangiopancreatography (ERCP), endoscopic ultrasound (EUS), dental procedures or joint aspiration/steroid injections No  Do you currently have any signs of illness or infection or are you on any antibiotics? No  Have you had any new, sudden or worsening abdominal pain? No  Have you or anyone in your household received a live vaccination in the past 4 weeks? Please note: No live vaccines while on biologic/chemotherapy until 6 months after the last treatment. Patient can receive the flu vaccine (shot only), pneumovax and the Covid vaccine. It is optimal for the patient to get these vaccines mid cycle, but they can be given at any time as long as it is not on the day of the infusion. No  Have you recently been diagnosed with any new nervous system diseases (ie. Multiple sclerosis, Guillain Independence, seizures, neurological changes) or cancer diagnosis? Are you on any form of radiation or chemotherapy? No  Are you pregnant or  breast feeding or do you have plans of pregnancy in the future? N/A  Have you been having any signs of worsening depression or suicidal ideations?  (benlysta only) N/A  Have there been any other new onset medical symptoms? No  Have you had any new blood clots? (IVIG only) N/A      Post Infusion Assessment:  Patient tolerated infusion without incident.  Blood return noted pre and post infusion.  Site patent and intact, free from redness, edema or discomfort.  No evidence of extravasations.  Access discontinued per protocol.       Discharge Plan:   Patient discharged in stable condition accompanied by: wife.  Departure Mode: Ambulatory.  Next infusion appointment on 11/8/24 is too early for infusion. Charge nurse today has been notified by scheduling team via Teams message to find him a spot on the infusion schedule near Nov 12th.      Gilda Skinner RN

## 2024-10-16 RX ORDER — ATORVASTATIN CALCIUM 40 MG/1
TABLET, FILM COATED ORAL
Qty: 30 TABLET | Refills: 0 | Status: SHIPPED | OUTPATIENT
Start: 2024-10-16 | End: 2024-11-11

## 2024-10-16 NOTE — TELEPHONE ENCOUNTER
atorvastatin (LIPITOR) 40 MG tablet   90 tablet 3 10/16/2023       Last Office Visit : 3-  ---   Virtual video visit followup in 1 year with lab work prior, earlier if needed.   Future Office visit:  none  Antihyperlipidemic agents Dtniir81/11/2024 05:04 AM   Protocol Details Recent (12 mo) or future (90 days) visit within the authorizing provider's specialty       Labs completed on :10-  ALT  0 - 70 U/L 24     Labs completed on :   11-1-2023  Lipid panel reflex to direct LDL Fasting

## 2024-10-16 NOTE — TELEPHONE ENCOUNTER
In reviewing Epic notes, it looks like atorvastatin was stopped for 1 week (see PCP notes from June 2024). Per consent notes in demographics, RN called and spoke with patient's wife to clarify if taking atorvastatin. According to Rani, pt is taking atorvastatin and has a follow-up schedule with PCP next month. RN also reviewed with wife that patient is due for clinic follow-up with cardiology, per Dr Lara last notes, pt wife prefers to see PCP prior then will call to set-up cardiology follow-up. Rena refill sent in the meantime.     Requested Prescriptions   Pending Prescriptions Disp Refills    atorvastatin (LIPITOR) 40 MG tablet [Pharmacy Med Name: ATORVASTATIN CALCIUM 40MG TABS] 30 tablet      Sig: Take 1 tablet (40 mg) by mouth daily - Oral       Antihyperlipidemic agents Failed - 10/16/2024  3:12 PM        Failed - Recent (12 mo) or future (90 days) visit within the authorizing provider's specialty     The patient must have completed an in-person or virtual visit within the past 12 months or has a future visit scheduled within the next 90 days with the authorizing provider s specialty.  Urgent care and e-visits do not quality as an office visit for this protocol.          Passed - LDL on file in the past 12 months        Passed - Medication is active on med list        Passed - Patient is age 18 years or older

## 2024-11-05 ENCOUNTER — LAB (OUTPATIENT)
Dept: LAB | Facility: OTHER | Age: 69
End: 2024-11-05
Payer: COMMERCIAL

## 2024-11-05 DIAGNOSIS — Z13.220 SCREENING FOR HYPERLIPIDEMIA: ICD-10-CM

## 2024-11-05 DIAGNOSIS — R73.09 ELEVATED GLUCOSE: ICD-10-CM

## 2024-11-05 DIAGNOSIS — Z12.5 SCREENING FOR PROSTATE CANCER: ICD-10-CM

## 2024-11-05 LAB
CHOLEST SERPL-MCNC: 173 MG/DL
EST. AVERAGE GLUCOSE BLD GHB EST-MCNC: 108 MG/DL
FASTING STATUS PATIENT QL REPORTED: NO
HBA1C MFR BLD: 5.4 % (ref 0–5.6)
HDLC SERPL-MCNC: 74 MG/DL
LDLC SERPL CALC-MCNC: 86 MG/DL
NONHDLC SERPL-MCNC: 99 MG/DL
PSA SERPL DL<=0.01 NG/ML-MCNC: 3.55 NG/ML (ref 0–6.5)
TRIGL SERPL-MCNC: 67 MG/DL

## 2024-11-05 PROCEDURE — 83036 HEMOGLOBIN GLYCOSYLATED A1C: CPT

## 2024-11-05 PROCEDURE — G0103 PSA SCREENING: HCPCS

## 2024-11-05 PROCEDURE — 36415 COLL VENOUS BLD VENIPUNCTURE: CPT

## 2024-11-05 PROCEDURE — 80061 LIPID PANEL: CPT

## 2024-11-10 SDOH — HEALTH STABILITY: PHYSICAL HEALTH: ON AVERAGE, HOW MANY MINUTES DO YOU ENGAGE IN EXERCISE AT THIS LEVEL?: 30 MIN

## 2024-11-10 SDOH — HEALTH STABILITY: PHYSICAL HEALTH: ON AVERAGE, HOW MANY DAYS PER WEEK DO YOU ENGAGE IN MODERATE TO STRENUOUS EXERCISE (LIKE A BRISK WALK)?: 3 DAYS

## 2024-11-10 ASSESSMENT — ASTHMA QUESTIONNAIRES
QUESTION_1 LAST FOUR WEEKS HOW MUCH OF THE TIME DID YOUR ASTHMA KEEP YOU FROM GETTING AS MUCH DONE AT WORK, SCHOOL OR AT HOME: NONE OF THE TIME
QUESTION_5 LAST FOUR WEEKS HOW WOULD YOU RATE YOUR ASTHMA CONTROL: WELL CONTROLLED
QUESTION_2 LAST FOUR WEEKS HOW OFTEN HAVE YOU HAD SHORTNESS OF BREATH: ONCE OR TWICE A WEEK
QUESTION_4 LAST FOUR WEEKS HOW OFTEN HAVE YOU USED YOUR RESCUE INHALER OR NEBULIZER MEDICATION (SUCH AS ALBUTEROL): ONE OR TWO TIMES PER DAY
ACT_TOTALSCORE: 20
ACT_TOTALSCORE: 20
QUESTION_3 LAST FOUR WEEKS HOW OFTEN DID YOUR ASTHMA SYMPTOMS (WHEEZING, COUGHING, SHORTNESS OF BREATH, CHEST TIGHTNESS OR PAIN) WAKE YOU UP AT NIGHT OR EARLIER THAN USUAL IN THE MORNING: NOT AT ALL

## 2024-11-10 ASSESSMENT — SOCIAL DETERMINANTS OF HEALTH (SDOH): HOW OFTEN DO YOU GET TOGETHER WITH FRIENDS OR RELATIVES?: THREE TIMES A WEEK

## 2024-11-11 ENCOUNTER — OFFICE VISIT (OUTPATIENT)
Dept: FAMILY MEDICINE | Facility: OTHER | Age: 69
End: 2024-11-11
Attending: PHYSICIAN ASSISTANT
Payer: COMMERCIAL

## 2024-11-11 ENCOUNTER — TELEPHONE (OUTPATIENT)
Dept: GASTROENTEROLOGY | Facility: CLINIC | Age: 69
End: 2024-11-11

## 2024-11-11 VITALS
TEMPERATURE: 98 F | WEIGHT: 205.5 LBS | HEIGHT: 70 IN | DIASTOLIC BLOOD PRESSURE: 60 MMHG | RESPIRATION RATE: 16 BRPM | BODY MASS INDEX: 29.42 KG/M2 | SYSTOLIC BLOOD PRESSURE: 102 MMHG | HEART RATE: 72 BPM

## 2024-11-11 DIAGNOSIS — G30.9 ALZHEIMER'S DISEASE (H): ICD-10-CM

## 2024-11-11 DIAGNOSIS — I25.10 CORONARY ARTERY CALCIFICATION: ICD-10-CM

## 2024-11-11 DIAGNOSIS — F02.80 ALZHEIMER'S DISEASE (H): ICD-10-CM

## 2024-11-11 DIAGNOSIS — J45.41 MODERATE PERSISTENT ASTHMA WITH ACUTE EXACERBATION: ICD-10-CM

## 2024-11-11 DIAGNOSIS — L30.9 ECZEMA, UNSPECIFIED TYPE: ICD-10-CM

## 2024-11-11 DIAGNOSIS — J45.40 MODERATE PERSISTENT ASTHMA, UNSPECIFIED WHETHER COMPLICATED: ICD-10-CM

## 2024-11-11 DIAGNOSIS — Z00.00 ENCOUNTER FOR MEDICARE ANNUAL WELLNESS EXAM: Primary | ICD-10-CM

## 2024-11-11 RX ORDER — ATORVASTATIN CALCIUM 40 MG/1
TABLET, FILM COATED ORAL
Qty: 90 TABLET | Refills: 3 | Status: SHIPPED | OUTPATIENT
Start: 2024-11-11

## 2024-11-11 RX ORDER — BUDESONIDE AND FORMOTEROL FUMARATE DIHYDRATE 160; 4.5 UG/1; UG/1
2 AEROSOL RESPIRATORY (INHALATION) 2 TIMES DAILY
Qty: 30.6 G | Refills: 3 | Status: SHIPPED | OUTPATIENT
Start: 2024-11-11

## 2024-11-11 RX ORDER — CLOBETASOL PROPIONATE 0.5 MG/G
OINTMENT TOPICAL 2 TIMES DAILY
Qty: 30 G | Refills: 1 | Status: SHIPPED | OUTPATIENT
Start: 2024-11-11

## 2024-11-11 RX ORDER — MONTELUKAST SODIUM 10 MG/1
10 TABLET ORAL AT BEDTIME
Qty: 90 TABLET | Refills: 3 | Status: SHIPPED | OUTPATIENT
Start: 2024-11-11

## 2024-11-11 ASSESSMENT — PAIN SCALES - GENERAL: PAINLEVEL_OUTOF10: NO PAIN (0)

## 2024-11-11 NOTE — TELEPHONE ENCOUNTER
Called to remind patient of their upcoming appointment with our GI clinic, on 11/13 with Dr. Lily Eaton. This appointment is scheduled as an in-person appt. Please arrive 15 minutes early to check in for your appointment. , if your appointment is virtual (video or telephone) you need to be in [place] for the visit. To reschedule or cancel appointment patient needs to call [phone] option 1.  Rani, patient's wife, verbalized understanding.    Edel Rosales

## 2024-11-11 NOTE — LETTER
My Asthma Action Plan    Name: Mainor Grande   YOB: 1955  Date: 11/11/2024   My doctor: Francisco Hicks PA-C   My clinic: [facility]        My Control Medicine: Budesonide + formoterol (Symbicort HFA) -  160/4.5 mcg 2 puffs BID  My Rescue Medicine: Albuterol (Proair/Ventolin/Proventil HFA) 2-4 puffs EVERY 4 HOURS as needed. Use a spacer if recommended by your provider.   My Asthma Severity:   Moderate Persistent  Know your asthma triggers:                  GREEN ZONE   Good Control  I feel good  No cough or wheeze  Can work, sleep and play without asthma symptoms       Take your asthma control medicine every day.     If exercise triggers your asthma, take your rescue medication  15 minutes before exercise or sports, and  During exercise if you have asthma symptoms  Spacer to use with inhaler: If you have a spacer, make sure to use it with your inhaler             YELLOW ZONE Getting Worse  I have ANY of these:  I do not feel good  Cough or wheeze  Chest feels tight  Wake up at night   Keep taking your Green Zone medications  Start taking your rescue medicine:  every 20 minutes for up to 1 hour. Then every 4 hours for 24-48 hours.  If you stay in the Yellow Zone for more than 12-24 hours, contact your doctor.  If you do not return to the Green Zone in 12-24 hours or you get worse, start taking your oral steroid medicine if prescribed by your provider.           RED ZONE Medical Alert - Get Help  I have ANY of these:  I feel awful  Medicine is not helping  Breathing getting harder  Trouble walking or talking  Nose opens wide to breathe       Take your rescue medicine NOW  If your provider has prescribed an oral steroid medicine, start taking it NOW  Call your doctor NOW  If you are still in the Red Zone after 20 minutes and you have not reached your doctor:  Take your rescue medicine again and  Call 911 or go to the emergency room right away    See your regular doctor within  2 weeks of an Emergency Room or Urgent Care visit for follow-up treatment.          Annual Reminders:  Meet with Asthma Educator,  Flu Shot in the Fall, consider Pneumonia Vaccination for patients with asthma (aged 19 and older).    Pharmacy: [id number] - [place], [place] - [address] [facility]    Electronically signed by Francisco Hicks PA-C   Date: 11/11/24                      Asthma Triggers  How To Control Things That Make Your Asthma Worse    Triggers are things that make your asthma worse.  Look at the list below to help you find your triggers and what you can do about them.  You can help prevent asthma flare-ups by staying away from your triggers.      Trigger                                                          What you can do   Cigarette Smoke  Tobacco smoke can make asthma worse. Do not allow smoking in your home, car or around you.  Be sure no one smokes at a child s day care or school.  If you smoke, ask your health care provider for ways to help you quit.  Ask family members to quit too.  Ask your health care provider for a referral to Quit Plan to help you quit smoking, or call 3-855-229-PLAN.     Colds, Flu, Bronchitis  These are common triggers of asthma. Wash your hands often.  Don t touch your eyes, nose or mouth.  Get a flu shot every year.     Dust Mites  These are tiny bugs that live in cloth or carpet. They are too small to see. Wash sheets and blankets in hot water every week.   Encase pillows and mattress in dust mite proof covers.  Avoid having carpet if you can. If you have carpet, vacuum weekly.   Use a dust mask and HEPA vacuum.   Pollen and Outdoor Mold  Some people are allergic to trees, grass, or weed pollen, or molds. Try to keep your windows closed.  Limit time out doors when pollen count is high.   Ask you health care provider about taking medicine during allergy season.     Animal Dander  Some people are allergic to skin flakes, urine or saliva from pets with fur or  feathers. Keep pets with fur or feathers out of your home.    If you can t keep the pet outdoors, then keep the pet out of your bedroom.  Keep the bedroom door closed.  Keep pets off cloth furniture and away from stuffed toys.     Mice, Rats, and Cockroaches   Some people are allergic to the waste from these pests.   Cover food and garbage.  Clean up spills and food crumbs.  Store grease in the refrigerator.   Keep food out of the bedroom.   Indoor Mold  This can be a trigger if your home has high moisture. Fix leaking faucets, pipes, or other sources of water.   Clean moldy surfaces.  Dehumidify basement if it is damp and smelly.   Smoke, Strong Odors, and Sprays  These can reduce air quality. Stay away from strong odors and sprays, such as perfume, powder, hair spray, paints, smoke incense, paint, cleaning products, candles and new carpet.   Exercise or Sports  Some people with asthma have this trigger. Be active!  Ask your doctor about taking medicine before sports or exercise to prevent symptoms.    Warm up for 5-10 minutes before and after sports or exercise.     Other Triggers of Asthma  Cold air:  Cover your nose and mouth with a scarf.  Sometimes laughing or crying can be a trigger.  Some medicines and food can trigger asthma.

## 2024-11-11 NOTE — PROGRESS NOTES
Preventive Care Visit  [facility]  Francisco Hicks PA-C, Family Medicine  Nov 11, 2024      Assessment & Plan   Encounter for Medicare annual wellness exam  Vaccinations discussed. Patient stated he will go to the pharmacy to receive the RSV vaccine.   Patient and wife notes positional dizziness once monthly for the last 3 months when he stands from a sitting or laying position. When these episodes occur he sits down and drinks water which help the dizziness. They also note monthly episodes of drenching sweat for the last few months. Denies any proceeding event and known causes. Denies difficulty breathing, palpitations, and throat tightness. Likely to be orthostatic hypotension from dehydration due to limited fluid intake when these episodes occur. Advised to also monitor glucose levels when this occurs. If this is the cause can eat a piece of candy and drink plenty of fluids to stay hydrated. Recommended wearing compression socks daily to help with blood pressure. Continue to monitor and if symptoms are worsening please return to the clinic.    Alzheimer's Disease (H)  Wife noticed some occasional agitation and would like to give the patient a half THC gummy to help with calming. Told her it was okay to give as needed. Discussed potential symptoms that can progressively occur such as sundowning and to monitor for increasing agitation. If this were to occur further interventions can be discussed. Patient was in a good mood and did not have any concerns with agitation, stress, and mood. He is no longer driving. No longer drinking alcohol, has NA beer and is happy with that.     Eczema, unspecified type  Diffuse bilateral eczema on lower legs. Worsened with recent cold and dry weather. Advised to apply clobetasol ointment twice daily and to cover with compression socks. Refilled clobetasol ointment.   - clobetasol (TEMOVATE) 0.05 % external ointment; Apply topically 2 times daily. to affected  "area(s)    Moderate persistent asthma with acute exacerbation  Moderate persistent asthma, unspecified whether complicated  Patient states his asthma is well controled with Symbicort twice daily and montelukast once daily. He has not had to use his albuterol inhaler within the last 4 weeks. Reports no issues with sleep and no night time awakenings short of breath. He does become short of breath with exertional activity and exercise. Advised to take albuterol inhaler prior to activity and exercise.  - montelukast (SINGULAIR) 10 MG tablet; Take 1 tablet (10 mg) by mouth at bedtime.  - budesonide-formoterol (SYMBICORT) 160-4.5 MCG/ACT Inhaler; Inhale 2 puffs into the lungs 2 times daily.    Coronary artery calcification  Lipid panel within normal limits. Refilled atorvastatin.  - atorvastatin (LIPITOR) 40 MG tablet; Take 1 tablet (40 mg) by mouth daily - Oral    Patient has been advised of split billing requirements and indicates understanding: Yes    BMI  Estimated body mass index is 29.49 kg/m  as calculated from the following:    Height as of this encounter: 1.778 m (5' 10\").    Weight as of this encounter: 93.2 kg (205 lb 8 oz).   Weight management plan: Discussed healthy diet and exercise guidelines    Counseling  Appropriate preventive services were addressed with this patient via screening, questionnaire, or discussion as appropriate for fall prevention, nutrition, physical activity, Tobacco-use cessation, social engagement, weight loss and cognition.  Checklist reviewing preventive services available has been given to the patient.  Reviewed patient's diet, addressing concerns and/or questions.   He is at risk for lack of exercise and has been provided with information to increase physical activity for the benefit of his well-being.   Updated plan of care.  Patient reported difficulty with activities of daily living were addressed today.Addressed any concerns about safety while driving.      Subjective   Mainor " is a 69 year old, presenting for the following:  Wellness Visit        11/11/2024     1:09 PM   Additional Questions   Roomed by Michelle   Accompanied by wife           HPI  Positional dizziness and sweating    Medication Followup of Clobetasol   Taking Medication as prescribed: yes  Side Effects:  None  Medication Helping Symptoms:  yes    Hyperlipidemia Follow-Up    Are you regularly taking any medication or supplement to lower your cholesterol?   Yes- Lipitor  Are you having muscle aches or other side effects that you think could be caused by your cholesterol lowering medication?  No  Health Care Directive  Patient has a Health Care Directive on file  Advance care planning document is on file and is current.      11/10/2024   General Health   How would you rate your overall physical health? Good   Feel stress (tense, anxious, or unable to sleep) To some extent      (!) STRESS CONCERN      11/10/2024   Nutrition   Diet: Regular (no restrictions)            11/10/2024   Exercise   Days per week of moderate/strenous exercise 3 days   Average minutes spent exercising at this level 30 min            11/10/2024   Social Factors   Frequency of gathering with friends or relatives Three times a week   Worry food won't last until get money to buy more No   Food not last or not have enough money for food? No   Do you have housing? (Housing is defined as stable permanent housing and does not include staying ouside in a car, in a tent, in an abandoned building, in an overnight shelter, or couch-surfing.) Yes   Are you worried about losing your housing? No   Lack of transportation? No   Unable to get utilities (heat,electricity)? No            11/10/2024   Fall Risk   Fallen 2 or more times in the past year? No    Trouble with walking or balance? Yes        Patient-reported           11/10/2024   Activities of Daily Living- Home Safety   Needs help with the following daily activites Telephone use    Transportation    Shopping     Medication administration    Money management   Safety concerns in the home None of the above       Multiple values from one day are sorted in reverse-chronological order         11/10/2024   Dental   Dentist two times every year? Yes            11/10/2024   Hearing Screening   Hearing concerns? None of the above            11/10/2024   Driving Risk Screening   Patient/family members have concerns about driving (!) YES Patient is not currently driving. Wife drives.            11/10/2024   General Alertness/Fatigue Screening   Have you been more tired than usual lately? No            11/10/2024   Urinary Incontinence Screening   Bothered by leaking urine in past 6 months No               Today's PHQ-2 Score:       11/10/2024     6:08 PM   PHQ-2 ( 1999 [employer])   Q1: Little interest or pleasure in doing things 0    Q2: Feeling down, depressed or hopeless 0    PHQ-2 Score 0    Q1: Little interest or pleasure in doing things Not at all   Q2: Feeling down, depressed or hopeless Not at all   PHQ-2 Score 0       Patient-reported           11/10/2024   Substance Use   Alcohol more than 3/day or more than 7/wk No   Do you have a current opioid prescription? No   How severe/bad is pain from 1 to 10? 0/10 (No Pain)   Do you use any other substances recreationally? No        Social History     Tobacco Use    Smoking status: Never     Passive exposure: Never    Smokeless tobacco: Never   Vaping Use    Vaping status: Never Used   Substance Use Topics    Alcohol use: Yes     Alcohol/week: 1.0 - 2.0 standard drink of alcohol     Types: 1 - 2 Standard drinks or equivalent per week     Comment: occasional    Drug use: No           11/10/2024   AAA Screening   Family history of Abdominal Aortic Aneurysm (AAA)? No      ASCVD Risk   The 10-year ASCVD risk score (Daniel ASHLEY, et al., 2019) is: 10.6%    Values used to calculate the score:      Age: 69 years      Sex: Male      Is Non- : No      Diabetic:  No      Tobacco smoker: No      Systolic Blood Pressure: 102 mmHg      Is BP treated: No      HDL Cholesterol: 74 mg/dL      Total Cholesterol: 173 mg/dL        Reviewed and updated as needed this visit by Provider                 Past Medical History:   Diagnosis Date    Allergic rhinitis, cause unspecified     Fatty liver 2004    elevated LFT, saw GI for a consultation    Mild persistent asthma 09/12/2008    Reflux esophagitis     Unspecified disease of respiratory system     RAD    Viremia, unspecified     acute     Past Surgical History:   Procedure Laterality Date    COLONOSCOPY  9/4/2013    Procedure: COLONOSCOPY;  colonoscopy;  Surgeon: Beto Keen MD;  Location: [facility]    COLONOSCOPY N/A 7/31/2020    Procedure: Colonoscopy, With Polypectomy And Biopsy;  Surgeon: Lily Eaton DO;  Location: [facility]    COLONOSCOPY N/A 4/5/2021    Procedure: Colonoscopy, With Polypectomy And Biopsy;  Surgeon: Lily Eaton DO;  [facility]: [facility]    COLONOSCOPY N/A 5/3/2022    Procedure: COLONOSCOPY, WITH POLYPECTOMY AND BIOPSY;  Surgeon: Lily Eaton DO;  [facility]    COLONOSCOPY N/A 5/3/2022    Procedure: COLONOSCOPY, FLEXIBLE, WITH LESION REMOVAL USING SNARE;  Surgeon: Lily Eaton DO;  [facility]    COLONOSCOPY N/A 5/18/2023    Procedure: COLONOSCOPY, WITH BIOPSY;  Surgeon: Lily Eaton DO;  [facility]    COLONOSCOPY N/A 3/11/2024    Procedure: limited COLONOSCOPY, WITH POLYPECTOMY AND BIOPSY;  Surgeon: Tripp Herman MD;  [facility]    COLONOSCOPY WITH CO2 INSUFFLATION N/A 7/31/2020    Procedure: COLONOSCOPY, WITH CO2 INSUFFLATION;  Surgeon: Lily Eaton DO;  [facility]    COLONOSCOPY WITH CO2 INSUFFLATION N/A 4/5/2021    Procedure: COLONOSCOPY, WITH CO2 INSUFFLATION;  Surgeon: Lily Eaton DO;  [facility]    COLONOSCOPY WITH CO2 INSUFFLATION N/A 5/3/2022    Procedure: COLONOSCOPY, WITH CO2 INSUFFLATION;  Surgeon: Lily Eaton DO;  [facility]     COMBINED ESOPHAGOSCOPY, GASTROSCOPY, DUODENOSCOPY (EGD) WITH CO2 INSUFFLATION N/A 7/31/2020    Procedure: ESOPHAGOGASTRODUODENOSCOPY, WITH CO2 INSUFFLATION;  Surgeon: Lily Eaton DO;  [facility]    ESOPHAGOSCOPY, GASTROSCOPY, DUODENOSCOPY (EGD), COMBINED N/A 7/31/2020    Procedure: Esophagogastroduodenoscopy, With Biopsy;  Surgeon: Lily Eaton DO;  [facility]    ESOPHAGOSCOPY, GASTROSCOPY, DUODENOSCOPY (EGD), COMBINED N/A 3/11/2024    Procedure: ESOPHAGOGASTRODUODENOSCOPY, WITH BIOPSY;  Surgeon: Tripp Herman MD;  [facility]: [facility]    HC VASECTOMY UNILAT/BILAT W POSTOP SEMEN  1992    Vasectomy    ZZHC COLONOSCOPY THRU STOMA, DIAGNOSTIC  2005    normal     BP Readings from Last 3 Encounters:   11/11/24 102/60   09/09/24 124/73   07/26/24 127/78    Wt Readings from Last 3 Encounters:   11/11/24 93.2 kg (205 lb 8 oz)   10/10/24 90.7 kg (200 lb)   09/09/24 93.4 kg (205 lb 12.8 oz)            Patient Active Problem List   Diagnosis    Erectile dysfunction    Moderate persistent asthma with acute exacerbation    HYPERLIPIDEMIA LDL GOAL <160    Seasonal allergic rhinitis    Deviated nasal septum    Chronic nasal congestion    Nummular eczema    Memory changes    Ulcerative pancolitis without complication (H)    PSC (primary sclerosing cholangitis) (H)    Dementia, unspecified dementia severity, unspecified dementia type, unspecified whether behavioral, psychotic, or mood disturbance or anxiety (H)     Past Surgical History:   Procedure Laterality Date    COLONOSCOPY  9/4/2013    Procedure: COLONOSCOPY;  colonoscopy;  Surgeon: Beto Keen MD;  [facility]    COLONOSCOPY N/A 7/31/2020    Procedure: Colonoscopy, With Polypectomy And Biopsy;  Surgeon: Lily Eaton DO;  [facility]    COLONOSCOPY N/A 4/5/2021    Procedure: Colonoscopy, With Polypectomy And Biopsy;  Surgeon: Lily Eaton DO;  [facility]    COLONOSCOPY N/A 5/3/2022    Procedure: COLONOSCOPY, WITH POLYPECTOMY  AND BIOPSY;  Surgeon: Lily Eaton DO;  [facility]    COLONOSCOPY N/A 5/3/2022    Procedure: COLONOSCOPY, FLEXIBLE, WITH LESION REMOVAL USING SNARE;  Surgeon: Lily Eaton DO;  [facility]    COLONOSCOPY N/A 5/18/2023    Procedure: COLONOSCOPY, WITH BIOPSY;  Surgeon: Lily Eaton DO;  [facility]    COLONOSCOPY N/A 3/11/2024    Procedure: limited COLONOSCOPY, WITH POLYPECTOMY AND BIOPSY;  Surgeon: Tripp Herman MD;  [facility]    COLONOSCOPY WITH CO2 INSUFFLATION N/A 7/31/2020    Procedure: COLONOSCOPY, WITH CO2 INSUFFLATION;  Surgeon: Lily Eaton DO;  [facility]    COLONOSCOPY WITH CO2 INSUFFLATION N/A 4/5/2021    Procedure: COLONOSCOPY, WITH CO2 INSUFFLATION;  Surgeon: Lily Eaton DO;  [facility]    COLONOSCOPY WITH CO2 INSUFFLATION N/A 5/3/2022    Procedure: COLONOSCOPY, WITH CO2 INSUFFLATION;  Surgeon: Lily Eaton DO;  [facility]    COMBINED ESOPHAGOSCOPY, GASTROSCOPY, DUODENOSCOPY (EGD) WITH CO2 INSUFFLATION N/A 7/31/2020    Procedure: ESOPHAGOGASTRODUODENOSCOPY, WITH CO2 INSUFFLATION;  Surgeon: Lily Eaton DO;  [facility]    ESOPHAGOSCOPY, GASTROSCOPY, DUODENOSCOPY (EGD), COMBINED N/A 7/31/2020    Procedure: Esophagogastroduodenoscopy, With Biopsy;  Surgeon: Lily Eaton DO;  [facility]    ESOPHAGOSCOPY, GASTROSCOPY, DUODENOSCOPY (EGD), COMBINED N/A 3/11/2024    Procedure: ESOPHAGOGASTRODUODENOSCOPY, WITH BIOPSY;  Surgeon: Tripp Herman MD;  [facility]    HC VASECTOMY UNILAT/BILAT W POSTOP SEMEN  1992    Vasectomy    ZZHC COLONOSCOPY THRU STOMA, DIAGNOSTIC  2005    normal       Social History     Tobacco Use    Smoking status: Never     Passive exposure: Never    Smokeless tobacco: Never   Substance Use Topics    Alcohol use: Yes     Alcohol/week: 1.0 - 2.0 standard drink of alcohol     Types: 1 - 2 Standard drinks or equivalent per week     Comment: occasional     Family History   Problem Relation Age of Onset     Cancer Mother         bladder cancer    Hyperlipidemia Mother     Other Cancer Mother         Bladder    Neurologic Disorder Father         alzheimers dementia    Hyperlipidemia Father     Gastrointestinal Disease Brother         divertculitis         Current Outpatient Medications   Medication Sig Dispense Refill    albuterol (PROAIR HFA/PROVENTIL HFA/VENTOLIN HFA) 108 (90 Base) MCG/ACT inhaler Inhale 2 puffs into the lungs every 6 hours. 18 g 11    ASPIRIN PO Take 81 mg by mouth daily      atorvastatin (LIPITOR) 40 MG tablet Take 1 tablet (40 mg) by mouth daily - Oral 90 tablet 3    azelastine (ASTELIN) 0.1 % nasal spray SPRAY 2 SPRAYS IN EACH NOSTRIL TWO TIMES A DAY 30 mL 3    benzonatate (TESSALON) 100 MG capsule Take 1 capsule (100 mg) by mouth 3 times daily as needed for cough 30 capsule 0    budesonide-formoterol (SYMBICORT) 160-4.5 MCG/ACT Inhaler Inhale 2 puffs into the lungs 2 times daily. 30.6 g 3    clobetasol (TEMOVATE) 0.05 % external ointment Apply topically 2 times daily. to affected area(s) 30 g 1    donepezil (ARICEPT) 5 MG tablet Take 10 mg by mouth At Bedtime      escitalopram (LEXAPRO) 10 MG tablet Take 1 tablet by mouth daily      famotidine (PEPCID) 40 MG tablet TAKE ONE TABLET BY MOUTH TWO TIMES A DAY AS NEEDED FOR HEARTBURN 180 tablet 1    fluticasone (FLONASE) 50 MCG/ACT nasal spray Spray 1-2 sprays into both nostrils daily 16 g 11    montelukast (SINGULAIR) 10 MG tablet Take 1 tablet (10 mg) by mouth at bedtime. 90 tablet 3    Naphazoline-Glycerin-Zinc Sulf (CLEAR EYES MAXIMUM ITCHY EYE) 0.012-0.25-0.25 % SOLN Apply to eye as needed (for allergies)      ursodiol (ACTIGALL) 300 MG capsule TAKE ONE CAPSULE BY MOUTH TWICE A  capsule 1    vedolizumab (ENTYVIO) 300 mg injection Inject 5 mLs (300 mg) into the vein every 4 weeks.      vitamin D3 (CHOLECALCIFEROL) 50 mcg (2000 units) tablet Take 1 tablet (50 mcg) by mouth daily 30 tablet 11     Allergies   Allergen Reactions    Seasonal  Allergies      Recent Labs   Lab Test 11/05/24  1157 10/15/24  1207 09/17/24  1202 07/26/24  0842 01/05/24  0824 11/01/23  0944 07/15/22  0737 07/01/22  0725 03/17/22  1416 10/06/21  0816 06/16/21  1234 03/22/21  0731   A1C 5.4  --   --   --   --   --   --   --   --   --   --   --    LDL 86  --   --   --   --  49  --  38  --   --  55 44   HDL 74  --   --   --   --  89  --  79  --   --  88 96   TRIG 67  --   --   --   --  65  --  50  --   --  61 42   ALT  --  24 29 18   < > 35   < > 82*  --    < > 129* 50   CR  --   --  0.95  --   --  0.94   < > 0.90 1.00   < > 0.90 1.00   GFRESTIMATED  --   --  86  --   --  87   < > >90 82   < > 88 77   GFRESTBLACK  --   --   --   --   --   --   --   --   --   --  >90 90   POTASSIUM  --   --  4.2  --   --  4.4   < > 3.9 4.1   < > 4.0 4.4   TSH  --   --   --   --   --   --   --  0.93 1.31  --   --   --     < > = values in this interval not displayed.      Current providers sharing in care for this patient include:  Patient Care Team:  Francisco Hicks PA-C as PCP - General (Family Medicine)  Lily Eaton DO as MD (Gastroenterology)  Roge Millan MD as Assigned Pulmonology Provider  Francisco Hicks PA-C as Assigned PCP  Lily Eaton DO as Assigned Gastroenterology Provider  Abiodun Campbell RPH as Pharmacist (Pharmacist)    The following health maintenance items are reviewed in Epic and correct as of today:  Health Maintenance   Topic Date Due    RSV VACCINE (1 - Risk 60-74 years 1-dose series) Never done    ASTHMA ACTION PLAN  06/13/2024    ANNUAL REVIEW OF HM ORDERS  11/01/2024    MEDICARE ANNUAL WELLNESS VISIT  11/01/2024    COVID-19 Vaccine (6 - 2024-25 season) 12/11/2024    COLORECTAL CANCER SCREENING  03/11/2025    ASTHMA CONTROL TEST  05/11/2025    LIPID  11/05/2025    FALL RISK ASSESSMENT  11/11/2025    GLUCOSE  09/17/2027    ADVANCE CARE PLANNING  11/27/2028    DTAP/TDAP/TD IMMUNIZATION (2 - Td or Tdap) 04/12/2033    HEPATITIS C SCREENING  Completed    PHQ-2  "(once per calendar year)  Completed    INFLUENZA VACCINE  Completed    Pneumococcal Vaccine: 65+ Years  Completed    ZOSTER IMMUNIZATION  Completed    HPV IMMUNIZATION  Aged Out    MENINGITIS IMMUNIZATION  Aged Out    RSV MONOCLONAL ANTIBODY  Aged Out     Review of Systems  Constitutional, HEENT, cardiovascular, pulmonary, GI, , musculoskeletal, neuro, skin, endocrine and psych systems are negative, except as otherwise noted.     Objective    Exam  /60   Pulse 72   Temp 98  F (36.7  C) (Temporal)   Resp 16   Ht 1.778 m (5' 10\")   Wt 93.2 kg (205 lb 8 oz)   BMI 29.49 kg/m     Estimated body mass index is 29.49 kg/m  as calculated from the following:    Height as of this encounter: 1.778 m (5' 10\").    Weight as of this encounter: 93.2 kg (205 lb 8 oz).    Physical Exam  GENERAL: alert and no distress  HENT: ear canals and TM's normal, nose and mouth without ulcers or lesions  NECK: no adenopathy, no asymmetry, masses, or scars  RESP: lungs clear to auscultation - no rales, rhonchi or wheezes  CV: regular rate and rhythm, normal S1 S2, no S3 or S4, no murmur, click or rub, no peripheral edema  ABDOMEN: soft, nontender, no hepatosplenomegaly, no masses and bowel sounds normal  MS: no gross musculoskeletal defects noted, no edema  SKIN: bilateral lower leg eczema lesions, no other lesions or rashes  PSYCH: affect normal/bright        11/11/2024   Mini Cog   Mini-Cog Not Completed (choose reason) Known dementia               Signed Electronically by: Francisco Hicks PA-C    IFrancisco PA-C, was present with the Physician Assistant student who participated in the service and in the documentation of the note.  I have verified the history and personally performed the physical exam and medical decision making.  I agree with the assessment and plan of care as documented in the note.     Charted by: NEVAEH Granados    "

## 2024-11-11 NOTE — PATIENT INSTRUCTIONS
Patient Education   Preventive Care Advice   This is general advice given by our system to help you stay healthy. However, your care team may have specific advice just for you. Please talk to your care team about your preventive care needs.  Nutrition  Eat 5 or more servings of fruits and vegetables each day.  Try wheat bread, brown rice and whole grain pasta (instead of white bread, rice, and pasta).  Get enough calcium and vitamin D. Check the label on foods and aim for 100% of the RDA (recommended daily allowance).  Lifestyle  Exercise at least 150 minutes each week  (30 minutes a day, 5 days a week).  Do muscle strengthening activities 2 days a week. These help control your weight and prevent disease.  No smoking.  Wear sunscreen to prevent skin cancer.  Have a dental exam and cleaning every 6 months.  Yearly exams  See your health care team every year to talk about:  Any changes in your health.  Any medicines your care team has prescribed.  Preventive care, family planning, and ways to prevent chronic diseases.  Shots (vaccines)   HPV shots (up to age 26), if you've never had them before.  Hepatitis B shots (up to age 59), if you've never had them before.  COVID-19 shot: Get this shot when it's due.  Flu shot: Get a flu shot every year.  Tetanus shot: Get a tetanus shot every 10 years.  Pneumococcal, hepatitis A, and RSV shots: Ask your care team if you need these based on your risk.  Shingles shot (for age 50 and up)  General health tests  Diabetes screening:  Starting at age 35, Get screened for diabetes at least every 3 years.  If you are younger than age 35, ask your care team if you should be screened for diabetes.  Cholesterol test: At age 39, start having a cholesterol test every 5 years, or more often if advised.  Bone density scan (DEXA): At age 50, ask your care team if you should have this scan for osteoporosis (brittle bones).  Hepatitis C: Get tested at least once in your life.  STIs (sexually  transmitted infections)  Before age 24: Ask your care team if you should be screened for STIs.  After age 24: Get screened for STIs if you're at risk. You are at risk for STIs (including HIV) if:  You are sexually active with more than one person.  You don't use condoms every time.  You or a partner was diagnosed with a sexually transmitted infection.  If you are at risk for HIV, ask about PrEP medicine to prevent HIV.  Get tested for HIV at least once in your life, whether you are at risk for HIV or not.  Cancer screening tests  Cervical cancer screening: If you have a cervix, begin getting regular cervical cancer screening tests starting at age 21.  Breast cancer scan (mammogram): If you've ever had breasts, begin having regular mammograms starting at age 40. This is a scan to check for breast cancer.  Colon cancer screening: It is important to start screening for colon cancer at age 45.  Have a colonoscopy test every 10 years (or more often if you're at risk) Or, ask your provider about stool tests like a FIT test every year or Cologuard test every 3 years.  To learn more about your testing options, visit:   .  For help making a decision, visit:   https://bit.ly/oa84669.  Prostate cancer screening test: If you have a prostate, ask your care team if a prostate cancer screening test (PSA) at age 55 is right for you.  Lung cancer screening: If you are a current or former smoker ages 50 to 80, ask your care team if ongoing lung cancer screenings are right for you.  For informational purposes only. Not to replace the advice of your health care provider. Copyright   2023 [facility]. All rights reserved. Clinically reviewed by the [facility]. Attributor 300561 - REV 01/24.  Learning About Activities of Daily Living  What are activities of daily living?     Activities of daily living (ADLs) are the basic self-care tasks you do every day. These include eating, bathing, dressing,  and moving around.  As you age, and if you have health problems, you may find that it's harder to do some of these tasks. If so, your doctor can suggest ideas that may help.  To measure what kind of help you may need, your doctor will ask how well you are able to do ADLs. Let your doctor know if there are any tasks that you are having trouble doing. This is an important first step to getting help. And when you have the help you need, you can stay as independent as possible.  How will a doctor assess your ADLs?  Asking about ADLs is part of a routine health checkup your doctor will likely do as you age. Your health check might be done in a doctor's office, in your home, or at a hospital. The goal is to find out if you are having any problems that could make it hard to care for yourself or that make it unsafe for you to be on your own.  To measure your ADLs, your doctor will ask how hard it is for you to do routine tasks. Your doctor may also want to know if you have changed the way you do a task because of a health problem. Your doctor may watch how you:  Walk back and forth.  Keep your balance while you stand or walk.  Move from sitting to standing or from a bed to a chair.  Button or unbutton a shirt or sweater.  Remove and put on your shoes.  It's common to feel a little worried or anxious if you find you can't do all the things you used to be able to do. Talking with your doctor about ADLs is a way to make sure you're as safe as possible and able to care for yourself as well as you can. You may want to bring a caregiver, friend, or family member to your checkup. They can help you talk to your doctor.  Follow-up care is a key part of your treatment and safety. Be sure to make and go to all appointments, and call your doctor if you are having problems. It's also a good idea to know your test results and keep a list of the medicines you take.  Current as of: October 24, 2023  Content Version: 14.2 2024 [facility]  [employer].   Care instructions adapted under license by your healthcare professional. If you have questions about a medical condition or this instruction, always ask your healthcare professional. [employer] disclaims any warranty or liability for your use of this information.    Preventing Falls: Care Instructions  Injuries and health problems such as trouble walking or poor eyesight can increase your risk of falling. So can some medicines. But there are things you can do to help prevent falls. You can exercise to get stronger. You can also arrange your home to make it safer.    Talk to your doctor about the medicines you take. Ask if any of them increase the risk of falls and whether they can be changed or stopped.   Try to exercise regularly. It can help improve your strength and balance. This can help lower your risk of falling.         Practice fall safety and prevention.   Wear low-heeled shoes that fit well and give your feet good support. Talk to your doctor if you have foot problems that make this hard.  Carry a cellphone or wear a medical alert device that you can use to call for help.  Use stepladders instead of chairs to reach high objects. Don't climb if you're at risk for falls. Ask for help, if needed.  Wear the correct eyeglasses, if you need them.        Make your home safer.   Remove rugs, cords, clutter, and furniture from walkways.  Keep your house well lit. Use night-lights in hallways and bathrooms.  Install and use sturdy handrails on stairways.  Wear nonskid footwear, even inside. Don't walk barefoot or in socks without shoes.        Be safe outside.   Use handrails, curb cuts, and ramps whenever possible.  Keep your hands free by using a shoulder bag or backpack.  Try to walk in well-lit areas. Watch out for uneven ground, changes in pavement, and debris.  Be careful in the winter. Walk on the grass or gravel when sidewalks are slippery. Use de-icer on steps and walkways.  "Add non-slip devices to shoes.    Put grab bars and nonskid mats in your shower or tub and near the toilet. Try to use a shower chair or bath bench when bathing.   Get into a tub or shower by putting in your weaker leg first. Get out with your strong side first. Have a phone or medical alert device in the bathroom with you.   Where can you learn more?  Go to https://www.[employer].net/patiented  Enter G117 in the search box to learn more about \"Preventing Falls: Care Instructions.\"  Current as of: July 17, 2023  Content Version: 14.2 2024 [employer].   Care instructions adapted under license by your healthcare professional. If you have questions about a medical condition or this instruction, always ask your healthcare professional. [employer] disclaims any warranty or liability for your use of this information.       "

## 2024-11-12 ENCOUNTER — INFUSION THERAPY VISIT (OUTPATIENT)
Dept: INFUSION THERAPY | Facility: CLINIC | Age: 69
End: 2024-11-12
Attending: INTERNAL MEDICINE
Payer: COMMERCIAL

## 2024-11-12 VITALS
BODY MASS INDEX: 29.67 KG/M2 | DIASTOLIC BLOOD PRESSURE: 76 MMHG | OXYGEN SATURATION: 100 % | TEMPERATURE: 98.2 F | HEART RATE: 56 BPM | WEIGHT: 206.8 LBS | SYSTOLIC BLOOD PRESSURE: 126 MMHG | RESPIRATION RATE: 16 BRPM

## 2024-11-12 DIAGNOSIS — K51.00 ULCERATIVE PANCOLITIS WITHOUT COMPLICATION (H): Primary | ICD-10-CM

## 2024-11-12 PROBLEM — G30.9 ALZHEIMER'S DISEASE (H): Status: ACTIVE | Noted: 2023-03-16

## 2024-11-12 PROBLEM — F02.80 ALZHEIMER'S DISEASE (H): Status: ACTIVE | Noted: 2023-03-16

## 2024-11-12 LAB
ALBUMIN SERPL BCG-MCNC: 4 G/DL (ref 3.5–5.2)
ALP SERPL-CCNC: 162 U/L (ref 40–150)
ALT SERPL W P-5'-P-CCNC: 24 U/L (ref 0–70)
AST SERPL W P-5'-P-CCNC: 37 U/L (ref 0–45)
BASOPHILS # BLD AUTO: 0.1 10E3/UL (ref 0–0.2)
BASOPHILS NFR BLD AUTO: 2 %
BILIRUB DIRECT SERPL-MCNC: 0.24 MG/DL (ref 0–0.3)
BILIRUB SERPL-MCNC: 0.6 MG/DL
CRP SERPL-MCNC: <3 MG/L
EOSINOPHIL # BLD AUTO: 0.5 10E3/UL (ref 0–0.7)
EOSINOPHIL NFR BLD AUTO: 8 %
ERYTHROCYTE [DISTWIDTH] IN BLOOD BY AUTOMATED COUNT: 12.2 % (ref 10–15)
ERYTHROCYTE [SEDIMENTATION RATE] IN BLOOD BY WESTERGREN METHOD: 2 MM/HR (ref 0–20)
HCT VFR BLD AUTO: 40.5 % (ref 40–53)
HGB BLD-MCNC: 13.6 G/DL (ref 13.3–17.7)
HOLD SPECIMEN: NORMAL
IMM GRANULOCYTES # BLD: 0 10E3/UL
IMM GRANULOCYTES NFR BLD: 0 %
LYMPHOCYTES # BLD AUTO: 2 10E3/UL (ref 0.8–5.3)
LYMPHOCYTES NFR BLD AUTO: 32 %
MCH RBC QN AUTO: 30.8 PG (ref 26.5–33)
MCHC RBC AUTO-ENTMCNC: 33.6 G/DL (ref 31.5–36.5)
MCV RBC AUTO: 92 FL (ref 78–100)
MONOCYTES # BLD AUTO: 0.6 10E3/UL (ref 0–1.3)
MONOCYTES NFR BLD AUTO: 9 %
NEUTROPHILS # BLD AUTO: 3.1 10E3/UL (ref 1.6–8.3)
NEUTROPHILS NFR BLD AUTO: 49 %
NRBC # BLD AUTO: 0 10E3/UL
NRBC BLD AUTO-RTO: 0 /100
PLATELET # BLD AUTO: 223 10E3/UL (ref 150–450)
PROT SERPL-MCNC: 7 G/DL (ref 6.4–8.3)
RBC # BLD AUTO: 4.41 10E6/UL (ref 4.4–5.9)
WBC # BLD AUTO: 6.2 10E3/UL (ref 4–11)

## 2024-11-12 PROCEDURE — 36415 COLL VENOUS BLD VENIPUNCTURE: CPT | Performed by: INTERNAL MEDICINE

## 2024-11-12 PROCEDURE — 82248 BILIRUBIN DIRECT: CPT | Performed by: INTERNAL MEDICINE

## 2024-11-12 PROCEDURE — 250N000011 HC RX IP 250 OP 636: Mod: JZ | Performed by: INTERNAL MEDICINE

## 2024-11-12 PROCEDURE — 99207 PR NO CHARGE LOS: CPT

## 2024-11-12 PROCEDURE — 85652 RBC SED RATE AUTOMATED: CPT | Performed by: INTERNAL MEDICINE

## 2024-11-12 PROCEDURE — 96365 THER/PROPH/DIAG IV INF INIT: CPT

## 2024-11-12 PROCEDURE — 258N000003 HC RX IP 258 OP 636: Performed by: INTERNAL MEDICINE

## 2024-11-12 PROCEDURE — 85004 AUTOMATED DIFF WBC COUNT: CPT | Performed by: INTERNAL MEDICINE

## 2024-11-12 PROCEDURE — 84155 ASSAY OF PROTEIN SERUM: CPT | Performed by: INTERNAL MEDICINE

## 2024-11-12 PROCEDURE — 86140 C-REACTIVE PROTEIN: CPT | Performed by: INTERNAL MEDICINE

## 2024-11-12 RX ORDER — HEPARIN SODIUM,PORCINE 10 UNIT/ML
5 VIAL (ML) INTRAVENOUS
OUTPATIENT
Start: 2024-12-10

## 2024-11-12 RX ORDER — HEPARIN SODIUM,PORCINE 10 UNIT/ML
5 VIAL (ML) INTRAVENOUS
Status: CANCELLED | OUTPATIENT
Start: 2024-12-10

## 2024-11-12 RX ORDER — HEPARIN SODIUM (PORCINE) LOCK FLUSH IV SOLN 100 UNIT/ML 100 UNIT/ML
5 SOLUTION INTRAVENOUS
OUTPATIENT
Start: 2024-12-10

## 2024-11-12 RX ORDER — HEPARIN SODIUM (PORCINE) LOCK FLUSH IV SOLN 100 UNIT/ML 100 UNIT/ML
5 SOLUTION INTRAVENOUS
Status: CANCELLED | OUTPATIENT
Start: 2024-12-10

## 2024-11-12 RX ADMIN — VEDOLIZUMAB 300 MG: 300 INJECTION, POWDER, LYOPHILIZED, FOR SOLUTION INTRAVENOUS at 14:45

## 2024-11-12 NOTE — PROGRESS NOTES
Infusion Nursing Note:  Mainor Grande presents today for Entyvio Infusion.    Patient seen by provider today: No   present during visit today: Not Applicable.    Note: Patient has been feeling great recently; no complaints. Does have a chronic rash to legs and was seen for this recently. Wife states they are getting a new cream that they will be picking up on the way home.      Intravenous Access:  Peripheral IV placed.    Treatment Conditions:  Biological Infusion Checklist:  ~~~ NOTE: If the patient answers yes to any of the questions below, hold the infusion and contact ordering provider or on-call provider.    Have you recently had an elevated temperature, fever, chills, productive cough, coughing for 3 weeks or longer or hemoptysis,  abnormal vital signs, night sweats,  chest pain or have you noticed a decrease in your appetite, unexplained weight loss or fatigue? No  Do you have any open wounds or new incisions? No  Do you have any upcoming hospitalizations or surgeries? Does not include esophagogastroduodenoscopy, colonoscopy, endoscopic retrograde cholangiopancreatography (ERCP), endoscopic ultrasound (EUS), dental procedures or joint aspiration/steroid injections No  Do you currently have any signs of illness or infection or are you on any antibiotics? No  Have you had any new, sudden or worsening abdominal pain? No  Have you or anyone in your household received a live vaccination in the past 4 weeks? Please note: No live vaccines while on biologic/chemotherapy until 6 months after the last treatment. Patient can receive the flu vaccine (shot only), pneumovax and the Covid vaccine. It is optimal for the patient to get these vaccines mid cycle, but they can be given at any time as long as it is not on the day of the infusion. No  Have you recently been diagnosed with any new nervous system diseases (ie. Multiple sclerosis, Guillain [place], seizures, neurological changes) or cancer diagnosis? Are  you on any form of radiation or chemotherapy? No  Are you pregnant or breast feeding or do you have plans of pregnancy in the future? No  Have you been having any signs of worsening depression or suicidal ideations?  (benlysta only) No  Have there been any other new onset medical symptoms? No  Have you had any new blood clots? (IVIG only) No      Post Infusion Assessment:  Patient tolerated infusion without incident.  Blood return noted pre and post infusion.  Site patent and intact, free from redness, edema or discomfort.  No evidence of extravasations.  Access discontinued per protocol.       Discharge Plan:   Patient discharged in stable condition accompanied by: wife.  Departure Mode: Ambulatory.  Will return 12/10/2024 for next appointment.      Maryann Nguyen RN

## 2024-11-13 ENCOUNTER — OFFICE VISIT (OUTPATIENT)
Dept: GASTROENTEROLOGY | Facility: CLINIC | Age: 69
End: 2024-11-13
Attending: INTERNAL MEDICINE
Payer: COMMERCIAL

## 2024-11-13 ENCOUNTER — DOCUMENTATION ONLY (OUTPATIENT)
Dept: GASTROENTEROLOGY | Facility: CLINIC | Age: 69
End: 2024-11-13

## 2024-11-13 VITALS
BODY MASS INDEX: 29.27 KG/M2 | DIASTOLIC BLOOD PRESSURE: 68 MMHG | OXYGEN SATURATION: 99 % | SYSTOLIC BLOOD PRESSURE: 111 MMHG | WEIGHT: 204 LBS | HEART RATE: 73 BPM

## 2024-11-13 DIAGNOSIS — K51.00 ULCERATIVE PANCOLITIS WITHOUT COMPLICATION (H): Primary | ICD-10-CM

## 2024-11-13 DIAGNOSIS — F02.80 ALZHEIMER'S DISEASE (H): ICD-10-CM

## 2024-11-13 DIAGNOSIS — G30.9 ALZHEIMER'S DISEASE (H): ICD-10-CM

## 2024-11-13 DIAGNOSIS — K83.01 PSC (PRIMARY SCLEROSING CHOLANGITIS) (H): ICD-10-CM

## 2024-11-13 ASSESSMENT — PAIN SCALES - GENERAL: PAINLEVEL_OUTOF10: NO PAIN (0)

## 2024-11-13 NOTE — PROGRESS NOTES
GI CLINIC VISIT           ASSESSMENT/PLAN:    # UC pancolitis, PSC - doing well clinically on entyvio - mildly elevated calprotectin although improved from baseline. Most recent complete colonoscopy in 2023 without active disease - aborted colonoscopy in March was normal. Will defer further colonoscopies given dementia and difficulties with prep. Would be reasonable to switch to entyvio pens for ease of administration - will have [facility] go over this today with Leno and his wife.    RTC 12 months or sooner if needed      CC/REFERRING MD:  Lily Eaton  REASON FOR CONSULTATION:   Lily Eaton for   Chief Complaint   Patient presents with    Follow Up     IBD         HPI    Leno presents today with his wife for follow-up of ulcerative colitis. Is doing well from a UC standpoint. Does spend a lot of time in the bathroom but denies any abdominal pain, diarrhea, etc. Tolerating a diet without any issue.   Recently went on a bus trip with family to [place]  Wondering about switching to the entyvio pens - planning to meet with  next week to discuss. Infusions go okay although has a lot of different appointments and with his underlying dementia sometimes makes things difficult.    IBD history:  Age at diagnosis: 64  Extent of disease: pancolitis  EIM: PSC  Current/previous medications: entyvio q4 weeks    ROS:    No fevers or chills  No weight loss  No blurry vision, double vision or change in vision  No sore throat  No lymphadenopathy  No headache, paraesthesias, or weakness in a limb  No shortness of breath or wheezing  No chest pain or pressure  No arthralgias or myalgias  No rashes or skin changes  No odynophagia or dysphagia  No BRBPR, hematochezia, melena  No dysuria, frequency or urgency  No hot/cold intolerance or polyria  No anxiety or depression    PROBLEM LIST  Patient Active Problem List    Diagnosis Date Noted    Alzheimer's disease (H) 03/16/2023     Priority: Medium    PSC (primary  sclerosing cholangitis) (H) 07/02/2021     Priority: Medium    Ulcerative pancolitis without complication (H) 08/05/2020     Priority: Medium    Memory changes 12/06/2019     Priority: Medium    Nummular eczema 02/12/2019     Priority: Medium    Deviated nasal septum 08/01/2013     Priority: Medium    Chronic nasal congestion 08/01/2013     Priority: Medium    Seasonal allergic rhinitis 07/18/2011     Priority: Medium     Primarily fall and spring allergies      HYPERLIPIDEMIA LDL GOAL <160 10/31/2010     Priority: Medium    Moderate persistent asthma with acute exacerbation 09/12/2008     Priority: Medium    Erectile dysfunction 06/03/2008     Priority: Medium       PERTINENT PAST MEDICAL HISTORY:  Past Medical History:   Diagnosis Date    Allergic rhinitis, cause unspecified     Fatty liver 2004    elevated LFT, saw GI for a consultation    Mild persistent asthma 09/12/2008    Reflux esophagitis     Unspecified disease of respiratory system     RAD    Viremia, unspecified     acute       PREVIOUS SURGERIES:  Past Surgical History:   Procedure Laterality Date    COLONOSCOPY  9/4/2013    Procedure: COLONOSCOPY;  colonoscopy;  Surgeon: Beto Keen MD;  Location: [facility]    COLONOSCOPY N/A 7/31/2020    Procedure: Colonoscopy, With Polypectomy And Biopsy;  Surgeon: Lily Eaton DO;  Location: [facility]    COLONOSCOPY N/A 4/5/2021    Procedure: Colonoscopy, With Polypectomy And Biopsy;  Surgeon: Lily Eaton DO;  Location: [facility]    COLONOSCOPY N/A 5/3/2022    Procedure: COLONOSCOPY, WITH POLYPECTOMY AND BIOPSY;  Surgeon: Lily Eaton DO;  Location: [facility]    COLONOSCOPY N/A 5/3/2022    Procedure: COLONOSCOPY, FLEXIBLE, WITH LESION REMOVAL USING SNARE;  Surgeon: Lily Eaton DO;  Location: [facility]    COLONOSCOPY N/A 5/18/2023    Procedure: COLONOSCOPY, WITH BIOPSY;  Surgeon: Lily Eaton DO;  Location: [facility]    COLONOSCOPY N/A 3/11/2024    Procedure: limited COLONOSCOPY, WITH POLYPECTOMY AND  BIOPSY;  Surgeon: Tripp Herman MD;  Location: [facility]    COLONOSCOPY WITH CO2 INSUFFLATION N/A 7/31/2020    Procedure: COLONOSCOPY, WITH CO2 INSUFFLATION;  Surgeon: Lily Eaton DO;  Location: [facility]    COLONOSCOPY WITH CO2 INSUFFLATION N/A 4/5/2021    Procedure: COLONOSCOPY, WITH CO2 INSUFFLATION;  Surgeon: Lily Eaton DO;  Location: [facility]    COLONOSCOPY WITH CO2 INSUFFLATION N/A 5/3/2022    Procedure: COLONOSCOPY, WITH CO2 INSUFFLATION;  Surgeon: Lily Eaton DO;  Location: [facility]    COMBINED ESOPHAGOSCOPY, GASTROSCOPY, DUODENOSCOPY (EGD) WITH CO2 INSUFFLATION N/A 7/31/2020    Procedure: ESOPHAGOGASTRODUODENOSCOPY, WITH CO2 INSUFFLATION;  Surgeon: Lily Eaton DO;  Location: [facility]    ESOPHAGOSCOPY, GASTROSCOPY, DUODENOSCOPY (EGD), COMBINED N/A 7/31/2020    Procedure: Esophagogastroduodenoscopy, With Biopsy;  Surgeon: Lliy Eaton DO;  Location: [facility]    ESOPHAGOSCOPY, GASTROSCOPY, DUODENOSCOPY (EGD), COMBINED N/A 3/11/2024    Procedure: ESOPHAGOGASTRODUODENOSCOPY, WITH BIOPSY;  Surgeon: Tripp Herman MD;  Location: [facility]    HC VASECTOMY UNILAT/BILAT W POSTOP SEMEN  1992    Vasectomy    ZZHC COLONOSCOPY THRU STOMA, DIAGNOSTIC  2005    normal       ALLERGIES:     Allergies   Allergen Reactions    Seasonal Allergies        PERTINENT MEDICATIONS:    Current Outpatient Medications:     albuterol (PROAIR HFA/PROVENTIL HFA/VENTOLIN HFA) 108 (90 Base) MCG/ACT inhaler, Inhale 2 puffs into the lungs every 6 hours., Disp: 18 g, Rfl: 11    ASPIRIN PO, Take 81 mg by mouth daily, Disp: , Rfl:     atorvastatin (LIPITOR) 40 MG tablet, Take 1 tablet (40 mg) by mouth daily - Oral, Disp: 90 tablet, Rfl: 3    azelastine (ASTELIN) 0.1 % nasal spray, SPRAY 2 SPRAYS IN EACH NOSTRIL TWO TIMES A DAY, Disp: 30 mL, Rfl: 3    benzonatate (TESSALON) 100 MG capsule, Take 1 capsule (100 mg) by mouth 3 times daily as needed for cough, Disp: 30 capsule, Rfl: 0    budesonide-formoterol (SYMBICORT) 160-4.5 MCG/ACT  Inhaler, Inhale 2 puffs into the lungs 2 times daily., Disp: 30.6 g, Rfl: 3    clobetasol (TEMOVATE) 0.05 % external ointment, Apply topically 2 times daily. to affected area(s), Disp: 30 g, Rfl: 1    donepezil (ARICEPT) 5 MG tablet, Take 10 mg by mouth At Bedtime, Disp: , Rfl:     escitalopram (LEXAPRO) 10 MG tablet, Take 1 tablet by mouth daily, Disp: , Rfl:     famotidine (PEPCID) 40 MG tablet, TAKE ONE TABLET BY MOUTH TWO TIMES A DAY AS NEEDED FOR HEARTBURN, Disp: 180 tablet, Rfl: 1    fluticasone (FLONASE) 50 MCG/ACT nasal spray, Spray 1-2 sprays into both nostrils daily, Disp: 16 g, Rfl: 11    montelukast (SINGULAIR) 10 MG tablet, Take 1 tablet (10 mg) by mouth at bedtime., Disp: 90 tablet, Rfl: 3    Naphazoline-Glycerin-Zinc Sulf (CLEAR EYES MAXIMUM ITCHY EYE) 0.012-0.25-0.25 % SOLN, Apply to eye as needed (for allergies), Disp: , Rfl:     ursodiol (ACTIGALL) 300 MG capsule, TAKE ONE CAPSULE BY MOUTH TWICE A DAY, Disp: 180 capsule, Rfl: 1    vedolizumab (ENTYVIO) 300 mg injection, Inject 5 mLs (300 mg) into the vein every 4 weeks., Disp: , Rfl:     vitamin D3 (CHOLECALCIFEROL) 50 mcg (2000 units) tablet, Take 1 tablet (50 mcg) by mouth daily, Disp: 30 tablet, Rfl: 11  No current facility-administered medications for this visit.    SOCIAL HISTORY:  Social History     Socioeconomic History    Marital status:      Spouse name: Not on file    Number of children: 2    Years of education: Not on file    Highest education level: Not on file   Occupational History    Occupation:    Tobacco Use    Smoking status: Never     Passive exposure: Never    Smokeless tobacco: Never   Vaping Use    Vaping status: Never Used   Substance and Sexual Activity    Alcohol use: Yes     Alcohol/week: 1.0 - 2.0 standard drink of alcohol     Types: 1 - 2 Standard drinks or equivalent per week     Comment: occasional    Drug use: No    Sexual activity: Yes     Partners: Female     Birth control/protection:  Male Surgical     Comment: vasectomy approx 1992   Other Topics Concern    Parent/sibling w/ CABG, MI or angioplasty before 65F 55M? No   Social History Narrative    Not on file     Social Drivers of Health     Financial Resource Strain: Low Risk  (11/10/2024)    Financial Resource Strain     Within the past 12 months, have you or your family members you live with been unable to get utilities (heat, electricity) when it was really needed?: No   Food Insecurity: Low Risk  (11/10/2024)    Food Insecurity     Within the past 12 months, did you worry that your food would run out before you got money to buy more?: No     Within the past 12 months, did the food you bought just not last and you didn t have money to get more?: No   Transportation Needs: Low Risk  (11/10/2024)    Transportation Needs     Within the past 12 months, has lack of transportation kept you from medical appointments, getting your medicines, non-medical meetings or appointments, work, or from getting things that you need?: No   Physical Activity: Insufficiently Active (11/10/2024)    Exercise Vital Sign     Days of Exercise per Week: 3 days     Minutes of Exercise per Session: 30 min   Stress: Stress Concern Present (11/10/2024)    Citizen of [place] [employer]ational [employer] - Occupational Stress Questionnaire     Feeling of Stress : To some extent   Social Connections: Socially Integrated (11/10/2024)    Social Connection and Isolation Panel [NHANES]     Frequency of Communication with Friends and Family: Once a week     Frequency of Social Gatherings with Friends and Family: Three times a week     Attends [employer] Services: More than 4 times per year     Active Member of Clubs or Organizations: Yes     Attends Club or Organization Meetings: More than 4 times per year     Marital Status:    Interpersonal Safety: Not on file   Housing Stability: Low Risk  (11/10/2024)    Housing Stability     Do you have housing? : Yes     Are you worried about  losing your housing?: No       FAMILY HISTORY:  Family History   Problem Relation Age of Onset    Cancer Mother         bladder cancer    Hyperlipidemia Mother     Other Cancer Mother         Bladder    Neurologic Disorder Father         alzheimers dementia    Hyperlipidemia Father     Gastrointestinal Disease Brother         divertculitis       Past/family/social history reviewed and no changes    PHYSICAL EXAMINATION:  Constitutional: aaox3, cooperative, pleasant, not dyspneic/diaphoretic, no acute distress  Vitals reviewed: /68   Pulse 73   Wt 92.5 kg (204 lb)   SpO2 99%   BMI 29.27 kg/m    Wt:   Wt Readings from Last 2 Encounters:   11/13/24 92.5 kg (204 lb)   11/12/24 93.8 kg (206 lb 12.8 oz)      Eyes: Sclera anicteric/injected  Ears/nose/mouth/throat: Normal oropharynx without ulcers or exudate, mucus membranes moist, hearing intact  Neck: supple, thyroid normal size  CV: No edema  Respiratory: Unlabored breathing  Skin: warm, perfused, no jaundice  Psych: Normal affect  MSK: Normal gait      PERTINENT STUDIES:  Most recent CBC:  Recent Labs   Lab Test 11/12/24  1359 10/15/24  1207   WBC 6.2 7.4   HGB 13.6 13.2*   HCT 40.5 40.3    236     Most recent hepatic panel:  Recent Labs   Lab Test 11/12/24  1359 10/15/24  1207   ALT 24 24   AST 37 36     Most recent creatinine:  Recent Labs   Lab Test 09/17/24  1202 11/01/23  0944   CR 0.95 0.94

## 2024-11-13 NOTE — PROGRESS NOTES
Nurse visit for education on Entyvio pen. Patient is interested in starting this after the first of the year.    Patients spouse was able to teach back education. Writer also provided [facility] direct number for questions or concerns.    Mallory Kessler RN

## 2024-11-13 NOTE — NURSING NOTE
Chief Complaint   Patient presents with    Follow Up     IBD     He requests these members of his care team be copied on today's visit information:  PCP: Francisco Hicks    Referring Provider:  Lily Eaton DO  [id number] [place] [address]    Vitals:    11/13/24 1414   BP: 111/68   Pulse: 73   SpO2: 99%   Weight: 92.5 kg (204 lb)     Body mass index is 29.27 kg/m .    Medications were reconciled.    Does patient need any medication refills at today's visit? Mariza Rosales

## 2024-11-13 NOTE — LETTER
11/13/2024      Mainor Grande  [id number] [facility] [address]      Dear Colleague,    Thank you for referring your patient, Mainor Grande, to the [facility]. Please see a copy of my visit note below.    GI CLINIC VISIT           ASSESSMENT/PLAN:    # UC pancolitis, PSC - doing well clinically on entyvio - mildly elevated calprotectin although improved from baseline. Most recent complete colonoscopy in 2023 without active disease - aborted colonoscopy in March was normal. Will defer further colonoscopies given dementia and difficulties with prep. Would be reasonable to switch to entyvio pens for ease of administration - will have RNCC go over this today with Leno and his wife.    RTC 12 months or sooner if needed      CC/REFERRING MD:  Lily Eaton  REASON FOR CONSULTATION:   Lily Eaton for   Chief Complaint   Patient presents with     Follow Up     IBD         HPI    Leno presents today with his wife for follow-up of ulcerative colitis. Is doing well from a UC standpoint. Does spend a lot of time in the bathroom but denies any abdominal pain, diarrhea, etc. Tolerating a diet without any issue.   Recently went on a bus trip with family to [place]  Wondering about switching to the entyvio pens - planning to meet with  next week to discuss. Infusions go okay although has a lot of different appointments and with his underlying dementia sometimes makes things difficult.    IBD history:  Age at diagnosis: 64  Extent of disease: pancolitis  EIM: PSC  Current/previous medications: entyvio q4 weeks    ROS:    No fevers or chills  No weight loss  No blurry vision, double vision or change in vision  No sore throat  No lymphadenopathy  No headache, paraesthesias, or weakness in a limb  No shortness of breath or wheezing  No chest pain or pressure  No arthralgias or myalgias  No rashes or skin changes  No odynophagia or dysphagia  No BRBPR, hematochezia,  melena  No dysuria, frequency or urgency  No hot/cold intolerance or polyria  No anxiety or depression    PROBLEM LIST  Patient Active Problem List    Diagnosis Date Noted     Alzheimer's disease (H) 03/16/2023     Priority: Medium     PSC (primary sclerosing cholangitis) (H) 07/02/2021     Priority: Medium     Ulcerative pancolitis without complication (H) 08/05/2020     Priority: Medium     Memory changes 12/06/2019     Priority: Medium     Nummular eczema 02/12/2019     Priority: Medium     Deviated nasal septum 08/01/2013     Priority: Medium     Chronic nasal congestion 08/01/2013     Priority: Medium     Seasonal allergic rhinitis 07/18/2011     Priority: Medium     Primarily fall and spring allergies       HYPERLIPIDEMIA LDL GOAL <160 10/31/2010     Priority: Medium     Moderate persistent asthma with acute exacerbation 09/12/2008     Priority: Medium     Erectile dysfunction 06/03/2008     Priority: Medium       PERTINENT PAST MEDICAL HISTORY:  Past Medical History:   Diagnosis Date     Allergic rhinitis, cause unspecified      Fatty liver 2004    elevated LFT, saw GI for a consultation     Mild persistent asthma 09/12/2008     Reflux esophagitis      Unspecified disease of respiratory system     RAD     Viremia, unspecified     acute       PREVIOUS SURGERIES:  Past Surgical History:   Procedure Laterality Date     COLONOSCOPY  9/4/2013    Procedure: COLONOSCOPY;  colonoscopy;  Surgeon: Beto Keen MD;  Location:  GI     COLONOSCOPY N/A 7/31/2020    Procedure: Colonoscopy, With Polypectomy And Biopsy;  Surgeon: Lily Eaton DO;  Location: [facility] OR     COLONOSCOPY N/A 4/5/2021    Procedure: Colonoscopy, With Polypectomy And Biopsy;  Surgeon: Lily Eaton DO;  Location: [facility] OR     COLONOSCOPY N/A 5/3/2022    Procedure: COLONOSCOPY, WITH POLYPECTOMY AND BIOPSY;  Surgeon: Lily Eaton DO;  Location: [facility] OR     COLONOSCOPY N/A 5/3/2022    Procedure: COLONOSCOPY, FLEXIBLE, WITH LESION REMOVAL  USING SNARE;  Surgeon: Lily Eaton DO;  Location: [facility] OR     COLONOSCOPY N/A 5/18/2023    Procedure: COLONOSCOPY, WITH BIOPSY;  Surgeon: Lily Eaton DO;  Location: [facility] GI     COLONOSCOPY N/A 3/11/2024    Procedure: limited COLONOSCOPY, WITH POLYPECTOMY AND BIOPSY;  Surgeon: Tripp Herman MD;  Location: [facility]     COLONOSCOPY WITH CO2 INSUFFLATION N/A 7/31/2020    Procedure: COLONOSCOPY, WITH CO2 INSUFFLATION;  Surgeon: Lily Eaton DO;  Location: [facility]     COLONOSCOPY WITH CO2 INSUFFLATION N/A 4/5/2021    Procedure: COLONOSCOPY, WITH CO2 INSUFFLATION;  Surgeon: Lily Eaton DO;  Location: [facility]     COLONOSCOPY WITH CO2 INSUFFLATION N/A 5/3/2022    Procedure: COLONOSCOPY, WITH CO2 INSUFFLATION;  Surgeon: Lily Eaton DO;  Location: [facility]     COMBINED ESOPHAGOSCOPY, GASTROSCOPY, DUODENOSCOPY (EGD) WITH CO2 INSUFFLATION N/A 7/31/2020    Procedure: ESOPHAGOGASTRODUODENOSCOPY, WITH CO2 INSUFFLATION;  Surgeon: Lily Eaton DO;  Location: [facility]     ESOPHAGOSCOPY, GASTROSCOPY, DUODENOSCOPY (EGD), COMBINED N/A 7/31/2020    Procedure: Esophagogastroduodenoscopy, With Biopsy;  Surgeon: Lily Eaton DO;  Location: [facility]     ESOPHAGOSCOPY, GASTROSCOPY, DUODENOSCOPY (EGD), COMBINED N/A 3/11/2024    Procedure: ESOPHAGOGASTRODUODENOSCOPY, WITH BIOPSY;  Surgeon: Tripp Herman MD;  Location: [facility]     HC VASECTOMY UNILAT/BILAT W POSTOP SEMEN  1992    Vasectomy     ZZ COLONOSCOPY THRU STOMA, DIAGNOSTIC  2005    normal       ALLERGIES:     Allergies   Allergen Reactions     Seasonal Allergies        PERTINENT MEDICATIONS:    Current Outpatient Medications:      albuterol (PROAIR HFA/PROVENTIL HFA/VENTOLIN HFA) 108 (90 Base) MCG/ACT inhaler, Inhale 2 puffs into the lungs every 6 hours., Disp: 18 g, Rfl: 11     ASPIRIN PO, Take 81 mg by mouth daily, Disp: , Rfl:      atorvastatin (LIPITOR) 40 MG tablet, Take 1 tablet (40 mg) by mouth daily - Oral, Disp: 90 tablet, Rfl: 3     azelastine  (ASTELIN) 0.1 % nasal spray, SPRAY 2 SPRAYS IN EACH NOSTRIL TWO TIMES A DAY, Disp: 30 mL, Rfl: 3     benzonatate (TESSALON) 100 MG capsule, Take 1 capsule (100 mg) by mouth 3 times daily as needed for cough, Disp: 30 capsule, Rfl: 0     budesonide-formoterol (SYMBICORT) 160-4.5 MCG/ACT Inhaler, Inhale 2 puffs into the lungs 2 times daily., Disp: 30.6 g, Rfl: 3     clobetasol (TEMOVATE) 0.05 % external ointment, Apply topically 2 times daily. to affected area(s), Disp: 30 g, Rfl: 1     donepezil (ARICEPT) 5 MG tablet, Take 10 mg by mouth At Bedtime, Disp: , Rfl:      escitalopram (LEXAPRO) 10 MG tablet, Take 1 tablet by mouth daily, Disp: , Rfl:      famotidine (PEPCID) 40 MG tablet, TAKE ONE TABLET BY MOUTH TWO TIMES A DAY AS NEEDED FOR HEARTBURN, Disp: 180 tablet, Rfl: 1     fluticasone (FLONASE) 50 MCG/ACT nasal spray, Spray 1-2 sprays into both nostrils daily, Disp: 16 g, Rfl: 11     montelukast (SINGULAIR) 10 MG tablet, Take 1 tablet (10 mg) by mouth at bedtime., Disp: 90 tablet, Rfl: 3     Naphazoline-Glycerin-Zinc Sulf (CLEAR EYES MAXIMUM ITCHY EYE) 0.012-0.25-0.25 % SOLN, Apply to eye as needed (for allergies), Disp: , Rfl:      ursodiol (ACTIGALL) 300 MG capsule, TAKE ONE CAPSULE BY MOUTH TWICE A DAY, Disp: 180 capsule, Rfl: 1     vedolizumab (ENTYVIO) 300 mg injection, Inject 5 mLs (300 mg) into the vein every 4 weeks., Disp: , Rfl:      vitamin D3 (CHOLECALCIFEROL) 50 mcg (2000 units) tablet, Take 1 tablet (50 mcg) by mouth daily, Disp: 30 tablet, Rfl: 11  No current facility-administered medications for this visit.    SOCIAL HISTORY:  Social History     Socioeconomic History     Marital status:      Spouse name: Not on file     Number of children: 2     Years of education: Not on file     Highest education level: Not on file   Occupational History     Occupation:    Tobacco Use     Smoking status: Never     Passive exposure: Never     Smokeless tobacco: Never   Vaping Use      Vaping status: Never Used   Substance and Sexual Activity     Alcohol use: Yes     Alcohol/week: 1.0 - 2.0 standard drink of alcohol     Types: 1 - 2 Standard drinks or equivalent per week     Comment: occasional     Drug use: No     Sexual activity: Yes     Partners: Female     Birth control/protection: Male Surgical     Comment: vasectomy approx 1992   Other Topics Concern     Parent/sibling w/ CABG, MI or angioplasty before 65F 55M? No   Social History Narrative     Not on file     Social Drivers of Health     Financial Resource Strain: Low Risk  (11/10/2024)    Financial Resource Strain      Within the past 12 months, have you or your family members you live with been unable to get utilities (heat, electricity) when it was really needed?: No   Food Insecurity: Low Risk  (11/10/2024)    Food Insecurity      Within the past 12 months, did you worry that your food would run out before you got money to buy more?: No      Within the past 12 months, did the food you bought just not last and you didn t have money to get more?: No   Transportation Needs: Low Risk  (11/10/2024)    Transportation Needs      Within the past 12 months, has lack of transportation kept you from medical appointments, getting your medicines, non-medical meetings or appointments, work, or from getting things that you need?: No   Physical Activity: Insufficiently Active (11/10/2024)    Exercise Vital Sign      Days of Exercise per Week: 3 days      Minutes of Exercise per Session: 30 min   Stress: Stress Concern Present (11/10/2024)    [employer] [place] [employer] - Occupational Stress Questionnaire      Feeling of Stress : To some extent   Social Connections: Socially Integrated (11/10/2024)    Social Connection and Isolation Panel [NHANES]      Frequency of Communication with Friends and Family: Once a week      Frequency of Social Gatherings with Friends and Family: Three times a week      Attends Oriental orthodox Services: More than 4  times per year      Active Member of Clubs or Organizations: Yes      Attends Club or Organization Meetings: More than 4 times per year      Marital Status:    Interpersonal Safety: Not on file   Housing Stability: Low Risk  (11/10/2024)    Housing Stability      Do you have housing? : Yes      Are you worried about losing your housing?: No       FAMILY HISTORY:  Family History   Problem Relation Age of Onset     Cancer Mother         bladder cancer     Hyperlipidemia Mother      Other Cancer Mother         Bladder     Neurologic Disorder Father         alzheimers dementia     Hyperlipidemia Father      Gastrointestinal Disease Brother         divertculitis       Past/family/social history reviewed and no changes    PHYSICAL EXAMINATION:  Constitutional: aaox3, cooperative, pleasant, not dyspneic/diaphoretic, no acute distress  Vitals reviewed: /68   Pulse 73   Wt 92.5 kg (204 lb)   SpO2 99%   BMI 29.27 kg/m    Wt:   Wt Readings from Last 2 Encounters:   11/13/24 92.5 kg (204 lb)   11/12/24 93.8 kg (206 lb 12.8 oz)      Eyes: Sclera anicteric/injected  Ears/nose/mouth/throat: Normal oropharynx without ulcers or exudate, mucus membranes moist, hearing intact  Neck: supple, thyroid normal size  CV: No edema  Respiratory: Unlabored breathing  Skin: warm, perfused, no jaundice  Psych: Normal affect  MSK: Normal gait      PERTINENT STUDIES:  Most recent CBC:  Recent Labs   Lab Test 11/12/24  1359 10/15/24  1207   WBC 6.2 7.4   HGB 13.6 13.2*   HCT 40.5 40.3    236     Most recent hepatic panel:  Recent Labs   Lab Test 11/12/24  1359 10/15/24  1207   ALT 24 24   AST 37 36     Most recent creatinine:  Recent Labs   Lab Test 09/17/24  1202 11/01/23  0944   CR 0.95 0.94                  Again, thank you for allowing me to participate in the care of your patient.        Sincerely,        Lily Eaton, DO

## 2024-11-18 ENCOUNTER — VIRTUAL VISIT (OUTPATIENT)
Dept: PHARMACY | Facility: CLINIC | Age: 69
End: 2024-11-18
Attending: INTERNAL MEDICINE
Payer: COMMERCIAL

## 2024-11-18 VITALS — WEIGHT: 204 LBS | HEIGHT: 70 IN | BODY MASS INDEX: 29.2 KG/M2

## 2024-11-18 DIAGNOSIS — E78.5 HYPERLIPIDEMIA LDL GOAL <160: ICD-10-CM

## 2024-11-18 DIAGNOSIS — K51.00 ULCERATIVE PANCOLITIS WITHOUT COMPLICATION (H): Primary | ICD-10-CM

## 2024-11-18 DIAGNOSIS — Z78.9 TAKES DIETARY SUPPLEMENTS: ICD-10-CM

## 2024-11-18 DIAGNOSIS — J45.41 MODERATE PERSISTENT ASTHMA WITH ACUTE EXACERBATION: ICD-10-CM

## 2024-11-18 ASSESSMENT — PAIN SCALES - GENERAL: PAINLEVEL_OUTOF10: NO PAIN (0)

## 2024-11-18 NOTE — PATIENT INSTRUCTIONS
"Recommendations from today's MTM visit:                                                      I checked after your visit and prior authorization has been approved for the Entyvio pens.   Your next infusion will be 12/10/2024. Afterwards I will send a prescription to [facility] for the Entyvio pens. At that time I will provide more information on how to arrange for delivery. We will aim to have your first injection be given in place of infusion on 1/7/2025.   I suggest AGAINST enrolling in AARP or Express Scripts (LOAN) medicare advantage programs as these may have a shipping restriction that make it difficult for you to use [facility].   There is a Medicare Part D Prescription Payment (M3P), also known as a smoothing program, that offer enrollees the option to pay out-of-pocket prescription drug costs in the form of capped monthly payments instead of all at once at the pharmacy. You can choose to decline this if offered it by the pharmacy.     Follow-up: 1/2/2025 at 1:00 PM (telephone)    It was great speaking with you today.  I value your experience and would be very thankful for your time in providing feedback in our clinic survey. In the next few days, you may receive an email or text message from [employer] with a link to a survey related to your  clinical pharmacist.\"     To schedule another MTM appointment, please call the clinic directly or you may call the MTM scheduling line at [phone] or toll-free at [phone].     My Clinical Pharmacist's contact information:                                                      Please feel free to contact me with any questions or concerns you have.      Asuncion PowersD, BCPS  MTM Pharmacist   [facility]  Phone: [phone]   "

## 2024-11-18 NOTE — NURSING NOTE
Current patient location: [id number] [facility] [address]    Is the patient currently in the state of [place]? YES    Visit mode:TELEPHONE    If the visit is dropped, the patient can be reconnected by:TELEPHONE VISIT: Phone number:   Telephone Information:   Mobile [phone]       Will anyone else be joining the visit? Yes  (If patient encounters technical issues they should call [phone])    Are changes needed to the allergy or medication list? Pt's spouse completed echeck-in an confirms medications and allergies are correct.     Are refills needed on medications prescribed by this physician? NO    Rooming Documentation:  Not applicable    Reason for visit: KAREY DAI

## 2024-11-18 NOTE — PROGRESS NOTES
Medication Therapy Management (MTM) Encounter    ASSESSMENT:                            Medication Adherence/Access: No issues identified.    Ulcerative Colitis/PSC:  Mainor would benefit from continued treatment with Entyvio. They would like to transition to the Entyvio subcutaneous pens after the new year to optimize spending on their maximum out of pocket. His next infusion is scheduled 12/10/2024, we will plan to transition to pens with subsequent infusion on 1/7/2025. On a pharmacokinetic basis the equivalent dose to Entyvio 300 mg every 4 weeks is considered by some experts to be Entyvio 108 mg weekly, which is covered by the prior authorization we have secured.      They are up to date on routine maintenance labs. They are due for annual tuberculosis screening. No access issues for their advanced therapy are present. Health maintenance was not comprehensively reviewed with this visit.    ASCVD Prevention: Stable. Last lipid panel WNL.    Asthma: Stable.    Supplements: Stable.    Due to time constraints, I was only able to assess the above with the patient today.    PLAN:                            I checked after your visit and prior authorization has been approved for the Entyvio pens.   Your next infusion will be 12/10/2024. Afterwards I will send a prescription to [facility] for the Entyvio pens. At that time I will provide more information on how to arrange for delivery. We will aim to have your first injection be given in place of infusion on 1/7/2025.   I suggest AGAINST enrolling in AARP or Express Scripts (LOAN) medicare advantage programs as these may have a shipping restriction that make it difficult for you to use [facility].   There is a Medicare Part D Prescription Payment (M3P), also known as a smoothing program, that offer enrollees the option to pay out-of-pocket prescription drug costs in the form of capped monthly payments instead of all at once at the  pharmacy. You can choose to decline this if offered it by the pharmacy.     Follow-up: 1/2/2025 at 1:00 PM (telephone)    SUBJECTIVE/OBJECTIVE:                          Mainor Grande is a 69 year old male seen for a follow-up visit. Patient was accompanied by his wife Rani.      Reason for visit: Entyvio follow-up visit.    Allergies/ADRs: Reviewed in chart  Past Medical History: Reviewed in chart  Tobacco: He reports that he has never smoked. He has never been exposed to tobacco smoke. He has never used smokeless tobacco.  Alcohol: none    Medication Adherence/Access: Rani sets up medications in a pill box.     Ulcerative Colitis/PSC:   Entyvio 300 mg every 4 weeks  Ursodiol 300 mg twice daily    I met with Mainor and Rani for a follow-up visit to discuss Entyvio pens. He had a follow-up visit with Dr. Eaton 11/13/2024 and he and his wife were able to do injection training with nurse at that time. They feel comfortable proceeding with injections and they are going to stay with [facility].       Specialty medication department: [facility] GI  Prior authorization status: approved ([facility]); Entyvio pen approved through 5/3/2025; per clarification with pharmacy liaison this covers 4 pens/28 days.   Last dose: 11/12/2024  Next dose: 12/10/2024     Last provider visit: 11/13/2024 DO Caro  Next provider visit: RTC 12 months (not scheduled)  Last labs completed: 11/12/2024  Lab frequency: every other infusion              - standing labs   Next labs due: around Feb 2025     ASCVD Prevention:  Aspirin 81 mg once daily  Atorvastatin 40 mg once daily    Last lipid panel 11/5/2024. Denies side effects or concerns.    Asthma:  Albuterol 90 mcg/act inhaler 2 puffs every 6 hours as needed  Budesonide-formoterol 160-4.5 mcg/act inahler 2 puffs twice daily  Montelukast 10 mg once daily    Rani helps remind him to do the daily maintenance inhalers. Reminder provided to swish and spit after use, Leno is aware of this.  "    Supplements:  Vitamin D 2000 units once daily    Denies side effects or concerns.    Today's Vitals: Ht 5' 10\" (1.778 m)   Wt 204 lb (92.5 kg)   BMI 29.27 kg/m    ----------------      I spent 30 minutes with this patient today. All changes were made via collaborative practice agreement with Lily Eaton DO. A copy of the visit note was provided to the patient's provider(s).    A summary of these recommendations was sent via [employer].    Abiodun Campbell, PharmD, [facility]  [facility] Pharmacist   [facility]  Phone: [phone]    Telemedicine Visit Details  The patient's medications can be safely assessed via a telemedicine encounter.  Type of service:  Telephone visit  Originating Location (pt. Location): Home    Distant Location (provider location):  Off-site  Start Time:  1:00 PM  End Time:  1:30 PM     Medication Therapy Recommendations  No medication therapy recommendations to display       "

## 2024-11-18 NOTE — PROGRESS NOTES
"Virtual Visit Details    Type of service:  Telephone Visit   Phone call duration: *** minutes   Originating Location (pt. Location): {patient location:190772::\"Home\"}  {PROVIDER LOCATION On-site should be selected for visits conducted from your clinic location or adjoining [facility] hospital, academic office, or other nearby [facility] building. Off-site should be selected for all other provider locations, including home:[id number]}  Distant Location (provider location):  {virtual location provider:350980}  "

## 2024-12-10 ENCOUNTER — INFUSION THERAPY VISIT (OUTPATIENT)
Dept: INFUSION THERAPY | Facility: CLINIC | Age: 69
End: 2024-12-10
Attending: INTERNAL MEDICINE
Payer: COMMERCIAL

## 2024-12-10 VITALS
WEIGHT: 207 LBS | SYSTOLIC BLOOD PRESSURE: 116 MMHG | HEART RATE: 61 BPM | TEMPERATURE: 97.9 F | RESPIRATION RATE: 16 BRPM | DIASTOLIC BLOOD PRESSURE: 75 MMHG | OXYGEN SATURATION: 100 % | BODY MASS INDEX: 29.7 KG/M2

## 2024-12-10 DIAGNOSIS — K51.00 ULCERATIVE PANCOLITIS WITHOUT COMPLICATION (H): Primary | ICD-10-CM

## 2024-12-10 PROCEDURE — 99207 PR NO CHARGE LOS: CPT

## 2024-12-10 PROCEDURE — 250N000011 HC RX IP 250 OP 636: Mod: JZ | Performed by: INTERNAL MEDICINE

## 2024-12-10 PROCEDURE — 96365 THER/PROPH/DIAG IV INF INIT: CPT

## 2024-12-10 PROCEDURE — 258N000003 HC RX IP 258 OP 636: Performed by: INTERNAL MEDICINE

## 2024-12-10 RX ORDER — HEPARIN SODIUM,PORCINE 10 UNIT/ML
5 VIAL (ML) INTRAVENOUS
OUTPATIENT
Start: 2025-01-07

## 2024-12-10 RX ORDER — HEPARIN SODIUM (PORCINE) LOCK FLUSH IV SOLN 100 UNIT/ML 100 UNIT/ML
5 SOLUTION INTRAVENOUS
OUTPATIENT
Start: 2025-01-07

## 2024-12-10 RX ADMIN — VEDOLIZUMAB 300 MG: 300 INJECTION, POWDER, LYOPHILIZED, FOR SOLUTION INTRAVENOUS at 14:26

## 2024-12-10 NOTE — PROGRESS NOTES
Infusion Nursing Note:  Mainor Grande presents today for Entyvio.    Patient seen by provider today: No   present during visit today: Not Applicable.    Note: Patient and patient's wife, Rani, expressed no new medical concerns or changes today. Patient may be transitioning to Entyvio pens instead of infusions, wife stated she would call and cancel future appointments if this change gets approved.     Intravenous Access:  Peripheral IV placed.    Treatment Conditions:  Biological Infusion Checklist:  ~~~ NOTE: If the patient answers yes to any of the questions below, hold the infusion and contact ordering provider or on-call provider.    Have you recently had an elevated temperature, fever, chills, productive cough, coughing for 3 weeks or longer or hemoptysis,  abnormal vital signs, night sweats,  chest pain or have you noticed a decrease in your appetite, unexplained weight loss or fatigue? No  Do you have any open wounds or new incisions? No  Do you have any upcoming hospitalizations or surgeries? Does not include esophagogastroduodenoscopy, colonoscopy, endoscopic retrograde cholangiopancreatography (ERCP), endoscopic ultrasound (EUS), dental procedures or joint aspiration/steroid injections No  Do you currently have any signs of illness or infection or are you on any antibiotics? No  Have you had any new, sudden or worsening abdominal pain? No  Have you or anyone in your household received a live vaccination in the past 4 weeks? Please note: No live vaccines while on biologic/chemotherapy until 6 months after the last treatment. Patient can receive the flu vaccine (shot only), pneumovax and the Covid vaccine. It is optimal for the patient to get these vaccines mid cycle, but they can be given at any time as long as it is not on the day of the infusion. No  Have you recently been diagnosed with any new nervous system diseases (ie. Multiple sclerosis, Guillain [place], seizures, neurological changes)  or cancer diagnosis? Are you on any form of radiation or chemotherapy? No  Have there been any other new onset medical symptoms? No    Post Infusion Assessment:  Patient tolerated infusion without incident.  Blood return noted pre and post infusion.  Site patent and intact, free from redness, edema or discomfort.  No evidence of extravasations.  Access discontinued per protocol.  Biologic Infusion Post Education: Call the triage nurse at your clinic or seek medical attention if you have chills and/or temperature greater than or equal to 100.5, uncontrolled nausea/vomiting, diarrhea, constipation, dizziness, shortness of breath, chest pain, heart palpitations, weakness or any other new or concerning symptoms, questions or concerns.  You cannot have any live virus vaccines prior to or during treatment or up to 6 months post infusion.  If you have an upcoming surgery, medical procedure or dental procedure during treatment, this should be discussed with your ordering physician and your surgeon/dentist.  If you are having any concerning symptom, if you are unsure if you should get your next infusion or wish to speak to a provider before your next infusion, please call your care coordinator or triage nurse at your clinic to notify them so we can adequately serve you.     Discharge Plan:   Discharge instructions reviewed with: Patient.  Patient and/or family verbalized understanding of discharge instructions and all questions answered.  Patient discharged in stable condition accompanied by: wife.  Departure Mode: Ambulatory.  Future appts have been reviewed and crosschecked with appt note and plan. .    Kira Huang RN

## 2024-12-11 ENCOUNTER — TELEPHONE (OUTPATIENT)
Dept: PHARMACY | Facility: CLINIC | Age: 69
End: 2024-12-11
Payer: COMMERCIAL

## 2024-12-11 ENCOUNTER — TELEPHONE (OUTPATIENT)
Dept: GASTROENTEROLOGY | Facility: CLINIC | Age: 69
End: 2024-12-11
Payer: COMMERCIAL

## 2024-12-11 DIAGNOSIS — K51.00 ULCERATIVE PANCOLITIS WITHOUT COMPLICATION (H): Primary | ICD-10-CM

## 2024-12-11 NOTE — TELEPHONE ENCOUNTER
Prescription sent to Entyvio pens 108 mg weekly. Will start in place of next infusion 1/7/2025. See last MTM visit note for details.      Prior authorization status: Entyvio pen approved through 5/3/2025; per clarification with pharmacy liaison this covers 4 pens/28 days.   Last dose: 11/12/2024  Next dose: 12/10/2024     Last provider visit: 11/13/2024 DO Caro  Next provider visit: RTC 12 months (not scheduled)  Last labs completed: 11/12/2024  Lab frequency: every other infusion              - standing labs   Next labs due: around Feb 2025

## 2024-12-11 NOTE — TELEPHONE ENCOUNTER
GEORGE INITIATED    Medication: ENTYVIO  MG/0.68ML SC SOAJ  [facility] Name: fpap  Date submitted: 12/11/2024 10:39 AM   Foundation Phone:    [facility] Fax:    Talked with his wife and she didn't think she would qualify for FPAP. She told me what she thought their income was last year and they would qualify. She is going to look for her tax return and call me back

## 2024-12-17 NOTE — TELEPHONE ENCOUNTER
GEORGE DENIED    Medication: ENTYVIO  MG/0.68ML SC SOAJ  [facility] Name: fpap  Denial Reason(s): finances to high  Denial Date: 12/17/2024  Patient Notified:  yes  Additional Information:

## 2025-01-02 ENCOUNTER — VIRTUAL VISIT (OUTPATIENT)
Dept: PHARMACY | Facility: CLINIC | Age: 70
End: 2025-01-02
Attending: INTERNAL MEDICINE
Payer: COMMERCIAL

## 2025-01-02 VITALS — BODY MASS INDEX: 29.35 KG/M2 | WEIGHT: 205 LBS | HEIGHT: 70 IN

## 2025-01-02 DIAGNOSIS — K51.00 ULCERATIVE PANCOLITIS WITHOUT COMPLICATION (H): Primary | ICD-10-CM

## 2025-01-02 ASSESSMENT — PAIN SCALES - GENERAL: PAINLEVEL_OUTOF10: NO PAIN (0)

## 2025-01-02 NOTE — PATIENT INSTRUCTIONS
"Recommendations from today's MTM visit:                                                      Reach out to [facility] and arrange for delivery. Plan to administer Entyvio injection in place of next infusion on 1/7/2025.  Reminder to cancel your infusion appointments. Currently scheduled for 1/7/2025 and 2/4/2025.  Watch the following video on how to administer Entyvio: [email or web address]    Follow-up: 1/27/2025 at 11:00 AM (telephone)    It was great speaking with you today.  I value your experience and would be very thankful for your time in providing feedback in our clinic survey. In the next few days, you may receive an email or text message from [employer] with a link to a survey related to your  clinical pharmacist.\"     To schedule another MTM appointment, please call the clinic directly or you may call the MTM scheduling line at [phone] or toll-free at [phone].     My Clinical Pharmacist's contact information:                                                      Please feel free to contact me with any questions or concerns you have.      Abiodun Campbell, PharmD, BCPS  MTM Pharmacist   [facility]  Phone: [phone]   "

## 2025-01-02 NOTE — NURSING NOTE
Current patient location: [id number] [facility] [address]    Is the patient currently in the state of [place]? YES    Visit mode:TELEPHONE    If the visit is dropped, the patient can be reconnected by:TELEPHONE VISIT: Phone number:   Telephone Information:   Mobile [phone]       Will anyone else be joining the visit? NO  (If patient encounters technical issues they should call [phone]    Are changes needed to the allergy or medication list? No    Are refills needed on medications prescribed by this physician? NO    Rooming Documentation:  Questionnaire(s) not done per department protocol    Reason for visit: KAREY DAI

## 2025-01-02 NOTE — PROGRESS NOTES
Medication Therapy Management (MTM) Encounter    ASSESSMENT:                            Medication Adherence/Access: No issues identified.    Ulcerative Colitis/PSC:  Mainro would benefit from continued treatment with Entyvio and ursdiol. We will plan to transition to Entyvio 108 mg pen every 7 days starting on 1/7/2025. They are up to date on routine maintenance labs. They are due for annual tuberculosis screening. No access issues for their advanced therapy are present. Health maintenance was not comprehensively reviewed with this visit.    PLAN:                            Reach out to [facility] and arrange for delivery. Plan to administer Entyvio injection in place of next infusion on 1/7/2025.  Reminder to cancel your infusion appointments. Currently scheduled for 1/7/2025 and 2/4/2025.  Watch the following video on how to administer Entyvio: [email or web address]    Follow-up: 1/27/2025 at 11:00 AM (telephone)    SUBJECTIVE/OBJECTIVE:                          Mainor Grande is a 69 year old male seen for a follow-up visit.  Patient was accompanied by his wife Rani.          Reason for visit: Entyvio self-injection transition.    Allergies/ADRs: Reviewed in chart  Past Medical History: Reviewed in chart  Tobacco: He reports that he has never smoked. He has never been exposed to tobacco smoke. He has never used smokeless tobacco.  Alcohol: none      Medication Adherence/Access: Rani sets up medications in a pill box; Rani will administer the Entyvio pen.    Ulcerative Colitis/PSC:   Entyvio 300 mg every 4 weeks--> Transition to Entyvio 108 mg every 7 days  Ursodiol 300 mg twice daily     Patient was accompanied by his wife Rani.          Going to [place] February 4th-20th.     Copied 11/18/2024:  I met with Mainor and Rani for a follow-up visit to discuss Entyvio pens. He had a follow-up visit with Dr. Eaton 11/13/2024 and he and his wife were able to do injection  "training with nurse at that time. They feel comfortable proceeding with injections and they are going to stay with [facility].      Specialty medication department: [facility]  Prior authorization status: approved ([facility]); Entyvio pen approved through 5/3/2025; per clarification with pharmacy liaison this covers 4 pens/28 days.   Last dose: 12/10/2025  Next dose: 2025 (first Entyvio subcutaneous pen)     Last provider visit: 2024 DO Caro  Next provider visit: RTC 12 months (not scheduled)  Last labs completed: 2024  Lab frequency: every other infusion              - standing labs crp, cbc with platelets & diff, hepatic function  2025    Today's Vitals: Ht 5' 10\" (1.778 m)   Wt 205 lb (93 kg)   BMI 29.41 kg/m    ----------------      I spent 18 minutes with this patient today. All changes were made via collaborative practice agreement with Lily Eaton DO.     A summary of these recommendations was sent via DearJane.    Abiodun Campbell, PharmD, BCPS  MTM Pharmacist   [facility]  Phone: [phone]    Telemedicine Visit Details  The patient's medications can be safely assessed via a telemedicine encounter.  Type of service:  Telephone visit  Originating Location (pt. Location): Home    Distant Location (provider location):  Off-site  Start Time:  1:00 PM  End Time:  1:18 PM     Medication Therapy Recommendations  No medication therapy recommendations to display       "

## 2025-01-02 NOTE — PROGRESS NOTES
"Virtual Visit Details    Type of service:  Telephone Visit   Phone call duration: *** minutes   Originating Location (pt. Location): {patient location:680645::\"Home\"}  {PROVIDER LOCATION On-site should be selected for visits conducted from your clinic location or adjoining [facility] hospital, academic office, or other nearby [facility] building. Off-site should be selected for all other provider locations, including home:987696}  Distant Location (provider location):  {virtual location provider:610552}  Telephone visit completed due to {audio only reason:338977}  "

## 2025-01-06 ENCOUNTER — MYC MEDICAL ADVICE (OUTPATIENT)
Dept: GASTROENTEROLOGY | Facility: CLINIC | Age: 70
End: 2025-01-06
Payer: COMMERCIAL

## 2025-01-06 ENCOUNTER — LAB (OUTPATIENT)
Dept: LAB | Facility: OTHER | Age: 70
End: 2025-01-06
Payer: COMMERCIAL

## 2025-01-06 DIAGNOSIS — K51.00 ULCERATIVE PANCOLITIS WITHOUT COMPLICATION (H): ICD-10-CM

## 2025-01-06 LAB
ALBUMIN SERPL BCG-MCNC: 4.2 G/DL (ref 3.5–5.2)
ALP SERPL-CCNC: 164 U/L (ref 40–150)
ALT SERPL W P-5'-P-CCNC: 39 U/L (ref 0–70)
AST SERPL W P-5'-P-CCNC: 43 U/L (ref 0–45)
BASOPHILS # BLD AUTO: 0.1 10E3/UL (ref 0–0.2)
BASOPHILS NFR BLD AUTO: 1 %
BILIRUB DIRECT SERPL-MCNC: <0.2 MG/DL (ref 0–0.3)
BILIRUB SERPL-MCNC: 0.5 MG/DL
CRP SERPL-MCNC: <3 MG/L
EOSINOPHIL # BLD AUTO: 0.3 10E3/UL (ref 0–0.7)
EOSINOPHIL NFR BLD AUTO: 5 %
ERYTHROCYTE [DISTWIDTH] IN BLOOD BY AUTOMATED COUNT: 12.4 % (ref 10–15)
HCT VFR BLD AUTO: 42.7 % (ref 40–53)
HGB BLD-MCNC: 14.1 G/DL (ref 13.3–17.7)
IMM GRANULOCYTES # BLD: 0 10E3/UL
IMM GRANULOCYTES NFR BLD: 0 %
LYMPHOCYTES # BLD AUTO: 1.8 10E3/UL (ref 0.8–5.3)
LYMPHOCYTES NFR BLD AUTO: 30 %
MCH RBC QN AUTO: 30.7 PG (ref 26.5–33)
MCHC RBC AUTO-ENTMCNC: 33 G/DL (ref 31.5–36.5)
MCV RBC AUTO: 93 FL (ref 78–100)
MONOCYTES # BLD AUTO: 0.8 10E3/UL (ref 0–1.3)
MONOCYTES NFR BLD AUTO: 13 %
NEUTROPHILS # BLD AUTO: 3 10E3/UL (ref 1.6–8.3)
NEUTROPHILS NFR BLD AUTO: 51 %
PLATELET # BLD AUTO: 238 10E3/UL (ref 150–450)
PROT SERPL-MCNC: 7.2 G/DL (ref 6.4–8.3)
RBC # BLD AUTO: 4.6 10E6/UL (ref 4.4–5.9)
WBC # BLD AUTO: 5.9 10E3/UL (ref 4–11)

## 2025-01-06 PROCEDURE — 86481 TB AG RESPONSE T-CELL SUSP: CPT

## 2025-01-06 PROCEDURE — 80076 HEPATIC FUNCTION PANEL: CPT

## 2025-01-06 PROCEDURE — 36415 COLL VENOUS BLD VENIPUNCTURE: CPT

## 2025-01-06 PROCEDURE — 86140 C-REACTIVE PROTEIN: CPT

## 2025-01-06 PROCEDURE — 85025 COMPLETE CBC W/AUTO DIFF WBC: CPT

## 2025-01-08 LAB
GAMMA INTERFERON BACKGROUND BLD IA-ACNC: 0.05 IU/ML
M TB IFN-G BLD-IMP: NEGATIVE
M TB IFN-G CD4+ BCKGRND COR BLD-ACNC: 9.95 IU/ML
MITOGEN IGNF BCKGRD COR BLD-ACNC: 0.01 IU/ML
MITOGEN IGNF BCKGRD COR BLD-ACNC: 0.01 IU/ML
QUANTIFERON MITOGEN: 10 IU/ML
QUANTIFERON NIL TUBE: 0.05 IU/ML
QUANTIFERON TB1 TUBE: 0.06 IU/ML
QUANTIFERON TB2 TUBE: 0.06

## 2025-01-24 ENCOUNTER — APPOINTMENT (OUTPATIENT)
Dept: MRI IMAGING | Facility: CLINIC | Age: 70
End: 2025-01-24
Payer: COMMERCIAL

## 2025-01-24 ENCOUNTER — HOSPITAL ENCOUNTER (EMERGENCY)
Facility: CLINIC | Age: 70
Discharge: HOME | End: 2025-01-24
Payer: COMMERCIAL

## 2025-01-24 VITALS
TEMPERATURE: 98.1 F | RESPIRATION RATE: 16 BRPM | WEIGHT: 205 LBS | HEART RATE: 76 BPM | DIASTOLIC BLOOD PRESSURE: 60 MMHG | SYSTOLIC BLOOD PRESSURE: 108 MMHG | OXYGEN SATURATION: 98 % | BODY MASS INDEX: 29.41 KG/M2

## 2025-01-24 DIAGNOSIS — G30.9 ALZHEIMER'S DISEASE (H): ICD-10-CM

## 2025-01-24 DIAGNOSIS — F02.80 ALZHEIMER'S DISEASE (H): ICD-10-CM

## 2025-01-24 DIAGNOSIS — R25.1 TREMOR: ICD-10-CM

## 2025-01-24 DIAGNOSIS — R27.0 ATAXIA: ICD-10-CM

## 2025-01-24 LAB
ALBUMIN SERPL BCG-MCNC: 4 G/DL (ref 3.5–5.2)
ALBUMIN UR-MCNC: 50 MG/DL
ALP SERPL-CCNC: 169 U/L (ref 40–150)
ALT SERPL W P-5'-P-CCNC: 36 U/L (ref 0–70)
ANION GAP SERPL CALCULATED.3IONS-SCNC: 14 MMOL/L (ref 7–15)
APPEARANCE UR: CLEAR
AST SERPL W P-5'-P-CCNC: 57 U/L (ref 0–45)
BASOPHILS # BLD AUTO: 0.1 10E3/UL (ref 0–0.2)
BASOPHILS NFR BLD AUTO: 1 %
BILIRUB DIRECT SERPL-MCNC: 0.21 MG/DL (ref 0–0.3)
BILIRUB SERPL-MCNC: 0.6 MG/DL
BILIRUB UR QL STRIP: NEGATIVE
BUN SERPL-MCNC: 24.3 MG/DL (ref 8–23)
CALCIUM SERPL-MCNC: 9.1 MG/DL (ref 8.8–10.4)
CAOX CRY #/AREA URNS HPF: ABNORMAL /HPF
CHLORIDE SERPL-SCNC: 97 MMOL/L (ref 98–107)
COLOR UR AUTO: YELLOW
CREAT SERPL-MCNC: 1.28 MG/DL (ref 0.67–1.17)
EGFRCR SERPLBLD CKD-EPI 2021: 60 ML/MIN/1.73M2
EOSINOPHIL # BLD AUTO: 0 10E3/UL (ref 0–0.7)
EOSINOPHIL NFR BLD AUTO: 0 %
ERYTHROCYTE [DISTWIDTH] IN BLOOD BY AUTOMATED COUNT: 12.7 % (ref 10–15)
GLUCOSE SERPL-MCNC: 101 MG/DL (ref 70–99)
GLUCOSE UR STRIP-MCNC: NEGATIVE MG/DL
HCO3 SERPL-SCNC: 23 MMOL/L (ref 22–29)
HCT VFR BLD AUTO: 44.7 % (ref 40–53)
HGB BLD-MCNC: 15.1 G/DL (ref 13.3–17.7)
HGB UR QL STRIP: NEGATIVE
IMM GRANULOCYTES # BLD: 0 10E3/UL
IMM GRANULOCYTES NFR BLD: 0 %
KETONES UR STRIP-MCNC: ABNORMAL MG/DL
LEUKOCYTE ESTERASE UR QL STRIP: NEGATIVE
LYMPHOCYTES # BLD AUTO: 1.9 10E3/UL (ref 0.8–5.3)
LYMPHOCYTES NFR BLD AUTO: 28 %
MCH RBC QN AUTO: 30.7 PG (ref 26.5–33)
MCHC RBC AUTO-ENTMCNC: 33.8 G/DL (ref 31.5–36.5)
MCV RBC AUTO: 91 FL (ref 78–100)
MONOCYTES # BLD AUTO: 1.3 10E3/UL (ref 0–1.3)
MONOCYTES NFR BLD AUTO: 19 %
MUCOUS THREADS #/AREA URNS LPF: PRESENT /LPF
NEUTROPHILS # BLD AUTO: 3.4 10E3/UL (ref 1.6–8.3)
NEUTROPHILS NFR BLD AUTO: 52 %
NITRATE UR QL: NEGATIVE
NRBC # BLD AUTO: 0 10E3/UL
NRBC BLD AUTO-RTO: 0 /100
PH UR STRIP: 6 [PH] (ref 5–7)
PLATELET # BLD AUTO: 206 10E3/UL (ref 150–450)
POTASSIUM SERPL-SCNC: 4.4 MMOL/L (ref 3.4–5.3)
PROT SERPL-MCNC: 7.2 G/DL (ref 6.4–8.3)
RBC # BLD AUTO: 4.92 10E6/UL (ref 4.4–5.9)
RBC URINE: 0 /HPF
SODIUM SERPL-SCNC: 134 MMOL/L (ref 135–145)
SP GR UR STRIP: 1.02 (ref 1–1.03)
UROBILINOGEN UR STRIP-MCNC: 3 MG/DL
WBC # BLD AUTO: 6.6 10E3/UL (ref 4–11)
WBC URINE: 0 /HPF

## 2025-01-24 PROCEDURE — 99285 EMERGENCY DEPT VISIT HI MDM: CPT | Mod: 25

## 2025-01-24 PROCEDURE — 96360 HYDRATION IV INFUSION INIT: CPT | Mod: 59

## 2025-01-24 PROCEDURE — 82248 BILIRUBIN DIRECT: CPT

## 2025-01-24 PROCEDURE — 99284 EMERGENCY DEPT VISIT MOD MDM: CPT

## 2025-01-24 PROCEDURE — 82040 ASSAY OF SERUM ALBUMIN: CPT

## 2025-01-24 PROCEDURE — 81001 URINALYSIS AUTO W/SCOPE: CPT

## 2025-01-24 PROCEDURE — 255N000002 HC RX 255 OP 636

## 2025-01-24 PROCEDURE — 85025 COMPLETE CBC W/AUTO DIFF WBC: CPT

## 2025-01-24 PROCEDURE — A9585 GADOBUTROL INJECTION: HCPCS

## 2025-01-24 PROCEDURE — 36415 COLL VENOUS BLD VENIPUNCTURE: CPT

## 2025-01-24 PROCEDURE — 70553 MRI BRAIN STEM W/O & W/DYE: CPT

## 2025-01-24 PROCEDURE — 258N000003 HC RX IP 258 OP 636

## 2025-01-24 RX ORDER — GADOBUTROL 604.72 MG/ML
10 INJECTION INTRAVENOUS ONCE
Status: COMPLETED | OUTPATIENT
Start: 2025-01-24 | End: 2025-01-24

## 2025-01-24 RX ADMIN — GADOBUTROL 10 ML: 604.72 INJECTION INTRAVENOUS at 18:56

## 2025-01-24 RX ADMIN — SODIUM CHLORIDE 1000 ML: 9 INJECTION, SOLUTION INTRAVENOUS at 19:28

## 2025-01-24 ASSESSMENT — ENCOUNTER SYMPTOMS
ABDOMINAL PAIN: 0
APPETITE CHANGE: 0
RHINORRHEA: 0
DYSURIA: 0
EYE REDNESS: 0
COUGH: 0
NAUSEA: 0
HEMATURIA: 0
HEADACHES: 0
DIZZINESS: 0
VOMITING: 0
NECK STIFFNESS: 0
DIARRHEA: 0
MYALGIAS: 0
ACTIVITY CHANGE: 0
ARTHRALGIAS: 0
FEVER: 0
SHORTNESS OF BREATH: 0
FATIGUE: 0
SORE THROAT: 0
CHILLS: 0

## 2025-01-24 ASSESSMENT — COLUMBIA-SUICIDE SEVERITY RATING SCALE - C-SSRS
2. HAVE YOU ACTUALLY HAD ANY THOUGHTS OF KILLING YOURSELF IN THE PAST MONTH?: NO
6. HAVE YOU EVER DONE ANYTHING, STARTED TO DO ANYTHING, OR PREPARED TO DO ANYTHING TO END YOUR LIFE?: NO
1. IN THE PAST MONTH, HAVE YOU WISHED YOU WERE DEAD OR WISHED YOU COULD GO TO SLEEP AND NOT WAKE UP?: NO

## 2025-01-24 ASSESSMENT — ACTIVITIES OF DAILY LIVING (ADL)
ADLS_ACUITY_SCORE: 41

## 2025-01-24 NOTE — ED TRIAGE NOTES
"Patient presents with concern of ongoing balance issues. Spouse reports it has been an issue since 2020, but recently he has been getting worse. Reports he has \"episodes\" where position changes cause him to jerk muscles and twitch. Lasts about 10 seconds. Has dementia and can be poor historian, but is A/O x4.     Triage Assessment (Adult)       Row Name 01/24/25 5630          Triage Assessment    Airway WDL WDL        Respiratory WDL    Respiratory WDL WDL        Skin Circulation/Temperature WDL    Skin Circulation/Temperature WDL WDL        Cardiac WDL    Cardiac WDL WDL        Peripheral/Neurovascular WDL    Peripheral Neurovascular WDL WDL        Cognitive/Neuro/Behavioral WDL    Cognitive/Neuro/Behavioral WDL X     Level of Consciousness alert     Arousal Level opens eyes spontaneously     Orientation oriented x 4     Speech logical;clear;spontaneous     Mood/Behavior calm;cooperative        Pupils (CN II)    Pupil PERRLA yes     Pupil Size Left 2 mm     Pupil Size Right 2 mm        Hector Coma Scale    Best Eye Response 4-->(E4) spontaneous     Best Motor Response 6-->(M6) obeys commands     Best Verbal Response 5-->(V5) oriented     [place] Coma Scale Score 15                     "

## 2025-01-25 NOTE — ED NOTES
Pt had episode of extremity jerks once back in the room and his wife and writer helped him to sit in the chair in the room. Pt stopped once sitting and recovered. Pt was then able to changed int a gown and move to the bed. Shirley Mcduffie RN

## 2025-01-25 NOTE — ED PROVIDER NOTES
History     Chief Complaint   Patient presents with    Balance Issues     HPI  Mainor Grande is a 69 year old male who presents to the emergency department with his wife with concerns for recent gait changes and describes them as episodes.  His wife states that they started around Abdiaziz time and have progressively been getting worse and more frequent.  She describes them as tremor like when he stands up, where he almost freezes and is unable to move and he needs to be helped to a seated position.   He has not had any falls due to this but that is a concern for both him and his wife.  Patient denies any symptoms associated with his gait abnormally, his wife is fearful of him falling.  Patient denies bladder/bowel dysfunction, denies headache fever chills nausea and vomiting.  Patient does take Aricept daily for dementia but is not correlate these symptoms starting with any medications that he is currently on.    Allergies:  Allergies   Allergen Reactions    Seasonal Allergies        Problem List:    Patient Active Problem List    Diagnosis Date Noted    Alzheimer's disease (H) 03/16/2023     Priority: Medium    PSC (primary sclerosing cholangitis) (H) 07/02/2021     Priority: Medium    Ulcerative pancolitis without complication (H) 08/05/2020     Priority: Medium    Memory changes 12/06/2019     Priority: Medium    Nummular eczema 02/12/2019     Priority: Medium    Deviated nasal septum 08/01/2013     Priority: Medium    Chronic nasal congestion 08/01/2013     Priority: Medium    Seasonal allergic rhinitis 07/18/2011     Priority: Medium     Primarily fall and spring allergies      HYPERLIPIDEMIA LDL GOAL <160 10/31/2010     Priority: Medium    Moderate persistent asthma with acute exacerbation 09/12/2008     Priority: Medium    Erectile dysfunction 06/03/2008     Priority: Medium        Past Medical History:    Past Medical History:   Diagnosis Date    Allergic rhinitis, cause unspecified     Fatty liver  2004    Mild persistent asthma 09/12/2008    Reflux esophagitis     Unspecified disease of respiratory system     Viremia, unspecified        Past Surgical History:    Past Surgical History:   Procedure Laterality Date    COLONOSCOPY  9/4/2013    Procedure: COLONOSCOPY;  colonoscopy;  Surgeon: Beto Keen MD;  Location: [facility]    COLONOSCOPY N/A 7/31/2020    Procedure: Colonoscopy, With Polypectomy And Biopsy;  Surgeon: Lily Eaton DO;  Location: [facility]    COLONOSCOPY N/A 4/5/2021    Procedure: Colonoscopy, With Polypectomy And Biopsy;  Surgeon: Lily Eaton DO;  Location: [facility]    COLONOSCOPY N/A 5/3/2022    Procedure: COLONOSCOPY, WITH POLYPECTOMY AND BIOPSY;  Surgeon: Lily Eaton DO;  Location: [facility]    COLONOSCOPY N/A 5/3/2022    Procedure: COLONOSCOPY, FLEXIBLE, WITH LESION REMOVAL USING SNARE;  Surgeon: Lily Eaton DO;  Location: [facility]    COLONOSCOPY N/A 5/18/2023    Procedure: COLONOSCOPY, WITH BIOPSY;  Surgeon: Lily Eaton DO;  Location: [facility]    COLONOSCOPY N/A 3/11/2024    Procedure: limited COLONOSCOPY, WITH POLYPECTOMY AND BIOPSY;  Surgeon: Tripp Herman MD;  Location: [facility]    COLONOSCOPY WITH CO2 INSUFFLATION N/A 7/31/2020    Procedure: COLONOSCOPY, WITH CO2 INSUFFLATION;  Surgeon: Lily Eaton DO;  [facility]: [facility]    COLONOSCOPY WITH CO2 INSUFFLATION N/A 4/5/2021    Procedure: COLONOSCOPY, WITH CO2 INSUFFLATION;  Surgeon: Lily Eaton DO;  [facility]: [facility]    COLONOSCOPY WITH CO2 INSUFFLATION N/A 5/3/2022    Procedure: COLONOSCOPY, WITH CO2 INSUFFLATION;  Surgeon: Lily Eaton DO;  Location: [facility]    COMBINED ESOPHAGOSCOPY, GASTROSCOPY, DUODENOSCOPY (EGD) WITH CO2 INSUFFLATION N/A 7/31/2020    Procedure: ESOPHAGOGASTRODUODENOSCOPY, WITH CO2 INSUFFLATION;  Surgeon: Lily Eaton DO;  Location: [facility]    ESOPHAGOSCOPY, GASTROSCOPY, DUODENOSCOPY (EGD), COMBINED N/A 7/31/2020    Procedure: Esophagogastroduodenoscopy, With Biopsy;  Surgeon: Uma  DO Lily;  Location: [facility]    ESOPHAGOSCOPY, GASTROSCOPY, DUODENOSCOPY (EGD), COMBINED N/A 3/11/2024    Procedure: ESOPHAGOGASTRODUODENOSCOPY, WITH BIOPSY;  Surgeon: Tripp Herman MD;  Location: [facility]    HC VASECTOMY UNILAT/BILAT W POSTOP SEMEN  1992    Vasectomy    ZZHC COLONOSCOPY THRU STOMA, DIAGNOSTIC  2005    normal       Family History:    Family History   Problem Relation Age of Onset    Cancer Mother         bladder cancer    Hyperlipidemia Mother     Other Cancer Mother         Bladder    Neurologic Disorder Father         alzheimers dementia    Hyperlipidemia Father     Gastrointestinal Disease Brother         divertculitis       Social History:  Marital Status:   [2]  Social History     Tobacco Use    Smoking status: Never     Passive exposure: Never    Smokeless tobacco: Never   Vaping Use    Vaping status: Never Used   Substance Use Topics    Alcohol use: Yes     Alcohol/week: 1.0 - 2.0 standard drink of alcohol     Types: 1 - 2 Standard drinks or equivalent per week     Comment: occasional    Drug use: No        Medications:    albuterol (PROAIR HFA/PROVENTIL HFA/VENTOLIN HFA) 108 (90 Base) MCG/ACT inhaler  ASPIRIN PO  atorvastatin (LIPITOR) 40 MG tablet  budesonide-formoterol (SYMBICORT) 160-4.5 MCG/ACT Inhaler  clobetasol (TEMOVATE) 0.05 % external ointment  donepezil (ARICEPT) 5 MG tablet  escitalopram (LEXAPRO) 10 MG tablet  famotidine (PEPCID) 40 MG tablet  montelukast (SINGULAIR) 10 MG tablet  Naphazoline-Glycerin-Zinc Sulf (CLEAR EYES MAXIMUM ITCHY EYE) 0.012-0.25-0.25 % SOLN  ursodiol (ACTIGALL) 300 MG capsule  Vedolizumab 108 MG/0.68ML SOAJ  vitamin D3 (CHOLECALCIFEROL) 50 mcg (2000 units) tablet          Review of Systems   Constitutional:  Negative for activity change, appetite change, chills, fatigue and fever.   HENT:  Negative for congestion, rhinorrhea and sore throat.    Eyes:  Negative for redness.   Respiratory:  Negative for cough and shortness of breath.     Cardiovascular:  Negative for chest pain.   Gastrointestinal:  Negative for abdominal pain, diarrhea, nausea and vomiting.   Genitourinary:  Negative for dysuria and hematuria.   Musculoskeletal:  Positive for gait problem. Negative for arthralgias, myalgias and neck stiffness.   Skin:  Negative for rash.   Neurological:  Negative for dizziness and headaches.       Physical Exam   BP: (!) 132/117  Pulse: 74  Temp: 98.1  F (36.7  C)  Resp: 18  Weight: 93 kg (205 lb)  SpO2: 98 %  Lying Orthostatic BP: 96/79 (MAP 81)  Lying Orthostatic Pulse: 80 bpm  Sitting Orthostatic BP: 105/64 (MAP 81)  Sitting Orthostatic Pulse: 82 bpm  Standing Orthostatic BP: (S) 82/55 (MAP 65)  Standing Orthostatic Pulse: (S) 86 bpm      Physical Exam  Constitutional:       General: He is not in acute distress.     Appearance: Normal appearance. He is not toxic-appearing.   HENT:      Head: Atraumatic.   Eyes:      General: No scleral icterus.     Conjunctiva/sclera: Conjunctivae normal.   Cardiovascular:      Rate and Rhythm: Normal rate.      Heart sounds: Normal heart sounds.   Pulmonary:      Effort: Pulmonary effort is normal. No respiratory distress.      Breath sounds: Normal breath sounds.   Abdominal:      Palpations: Abdomen is soft.      Tenderness: There is no abdominal tenderness.   Musculoskeletal:         General: No deformity. Normal range of motion.      Cervical back: Normal range of motion and neck supple. No rigidity.   Skin:     General: Skin is warm.   Neurological:      General: No focal deficit present.      Mental Status: He is alert and oriented to person, place, and time. Mental status is at baseline.      Coordination: Coordination abnormal.      Gait: Gait abnormal.         ED Course        Procedures                Results for orders placed or performed during the hospital encounter of 01/24/25 (from the past 24 hours)   CBC with platelets differential    Narrative    The following orders were created for panel  order CBC with platelets differential.  Procedure                               Abnormality         Status                     ---------                               -----------         ------                     CBC with platelets and d...[[id number]]                      Final result                 Please view results for these tests on the individual orders.   Comprehensive metabolic panel   Result Value Ref Range    Sodium 134 (L) 135 - 145 mmol/L    Potassium 4.4 3.4 - 5.3 mmol/L    Carbon Dioxide (CO2) 23 22 - 29 mmol/L    Anion Gap 14 7 - 15 mmol/L    Urea Nitrogen 24.3 (H) 8.0 - 23.0 mg/dL    Creatinine 1.28 (H) 0.67 - 1.17 mg/dL    GFR Estimate 60 (L) >60 mL/min/1.73m2    Calcium 9.1 8.8 - 10.4 mg/dL    Chloride 97 (L) 98 - 107 mmol/L    Glucose 101 (H) 70 - 99 mg/dL    Alkaline Phosphatase 169 (H) 40 - 150 U/L    AST 57 (H) 0 - 45 U/L    ALT 36 0 - 70 U/L    Protein Total 7.2 6.4 - 8.3 g/dL    Albumin 4.0 3.5 - 5.2 g/dL    Bilirubin Total 0.6 <=1.2 mg/dL   Bilirubin direct   Result Value Ref Range    Bilirubin Direct 0.21 0.00 - 0.30 mg/dL   CBC with platelets and differential   Result Value Ref Range    WBC Count 6.6 4.0 - 11.0 10e3/uL    RBC Count 4.92 4.40 - 5.90 10e6/uL    Hemoglobin 15.1 13.3 - 17.7 g/dL    Hematocrit 44.7 40.0 - 53.0 %    MCV 91 78 - 100 fL    MCH 30.7 26.5 - 33.0 pg    MCHC 33.8 31.5 - 36.5 g/dL    RDW 12.7 10.0 - 15.0 %    Platelet Count 206 150 - 450 10e3/uL    % Neutrophils 52 %    % Lymphocytes 28 %    % Monocytes 19 %    % Eosinophils 0 %    % Basophils 1 %    % Immature Granulocytes 0 %    NRBCs per 100 WBC 0 <1 /100    Absolute Neutrophils 3.4 1.6 - 8.3 10e3/uL    Absolute Lymphocytes 1.9 0.8 - 5.3 10e3/uL    Absolute Monocytes 1.3 0.0 - 1.3 10e3/uL    Absolute Eosinophils 0.0 0.0 - 0.7 10e3/uL    Absolute Basophils 0.1 0.0 - 0.2 10e3/uL    Absolute Immature Granulocytes 0.0 <=0.4 10e3/uL    Absolute NRBCs 0.0 10e3/uL   MR Brain w/o & w Contrast    Narrative    EXAM: MR  BRAIN W/O and W CONTRAST  LOCATION: [facility]  DATE: 1/24/2025    INDICATION: gait changes, freezing episodes  COMPARISON: CT head 11/3/2020  CONTRAST: 10mL Gadavist  TECHNIQUE: Routine multiplanar multisequence head MRI without and with intravenous contrast.    FINDINGS: Images are degraded secondary to patient motion artifact.  INTRACRANIAL CONTENTS: No acute or subacute infarct. No mass, acute hemorrhage, or extra-axial fluid collections. Scattered nonspecific T2/FLAIR hyperintensities within the cerebral white matter most consistent with mild chronic microvascular ischemic   change. Mild generalized cerebral atrophy. No hydrocephalus. Normal position of the cerebellar tonsils. No pathologic contrast enhancement.    SELLA: No abnormality accounting for technique.    OSSEOUS STRUCTURES/SOFT TISSUES: Normal marrow signal. The major intracranial vascular flow voids are maintained.     ORBITS: No abnormality accounting for technique.     SINUSES/MASTOIDS: No paranasal sinus mucosal disease. No middle ear or mastoid effusion.       Impression    IMPRESSION:  1.  Motion degraded images without identified acute intracranial abnormality.   UA with Microscopic reflex to Culture    Specimen: Urine, Clean Catch   Result Value Ref Range    Color Urine Yellow Colorless, Straw, Light Yellow, Yellow    Appearance Urine Clear Clear    Glucose Urine Negative Negative mg/dL    Bilirubin Urine Negative Negative    Ketones Urine Trace (A) Negative mg/dL    Specific Gravity Urine 1.025 1.003 - 1.035    Blood Urine Negative Negative    pH Urine 6.0 5.0 - 7.0    Protein Albumin Urine 50 (A) Negative mg/dL    Urobilinogen Urine 3.0 (A) Normal, 2.0 mg/dL    Nitrite Urine Negative Negative    Leukocyte Esterase Urine Negative Negative    Mucus Urine Present (A) None Seen /LPF    Calcium Oxalate Crystals Urine Few (A) None Seen /HPF    RBC Urine 0 <=2 /HPF    WBC Urine 0 <=5 /HPF    Narrative    Urine  Culture not indicated       Medications   sodium chloride 0.9% BOLUS 1,000 mL (0 mLs Intravenous Stopped 1/24/25 2050)   gadobutrol (GADAVIST) injection 10 mL (10 mLs Intravenous $Given 1/24/25 1856)       Assessments & Plan (with Medical Decision Making)    Chief complaint -patient has been suffering from spastic, rest tremor and rigidity that started a few months before [place] and he and his wife called them episodes where he has tremor/spastic movements and needs to sit down.  He has not had any falls due to this but that is a concern for both him and his wife.  Patient denies any symptoms associated with his gait abnormally, his wife is fearful of him falling.  Patient denies bladder/bowel dysfunction, denies headache fever chills nausea and vomiting.  Patient does take Aricept daily for dementia but is not correlate these symptoms starting with any medications that he is currently on.  On exam patient is negative for weakness, no loss of motor control, no sensory loss, no imbalance as well as no signs of an associated neurologic disorder. Vision, hearing, and blood pressure are also normal.  Tone of patient's lower limbs were assessed and no loss of tone noted-I was not able to reproduce spasticity of tremor.  Arm swing was also noted during walking assessment and it was symmetric in nature.  MRI was negative for any acute intracranial abnormalities however the MRI was obscured due to patient movement during the exam.  Orthostatic blood pressures did show a marked change from lying to sitting, patient currently is not on any blood pressure medication discussed with family that patient should take his time when moving from a laying to a sitting position and sitting to standing.  UA was negative for leukocytes and nitrates, this CMP and CBC were both unremarkable.  Based on patient's clinical assessment I feel that this is noted in advancement of his neurological diagnosis of dementia and recommended he  follow-up with neurology, they do see a neurologist already but I did place referral for in-house neurology assessment.  Patient's wife is stating she is feeling overwhelmed having to care for the patient and I discussed that we currently do not have a reason to keep him admitted to the hospital as he does not have an emergent medical condition that needs to be assessed in the hospital.  We discussed the support system for her and if she has any family friends she can call for help, she said that she can reach out to some friends.  All questions and concerns answered and patient is being discharged in stable condition with a follow-up with neurology.                               Discharge Medication List as of 1/24/2025  8:51 PM          Final diagnoses:   Ataxia   Tremor   Alzheimer's disease (H)       1/24/2025   [facility] EMERGENCY DEPT       Maddy Bray APRN CNP  01/24/25 8094

## 2025-01-25 NOTE — DISCHARGE INSTRUCTIONS
Your MRI and blood work were very reassuring today.  Your blood pressure does drop when you go from a laying to a sitting position, with this means is you need to go slow and not get up too fast, if you do get dizzy take a couple deep breaths before you go to a standing position.  This will help prevent you from falling.  I did put a referral to neurology and they should call you within the next week to set up an appointment.

## 2025-01-25 NOTE — ED NOTES
Ortho Blood pressures obtained - Pt denied dizziness/light headed with activity. Pt denies pain. Provider notified of BP.    01/24/25 2020 01/24/25 2021 01/24/25 2023   Lying Orthostatic BP   Lying Orthostatic BP 96/79  (MAP 81)  --   --    Lying Orthostatic Pulse 80 bpm  --   --    Sitting Orthostatic BP   Sitting Orthostatic BP  --  105/64  (MAP 81)  --    Sitting Orthostatic Pulse  --  82 bpm  --    Standing Orthostatic BP   Standing Orthostatic BP  --   --  82/55  (MAP 65)   Standing Orthostatic Pulse  --   --  86 bpm

## 2025-01-27 ENCOUNTER — VIRTUAL VISIT (OUTPATIENT)
Dept: PHARMACY | Facility: CLINIC | Age: 70
End: 2025-01-27
Attending: INTERNAL MEDICINE
Payer: COMMERCIAL

## 2025-01-27 VITALS — WEIGHT: 205 LBS | BODY MASS INDEX: 28.7 KG/M2 | HEIGHT: 71 IN

## 2025-01-27 DIAGNOSIS — K51.00 ULCERATIVE PANCOLITIS WITHOUT COMPLICATION (H): Primary | ICD-10-CM

## 2025-01-27 ASSESSMENT — PAIN SCALES - GENERAL: PAINLEVEL_OUTOF10: NO PAIN (0)

## 2025-01-27 NOTE — PROGRESS NOTES
Is the patient currently in the state of [place]? YES    Visit mode: TELEPHONE    If the visit is dropped, the patient can be reconnected by:VIDEO VISIT: Send to e-mail at: [email or web address]    Will anyone else be joining the visit? NO  (If patient encounters technical issues they should call [phone]    Are changes needed to the allergy or medication list? No    Are refills needed on medications prescribed by this physician? NO    Rooming Documentation:  Questionnaire(s) completed    Reason for visit: Video Visit (Follow up )    Melanie DAI

## 2025-01-27 NOTE — PROGRESS NOTES
"Medication Therapy Management (MTM) Encounter    ASSESSMENT:                            Medication Adherence/Access: No issues identified.    Ulcerative Colitis:  Mainor would benefit from continued treatment with Entyvio 108 mg pen every 7 days. They are up to date on routine maintenance labs. They are up to date on annual tuberculosis screening. No access issues for their advanced therapy are present. Health maintenance was not comprehensively reviewed with this visit. Due to time constraints, I was only able to assess the above with the patient today.    PLAN:                            No medication changes recommended during today's visit.  Currently up to date on labs. You are due for routine maintenance labs around 4/6/2025 . These are standing labs ordered under Lily Eaton DO. You can make an appointment at any [facility] lab and ask for \"Uma standing labs.\"  I recommend getting a \"travel medicine cooler.\" You can call [phone] ([phone]) to see if they have a size they recommend for bringing two pens.     Follow-up: 5/27/2025 at 1:30 PM (telephone)    SUBJECTIVE/OBJECTIVE:                          Mainor Grande is a 69 year old male seen for a follow-up visit. Patient was accompanied by his wife Rani.      Reason for visit: Entyvio self injection follow-up visit.    Allergies/ADRs: Reviewed in chart  Past Medical History: Reviewed in chart  Tobacco: He reports that he has never smoked. He has never been exposed to tobacco smoke. He has never used smokeless tobacco.  Alcohol: none      Medication Adherence/Access: Rani sets up medications in a pill box; Rani will administer the Entyvio pen.     Ulcerative Colitis/PSC:   Entyvio 108 mg every 7 days  Ursodiol 300 mg twice daily     Met with Mainor and Rani for a routine follow-up visit. He transitioned to Entyvio pens on January 7th (previously on Entyvio 300 mg IV every 4 weeks). Injections have been going well without side " effects or concerns. Did recently get an illness which may be increasing his stools and causing some diarrhea. Discussed letting us know if this does not improve.      Going to [place] -.     PRO-2 for Ulcerative Colitis    Please select the one best answer for the patient's ability at this time     How would you rate your stool frequency over the last 3 days?    1-2 stools more than normal: 1 point    How would you rate the severity of your rectal bleeding over the last 3 days?    None: 0 points    3. Over the last week, how would you rate your general well-being?    Generally Well    Score: 0 (do not include question 3 in scoring)  0: Inactive Disease  1-1.5: Remission  6: Active disease and spontaneous bleeding     Specialty medication department: [facility]  Prior authorization status: approved (MG); Entyvio pen approved through 5/3/2025; per clarification with pharmacy liaison this covers 4 pens/28 days.   Last dose: 2025  Next dose: 2025     Last provider visit: 2024 DO Caro  Next provider visit: RTC 12 months (not scheduled)  Last labs completed: 2025  Lab frequency: every other infusion              - standing labs crp, cbc with platelets & diff, hepatic function  2025    Last IBD health maintenance visit: 10/10/2024    Vaccinations:  All patients on immunosuppression should avoid live vaccines unless specifically indicated.    -- Influenza (every year)- last 10/2024  -- TdaP (every 10 years)- last 2023  -- Pneumococcal Pneumonia               Prevnar-13: 10/2019              Pneumovax-23: 2017              Prevnar-20: 2022  -- COVID-19- 3/2021, 2021, 2021, 2022, 10/2024  -- RSV- 2024     One time confirmation of immunity or serologies:  -- Hepatitis A (serologies or immunizations)- not on file  -- Hepatitis B (serologies or immunizations)- not immune by serology   -- Varicella/Zoster               Varicella/ Zoster- Zostavax 3/2017,  "Shingrix 10/2019, 12/2019     Due to the immunosuppression in this patient, I would not advise administration of live vaccines such as varicella/VZV, intranasal influenza, MMR, or yellow fever vaccine (if traveling).       Immunosuppressive Screening:  -- Hep B Surface Antibody not immune by serology 7/31/2020  -- Hep B Surface Antigen non-reactive 7/31/2020  -- Hep B Core Antibody non-reactive 7/31/2020  -- Hep C Antibody non-reactive  3/21/2016  -- Yearly assessment of TB Negative 1/6/2025    Today's Vitals: Ht 5' 10.5\" (1.791 m)   Wt 205 lb (93 kg)   BMI 29.00 kg/m    ----------------      I spent 20 minutes with this patient today. All changes were made via collaborative practice agreement with Lily Eaton DO.     A summary of these recommendations was sent via [employer].    Asuncion PowersD, BCPS  MTM Pharmacist   [facility]  Phone: [phone]    Telemedicine Visit Details  The patient's medications can be safely assessed via a telemedicine encounter.  Type of service:  Telephone visit  Originating Location (pt. Location): Home    Distant Location (provider location):  Off-site  Start Time:  11:02 AM  End Time:  11:22 AM     Medication Therapy Recommendations  No medication therapy recommendations to display       "

## 2025-01-27 NOTE — PATIENT INSTRUCTIONS
"Recommendations from today's MTM visit:                                                      No medication changes recommended during today's visit.  Currently up to date on labs. You are due for routine maintenance labs around 4/6/2025 . These are standing labs ordered under Lily Eaton DO. You can make an appointment at any [facility] lab and ask for \"Uma standing labs.\"  I recommend getting a \"travel medicine cooler.\" You can call [phone] ([phone]) to see if they have a size they recommend for bringing two pens.     Follow-up: 5/27/2025 at 1:30 PM (telephone)    It was great speaking with you today.  I value your experience and would be very thankful for your time in providing feedback in our clinic survey. In the next few days, you may receive an email or text message from [employer] with a link to a survey related to your  clinical pharmacist.\"     To schedule another MTM appointment, please call the clinic directly or you may call the MTM scheduling line at [phone] or toll-free at [phone].     My Clinical Pharmacist's contact information:                                                      Please feel free to contact me with any questions or concerns you have.      Abiodun Campbell, PharmD, BCPS  MTM Pharmacist   [facility]  Phone: [phone]   "

## 2025-01-27 NOTE — PROGRESS NOTES
"Virtual Visit Details    Type of service:  Telephone Visit   Phone call duration: *** minutes   Originating Location (pt. Location): {patient location:088656::\"Home\"}  {PROVIDER LOCATION On-site should be selected for visits conducted from your clinic location or adjoining [facility] hospital, academic office, or other nearby [facility] building. Off-site should be selected for all other provider locations, including home:505347}  Distant Location (provider location):  {virtual location provider:729624}  Telephone visit completed due to {audio only reason:078024}    "

## 2025-01-30 ENCOUNTER — ANCILLARY PROCEDURE (OUTPATIENT)
Dept: GENERAL RADIOLOGY | Facility: OTHER | Age: 70
End: 2025-01-30
Attending: FAMILY MEDICINE
Payer: COMMERCIAL

## 2025-01-30 ENCOUNTER — NURSE TRIAGE (OUTPATIENT)
Dept: FAMILY MEDICINE | Facility: OTHER | Age: 70
End: 2025-01-30

## 2025-01-30 ENCOUNTER — OFFICE VISIT (OUTPATIENT)
Dept: FAMILY MEDICINE | Facility: OTHER | Age: 70
End: 2025-01-30
Payer: COMMERCIAL

## 2025-01-30 VITALS
RESPIRATION RATE: 18 BRPM | TEMPERATURE: 98.9 F | BODY MASS INDEX: 27.92 KG/M2 | SYSTOLIC BLOOD PRESSURE: 102 MMHG | HEIGHT: 70 IN | WEIGHT: 195 LBS | HEART RATE: 68 BPM | OXYGEN SATURATION: 95 % | DIASTOLIC BLOOD PRESSURE: 52 MMHG

## 2025-01-30 DIAGNOSIS — G30.9 ALZHEIMER'S DISEASE (H): ICD-10-CM

## 2025-01-30 DIAGNOSIS — J45.41 MODERATE PERSISTENT ASTHMA WITH ACUTE EXACERBATION: ICD-10-CM

## 2025-01-30 DIAGNOSIS — F02.80 ALZHEIMER'S DISEASE (H): ICD-10-CM

## 2025-01-30 DIAGNOSIS — R05.1 ACUTE COUGH: ICD-10-CM

## 2025-01-30 DIAGNOSIS — R05.1 ACUTE COUGH: Primary | ICD-10-CM

## 2025-01-30 RX ORDER — AZITHROMYCIN 250 MG/1
TABLET, FILM COATED ORAL
Qty: 6 TABLET | Refills: 0 | Status: SHIPPED | OUTPATIENT
Start: 2025-01-30 | End: 2025-02-04

## 2025-01-30 RX ORDER — DONEPEZIL HYDROCHLORIDE 10 MG/1
1 TABLET, FILM COATED ORAL
COMMUNITY
Start: 2024-11-30

## 2025-01-30 ASSESSMENT — ENCOUNTER SYMPTOMS
SWEATS: 1
WHEEZING: 1
COUGH: 1
CHILLS: 1
WEIGHT LOSS: 1
SHORTNESS OF BREATH: 1

## 2025-01-30 ASSESSMENT — LIFESTYLE VARIABLES: SMOKING_STATUS: 0

## 2025-01-30 ASSESSMENT — PAIN SCALES - GENERAL: PAINLEVEL_OUTOF10: NO PAIN (0)

## 2025-01-30 NOTE — TELEPHONE ENCOUNTER
"Nurse Triage SBAR    Is this a 2nd Level Triage? NO    Situation: Cough for 10 days.    Background: Wife reports patient has had a cough for 10 days, was recently in the hospital for something unrelated, wife reports cough is constant and he is coughing up mucus but cannot get it out.     Assessment: Wife denies fevers, reports constant productive cough, reports patient is unable to get the mucus up and out, reports he \"chokes\" on the mucus. Reports he has been laying either in bed or on the couch all day for the last couple of days.     Protocol Recommended Disposition:   See in Office Today or Tomorrow    Recommendation: Patient to be seen in clinic today or tomorrow per protocol. RN assisted with scheduling patient in clinic today.      Does the patient meet one of the following criteria for ADS visit consideration? 16+ years old, with an [facility] PCP     Shalonda Reynoso RN on 1/30/2025 at 9:43 AM    Reason for Disposition   Continuous (nonstop) coughing interferes with work or school and no improvement using cough treatment per Care Advice    Additional Information   Negative: Bluish (or gray) lips or face   Negative: SEVERE difficulty breathing (e.g., struggling for each breath, speaks in single words)   Negative: Rapid onset of cough and has hives   Negative: Coughing started suddenly after medicine, an allergic food or bee sting   Negative: Difficulty breathing after exposure to flames, smoke, or fumes   Negative: Sounds like a life-threatening emergency to the triager   Negative: Previous asthma attacks and this feels like asthma attack   Negative: Dry cough (non-productive; no sputum or minimal clear sputum) and within 14 days of COVID-19 Exposure   Negative: MODERATE difficulty breathing (e.g., speaks in phrases, SOB even at rest, pulse 100-120) and still present when not coughing   Negative: Chest pain present when not coughing   Negative: Passed out (e.g., fainted, lost consciousness, blacked out and was " not responding)   Negative: Patient sounds very sick or weak to the triager   Negative: SEVERE coughing spells (e.g., whooping sound after coughing, vomiting after coughing)   Negative: Coughing up dani-colored (reddish-brown) or blood-tinged sputum   Negative: Fever present > 3 days (72 hours)   Negative: Fever returns after gone for over 24 hours and symptoms worse or not improved   Negative: Using nasal washes and pain medicine > 24 hours and sinus pain persists   Negative: Known COPD or other severe lung disease (i.e., bronchiectasis, cystic fibrosis, lung surgery) and symptoms getting worse (i.e., increased sputum purulence or amount, increased breathing difficulty)    Protocols used: Cough-A-OH

## 2025-01-30 NOTE — PROGRESS NOTES
Assessment & Plan     Acute cough/Moderate persistent asthma with acute exacerbation  Patient who has had a cough for a little over a week which is slowly improving.  He also has a history of asthma.  Chest x-ray was unremarkable for pneumonia although radiology review is pending.  He does have asthma.  No evidence of wheezing and therefore he will not do steroids.  Encouraged him to continue his Symbicort and uses albuterol as needed.  Will cover with Zithromax as patient is leaving the state for couple of weeks.    - azithromycin (ZITHROMAX) 250 MG tablet; Take 2 tablets (500 mg) by mouth daily for 1 day, THEN 1 tablet (250 mg) daily for 4 days.    Alzheimer's disease (H)  Chronic.  He remains on Aricept.  He will be seeing neurology this summer.  Did discuss that the spells that she has been describing with the shaking and shuffling gait and decreased gait speed are more concerning for a movement disorder and neurology would be the appropriate specialist to see.        Da Hayward is a 69 year old, presenting for the following health issues:  Cough        1/30/2025    12:45 PM   Additional Questions   Roomed by Madyson VALDEZ   Accompanied by Rani, Spouse     Patient does feel dizzy and lightheaded intermittently when he stands up.     History of Present Illness       Reason for visit:  Deep chest cough  Symptom onset:  1-2 weeks ago  Symptoms include:  Coughing flem chocking  Symptom intensity:  Moderate  Symptom progression:  Staying the same  Had these symptoms before:  No  What makes it worse:  Not sure  What makes it better:  Not sure He is missing 2 dose(s) of medications per week.     Patient has had a deep cough for the last 1 to 2 weeks.  His wife states that she has had them in his bedroom resting for the last 5 or so days and today is the first day she is really gotten out of bed as she was hoping that the rest would help him improve.  She states he is better now with him being up and walking  "around than when he is laying down.  He has a history of asthma but she denies that he has been wheezing.  There has been no fevers.  No ear congestion, nasal congestion or sore throat.  They did not do any home testing for COVID or flu.  He has been immunized for COVID, influenza, RSV and pneumonia.  He is not a smoker.  They are leaving next week for [place] for 2 weeks.    He was recently seen in the ER for change in gait along with shaking episodes and concerns for possible falling he was found to have some decrease in his blood pressure.  It was recommended that he see neurology but his wife was concerned.  With regards to his lower blood pressure.  Patient normally wears compression stockings but is not wearing them today.  He states he has been drinking a lot more fluids.      Objective    /52   Pulse 68   Temp 98.9  F (37.2  C) (Temporal)   Resp 18   Ht 1.78 m (5' 10.08\")   Wt 88.5 kg (195 lb)   SpO2 95%   BMI 27.92 kg/m    Body mass index is 27.92 kg/m .  Physical Exam   Orthostatic Vitals from 01/28/25 1330 to 01/30/25 1330    Date and Time Orthostatic BP Orthostatic Pulse Patient Position BP   Location Cuff Size   01/30/25 1309 91/48 73 Standing Right arm Adult Regular   01/30/25 1307 92/55 73 Standing Right arm Adult Regular   01/30/25 1305 106/65 63 Supine Right arm Adult Regular      Peak Flow from 01/28/25 1330 to 01/30/25 1330    Date and Time PF Resp   01/30/25 1254 -- 18      Pain Information from 01/28/25 1330 to 01/30/25 1330    Date and Time Pain Score Pain Loc   01/30/25 1254 0 (None) --       Gen: no apparent distress  HENT: Ears normal. Throat and pharynx normal. Neck supple. No adenopathy or masses in the neck or supraclavicular regions. Sinuses non tender.   Chest: clear to auscultation without wheeze, rale or rhonchi  Cor: regular rate and rhythm without murmur  Ext: warm and dry without edema    CXR: No infiltrate, radiology review pending        Signed Electronically by: Orquidea" JUANY Finn MD

## 2025-01-30 NOTE — TELEPHONE ENCOUNTER
Patient Returning Call    Reason for call:  pt wife is requesting a call back    Information relayed to patient:  no    Patient has additional questions:  Yes    What are your questions/concerns:  needs a return call    Who does the patient want to speak with:  RN    Is an  needed?:  No      Could we send this information to you in [facility] or would you prefer to receive a phone call?:   Patient would prefer a phone call   Okay to leave a detailed message?: Yes at Cell number on file:    Telephone Information:   Mobile [phone]

## 2025-02-09 ENCOUNTER — PATIENT OUTREACH (OUTPATIENT)
Dept: CARE COORDINATION | Facility: CLINIC | Age: 70
End: 2025-02-09
Payer: COMMERCIAL

## 2025-02-26 DIAGNOSIS — K83.01 PSC (PRIMARY SCLEROSING CHOLANGITIS) (H): ICD-10-CM

## 2025-02-26 RX ORDER — URSODIOL 300 MG/1
CAPSULE ORAL
Qty: 180 CAPSULE | Refills: 0 | OUTPATIENT
Start: 2025-02-26

## 2025-04-01 ENCOUNTER — TELEPHONE (OUTPATIENT)
Dept: FAMILY MEDICINE | Facility: OTHER | Age: 70
End: 2025-04-01
Payer: COMMERCIAL

## 2025-04-01 NOTE — TELEPHONE ENCOUNTER
Called and spoke with spouse, relayed providers message below. Verbalizes understanding.     Gilda Vincent, RN, BSN

## 2025-04-01 NOTE — TELEPHONE ENCOUNTER
Ok to keep virtual but if they are wanting to discuss adjustments with meds that is where Neurology is really going to come into play.     Francisco Hicks PA-C

## 2025-04-01 NOTE — TELEPHONE ENCOUNTER
"Patient spouse, [facility],  calling tearful looking for recommendations, patient increasing confusion and aggression to spouse at home.     Hx of dementia and alzheimer's.     Patient is getting more irritable, angry, and aggressive with wife. She is wondering what \"else can be done, such as increasing meds or what\".     RN did advise that a visit with PCP would be best course of action, writer did schedule virtual visit with PCP tomorrow. RN did emphasize to spouse to reach out to her primary care provider as patient's disease affecting her. \"I feel like the problem and we've been  for 40 some years\". RN emphasized this is nasty part of disease and to be sure to take care of her own mental needs. Spouse appreciative.     Francisco Hicks: Are we okay to have virtual visit to discuss? Does patient need to be present? Wife wanted to emphasize, patient is very nice/pleasant/agreeable to you, but forgets and blames spouse the minute they get out of clinic. She thinks patient forgets the discussion.     Spouse would like callback today confirming plan of action for tomorrow.     Westley Neal RN on 4/1/2025 at 12:31 PM    "

## 2025-04-02 ENCOUNTER — VIRTUAL VISIT (OUTPATIENT)
Dept: FAMILY MEDICINE | Facility: OTHER | Age: 70
End: 2025-04-02
Payer: COMMERCIAL

## 2025-04-02 DIAGNOSIS — K83.01 PSC (PRIMARY SCLEROSING CHOLANGITIS) (H): ICD-10-CM

## 2025-04-02 DIAGNOSIS — I25.10 CORONARY ARTERY CALCIFICATION: ICD-10-CM

## 2025-04-02 DIAGNOSIS — F43.23 ADJUSTMENT DISORDER WITH MIXED ANXIETY AND DEPRESSED MOOD: ICD-10-CM

## 2025-04-02 DIAGNOSIS — I25.10 CORONARY ARTERY DISEASE INVOLVING NATIVE CORONARY ARTERY OF NATIVE HEART WITHOUT ANGINA PECTORIS: ICD-10-CM

## 2025-04-02 DIAGNOSIS — J45.40 MODERATE PERSISTENT ASTHMA, UNSPECIFIED WHETHER COMPLICATED: ICD-10-CM

## 2025-04-02 DIAGNOSIS — G30.9 ALZHEIMER'S DISEASE (H): Primary | ICD-10-CM

## 2025-04-02 DIAGNOSIS — K51.00 ULCERATIVE PANCOLITIS WITHOUT COMPLICATION (H): ICD-10-CM

## 2025-04-02 DIAGNOSIS — F02.80 ALZHEIMER'S DISEASE (H): Primary | ICD-10-CM

## 2025-04-02 RX ORDER — ESCITALOPRAM OXALATE 10 MG/1
15 TABLET ORAL DAILY
Qty: 135 TABLET | Refills: 1 | Status: SHIPPED | OUTPATIENT
Start: 2025-04-02

## 2025-04-02 RX ORDER — ATORVASTATIN CALCIUM 40 MG/1
TABLET, FILM COATED ORAL
Qty: 90 TABLET | Refills: 3 | Status: SHIPPED | OUTPATIENT
Start: 2025-04-02

## 2025-04-02 NOTE — PROGRESS NOTES
Mainor is a 69 year old who is being evaluated via a billable video visit.    How would you like to obtain your AVS? MyChart  If the video visit is dropped, the invitation should be resent by: Send to e-mail at: [email or web address]  Will anyone else be joining your video visit? No      Assessment & Plan     Alzheimer's disease (H)  - Unfortunately Mainor' dementia is getting worse which was expected but is difficult due to sleep issues and agitation with his wife.   - We will increase his Escitalopram to 15 mg to see if this helps since he tolerates this well and then consider adding on Trazodone or Seroquel to help with sleep, wife wants to wait on this for now.   - Will try to minimize his interventions.   - They would like to discontinue Donepezil since they don't feel it has been helpful. Will reduce to 5 mg for 2-4 weeks then can stop. Ok to restart if they notice major changes.   - For now will continue on medications that will prevent flares of symptoms which could worsen his dementia or outcomes.       Coronary artery disease involving native coronary artery of native heart without angina pectoris  Coronary artery calcification  - atorvastatin (LIPITOR) 40 MG tablet; Take 1 tablet (40 mg) by mouth daily - Oral  Maintaining on Atorvastatin for now and Aspirin.     Moderate persistent asthma, unspecified whether complicated  Stable and maintaining on Montelukast and inhalers.     PSC (primary sclerosing cholangitis) (H)  Ulcerative pancolitis without complication (H)  Will remain on GI medications for comfort.     Adjustment disorder with mixed anxiety and depressed mood  Adjustments as noted above.   - escitalopram (LEXAPRO) 10 MG tablet; Take 1.5 tablets (15 mg) by mouth daily.      Options for treatment and follow-up care were reviewed with the patient and/or guardian. Patient and/or guardian engaged in the decision making process and verbalized understanding of the options discussed and agreed with  the final plan.    The longitudinal plan of care for the diagnosis(es)/condition(s) as documented were addressed during this visit. Due to the added complexity in care, I will continue to support Mainor in the subsequent management and with ongoing continuity of care.      Subjective   Mainor is a 69 year old, presenting for the following health issues:  Behavioral Problem      4/2/2025     1:50 PM   Additional Questions   Roomed by Suzy   Accompanied by Spouse Rani     Video Start Time: 2:13 PM    History of Present Illness       Reason for visit:  Medication for anxiety, see telephone encounter 4/1/25         Goes in waves of getting worse and then better.   He has been getting more frustrated and angry with his life and things that are going on.   Having more agitation towards the end of the day, tired more as well towards the end of the day.   She wonders about using CBD gummies to see if that works.  Sleep has been sporadic.  He will get up around 12:30 AM and will be up dressed and bed made and making coffee.  Wife will move to another bed around 10:30 PM.  He does get up again around 3:30 AM after going back to bed with direction from wife.   Still struggling to accept not being able to drive.   Times are getting confused  Wife is on support groups for spouses with dementia.  He is able to go to Respite care while she goes.  Home care is coming in 3 days per week for 3 hours. She can increase that if needed.     The 10-year ASCVD risk score (Daniel ASHLEY, et al., 2019) is: 8.8%    Values used to calculate the score:      Age: 69 years      Sex: Male      Is Non- : No      Diabetic: No      Tobacco smoker: No      Systolic Blood Pressure: 91 mmHg      Is BP treated: No      HDL Cholesterol: 74 mg/dL      Total Cholesterol: 173 mg/dL            Objective           Vitals:  No vitals were obtained today due to virtual visit.    Physical Exam   GENERAL: alert and no distress  EYES: Eyes  grossly normal to inspection.  No discharge or erythema, or obvious scleral/conjunctival abnormalities.  RESP: No audible wheeze, cough, or visible cyanosis.    SKIN: Visible skin clear. No significant rash, abnormal pigmentation or lesions.  NEURO: Cranial nerves grossly intact.  Mentation and speech appropriate for age.  PSYCH: Appropriate affect, tone, and pace of words          Video-Visit Details    Type of service:  Video Visit   Video End Time:2:49 PM  Originating Location (pt. Location): Home    Distant Location (provider location):  Off-site  Platform used for Video Visit: Briana  Signed Electronically by: Francisco Hicks PA-C

## 2025-04-07 ENCOUNTER — LAB (OUTPATIENT)
Dept: LAB | Facility: OTHER | Age: 70
End: 2025-04-07
Payer: COMMERCIAL

## 2025-04-07 DIAGNOSIS — K51.00 ULCERATIVE PANCOLITIS WITHOUT COMPLICATION (H): ICD-10-CM

## 2025-04-07 LAB
ALBUMIN SERPL BCG-MCNC: 4.1 G/DL (ref 3.5–5.2)
ALP SERPL-CCNC: 172 U/L (ref 40–150)
ALT SERPL W P-5'-P-CCNC: 26 U/L (ref 0–70)
AST SERPL W P-5'-P-CCNC: 37 U/L (ref 0–45)
BASOPHILS # BLD AUTO: 0.1 10E3/UL (ref 0–0.2)
BASOPHILS NFR BLD AUTO: 2 %
BILIRUB DIRECT SERPL-MCNC: 0.27 MG/DL (ref 0–0.3)
BILIRUB SERPL-MCNC: 0.7 MG/DL
CRP SERPL-MCNC: <3 MG/L
EOSINOPHIL # BLD AUTO: 0.5 10E3/UL (ref 0–0.7)
EOSINOPHIL NFR BLD AUTO: 8 %
ERYTHROCYTE [DISTWIDTH] IN BLOOD BY AUTOMATED COUNT: 12.3 % (ref 10–15)
HCT VFR BLD AUTO: 41 % (ref 40–53)
HGB BLD-MCNC: 13.5 G/DL (ref 13.3–17.7)
IMM GRANULOCYTES # BLD: 0 10E3/UL
IMM GRANULOCYTES NFR BLD: 0 %
LYMPHOCYTES # BLD AUTO: 2 10E3/UL (ref 0.8–5.3)
LYMPHOCYTES NFR BLD AUTO: 35 %
MCH RBC QN AUTO: 31 PG (ref 26.5–33)
MCHC RBC AUTO-ENTMCNC: 32.9 G/DL (ref 31.5–36.5)
MCV RBC AUTO: 94 FL (ref 78–100)
MONOCYTES # BLD AUTO: 0.7 10E3/UL (ref 0–1.3)
MONOCYTES NFR BLD AUTO: 12 %
NEUTROPHILS # BLD AUTO: 2.5 10E3/UL (ref 1.6–8.3)
NEUTROPHILS NFR BLD AUTO: 44 %
PLATELET # BLD AUTO: 223 10E3/UL (ref 150–450)
PROT SERPL-MCNC: 7.2 G/DL (ref 6.4–8.3)
RBC # BLD AUTO: 4.36 10E6/UL (ref 4.4–5.9)
WBC # BLD AUTO: 5.7 10E3/UL (ref 4–11)

## 2025-04-07 PROCEDURE — 36415 COLL VENOUS BLD VENIPUNCTURE: CPT

## 2025-04-07 PROCEDURE — 86140 C-REACTIVE PROTEIN: CPT

## 2025-04-07 PROCEDURE — 85025 COMPLETE CBC W/AUTO DIFF WBC: CPT

## 2025-04-07 PROCEDURE — 80076 HEPATIC FUNCTION PANEL: CPT

## 2025-04-12 ENCOUNTER — HEALTH MAINTENANCE LETTER (OUTPATIENT)
Age: 70
End: 2025-04-12

## 2025-04-12 DIAGNOSIS — K21.9 GASTROESOPHAGEAL REFLUX DISEASE WITHOUT ESOPHAGITIS: ICD-10-CM

## 2025-04-14 RX ORDER — FAMOTIDINE 40 MG/1
TABLET, FILM COATED ORAL
Qty: 180 TABLET | Refills: 2 | Status: SHIPPED | OUTPATIENT
Start: 2025-04-14

## 2025-04-22 ENCOUNTER — TELEPHONE (OUTPATIENT)
Dept: GASTROENTEROLOGY | Facility: CLINIC | Age: 70
End: 2025-04-22
Payer: COMMERCIAL

## 2025-04-22 NOTE — TELEPHONE ENCOUNTER
Log 2/17-2/24 reviewed, just 2 PP readings over 140, and just a mild FBG elevations. Continue diet control - BID testing - fasting plus 1 rotating meal daily. PA Initiation    Medication: ENTYVIO  MG/0.68ML SC SOAJ  Insurance Company: [employer] - Phone [phone] Fax [phone]  Pharmacy Filling the Rx: [facility] - [place], [place] - [address]  Filling Pharmacy Phone:    Filling Pharmacy Fax:    Start Date: 4/22/2025  [id number]

## 2025-04-23 NOTE — TELEPHONE ENCOUNTER
Prior Authorization Approval    Medication: ENTYVIO  MG/0.68ML SC SOAJ  Authorization Effective Date: 1/22/2025  Authorization Expiration Date: 7/29/2025  Approved Dose/Quantity: ud  Reference #: [id number]   Insurance Company: [employer] - Phone [phone] Fax [phone]  Expected CoPay: $    CoPay Card Available:      Financial Assistance Needed:    Which Pharmacy is filling the prescription: [facility] - [place], [place] - [address]  Pharmacy Notified:    Patient Notified: renewal

## 2025-04-28 ASSESSMENT — ASTHMA QUESTIONNAIRES
QUESTION_1 LAST FOUR WEEKS HOW MUCH OF THE TIME DID YOUR ASTHMA KEEP YOU FROM GETTING AS MUCH DONE AT WORK, SCHOOL OR AT HOME: NONE OF THE TIME
QUESTION_5 LAST FOUR WEEKS HOW WOULD YOU RATE YOUR ASTHMA CONTROL: WELL CONTROLLED
QUESTION_4 LAST FOUR WEEKS HOW OFTEN HAVE YOU USED YOUR RESCUE INHALER OR NEBULIZER MEDICATION (SUCH AS ALBUTEROL): ONCE A WEEK OR LESS
ACT_TOTALSCORE: 21
QUESTION_3 LAST FOUR WEEKS HOW OFTEN DID YOUR ASTHMA SYMPTOMS (WHEEZING, COUGHING, SHORTNESS OF BREATH, CHEST TIGHTNESS OR PAIN) WAKE YOU UP AT NIGHT OR EARLIER THAN USUAL IN THE MORNING: ONCE OR TWICE
QUESTION_2 LAST FOUR WEEKS HOW OFTEN HAVE YOU HAD SHORTNESS OF BREATH: ONCE OR TWICE A WEEK
EMERGENCY_ROOM_LAST_YEAR_TOTAL: ONE

## 2025-04-29 ENCOUNTER — OFFICE VISIT (OUTPATIENT)
Dept: FAMILY MEDICINE | Facility: OTHER | Age: 70
End: 2025-04-29
Payer: COMMERCIAL

## 2025-04-29 VITALS
RESPIRATION RATE: 16 BRPM | WEIGHT: 199 LBS | HEIGHT: 70 IN | BODY MASS INDEX: 28.49 KG/M2 | HEART RATE: 69 BPM | SYSTOLIC BLOOD PRESSURE: 110 MMHG | OXYGEN SATURATION: 97 % | TEMPERATURE: 98.1 F | DIASTOLIC BLOOD PRESSURE: 54 MMHG

## 2025-04-29 DIAGNOSIS — H26.9 CATARACT OF BOTH EYES, UNSPECIFIED CATARACT TYPE: ICD-10-CM

## 2025-04-29 DIAGNOSIS — G30.9 ALZHEIMER'S DISEASE (H): ICD-10-CM

## 2025-04-29 DIAGNOSIS — Z01.818 PREOP GENERAL PHYSICAL EXAM: Primary | ICD-10-CM

## 2025-04-29 DIAGNOSIS — J45.40 MODERATE PERSISTENT ASTHMA, UNSPECIFIED WHETHER COMPLICATED: ICD-10-CM

## 2025-04-29 DIAGNOSIS — K51.00 ULCERATIVE PANCOLITIS WITHOUT COMPLICATION (H): ICD-10-CM

## 2025-04-29 DIAGNOSIS — F02.80 ALZHEIMER'S DISEASE (H): ICD-10-CM

## 2025-04-29 DIAGNOSIS — K83.01 PSC (PRIMARY SCLEROSING CHOLANGITIS) (H): ICD-10-CM

## 2025-04-29 DIAGNOSIS — I25.10 CORONARY ARTERY DISEASE INVOLVING NATIVE CORONARY ARTERY OF NATIVE HEART WITHOUT ANGINA PECTORIS: ICD-10-CM

## 2025-04-29 PROCEDURE — 99214 OFFICE O/P EST MOD 30 MIN: CPT | Performed by: PHYSICIAN ASSISTANT

## 2025-04-29 PROCEDURE — 3074F SYST BP LT 130 MM HG: CPT | Performed by: PHYSICIAN ASSISTANT

## 2025-04-29 PROCEDURE — 1126F AMNT PAIN NOTED NONE PRSNT: CPT | Performed by: PHYSICIAN ASSISTANT

## 2025-04-29 PROCEDURE — 3078F DIAST BP <80 MM HG: CPT | Performed by: PHYSICIAN ASSISTANT

## 2025-04-29 ASSESSMENT — PAIN SCALES - GENERAL: PAINLEVEL_OUTOF10: NO PAIN (0)

## 2025-04-29 NOTE — PROGRESS NOTES
Preoperative Evaluation  [facility]  [address]  [facility] [address]  Phone: [phone]  Fax: [phone]  Primary Provider: Francisco Hicks PA-C  Pre-op Performing Provider: Francisco Hicks PA-C  Apr 29, 2025 4/28/2025   Surgical Information   What procedure is being done? Cataract removal surgery   Facility or Hospital where procedure/surgery will be performed: [facility] ([place])   Who is doing the procedure / surgery? Dr. Andre Freitas   Date of surgery / procedure: May 5th (left) and May 12th (right)   Time of surgery / procedure: N?A   Where do you plan to recover after surgery? at home with family     Fax number for surgical facility: [phone] - [facility]    Assessment & Plan     The proposed surgical procedure is considered LOW risk.    Preop general physical exam  - No prior complications from anesthesia. Patient's wife notes he does get more confused when recovering from anesthesia  - Met >4    Cataract of both eyes, unspecified cataract type  Patient notes cloudiness and decreased vision in his left eye.     Alzheimer's disease (H)  Continues to have advancement of his dementia. Wife has elected to continue on the Donepezil versus taper which is just fine.  Refill medication as needed.      Ulcerative pancolitis without complication (H)  Stable on current medications    Coronary artery disease involving native coronary artery of native heart without angina pectoris  Stable. No acute concerns.     PSC (primary sclerosing cholangitis) (H)  Stable. No acute concerns.    Moderate persistent asthma, unspecified whether complicated  Stable. No acute concerns.             - No identified additional risk factors other than previously addressed    Antiplatelet or Anticoagulation Medication Instructions   - Bleeding risk is low for this procedure (e.g. dental, skin, cataract).    Additional Medication  Instructions  We reviewed the medication list and there are no chronic medications that need to be adjusted for this procedure. Felt that the risk of discontinuing use of his Vedolizumab and having UC flare outweighs the risks of infection in regards to these procedures so patient will continue injection without interruption. Per wife he was allowed to remain on Aspirin as well according to the surgeon.     Recommendation  Approval given to proceed with proposed procedure, without further diagnostic evaluation.        Da Hayward is a 69 year old, presenting for the following:  Pre-Op Exam          4/29/2025    10:36 AM   Additional Questions   Roomed by ABEL Coughlin   Accompanied by Spouse     HPI: Patient presenting to clinic for a preoperative exam for bilateral cataract surgery. Patient reports cloudiness and decreased vision in his left eye. Patient will have left eye done first and the right eye done the following week. Patient has no concerns pertaining to the procedure. Patient's chronic conditions are stable.             4/28/2025   Pre-Op Questionnaire   Have you ever had a heart attack or stroke? No   Have you ever had surgery on your heart or blood vessels, such as a stent placement, a coronary artery bypass, or surgery on an artery in your head, neck, heart, or legs? No   Do you have chest pain with activity? No   Do you have a history of heart failure? No   Do you currently have a cold, bronchitis or symptoms of other infection? No   Do you have a cough, shortness of breath, or wheezing? No   Do you or anyone in your family have previous history of blood clots? No   Do you or does anyone in your family have a serious bleeding problem such as prolonged bleeding following surgeries or cuts? No   Have you ever had problems with anemia or been told to take iron pills? No   Have you had any abnormal blood loss such as black, tarry or bloody stools? No   Have you ever had a blood transfusion? No    Are you willing to have a blood transfusion if it is medically needed before, during, or after your surgery? Yes   Have you or any of your relatives ever had problems with anesthesia? No   Do you have sleep apnea, excessive snoring or daytime drowsiness? No   Do you have any artifical heart valves or other implanted medical devices like a pacemaker, defibrillator, or continuous glucose monitor? No   Do you have artificial joints? No   Are you allergic to latex? No     Advance Care Planning    Document on file is a Health Care Directive or POLST.    Preoperative Review of    reviewed - no record of controlled substances prescribed.      Status of Chronic Conditions:  See problem list for active medical problems.  Problems all longstanding and stable, except as noted/documented.  See ROS for pertinent symptoms related to these conditions.    ASTHMA - Patient has a longstanding history of moderate-severe Asthma . Patient has been doing well overall noting NO SYMPTOMS and continues on medication regimen consisting of Symbicort without adverse reactions or side effects.     Patient Active Problem List    Diagnosis Date Noted    Coronary artery disease involving native coronary artery of native heart without angina pectoris 04/02/2025     Priority: Medium    Alzheimer's disease (H) 03/16/2023     Priority: Medium    PSC (primary sclerosing cholangitis) (H) 07/02/2021     Priority: Medium    Ulcerative pancolitis without complication (H) 08/05/2020     Priority: Medium    Memory changes 12/06/2019     Priority: Medium    Nummular eczema 02/12/2019     Priority: Medium    Deviated nasal septum 08/01/2013     Priority: Medium    Chronic nasal congestion 08/01/2013     Priority: Medium    Seasonal allergic rhinitis 07/18/2011     Priority: Medium     Primarily fall and spring allergies      HYPERLIPIDEMIA LDL GOAL <160 10/31/2010     Priority: Medium    Moderate persistent asthma with acute exacerbation 09/12/2008      Priority: Medium    Erectile dysfunction 06/03/2008     Priority: Medium      Past Medical History:   Diagnosis Date    Allergic rhinitis, cause unspecified     Fatty liver 2004    elevated LFT, saw GI for a consultation    Mild persistent asthma 09/12/2008    Reflux esophagitis     Unspecified disease of respiratory system     RAD    Viremia, unspecified     acute     Past Surgical History:   Procedure Laterality Date    COLONOSCOPY  9/4/2013    Procedure: COLONOSCOPY;  colonoscopy;  Surgeon: Beto Keen MD;  Location: [facility]    COLONOSCOPY N/A 7/31/2020    Procedure: Colonoscopy, With Polypectomy And Biopsy;  Surgeon: Lily Eaton DO;  Location: [facility]    COLONOSCOPY N/A 4/5/2021    Procedure: Colonoscopy, With Polypectomy And Biopsy;  Surgeon: Lily Eaton DO;  Location: [facility]    COLONOSCOPY N/A 5/3/2022    Procedure: COLONOSCOPY, WITH POLYPECTOMY AND BIOPSY;  Surgeon: Lily Eaton DO;  Location: [facility]    COLONOSCOPY N/A 5/3/2022    Procedure: COLONOSCOPY, FLEXIBLE, WITH LESION REMOVAL USING SNARE;  Surgeon: Lily Eaton DO;  Location: [facility]    COLONOSCOPY N/A 5/18/2023    Procedure: COLONOSCOPY, WITH BIOPSY;  Surgeon: Lily Eaton DO;  Location: [facility]    COLONOSCOPY N/A 3/11/2024    Procedure: limited COLONOSCOPY, WITH POLYPECTOMY AND BIOPSY;  Surgeon: Tripp Herman MD;  Location: [facility]    COLONOSCOPY WITH CO2 INSUFFLATION N/A 7/31/2020    Procedure: COLONOSCOPY, WITH CO2 INSUFFLATION;  Surgeon: Lily Eaton DO;  Location: [facility]    COLONOSCOPY WITH CO2 INSUFFLATION N/A 4/5/2021    Procedure: COLONOSCOPY, WITH CO2 INSUFFLATION;  Surgeon: Lily Eaton DO;  [facility]    COLONOSCOPY WITH CO2 INSUFFLATION N/A 5/3/2022    Procedure: COLONOSCOPY, WITH CO2 INSUFFLATION;  Surgeon: Lily Eaton DO;  Location: [facility]    COMBINED ESOPHAGOSCOPY, GASTROSCOPY, DUODENOSCOPY (EGD) WITH CO2 INSUFFLATION N/A 7/31/2020    Procedure: ESOPHAGOGASTRODUODENOSCOPY, WITH CO2  INSUFFLATION;  Surgeon: Lily Eaton DO;  Location: [facility]    ESOPHAGOSCOPY, GASTROSCOPY, DUODENOSCOPY (EGD), COMBINED N/A 7/31/2020    Procedure: Esophagogastroduodenoscopy, With Biopsy;  Surgeon: Lily Eaton DO;  Location: [facility]    ESOPHAGOSCOPY, GASTROSCOPY, DUODENOSCOPY (EGD), COMBINED N/A 3/11/2024    Procedure: ESOPHAGOGASTRODUODENOSCOPY, WITH BIOPSY;  Surgeon: Tripp Herman MD;  Location: [facility]    HC VASECTOMY UNILAT/BILAT W POSTOP SEMEN  1992    Vasectomy    ZZHC COLONOSCOPY THRU STOMA, DIAGNOSTIC  2005    normal     Current Outpatient Medications   Medication Sig Dispense Refill    albuterol (PROAIR HFA/PROVENTIL HFA/VENTOLIN HFA) 108 (90 Base) MCG/ACT inhaler Inhale 2 puffs into the lungs every 6 hours. 18 g 11    ASPIRIN PO Take 81 mg by mouth daily      atorvastatin (LIPITOR) 40 MG tablet Take 1 tablet (40 mg) by mouth daily - Oral 90 tablet 3    budesonide-formoterol (SYMBICORT) 160-4.5 MCG/ACT Inhaler Inhale 2 puffs into the lungs 2 times daily. 30.6 g 3    clobetasol (TEMOVATE) 0.05 % external ointment Apply topically 2 times daily. to affected area(s) 30 g 1    donepezil (ARICEPT) 10 MG tablet Take 1 tablet by mouth daily at 2 pm. Take 1/2 tablet for 2-4 weeks then ok to discontinue      escitalopram (LEXAPRO) 10 MG tablet Take 1.5 tablets (15 mg) by mouth daily. 135 tablet 1    famotidine (PEPCID) 40 MG tablet TAKE ONE TABLET BY MOUTH TWO TIMES A DAY AS NEEDED FOR HEARTBURN 180 tablet 2    montelukast (SINGULAIR) 10 MG tablet Take 1 tablet (10 mg) by mouth at bedtime. 90 tablet 3    Naphazoline-Glycerin-Zinc Sulf (CLEAR EYES MAXIMUM ITCHY EYE) 0.012-0.25-0.25 % SOLN Apply to eye as needed (for allergies)      ursodiol (ACTIGALL) 300 MG capsule Take 1 capsule (300 mg) by mouth 2 times daily. APPT NEEDED FOR FURTHER REFILLS 180 capsule 0    Vedolizumab 108 MG/0.68ML SOAJ Inject 108 mg subcutaneously every 7 days. 2.72 mL 5    vitamin D3 (CHOLECALCIFEROL) 50 mcg (2000 units) tablet Take 1  "tablet (50 mcg) by mouth daily 30 tablet 11       Allergies   Allergen Reactions    Seasonal Allergies         Social History     Tobacco Use    Smoking status: Never     Passive exposure: Never    Smokeless tobacco: Never   Substance Use Topics    Alcohol use: Yes     Alcohol/week: 1.0 - 2.0 standard drink of alcohol     Types: 1 - 2 Standard drinks or equivalent per week     Comment: occasional       History   Drug Use No             Review of Systems  Constitutional, HEENT, cardiovascular, pulmonary, GI, , and musculoskeletal systems are negative, except as otherwise noted.    Objective    /54   Pulse 69   Temp 98.1  F (36.7  C) (Temporal)   Resp 16   Ht 1.78 m (5' 10.08\")   Wt 90.3 kg (199 lb)   SpO2 97%   BMI 28.49 kg/m     Estimated body mass index is 28.49 kg/m  as calculated from the following:    Height as of this encounter: 1.78 m (5' 10.08\").    Weight as of this encounter: 90.3 kg (199 lb).  Physical Exam  GENERAL: alert and no distress  EYES: Eyes grossly normal to inspection, PERRL and conjunctivae and sclerae normal  HENT: ear canals and TM's normal, nose and mouth without ulcers or lesions  NECK: no adenopathy, no asymmetry, masses, or scars  RESP: lungs clear to auscultation - no rales, rhonchi or wheezes  CV: regular rate and rhythm, normal S1 S2, no S3 or S4, no murmur, click or rub, no peripheral edema  ABDOMEN: soft, nontender, no hepatosplenomegaly, no masses and bowel sounds normal  MS: no gross musculoskeletal defects noted, no edema    Recent Labs   Lab Test 04/07/25  1052 01/24/25  1821 11/12/24  1359 11/05/24  1157 10/15/24  1207 09/17/24  1202   HGB 13.5 15.1   < >  --    < > 13.3    206   < >  --    < > 231   NA  --  134*  --   --   --  136   POTASSIUM  --  4.4  --   --   --  4.2   CR  --  1.28*  --   --   --  0.95   A1C  --   --   --  5.4  --   --     < > = values in this interval not displayed.        Diagnostics  No labs were ordered during this visit.   No EKG " required for low risk surgery (cataract, skin procedure, breast biopsy, etc).    Revised Cardiac Risk Index (RCRI)  The patient has the following serious cardiovascular risks for perioperative complications:   - No serious cardiac risks = 0 points     RCRI Interpretation: 0 points: Class I (very low risk - 0.4% complication rate)       Seen by Alexandra BERRIOS    I, Francisco Hicsk PA-C, was present with the Physician Assistant student who participated in the service and in the documentation of the note.  I have verified the history and personally performed the physical exam and medical decision making.  I agree with the assessment and plan of care as documented in the note.     Signed Electronically by: Francisco Hicks PA-C  A copy of this evaluation report is provided to the requesting physician.

## 2025-04-29 NOTE — PATIENT INSTRUCTIONS
How to Take Your Medication Before Surgery  Preoperative Medication Instructions   Antiplatelet or Anticoagulation Medication Instructions   - Bleeding risk is low for this procedure (e.g. dental, skin, cataract).    Additional Medication Instructions  We reviewed the medication list and there are no chronic medications that need to be adjusted for this procedure.       Patient Education   Preparing for Your Surgery  For Adults  Getting started  In most cases, a nurse will call to review your health history and instructions. They will give you an arrival time based on your scheduled surgery time. Please be ready to share:  Your doctor's clinic name and phone number  Your medical, surgical, and anesthesia history  A list of allergies and sensitivities  A list of medicines, including herbal treatments and over-the-counter drugs  Whether the patient has a legal guardian (ask how to send us the papers in advance)  Note: You may not receive a call if you were seen at our PAC (Preoperative Assessment Center).  Please tell us if you're pregnant--or if there's any chance you might be pregnant. Some surgeries may injure a fetus (unborn baby), so they require a pregnancy test. Surgeries that are safe for a fetus don't always need a test, and you can choose whether to have one.   Preparing for surgery  Within 10 to 30 days of surgery: Have a pre-op exam (sometimes called an H&P, or History and Physical). This can be done at a clinic or pre-operative center.  If you're having a , you may not need this exam. Talk to your care team.  At your pre-op exam, talk to your care team about all medicines you take. (This includes CBD oil and any drugs, such as THC, marijuana, and other forms of cannabis.) If you need to stop any medicine before surgery, ask when to start taking it again.  This is for your safety. Many medicines and drugs can make you bleed too much during surgery. Some change how well surgery (anesthesia) drugs  work.  Call your insurance company to let them know you're having surgery. (If you don't have insurance, call [phone].)  Call your clinic if there's any change in your health. This includes a scrape or scratch near the surgery site, or any signs of a cold (sore throat, runny nose, cough, rash, fever).  Eating and drinking guidelines  For your safety: Unless your surgeon tells you otherwise, follow the guidelines below.  Eat and drink as normal until 8 hours before you arrive for surgery. After that, no food or milk. You can spit out gum when you arrive.  Drink clear liquids until 2 hours before you arrive. These are liquids you can see through, like water, Gatorade, and Propel Water. They also include plain black coffee and tea (no cream or milk).  No alcohol for 24 hours before you arrive. The night before surgery, stop any drinks that contain THC.  If your care team tells you to take medicine on the morning of surgery, it's okay to take it with a sip of water. No other medicines or drugs are allowed (including CBD oil)--follow your care team's instructions.  If you have questions the day of surgery, call your hospital or surgery center.   Preventing infection  Shower or bathe the night before and the morning of surgery. Follow the instructions your clinic gave you. (If no instructions, use regular soap.)  Don't shave or clip hair near your surgery site. We'll remove the hair if needed.  Don't smoke or vape the morning of surgery. No chewing tobacco for 6 hours before you arrive. A nicotine patch is okay. You may spit out nicotine gum when you arrive.  For some surgeries, the surgeon will tell you to fully quit smoking and nicotine.  We will make every effort to keep you safe from infection. We will:  Clean our hands often with soap and water (or an alcohol-based hand rub).  Clean the skin at your surgery site with a special soap that kills germs.  Give you a special gown to keep you warm. (Cold raises the  risk of infection.)  Wear hair covers, masks, gowns, and gloves during surgery.  Give antibiotic medicine, if prescribed. Not all surgeries need this medicine.  What to bring on the day of surgery  Photo ID and insurance card  Copy of your health care directive, if you have one  Glasses and hearing aids (bring cases)  You can't wear contacts during surgery  Inhaler and eye drops, if you use them (tell us about these when you arrive)  CPAP machine or breathing device, if you use them  A few personal items, if spending the night  If you have . . .  A pacemaker, ICD (cardiac defibrillator), or other implant: Bring the ID card.  An implanted stimulator: Bring the remote control.  A legal guardian: Bring a copy of the certified (court-stamped) guardianship papers.  Please remove any jewelry, including body piercings. Leave jewelry and other valuables at home.  If you're going home the day of surgery  You must have a responsible adult drive you home. They should stay with you overnight as well.  If you don't have someone to stay with you, and you aren't safe to go home alone, we may keep you overnight. Insurance often won't pay for this.  After surgery  If it's hard to control your pain or you need more pain medicine, please call your surgeon's office.  Questions?   If you have any questions for your care team, list them here:   ____________________________________________________________________________________________________________________________________________________________________________________________________________________________________________________________  For informational purposes only. Not to replace the advice of your health care provider. Copyright   2003, 2019 [facility]. All rights reserved. Clinically reviewed by Roque Chauhan MD. [employer] [id number] - REV 08/24.

## 2025-05-01 ENCOUNTER — TELEPHONE (OUTPATIENT)
Dept: GASTROENTEROLOGY | Facility: CLINIC | Age: 70
End: 2025-05-01

## 2025-05-01 NOTE — TELEPHONE ENCOUNTER
MTM appointment no showed, we made one more attempt to reschedule.     Routing back to referring provider and MTM Pharmacist Team        Chau Zuleta  MTM

## 2025-06-02 DIAGNOSIS — K51.00 ULCERATIVE PANCOLITIS WITHOUT COMPLICATION (H): ICD-10-CM

## 2025-06-02 RX ORDER — VEDOLIZUMAB 108 MG/.68ML
INJECTION, SOLUTION SUBCUTANEOUS
Qty: 2.72 ML | Refills: 5 | Status: SHIPPED | OUTPATIENT
Start: 2025-06-02

## 2025-06-02 NOTE — TELEPHONE ENCOUNTER
Entyvio pen refilled using CPA with DO Uma.    Last provider visit: 2024 DO Uma  Next provider visit: RTC 12 months (not scheduled)  Last labs completed: 2025  Lab frequency: every 3 months   - standing labs available until 2025  Next labs due: 2025  Last TB screenin2025  PDC: 83%    Saurav Campbell PharmD, BCPS  MT Pharmacist   [facility]  Phone: [phone]

## 2025-06-14 DIAGNOSIS — K83.01 PSC (PRIMARY SCLEROSING CHOLANGITIS) (H): ICD-10-CM

## 2025-06-16 RX ORDER — URSODIOL 300 MG/1
CAPSULE ORAL
Qty: 180 CAPSULE | Refills: 0 | OUTPATIENT
Start: 2025-06-16

## 2025-06-24 ENCOUNTER — MYC MEDICAL ADVICE (OUTPATIENT)
Dept: PHARMACY | Facility: CLINIC | Age: 70
End: 2025-06-24
Payer: COMMERCIAL

## 2025-06-24 NOTE — TELEPHONE ENCOUNTER
Patient's wife called and is returning call. She states that Leno gets confused and that is why she wants to be there when Leno gets talked to. Please advise.   Writer contacted patient 6/20/25 1024 and 6/23/25 4290 with no answer and left a voicemail requesting a call back for caring contact. Writer also contacted patients emergency contact with no answer. Writer reached out to Radha Avila for welfare check.

## 2025-06-30 ENCOUNTER — LAB (OUTPATIENT)
Dept: LAB | Facility: OTHER | Age: 70
End: 2025-06-30
Payer: COMMERCIAL

## 2025-06-30 DIAGNOSIS — K51.00 ULCERATIVE PANCOLITIS WITHOUT COMPLICATION (H): ICD-10-CM

## 2025-06-30 LAB
ALBUMIN SERPL BCG-MCNC: 3.9 G/DL (ref 3.5–5.2)
ALP SERPL-CCNC: 192 U/L (ref 40–150)
ALT SERPL W P-5'-P-CCNC: 32 U/L (ref 0–70)
AST SERPL W P-5'-P-CCNC: 39 U/L (ref 0–45)
BASOPHILS # BLD AUTO: 0.1 10E3/UL (ref 0–0.2)
BASOPHILS NFR BLD AUTO: 1 %
BILIRUB DIRECT SERPL-MCNC: 0.23 MG/DL (ref 0–0.3)
BILIRUB SERPL-MCNC: 0.5 MG/DL
CRP SERPL-MCNC: <3 MG/L
EOSINOPHIL # BLD AUTO: 0.5 10E3/UL (ref 0–0.7)
EOSINOPHIL NFR BLD AUTO: 7 %
ERYTHROCYTE [DISTWIDTH] IN BLOOD BY AUTOMATED COUNT: 12.3 % (ref 10–15)
HCT VFR BLD AUTO: 39.6 % (ref 40–53)
HGB BLD-MCNC: 12.9 G/DL (ref 13.3–17.7)
IMM GRANULOCYTES # BLD: 0 10E3/UL
IMM GRANULOCYTES NFR BLD: 0 %
LYMPHOCYTES # BLD AUTO: 2 10E3/UL (ref 0.8–5.3)
LYMPHOCYTES NFR BLD AUTO: 28 %
MCH RBC QN AUTO: 30.1 PG (ref 26.5–33)
MCHC RBC AUTO-ENTMCNC: 32.6 G/DL (ref 31.5–36.5)
MCV RBC AUTO: 92 FL (ref 78–100)
MONOCYTES # BLD AUTO: 0.8 10E3/UL (ref 0–1.3)
MONOCYTES NFR BLD AUTO: 11 %
NEUTROPHILS # BLD AUTO: 3.8 10E3/UL (ref 1.6–8.3)
NEUTROPHILS NFR BLD AUTO: 53 %
PLATELET # BLD AUTO: 255 10E3/UL (ref 150–450)
PROT SERPL-MCNC: 7.3 G/DL (ref 6.4–8.3)
RBC # BLD AUTO: 4.29 10E6/UL (ref 4.4–5.9)
WBC # BLD AUTO: 7.1 10E3/UL (ref 4–11)

## 2025-06-30 PROCEDURE — 80076 HEPATIC FUNCTION PANEL: CPT

## 2025-06-30 PROCEDURE — 85025 COMPLETE CBC W/AUTO DIFF WBC: CPT

## 2025-06-30 PROCEDURE — 36415 COLL VENOUS BLD VENIPUNCTURE: CPT

## 2025-06-30 PROCEDURE — 86140 C-REACTIVE PROTEIN: CPT

## 2025-07-01 ENCOUNTER — RESULTS FOLLOW-UP (OUTPATIENT)
Dept: GASTROENTEROLOGY | Facility: CLINIC | Age: 70
End: 2025-07-01

## 2025-07-03 ENCOUNTER — TELEPHONE (OUTPATIENT)
Dept: GASTROENTEROLOGY | Facility: CLINIC | Age: 70
End: 2025-07-03
Payer: COMMERCIAL

## 2025-07-03 NOTE — TELEPHONE ENCOUNTER
Prior Authorization Approval    Medication: ENTYVIO  MG/0.68ML SC SOAJ  Authorization Effective Date: 4/4/2025  Authorization Expiration Date: 7/3/2026  Approved Dose/Quantity: 2/282  Reference #: [id number]   Insurance Company: [employer] [place] - Phone [phone] Fax [phone]  Expected CoPay: $    CoPay Card Available:      Financial Assistance Needed:    Which Pharmacy is filling the prescription: [facility] - [place] [employer], [place] - [address]  Pharmacy Notified:    Patient Notified:  renewal

## 2025-07-03 NOTE — TELEPHONE ENCOUNTER
PA Initiation    Medication: ENTYVIO  MG/0.68ML SC SOAJ  Insurance Company: [employer] [place] - Phone [phone] Fax [phone]  Pharmacy Filling the Rx: [facility] - [place], [place] - [address]  Filling Pharmacy Phone:    Filling Pharmacy Fax:    Start Date: 7/3/2025  [id number]

## 2025-07-24 ENCOUNTER — VIRTUAL VISIT (OUTPATIENT)
Dept: PHARMACY | Facility: CLINIC | Age: 70
End: 2025-07-24
Attending: INTERNAL MEDICINE
Payer: COMMERCIAL

## 2025-07-24 DIAGNOSIS — K51.00 ULCERATIVE PANCOLITIS WITHOUT COMPLICATION (H): Primary | ICD-10-CM

## 2025-07-24 DIAGNOSIS — K83.01 PSC (PRIMARY SCLEROSING CHOLANGITIS) (H): ICD-10-CM

## 2025-07-24 ASSESSMENT — PAIN SCALES - GENERAL: PAINLEVEL_OUTOF10: NO PAIN (0)

## 2025-07-24 NOTE — PROGRESS NOTES
"Medication Therapy Management (MTM) Encounter    ASSESSMENT:                            Medication Adherence/Access: No issues identified.    Ulcerative Colitis/PSC:   Leno would benefit from continued treatment with Entyvio 108 mg every 7 days. They are up to date on routine maintenance labs. They are up to date on annual tuberculosis screening. No access issues for their advanced therapy are present. Health maintenance was not comprehensively reviewed with this visit.    PLAN:                            No changes to medications at today's visit.    Follow-up: 1/22/2026 at 1:30 PM (telephone)    SUBJECTIVE/OBJECTIVE:                          Mainor Grande is a 69 year old male seen for a follow-up visit. Patient was accompanied by his wife, Rani.  He has dementia, so wife provides history and answers questions.    Reason for visit: Entyvio follow-up visit.    Allergies/ADRs: Reviewed in chart  Past Medical History: Reviewed in chart  Tobacco: He reports that he has never smoked. He has never been exposed to tobacco smoke. He has never used smokeless tobacco.  Alcohol: none      Medication Adherence/Access: no issues reported.    Ulcerative Colitis/PSC:   Entyvio 108 mg every 7 days  Ursodiol 300 mg twice daily     Met with Mainor and Rani for a routine follow-up visit. Did episode of sweating, similar to January, but without the \"seizure\" looking activity.      PRO-2 for Ulcerative Colitis    Please select the one best answer for the patient's ability at this time     How would you rate your stool frequency over the last 3 days?    3-4 stools more than normal: 2 points    How would you rate the severity of your rectal bleeding over the last 3 days?    None: 0 points -- Rani does not check this, she will do so moving forward given decrease in Hgb    3. Over the last week, how would you rate your general well-being?    Generally Well difficult to assess with dementia, but denies pain    Score: 0 (do not include " question 3 in scoring)  0: Inactive Disease  1-1.5: Remission  6: Active disease and spontaneous bleeding     Specialty medication department: [facility]  Prior authorization status: approved (MG); Entyvio pen approved through 7/3/2026  Last dose: 2025  Next dose: 2025     Last provider visit: 2024 DO Caro  Next provider visit: RTC 12 months (not scheduled)  Last labs completed: 2025  Last Tb screening:   Lab frequency: every other infusion              - standing labs crp, cbc with platelets & diff, hepatic function  2025  ----------------      I spent 15 minutes with this patient today. All changes were made via collaborative practice agreement with Lily Eaton DO.     A summary of these recommendations was sent via [email or web address].    Abiodun Campbell, PharmD, BCPS  [facility] Pharmacist   [facility]  Phone: [phone]    Telemedicine Visit Details  The patient's medications can be safely assessed via a telemedicine encounter.  Type of service:  Telephone visit  Originating Location (pt. Location): Home    Distant Location (provider location):  Off-site  Start Time: 1:30 PM  End Time: 1:45 PM     Medication Therapy Recommendations  No medication therapy recommendations to display

## 2025-07-24 NOTE — NURSING NOTE
Current patient location: [id number] [facility] [address]    Is the patient currently in the state of [place]? YES    Visit mode: TELEPHONE    If the visit is dropped, the patient can be reconnected by:TELEPHONE VISIT: Phone number:   Telephone Information:   Mobile [phone]       Will anyone else be joining the visit? RaniSherrill's Wife  (If patient encounters technical issues they should call [phone])    Are changes needed to the allergy or medication list? No    Are refills needed on medications prescribed by this physician? NO    Rooming Documentation:  Not applicable    Reason for visit: [facility]

## 2025-07-24 NOTE — PROGRESS NOTES
"Virtual Visit Details    Type of service:  Telephone Visit   Phone call duration: *** minutes   Originating Location (pt. Location): {patient location:237399::\"Home\"}  {PROVIDER LOCATION On-site should be selected for visits conducted from your clinic location or adjoining [facility] hospital, academic office, or other nearby [facility] building. Off-site should be selected for all other provider locations, including home:[id number]}  Distant Location (provider location):  {virtual location provider:479888}  Telephone visit completed due to {audio only reason:055793}  "

## 2025-07-24 NOTE — PATIENT INSTRUCTIONS
"Recommendations from today's MTM visit:                                                      No changes to medications at today's visit.    Follow-up: 1/22/2026 at 1:30 PM (telephone)    It was great speaking with you today.  I value your experience and would be very thankful for your time in providing feedback in our clinic survey. In the next few days, you may receive an email or text message from [facility] with a link to a survey related to your  clinical pharmacist.\"     To schedule another MTM appointment, please call the clinic directly or you may call the MTM scheduling line at [phone] or toll-free at [phone].     My Clinical Pharmacist's contact information:                                                      Please feel free to contact me with any questions or concerns you have.      Abiodun Campbell, PharmD, BCPS  MTM Pharmacist   [facility]  Phone: [phone]   "

## 2025-07-30 NOTE — PROGRESS NOTES
Therapy plan discontinued as patient has transitioned to Entyvio pens and is no longer doing infusions.    Mallory Kessler RN

## (undated) DEVICE — KIT ENDO TURNOVER/PROCEDURE CARRY-ON 101822

## (undated) DEVICE — PAD CHUX UNDERPAD 23X24" 7136

## (undated) DEVICE — LUBRICATING JELLY 4.25OZ

## (undated) DEVICE — PREP CHLORAPREP 26ML TINTED ORANGE  260815

## (undated) DEVICE — KIT ENDO FIRST STEP DISINFECTANT 200ML W/POUCH EP-4

## (undated) DEVICE — ENDO FORCEP ENDOJAW BIOPSY 2.8MMX230CM FB-220U

## (undated) DEVICE — TUBING SUCTION 12"X1/4" N612

## (undated) DEVICE — GLOVE EXAM NITRILE LG

## (undated) DEVICE — SOL WATER IRRIG 1000ML BOTTLE 07139-09

## (undated) RX ORDER — SIMETHICONE 40MG/0.6ML
SUSPENSION, DROPS(FINAL DOSAGE FORM)(ML) ORAL
Status: DISPENSED
Start: 2020-07-31

## (undated) RX ORDER — FENTANYL CITRATE 50 UG/ML
INJECTION, SOLUTION INTRAMUSCULAR; INTRAVENOUS
Status: DISPENSED
Start: 2021-04-05

## (undated) RX ORDER — FENTANYL CITRATE 50 UG/ML
INJECTION, SOLUTION INTRAMUSCULAR; INTRAVENOUS
Status: DISPENSED
Start: 2020-07-31

## (undated) RX ORDER — FENTANYL CITRATE 50 UG/ML
INJECTION, SOLUTION INTRAMUSCULAR; INTRAVENOUS
Status: DISPENSED
Start: 2022-05-03